# Patient Record
Sex: MALE | Race: WHITE | NOT HISPANIC OR LATINO | Employment: OTHER | URBAN - METROPOLITAN AREA
[De-identification: names, ages, dates, MRNs, and addresses within clinical notes are randomized per-mention and may not be internally consistent; named-entity substitution may affect disease eponyms.]

---

## 2017-09-27 PROBLEM — I49.3 PVC'S (PREMATURE VENTRICULAR CONTRACTIONS): Status: ACTIVE | Noted: 2017-09-27

## 2018-12-14 DIAGNOSIS — I10 ESSENTIAL HYPERTENSION: ICD-10-CM

## 2018-12-14 RX ORDER — HYDROCHLOROTHIAZIDE 12.5 MG/1
12.5 CAPSULE ORAL DAILY
Qty: 30 CAPSULE | Refills: 3 | Status: SHIPPED | OUTPATIENT
Start: 2018-12-14 | End: 2020-02-10

## 2019-01-11 ENCOUNTER — OFFICE VISIT (OUTPATIENT)
Dept: CARDIOLOGY | Facility: CLINIC | Age: 64
End: 2019-01-11
Payer: COMMERCIAL

## 2019-01-11 VITALS
SYSTOLIC BLOOD PRESSURE: 128 MMHG | DIASTOLIC BLOOD PRESSURE: 75 MMHG | WEIGHT: 215 LBS | BODY MASS INDEX: 30.1 KG/M2 | HEART RATE: 59 BPM | HEIGHT: 71 IN

## 2019-01-11 DIAGNOSIS — E01.0 THYROMEGALY: ICD-10-CM

## 2019-01-11 DIAGNOSIS — I49.1 PREMATURE ATRIAL CONTRACTIONS: ICD-10-CM

## 2019-01-11 DIAGNOSIS — I48.92 ATRIAL FLUTTER, UNSPECIFIED TYPE: Primary | ICD-10-CM

## 2019-01-11 DIAGNOSIS — I48.91 ATRIAL FIBRILLATION: ICD-10-CM

## 2019-01-11 DIAGNOSIS — I49.3 PVC'S (PREMATURE VENTRICULAR CONTRACTIONS): ICD-10-CM

## 2019-01-11 DIAGNOSIS — I10 ESSENTIAL HYPERTENSION: ICD-10-CM

## 2019-01-11 PROCEDURE — 3008F PR BODY MASS INDEX (BMI) DOCUMENTED: ICD-10-PCS | Mod: CPTII,S$GLB,, | Performed by: INTERNAL MEDICINE

## 2019-01-11 PROCEDURE — 99214 PR OFFICE/OUTPT VISIT, EST, LEVL IV, 30-39 MIN: ICD-10-PCS | Mod: S$GLB,,, | Performed by: INTERNAL MEDICINE

## 2019-01-11 PROCEDURE — 93000 ELECTROCARDIOGRAM COMPLETE: CPT | Mod: 76,S$GLB,, | Performed by: INTERNAL MEDICINE

## 2019-01-11 PROCEDURE — 3008F BODY MASS INDEX DOCD: CPT | Mod: CPTII,S$GLB,, | Performed by: INTERNAL MEDICINE

## 2019-01-11 PROCEDURE — 93000 EKG 12-LEAD: ICD-10-PCS | Mod: S$GLB,,, | Performed by: INTERNAL MEDICINE

## 2019-01-11 PROCEDURE — 99214 OFFICE O/P EST MOD 30 MIN: CPT | Mod: S$GLB,,, | Performed by: INTERNAL MEDICINE

## 2019-01-11 PROCEDURE — 3078F PR MOST RECENT DIASTOLIC BLOOD PRESSURE < 80 MM HG: ICD-10-PCS | Mod: CPTII,S$GLB,, | Performed by: INTERNAL MEDICINE

## 2019-01-11 PROCEDURE — 93000 ELECTROCARDIOGRAM COMPLETE: CPT | Mod: S$GLB,,, | Performed by: INTERNAL MEDICINE

## 2019-01-11 PROCEDURE — 3078F DIAST BP <80 MM HG: CPT | Mod: CPTII,S$GLB,, | Performed by: INTERNAL MEDICINE

## 2019-01-11 PROCEDURE — 3074F SYST BP LT 130 MM HG: CPT | Mod: CPTII,S$GLB,, | Performed by: INTERNAL MEDICINE

## 2019-01-11 PROCEDURE — 3074F PR MOST RECENT SYSTOLIC BLOOD PRESSURE < 130 MM HG: ICD-10-PCS | Mod: CPTII,S$GLB,, | Performed by: INTERNAL MEDICINE

## 2019-01-11 RX ORDER — PANTOPRAZOLE SODIUM 40 MG/1
40 TABLET, DELAYED RELEASE ORAL DAILY
COMMUNITY
End: 2021-03-09

## 2019-01-11 NOTE — PROGRESS NOTES
Subjective:      Patient ID: Jules Aviles is a 63 y.o. male.    Chief Complaint: Follow-up (Hypertension)    HPI:  Works every day in construction.  Feels well.    Review of Systems   Cardiovascular: Negative for chest pain, claudication, dyspnea on exertion, irregular heartbeat, leg swelling, near-syncope, orthopnea, palpitations and syncope.      Pt recently diagnosed with stomach ulcers.  Pt was begun of protonix within the past week.  There is no hx of bleeding.    Pt does not drink alcohol and does not take ASA or NSAID's.    Pt had EGD and colonoscopy with polypectomy.  Past Medical History:   Diagnosis Date    Essential hypertension 12/7/2016        Past Surgical History:   Procedure Laterality Date    APPENDECTOMY      as a child    GALLBLADDER SURGERY  2004    HERNIA REPAIR  2004 & 2005    TONSILLECTOMY, ADENOIDECTOMY      as a child       Family History   Problem Relation Age of Onset    Aneurysm Mother     Heart disease Father        Social History     Socioeconomic History    Marital status: Single     Spouse name: None    Number of children: None    Years of education: None    Highest education level: None   Social Needs    Financial resource strain: None    Food insecurity - worry: None    Food insecurity - inability: None    Transportation needs - medical: None    Transportation needs - non-medical: None   Occupational History    None   Tobacco Use    Smoking status: Never Smoker    Smokeless tobacco: Never Used   Substance and Sexual Activity    Alcohol use: No    Drug use: No    Sexual activity: None   Other Topics Concern    None   Social History Narrative    None       Current Outpatient Medications on File Prior to Visit   Medication Sig Dispense Refill    Bifidobacterium infantis (ALIGN ORAL) Take by mouth once daily.      hydroCHLOROthiazide (MICROZIDE) 12.5 mg capsule Take 1 capsule (12.5 mg total) by mouth once daily. 30 capsule 3    pantoprazole (PROTONIX) 40 MG  "tablet Take 40 mg by mouth once daily.      [DISCONTINUED] aspirin (ECOTRIN) 81 MG EC tablet Take 81 mg by mouth once daily.      [DISCONTINUED] b complex vitamins capsule Take 1 capsule by mouth once daily.      [DISCONTINUED] fish oil-omega-3 fatty acids 300-1,000 mg capsule Take 2 g by mouth once daily.      [DISCONTINUED] vitamin D 1000 units Tab Take 1,000 Units by mouth once daily.       No current facility-administered medications on file prior to visit.        Review of patient's allergies indicates:  No Known Allergies  Objective:     Vitals:    01/11/19 1347   BP: 128/75   BP Location: Right arm   Patient Position: Sitting   BP Method: Large (Automatic)   Pulse: (!) 59   Weight: 97.5 kg (215 lb)   Height: 5' 11" (1.803 m)        Physical Exam   Constitutional: He is oriented to person, place, and time. He appears well-developed and well-nourished. No distress.   Eyes: No scleral icterus.   Neck: No JVD present. Carotid bruit is not present.   Cardiovascular: Normal heart sounds. An irregularly irregular rhythm present. Exam reveals no gallop and no friction rub.   No murmur heard.  Pulmonary/Chest: Effort normal and breath sounds normal. No respiratory distress.   Musculoskeletal: He exhibits no edema.   Neurological: He is alert and oriented to person, place, and time.   Skin: Skin is warm and dry. He is not diaphoretic.   Psychiatric: He has a normal mood and affect. His behavior is normal. Judgment and thought content normal.   Vitals reviewed.     Wt down 2 lbs    ECG today --atrial flutter with ventricular rate in the 90's, otherwise WNL  Assessment:     1. Atrial flutter, unspecified type    2. Essential hypertension    3. Premature atrial contractions    4. PVC's (premature ventricular contractions)    5. Thyromegaly      Plan:   Jules was seen today for follow-up.    Diagnoses and all orders for this visit:    Atrial flutter, unspecified type  -     TSH; Future  -     T4, free; Future  -     " Transthoracic echo (TTE) complete (Cupid Only); Future    Essential hypertension  -     IN OFFICE EKG 12-LEAD (to Muse)  -     CBC auto differential; Future  -     Comprehensive metabolic panel; Future  -     Lipid panel; Future  -     TSH; Future  -     T4, free; Future  -     Transthoracic echo (TTE) complete (Cupid Only); Future    Premature atrial contractions  -     IN OFFICE EKG 12-LEAD (to Muse)    PVC's (premature ventricular contractions)  -     IN OFFICE EKG 12-LEAD (to Muse)    Thyromegaly     Discussed at length with pt the diagnosis of new onset atrial flutter.Pt is asymptomatic.   Discussed risk of thromboembolic stroke and the benefit of anticoagulation (albeit with a risk of bleeding).  Will begin Eliquis    Consider trial of amiodarone after a month of anticoagulation.  Consider electrical cardioversion if amiodarone is not effective.  Consult Dr Rich Berumen at [facility]    Discussed with pt consultation with electrophysiology service and potential role of RFA.    Endocrinology consult for thyromegaly at [facility]    Lab ordered    Echocardiogram    Follow-up in about 4 weeks (around 2/8/2019).

## 2019-01-15 ENCOUNTER — HOSPITAL ENCOUNTER (OUTPATIENT)
Dept: CARDIOLOGY | Facility: OTHER | Age: 64
Discharge: HOME | End: 2019-01-15
Attending: INTERNAL MEDICINE
Payer: COMMERCIAL

## 2019-01-15 DIAGNOSIS — I10 ESSENTIAL HYPERTENSION: ICD-10-CM

## 2019-01-15 DIAGNOSIS — I48.92 ATRIAL FLUTTER, UNSPECIFIED TYPE: ICD-10-CM

## 2019-01-15 LAB
AORTIC ROOT ANNULUS: 3.01 CM
AORTIC VALVE CUSP SEPERATION: 1.93 CM
ASCENDING AORTA: 2.98 CM
AV INDEX (PROSTH): 0.81
AV MEAN GRADIENT: 3.38 MMHG
AV PEAK GRADIENT: 7.84 MMHG
AV VALVE AREA: 2.7 CM2
CV ECHO LV RWT: 0.49 CM
DOP CALC AO PEAK VEL: 1.4 M/S
DOP CALC AO VTI: 26.04 CM
DOP CALC LVOT AREA: 3.33 CM2
DOP CALC LVOT DIAMETER: 2.06 CM
DOP CALC LVOT STROKE VOLUME: 70.39 CM3
DOP CALCLVOT PEAK VEL VTI: 21.13 CM
E WAVE DECELERATION TIME: 250.41 MSEC
E/A RATIO: 1.05
E/E' RATIO: 6.63
ECHO LV POSTERIOR WALL: 1.2 CM (ref 0.6–1.1)
FRACTIONAL SHORTENING: 24 % (ref 28–44)
INTERVENTRICULAR SEPTUM: 1.3 CM (ref 0.6–1.1)
LA MAJOR: 5.62 CM
LA MINOR: 6.03 CM
LA WIDTH: 4.01 CM
LEFT ATRIUM SIZE: 5 CM
LEFT ATRIUM VOLUME: 99.15 CM3
LEFT INTERNAL DIMENSION IN SYSTOLE: 3.74 CM (ref 2.1–4)
LEFT VENTRICLE DIASTOLIC VOLUME: 113.89 ML
LEFT VENTRICLE SYSTOLIC VOLUME: 59.58 ML
LEFT VENTRICULAR INTERNAL DIMENSION IN DIASTOLE: 4.92 CM (ref 3.5–6)
LEFT VENTRICULAR MASS: 241.4 G
LV LATERAL E/E' RATIO: 5.73
LV SEPTAL E/E' RATIO: 7.88
MV PEAK A VEL: 0.6 M/S
MV PEAK E VEL: 0.63 M/S
PISA TR MAX VEL: 2.52 M/S
PV PEAK VELOCITY: 0.88 CM/S
RA MAJOR: 5.2 CM
RA PRESSURE: 3 MMHG
RA WIDTH: 3.98 CM
RIGHT VENTRICULAR END-DIASTOLIC DIMENSION: 2.8 CM
SINUS: 3.29 CM
STJ: 2.79 CM
TDI LATERAL: 0.11
TDI SEPTAL: 0.08
TDI: 0.1
TR MAX PG: 25.4 MMHG
TV REST PULMONARY ARTERY PRESSURE: 28 MMHG

## 2019-01-15 PROCEDURE — 93306 TTE W/DOPPLER COMPLETE: CPT

## 2019-01-15 PROCEDURE — 93306 TTE W/DOPPLER COMPLETE: CPT | Mod: 26,,, | Performed by: INTERNAL MEDICINE

## 2019-01-15 PROCEDURE — 93306 TRANSTHORACIC ECHO (TTE) COMPLETE (CUPID ONLY): ICD-10-PCS | Mod: 26,,, | Performed by: INTERNAL MEDICINE

## 2019-01-16 ENCOUNTER — TELEPHONE (OUTPATIENT)
Dept: CARDIOLOGY | Facility: CLINIC | Age: 64
End: 2019-01-16

## 2019-01-16 NOTE — TELEPHONE ENCOUNTER
Echocardiogram shows normal LVEF. Pt is back in NSR.   Pt reassured.    Echo shows LVH and dilated LA.    Lab shows low HDL and borderline LDL.  CBC and CMP and TFT's ok--PT reassured.  Elevated T bili prob Jeanmarei's    EGD report from Dr Reed report reviewed.  Pt had ulcers in duodenum and polyp in colon.. Discussed increased risk of bleeding with Eliquis vs risk of stroke off Eliquis>  Pt knows to stop Eliquis and seek medical attention prn bleeding or black stools.    Consults with arrhythmia and endocrinology pending.

## 2019-01-18 DIAGNOSIS — I48.92 ATRIAL FLUTTER, UNSPECIFIED TYPE: Primary | ICD-10-CM

## 2019-01-22 ENCOUNTER — HOSPITAL ENCOUNTER (OUTPATIENT)
Dept: CARDIOLOGY | Facility: CLINIC | Age: 64
Discharge: HOME | End: 2019-01-22
Payer: COMMERCIAL

## 2019-01-22 ENCOUNTER — INITIAL CONSULT (OUTPATIENT)
Dept: ELECTROPHYSIOLOGY | Facility: CLINIC | Age: 64
End: 2019-01-22
Payer: COMMERCIAL

## 2019-01-22 VITALS
BODY MASS INDEX: 29.92 KG/M2 | DIASTOLIC BLOOD PRESSURE: 76 MMHG | WEIGHT: 209 LBS | HEART RATE: 58 BPM | SYSTOLIC BLOOD PRESSURE: 130 MMHG | HEIGHT: 70 IN

## 2019-01-22 DIAGNOSIS — I48.92 ATRIAL FLUTTER, UNSPECIFIED TYPE: ICD-10-CM

## 2019-01-22 DIAGNOSIS — I10 ESSENTIAL HYPERTENSION: ICD-10-CM

## 2019-01-22 DIAGNOSIS — I48.92 ATRIAL FLUTTER, UNSPECIFIED TYPE: Primary | ICD-10-CM

## 2019-01-22 DIAGNOSIS — I49.1 PREMATURE ATRIAL CONTRACTIONS: ICD-10-CM

## 2019-01-22 PROCEDURE — 3075F SYST BP GE 130 - 139MM HG: CPT | Mod: CPTII,S$GLB,, | Performed by: INTERNAL MEDICINE

## 2019-01-22 PROCEDURE — 93005 ELECTROCARDIOGRAM TRACING: CPT | Mod: S$GLB,,, | Performed by: INTERNAL MEDICINE

## 2019-01-22 PROCEDURE — 3078F DIAST BP <80 MM HG: CPT | Mod: CPTII,S$GLB,, | Performed by: INTERNAL MEDICINE

## 2019-01-22 PROCEDURE — 99999 PR PBB SHADOW E&M-EST. PATIENT-LVL III: CPT | Mod: PBBFAC,,, | Performed by: INTERNAL MEDICINE

## 2019-01-22 PROCEDURE — 93010 RHYTHM STRIP: ICD-10-PCS | Mod: S$GLB,,, | Performed by: INTERNAL MEDICINE

## 2019-01-22 PROCEDURE — 93005 RHYTHM STRIP: ICD-10-PCS | Mod: S$GLB,,, | Performed by: INTERNAL MEDICINE

## 2019-01-22 PROCEDURE — 93010 ELECTROCARDIOGRAM REPORT: CPT | Mod: S$GLB,,, | Performed by: INTERNAL MEDICINE

## 2019-01-22 PROCEDURE — 99999 PR PBB SHADOW E&M-EST. PATIENT-LVL III: ICD-10-PCS | Mod: PBBFAC,,, | Performed by: INTERNAL MEDICINE

## 2019-01-22 PROCEDURE — 3078F PR MOST RECENT DIASTOLIC BLOOD PRESSURE < 80 MM HG: ICD-10-PCS | Mod: CPTII,S$GLB,, | Performed by: INTERNAL MEDICINE

## 2019-01-22 PROCEDURE — 99205 PR OFFICE/OUTPT VISIT, NEW, LEVL V, 60-74 MIN: ICD-10-PCS | Mod: S$GLB,,, | Performed by: INTERNAL MEDICINE

## 2019-01-22 PROCEDURE — 99205 OFFICE O/P NEW HI 60 MIN: CPT | Mod: S$GLB,,, | Performed by: INTERNAL MEDICINE

## 2019-01-22 PROCEDURE — 3075F PR MOST RECENT SYSTOLIC BLOOD PRESS GE 130-139MM HG: ICD-10-PCS | Mod: CPTII,S$GLB,, | Performed by: INTERNAL MEDICINE

## 2019-01-22 PROCEDURE — 3008F BODY MASS INDEX DOCD: CPT | Mod: CPTII,S$GLB,, | Performed by: INTERNAL MEDICINE

## 2019-01-22 PROCEDURE — 3008F PR BODY MASS INDEX (BMI) DOCUMENTED: ICD-10-PCS | Mod: CPTII,S$GLB,, | Performed by: INTERNAL MEDICINE

## 2019-01-22 NOTE — PROGRESS NOTES
Subjective:    Patient ID:  Jules Aviles is a 63 y.o. male who presents for evaluation of Atrial Flutter    Referring Cardiologist: Aaron Mathis MD    HPI  I had the pleasure of seeing Mr. Aviles today in our electrophysiology clinic in consultation for his atrial arrhythmia. As you are aware he is a pleasant 63 year-old man with hypertension, duodenal ulcers and newly diagnosed atrial flutter.  He saw Dr. Mathis on 1/11/2019 and was noted to be in atrial flutter with variable AV conduction. Appearance of flutter waves were most consistent with reverse typical atrial flutter however atrial flutter wave cycle length was ~200msec. He was initiated on eliquis. An echocardiogram was then performed on 1/15/2019 noting a normal LVEF and left atrial enlargement. During that study he was noted to be in sinus rhythm.    I reviewed prior ECGs available in media tab which note sinus rhythm with intermittent PACs and PVCs.    My interpretation of today's in clinic ECG is sinus rhythm with first degree AV block.    Review of Systems   Constitution: Negative for fever, weakness and malaise/fatigue.   HENT: Negative for congestion and sore throat.    Eyes: Negative for blurred vision and visual disturbance.   Cardiovascular: Negative for chest pain, dyspnea on exertion, irregular heartbeat, orthopnea and palpitations.   Respiratory: Negative for cough and shortness of breath.    Hematologic/Lymphatic: Negative for bleeding problem. Does not bruise/bleed easily.   Skin: Negative.    Musculoskeletal: Negative.    Gastrointestinal: Positive for heartburn. Negative for hematochezia and melena.   Neurological: Negative for dizziness, focal weakness and light-headedness.        Objective:    Physical Exam   Constitutional: He is oriented to person, place, and time. He appears well-developed and well-nourished. No distress.   HENT:   Head: Normocephalic and atraumatic.   Eyes: Conjunctivae are normal. Right eye exhibits no discharge.  Left eye exhibits no discharge.   Neck: Neck supple. No JVD present.   Cardiovascular: Normal rate and regular rhythm. Exam reveals no gallop and no friction rub.   No murmur heard.  Pulmonary/Chest: Effort normal and breath sounds normal. No respiratory distress. He has no wheezes. He has no rales.   Abdominal: Soft. Bowel sounds are normal. He exhibits no distension. There is no tenderness. There is no rebound.   Musculoskeletal: He exhibits no edema.   Neurological: He is alert and oriented to person, place, and time.   Skin: Skin is warm and dry. He is not diaphoretic.   Psychiatric: He has a normal mood and affect. His behavior is normal. Thought content normal.         Assessment:       1. Atrial flutter, unspecified type    2. Essential hypertension    3. Premature atrial contractions         Plan:       In summary, Mr. Aviles is a pleasant 63 year-old man with hypertension, duodenal ulcers and newly diagnosed paroxysmal symptomatic atrial flutter. Review of his ECG is not consistent with typical counterclockwise CTI dependent flutter. Could be reverse typical CTI flutter. I had a long discussion with the patient about the pathophysiology and risks of atrial flutter and its basic pathophysiology, including its health implications and treatment options. We also discussed atrial fibrillation's relationship to atrial flutter and how 50% of patients are diagnosed with AF within 5 years. We also addressed the need for CVA (stroke) prophylaxis with aspirin versus oral anticoagulation (warfarin vs DOACs, discussed bleeding risks, and need to come to the ER for any head trauma for CT scanning even if asymptomatic). His [id number] score is 1 however he also has duodenal ulcers. I also discussed the goal to reduce symptomatic arrhythmic episodes by pharmacologic and/or procedural methods and utilizing a rhythm versus a rate control strategy. I recommend EPS and RFA of the CTI. Would attempt to induce if he presents in  sinus rhythm. I spent about a half hour discussing the nature of EP study and ablation. We discussed risks and benefits at length. Our discussion included, but was not limited to the risk of death, infection, bleeding, stroke, MI, cardiac perforation, vascular injury, embolism, cardiac tamponade, skin burns, and other organic injury including the possibility for need for surgery or pacemaker implantation.    Plan  EPS/RFA of CTI  Anesthesia for sedation  JONA day of, cancel if in sinus rhythm  SARA for mapping  Hold eliquis AM of procedure    Thank you for allowing me to participate in the care of this patient. Please do not hesitate to call me with any questions or concerns.    Rich Berumen MD, PhD  Cardiac Electrophysiology

## 2019-01-22 NOTE — H&P (VIEW-ONLY)
Subjective:    Patient ID:  Jules Aviles is a 63 y.o. male who presents for evaluation of Atrial Flutter    Referring Cardiologist: Aaron Mathis MD    HPI  I had the pleasure of seeing Mr. Aviles today in our electrophysiology clinic in consultation for his atrial arrhythmia. As you are aware he is a pleasant 63 year-old man with hypertension, duodenal ulcers and newly diagnosed atrial flutter.  He saw Dr. Mathis on 1/11/2019 and was noted to be in atrial flutter with variable AV conduction. Appearance of flutter waves were most consistent with reverse typical atrial flutter however atrial flutter wave cycle length was ~200msec. He was initiated on eliquis. An echocardiogram was then performed on 1/15/2019 noting a normal LVEF and left atrial enlargement. During that study he was noted to be in sinus rhythm.    I reviewed prior ECGs available in media tab which note sinus rhythm with intermittent PACs and PVCs.    My interpretation of today's in clinic ECG is sinus rhythm with first degree AV block.    Review of Systems   Constitution: Negative for fever, weakness and malaise/fatigue.   HENT: Negative for congestion and sore throat.    Eyes: Negative for blurred vision and visual disturbance.   Cardiovascular: Negative for chest pain, dyspnea on exertion, irregular heartbeat, orthopnea and palpitations.   Respiratory: Negative for cough and shortness of breath.    Hematologic/Lymphatic: Negative for bleeding problem. Does not bruise/bleed easily.   Skin: Negative.    Musculoskeletal: Negative.    Gastrointestinal: Positive for heartburn. Negative for hematochezia and melena.   Neurological: Negative for dizziness, focal weakness and light-headedness.        Objective:    Physical Exam   Constitutional: He is oriented to person, place, and time. He appears well-developed and well-nourished. No distress.   HENT:   Head: Normocephalic and atraumatic.   Eyes: Conjunctivae are normal. Right eye exhibits no discharge.  Left eye exhibits no discharge.   Neck: Neck supple. No JVD present.   Cardiovascular: Normal rate and regular rhythm. Exam reveals no gallop and no friction rub.   No murmur heard.  Pulmonary/Chest: Effort normal and breath sounds normal. No respiratory distress. He has no wheezes. He has no rales.   Abdominal: Soft. Bowel sounds are normal. He exhibits no distension. There is no tenderness. There is no rebound.   Musculoskeletal: He exhibits no edema.   Neurological: He is alert and oriented to person, place, and time.   Skin: Skin is warm and dry. He is not diaphoretic.   Psychiatric: He has a normal mood and affect. His behavior is normal. Thought content normal.         Assessment:       1. Atrial flutter, unspecified type    2. Essential hypertension    3. Premature atrial contractions         Plan:       In summary, Mr. Aviles is a pleasant 63 year-old man with hypertension, duodenal ulcers and newly diagnosed paroxysmal symptomatic atrial flutter. Review of his ECG is not consistent with typical counterclockwise CTI dependent flutter. Could be reverse typical CTI flutter. I had a long discussion with the patient about the pathophysiology and risks of atrial flutter and its basic pathophysiology, including its health implications and treatment options. We also discussed atrial fibrillation's relationship to atrial flutter and how 50% of patients are diagnosed with AF within 5 years. We also addressed the need for CVA (stroke) prophylaxis with aspirin versus oral anticoagulation (warfarin vs DOACs, discussed bleeding risks, and need to come to the ER for any head trauma for CT scanning even if asymptomatic). His [id number] score is 1 however he also has duodenal ulcers. I also discussed the goal to reduce symptomatic arrhythmic episodes by pharmacologic and/or procedural methods and utilizing a rhythm versus a rate control strategy. I recommend EPS and RFA of the CTI. Would attempt to induce if he presents in  sinus rhythm. I spent about a half hour discussing the nature of EP study and ablation. We discussed risks and benefits at length. Our discussion included, but was not limited to the risk of death, infection, bleeding, stroke, MI, cardiac perforation, vascular injury, embolism, cardiac tamponade, skin burns, and other organic injury including the possibility for need for surgery or pacemaker implantation.    Plan  EPS/RFA of CTI  Anesthesia for sedation  JONA day of, cancel if in sinus rhythm  SARA for mapping  Hold eliquis AM of procedure    Thank you for allowing me to participate in the care of this patient. Please do not hesitate to call me with any questions or concerns.    Rich Berumen MD, PhD  Cardiac Electrophysiology

## 2019-01-22 NOTE — PATIENT INSTRUCTIONS
Understanding Atrial Flutter    An arrhythmia is any problem with the speed or pattern of the heartbeat. Atrial flutter is a type of arrhythmia. It causes the heart to beat faster than normal. Atrial flutter can increase the risk for certain serious problems, such as stroke. Your healthcare provider will need to monitor and manage it.  What happens during atrial flutter?  The heart has an electrical system that sends signals to control the heartbeat. As signals move through the heart, they tell the hearts upper chambers (atria) and lower chambers (ventricles) when to squeeze (contract) and relax. This lets blood move through the heart and out to the body and lungs.  With atrial flutter, an abnormal electrical loop (circuit) causes the atria to contract too quickly. This results in a fast, steady heartbeat. Because the atria are not meron normally, blood may pool in the atria instead of moving into the ventricles. This can increase the risk for blood clots and stroke. The ventricles also contract too quickly. As a result, they may not pump blood to the body and lungs as well as they should. This can weaken the heart muscle over time and cause heart failure.   What causes of atrial flutter?  Many things can cause atrial flutter. They include:  · Coronary artery disease  · Heart valve disease  · Heart attack  · Heart surgery  · High blood pressure  · Thyroid disease  · Diabetes  · Lung disease  · Sleep apnea  · Heavy alcohol use  What are the symptoms of atrial flutter?  Atrial flutter may or may not cause symptoms. If symptoms do occur, they may include:  · A fast, pounding heartbeat  · Shortness of breath  · Tiredness  · Dizziness or fainting  · Chest pain  How is atrial flutter treated?  Treatment for atrial flutter may include any of the options below.  · Medicines. You may be prescribed:  ¨ Heart rate medicines to help slow down the heartbeat  ¨ Heart rhythm medicines to help the heart beat more  regularly  ¨ Anti-clotting medicines to help reduce the risk for blood clots and stroke  · Electrical cardioversion. Your healthcare provider uses special pads or paddles to send one or more brief electrical shocks to the heart. This can help reset the heartbeat to normal.  · Ablation. A long thin tube called a catheter is thread through a blood vessel to the heart. There, the catheter sends out hot or cold energy to the areas causing the abnormal signals. This energy destroys the problem tissue or cells and cures atrial flutter.  If the heart rate and rhythm cant be controlled, you may need ablation and a pacemaker. Together these help control the heart rate and regularity of the heartbeat.  What are the complications of atrial flutter?  These can include:  · Blood clots  · Stroke  · Heart failure. This problem occurs when the heart muscle weakens so much that it no longer pumps blood well.  When should I call my healthcare provider?  Call your healthcare provider right away if you have any of these:  · Symptoms that dont get better with treatment, or get worse  · New symptoms   Date Last Reviewed: 5/1/2016  © 8613-5786 [employer]. [address] [place] [address]. All rights reserved. This information is not intended as a substitute for professional medical care. Always follow your healthcare professional's instructions.

## 2019-01-22 NOTE — LETTER
January 22, 2019      Bharat Mathis MD  [address]  [address]  [place] [address]           [facility] - Arrhythmia  [address]  [facility] [address]  Phone: [phone]  Fax: [phone]          Patient: Jules Aviles   MR Number: [id number]   YOB: 1955   Date of Visit: 1/22/2019       Dear Dr. Bharat Mathis:    Thank you for referring Jules Aviles to me for evaluation. Attached you will find relevant portions of my assessment and plan of care.    If you have questions, please do not hesitate to call me. I look forward to following Jules Aviles along with you.    Sincerely,    Rich Berumen MD    Enclosure  CC:  No Recipients    If you would like to receive this communication electronically, please contact [email or web address] or [phone] to request more information on [employer] access.    For providers and/or their staff who would like to refer a patient to [facility], please contact us through our one-stop-shop provider referral line, [facility], at [phone].    If you feel you have received this communication in error or would no longer like to receive these types of communications, please e-mail [email or web address]

## 2019-01-25 ENCOUNTER — TELEPHONE (OUTPATIENT)
Dept: ELECTROPHYSIOLOGY | Facility: CLINIC | Age: 64
End: 2019-01-25

## 2019-01-25 NOTE — TELEPHONE ENCOUNTER
Spoke to Mr. Vicente to offer to move procedure up to 1/28 due to a cancellation.  Mr. Vicente reports he is glad we called as he would like to push procedure date back.     Procedure date moved to 2/18/19.  Spoke to Sherly and moved JONA to 2/18/19.    Instructions to be mailed once completed.  Mr. Tonglen verbalizes understanding and appreciates call.

## 2019-02-05 ENCOUNTER — TELEPHONE (OUTPATIENT)
Dept: ELECTROPHYSIOLOGY | Facility: CLINIC | Age: 64
End: 2019-02-05

## 2019-02-05 DIAGNOSIS — I48.92 ATRIAL FLUTTER, UNSPECIFIED TYPE: Primary | ICD-10-CM

## 2019-02-05 NOTE — TELEPHONE ENCOUNTER
----- Message from Sharifa Ram MA sent at 2/5/2019 11:36 AM CST -----  Contact: self  Pt. asking to speak to you about labs to schedule before he has procedure. Wants labs done on 2/13 in [facility] . Please call  pt.

## 2019-02-05 NOTE — TELEPHONE ENCOUNTER
Spoke to Mr. Aviles.  Pre-procedure labs re-scheduled to [facility] location per request.     Mr. Aviles verbalizes understanding and appreciates call.

## 2019-02-10 ENCOUNTER — HOSPITAL ENCOUNTER (EMERGENCY)
Facility: HOSPITAL | Age: 64
Discharge: HOME | End: 2019-02-10
Attending: EMERGENCY MEDICINE
Payer: COMMERCIAL

## 2019-02-10 VITALS
HEIGHT: 70 IN | BODY MASS INDEX: 30.06 KG/M2 | WEIGHT: 210 LBS | DIASTOLIC BLOOD PRESSURE: 72 MMHG | RESPIRATION RATE: 20 BRPM | HEART RATE: 62 BPM | OXYGEN SATURATION: 100 % | SYSTOLIC BLOOD PRESSURE: 152 MMHG | TEMPERATURE: 100 F

## 2019-02-10 DIAGNOSIS — E87.6 HYPOKALEMIA: Primary | ICD-10-CM

## 2019-02-10 DIAGNOSIS — I10 HYPERTENSION: ICD-10-CM

## 2019-02-10 LAB
ALBUMIN SERPL-MCNC: 4.5 G/DL (ref 3.3–5.5)
ALP SERPL-CCNC: 76 U/L (ref 42–141)
BILIRUB SERPL-MCNC: 1.2 MG/DL (ref 0.2–1.6)
BILIRUBIN, POC UA: NEGATIVE
BLOOD, POC UA: NEGATIVE
BUN SERPL-MCNC: 12 MG/DL (ref 7–22)
CALCIUM SERPL-MCNC: 9.9 MG/DL (ref 8–10.3)
CHLORIDE SERPL-SCNC: 104 MMOL/L (ref 98–108)
CLARITY, POC UA: CLEAR
COLOR, POC UA: YELLOW
CREAT SERPL-MCNC: 0.9 MG/DL (ref 0.6–1.2)
GLUCOSE SERPL-MCNC: 112 MG/DL (ref 73–118)
GLUCOSE, POC UA: NEGATIVE
KETONES, POC UA: NEGATIVE
LEUKOCYTE EST, POC UA: NEGATIVE
NITRITE, POC UA: NEGATIVE
PH UR STRIP: 5.5 [PH]
POC ALT (SGPT): 31 U/L (ref 10–47)
POC AST (SGOT): 31 U/L (ref 11–38)
POC CARDIAC TROPONIN I: 0 NG/ML
POC PTINR: 1 (ref 0.9–1.2)
POC PTWBT: 12.3 SEC (ref 9.7–14.3)
POC TCO2: 28 MMOL/L (ref 18–33)
POTASSIUM BLD-SCNC: 3.3 MMOL/L (ref 3.6–5.1)
PROTEIN, POC UA: NEGATIVE
PROTEIN, POC: 7.3 G/DL (ref 6.4–8.1)
SAMPLE: NORMAL
SAMPLE: NORMAL
SODIUM BLD-SCNC: 144 MMOL/L (ref 128–145)
SPECIFIC GRAVITY, POC UA: 1.01
UROBILINOGEN, POC UA: 0.2 E.U./DL

## 2019-02-10 PROCEDURE — 84484 ASSAY OF TROPONIN QUANT: CPT | Mod: ER

## 2019-02-10 PROCEDURE — 93010 EKG 12-LEAD: ICD-10-PCS | Mod: ,,, | Performed by: INTERNAL MEDICINE

## 2019-02-10 PROCEDURE — 85025 COMPLETE CBC W/AUTO DIFF WBC: CPT | Mod: ER

## 2019-02-10 PROCEDURE — 93005 ELECTROCARDIOGRAM TRACING: CPT | Mod: ER

## 2019-02-10 PROCEDURE — 99285 EMERGENCY DEPT VISIT HI MDM: CPT | Mod: ER

## 2019-02-10 PROCEDURE — 93010 ELECTROCARDIOGRAM REPORT: CPT | Mod: ,,, | Performed by: INTERNAL MEDICINE

## 2019-02-10 PROCEDURE — 85610 PROTHROMBIN TIME: CPT | Mod: ER

## 2019-02-10 PROCEDURE — 80053 COMPREHEN METABOLIC PANEL: CPT | Mod: ER

## 2019-02-10 PROCEDURE — 25000003 PHARM REV CODE 250: Mod: ER | Performed by: EMERGENCY MEDICINE

## 2019-02-10 PROCEDURE — 81003 URINALYSIS AUTO W/O SCOPE: CPT | Mod: ER

## 2019-02-10 RX ORDER — POTASSIUM CHLORIDE 20 MEQ/1
40 TABLET, EXTENDED RELEASE ORAL
Status: COMPLETED | OUTPATIENT
Start: 2019-02-10 | End: 2019-02-10

## 2019-02-10 RX ORDER — METHOCARBAMOL 750 MG/1
1500 TABLET, FILM COATED ORAL 3 TIMES DAILY PRN
Qty: 30 TABLET | Refills: 0 | Status: SHIPPED | OUTPATIENT
Start: 2019-02-10 | End: 2019-02-15

## 2019-02-10 RX ORDER — ASPIRIN 325 MG
325 TABLET ORAL
Status: COMPLETED | OUTPATIENT
Start: 2019-02-10 | End: 2019-02-10

## 2019-02-10 RX ORDER — NAPROXEN 375 MG/1
375 TABLET ORAL 2 TIMES DAILY PRN
Qty: 20 TABLET | Refills: 0 | Status: SHIPPED | OUTPATIENT
Start: 2019-02-10 | End: 2019-02-13

## 2019-02-10 RX ADMIN — POTASSIUM CHLORIDE 40 MEQ: 1500 TABLET, EXTENDED RELEASE ORAL at 06:02

## 2019-02-10 RX ADMIN — ASPIRIN 325 MG ORAL TABLET 325 MG: 325 PILL ORAL at 05:02

## 2019-02-10 NOTE — ED PROVIDER NOTES
"Encounter Date: 2/10/2019    SCRIBE #1 NOTE: I, Reyes Chatman, am scribing for, and in the presence of,  Dr. Hathaway. I have scribed the following portions of the note - Other sections scribed: HPI, ROS, PE.       History     Chief Complaint   Patient presents with    Hypertension     pt reports /93, monitors bp at home and noted it was higher than normal today. reports some pain to the back of his head     This is a 63 y.o. male who presents to the ED with a complaint of pain to the occipital head (resolved) that began earlier today. Pt is concerned due to an elevated BP reading of 174/93 at home today. Pt states that he working to get scheduled for an Ablation due to Atrial Fibrillation. Pt also reports lighthdeadedness, but suspects this is due to his "flutter". Pt has an appointment with his cardiologist on 2/13/19. Pt has been taking Eliquis since dx 'd with Afib. Pt's PMHx includes his mother having a brain aneurysm. Pt suspects that he may be stressed due to his high BP reading and that he checked his BP many times today. Pt admits that he is anxious. He admits to a diet low in sodium.       The history is provided by the patient.     Review of patient's allergies indicates:  No Known Allergies  Past Medical History:   Diagnosis Date    Essential hypertension 12/7/2016     Past Surgical History:   Procedure Laterality Date    APPENDECTOMY      as a child    GALLBLADDER SURGERY  2004    HERNIA REPAIR  2004 & 2005    TONSILLECTOMY, ADENOIDECTOMY      as a child     Family History   Problem Relation Age of Onset    Aneurysm Mother     Heart disease Father      Social History     Tobacco Use    Smoking status: Never Smoker    Smokeless tobacco: Never Used   Substance Use Topics    Alcohol use: No    Drug use: No     Review of Systems   Musculoskeletal: Negative for neck pain.   Neurological: Positive for light-headedness and headaches (Resolved).   Psychiatric/Behavioral: The patient is " nervous/anxious.    All other systems reviewed and are negative.      Physical Exam     Initial Vitals [02/10/19 1701]   BP Pulse Resp Temp SpO2   (!) 176/77 69 18 99.5 °F (37.5 °C) 99 %      MAP       --         The patient granted permission for examination.     Physical Exam    Nursing note and vitals reviewed.  Constitutional: He appears well-developed and well-nourished.   HENT:   Head: Normocephalic and atraumatic.   Eyes: Conjunctivae and EOM are normal. Pupils are equal, round, and reactive to light.   Neck: Normal range of motion. Neck supple.   Cardiovascular: Normal rate, regular rhythm, normal heart sounds and intact distal pulses. Exam reveals no gallop and no friction rub.    No murmur heard.  Pulses:       Radial pulses are 2+ on the right side, and 2+ on the left side.   Pulmonary/Chest: Breath sounds normal. No respiratory distress. He has no wheezes. He has no rhonchi. He has no rales. He exhibits no tenderness.   Abdominal: Soft. Bowel sounds are normal. He exhibits no distension and no mass. There is no tenderness. There is no rebound and no guarding.   Musculoskeletal: Normal range of motion.   Neurological: He is alert and oriented to person, place, and time. He has normal strength. No cranial nerve deficit (II-XII intact) or sensory deficit.   Sensation is grossly intact. Strength is 5/5.    Skin: Skin is warm and dry.   Psychiatric: His behavior is normal. His mood appears anxious.         ED Course   Procedures  Labs Reviewed   POCT URINALYSIS W/O SCOPE - Abnormal; Notable for the following components:       Result Value    Glucose, UA Negative (*)     Bilirubin, UA Negative (*)     Ketones, UA Negative (*)     Blood, UA Negative (*)     Protein, UA Negative (*)     Nitrite, UA Negative (*)     Leukocytes, UA Negative (*)     All other components within normal limits   POCT CMP - Abnormal; Notable for the following components:    POC Potassium 3.3 (*)     All other components within normal  limits   TROPONIN ISTAT   POCT CBC   POCT URINALYSIS W/O SCOPE   POCT CMP   POCT PROTIME-INR   POCT TROPONIN   ISTAT PROCEDURE     EKG Readings: (Independently Interpreted)   Initial Reading: No STEMI. Previous EKG Date: When compared to prior EKG, 1/11/19, rate has decreased by 26 BPM today. Rhythm: Normal Sinus Rhythm. Heart Rate: 65. Axis: Left Axis Deviation. Other Impression: Abnormal EKG, septal infarct age undetermined, QTC is 420       Imaging Results          CT Head Without Contrast (Final result)  Result time 02/10/19 17:34:43    Final result by Jn Carballo MD (02/10/19 17:34:43)                 Impression:      1. No acute intracranial abnormalities noting sequela of chronic microvascular ischemic change and senescent change.      Electronically signed by: Jn Carballo MD  Date:    02/10/2019  Time:    17:34             Narrative:    EXAMINATION:  CT HEAD WITHOUT CONTRAST    CLINICAL HISTORY:  Headache, acute, norm neuro exam;    TECHNIQUE:  Low dose axial images were obtained through the head.  Coronal and sagittal reformations were also performed. Contrast was not administered.    COMPARISON:  None.    FINDINGS:  There is generalized cerebral volume loss.  There is hypoattenuation in a periventricular fashion, likely sequela of chronic microvascular ischemic change.  There is no evidence of acute major vascular territory infarct, hemorrhage, or mass.  There is no hydrocephalus.  There are no abnormal extra-axial fluid collections.  The paranasal sinuses and mastoid air cells are clear, and there is no evidence of calvarial fracture.  The visualized soft tissues are are remarkable for radiopaque foreign body within the right parietal soft tissues..                               X-Ray Chest PA And Lateral (Final result)  Result time 02/10/19 18:07:57    Final result by Shannan Salazar MD (02/10/19 18:07:57)                 Impression:      No acute abnormality.  5 mm nodular density  projecting over the 1st rib.  It is unclear whether not this is arising from the actual bone or the parenchyma.  Per Fleischner criteria, follow-up may be performed optional in 1 year.      Electronically signed by: Shannan Salazar  Date:    02/10/2019  Time:    18:07             Narrative:    EXAMINATION:  XR CHEST PA AND LATERAL    CLINICAL HISTORY:  Hypertension;    TECHNIQUE:  PA and lateral views of the chest were performed.    COMPARISON:  None    FINDINGS:  There is a 5 mm nodular density projecting over the left 1st rib, which is nonspecific.  Otherwise, with normal appearance of pulmonary vasculature and no pleural effusion or pneumothorax.    The cardiac silhouette is normal in size. The hilar and mediastinal contours are unremarkable.                                 Medical Decision Making:   Clinical Tests:   Lab Tests: Ordered  Radiological Study: Ordered  Medical Tests: Ordered  Medical decision making   Chief complaint:  Elevated blood pressure and posterior headache.  Treatment in the ED Physical Exam.  Patient reports total resolution of symptoms spontaneously  Discussed labs, and imaging results.    Fill and take prescriptions as directed.  Return to the ED if symptoms worsen or do not resolve.   Answered questions and discussed discharge plan.    Patient feels much better and is ready for discharge.  Follow up with PCP in 1 days.            Scribe Attestation:   Scribe #1: I performed the above scribed service and the documentation accurately describes the services I performed. I attest to the accuracy of the note.     I, Dr. Mariangel Hathaway, personally performed the services described in this documentation. This document was produced by a scribe under my direction and in my presence. All medical record entries made by the scribe were at my direction and in my presence.  I have reviewed the chart and agree that the record reflects my personal performance and is accurate and complete. Mariangel Dhlaiwal  DO [facility].     02/10/2019 6:13 PM             Clinical Impression:     1. Hypokalemia    2. Hypertension                                  Mariangel Hathaway DO  02/14/19 8725

## 2019-02-11 NOTE — PROGRESS NOTES
ABLATION EDUCATION CHECKLIST    PRE-PROCEDURE TESTING:    February 13, 2019 at 11:00 am  Pre-Procedure labs have been ordered for you at:  [facility]   · Be sure to arrive at your scheduled time. YOU DO NOT HAVE TO FAST FOR THIS LAB WORK!    DAY OF PROCEDURE:    February 18, 2019 at 9:00 am   Report to Cardiology Waiting Room on 3rd floor of the Hospital    · Do not eat or drink anything after: 12 mn on the night before your procedure    Medications:   · HOLD (do NOT take) Eliquis on the day of your procedure.  Your last dose will be taken on 2/17/19.  · You may take your other usual morning medications with a sip of water      Directions to the Cardiology Waiting Room  If you park in the Parking Garage:  Take elevators to the 2nd floor  Walk up ramp and turn right by Gold Elevators  Take elevator to the 3rd floor  Upon exiting the elevator, turn away from the clinic areas  Walk long fuller around to front of hospital to area with windows overlooking [facility]  Check in at Reception Desk  OR  If family is dropping you off:  Have them drop you off at the front of the Hospital  (Near the ER, where all the flags are hung).  Take the E elevators to the 3rd floor.  Check in at the Reception Desk in the waiting room.        · You will be spending the night after your procedure.  · You will need someone to drive you home the day after your procedure.  · Your pain during your procedure will be managed by the anesthesia team.     Any need to reschedule or cancel procedures, or any questions regarding your procedures should be addressed directly with the Arrhythmia Department Nurses at the following phone number: [phone]

## 2019-02-11 NOTE — ED NOTES
Patient stated h ehas been checking blood pressure at home and it has been high  Patient stated he was recently diagnosed with a-flutter and he is due for ablation in 2 weeks

## 2019-02-13 ENCOUNTER — OFFICE VISIT (OUTPATIENT)
Dept: CARDIOLOGY | Facility: CLINIC | Age: 64
End: 2019-02-13
Payer: COMMERCIAL

## 2019-02-13 ENCOUNTER — TELEPHONE (OUTPATIENT)
Dept: ELECTROPHYSIOLOGY | Facility: CLINIC | Age: 64
End: 2019-02-13

## 2019-02-13 VITALS
BODY MASS INDEX: 29.78 KG/M2 | HEART RATE: 58 BPM | SYSTOLIC BLOOD PRESSURE: 128 MMHG | DIASTOLIC BLOOD PRESSURE: 75 MMHG | HEIGHT: 70 IN | WEIGHT: 208 LBS

## 2019-02-13 DIAGNOSIS — I48.92 ATRIAL FLUTTER, UNSPECIFIED TYPE: ICD-10-CM

## 2019-02-13 DIAGNOSIS — I49.3 PVC'S (PREMATURE VENTRICULAR CONTRACTIONS): ICD-10-CM

## 2019-02-13 DIAGNOSIS — I10 ESSENTIAL HYPERTENSION: Primary | ICD-10-CM

## 2019-02-13 DIAGNOSIS — E01.0 THYROMEGALY: ICD-10-CM

## 2019-02-13 PROCEDURE — 99214 OFFICE O/P EST MOD 30 MIN: CPT | Mod: S$GLB,,, | Performed by: INTERNAL MEDICINE

## 2019-02-13 PROCEDURE — 3008F PR BODY MASS INDEX (BMI) DOCUMENTED: ICD-10-PCS | Mod: CPTII,S$GLB,, | Performed by: INTERNAL MEDICINE

## 2019-02-13 PROCEDURE — 99214 PR OFFICE/OUTPT VISIT, EST, LEVL IV, 30-39 MIN: ICD-10-PCS | Mod: S$GLB,,, | Performed by: INTERNAL MEDICINE

## 2019-02-13 PROCEDURE — 3008F BODY MASS INDEX DOCD: CPT | Mod: CPTII,S$GLB,, | Performed by: INTERNAL MEDICINE

## 2019-02-13 PROCEDURE — 3074F PR MOST RECENT SYSTOLIC BLOOD PRESSURE < 130 MM HG: ICD-10-PCS | Mod: CPTII,S$GLB,, | Performed by: INTERNAL MEDICINE

## 2019-02-13 PROCEDURE — 3074F SYST BP LT 130 MM HG: CPT | Mod: CPTII,S$GLB,, | Performed by: INTERNAL MEDICINE

## 2019-02-13 PROCEDURE — 3078F DIAST BP <80 MM HG: CPT | Mod: CPTII,S$GLB,, | Performed by: INTERNAL MEDICINE

## 2019-02-13 PROCEDURE — 3078F PR MOST RECENT DIASTOLIC BLOOD PRESSURE < 80 MM HG: ICD-10-PCS | Mod: CPTII,S$GLB,, | Performed by: INTERNAL MEDICINE

## 2019-02-13 RX ORDER — LOSARTAN POTASSIUM 25 MG/1
25 TABLET ORAL DAILY
Qty: 30 TABLET | Refills: 11 | Status: SHIPPED | OUTPATIENT
Start: 2019-02-13 | End: 2020-02-10

## 2019-02-13 NOTE — TELEPHONE ENCOUNTER
Spoke to Mr. Aviles.  Labs rescheduled to [facility] for tomorrow at 10:05 am per his request.     Mr. Aviles verbalizes understanding and appreciates call.

## 2019-02-13 NOTE — TELEPHONE ENCOUNTER
----- Message from Aparna Pickett MA sent at 2/13/2019  1:04 PM CST -----  Contact: Patient      ----- Message -----  From: Monse De La Fuente  Sent: 2/13/2019  11:54 AM  To: Ata MILLAN Staff    The Pt is calling to get the info on his procedure and he also needs to take blood today but doesn't have the orders. Please call him back @ [phone]. Thanks, Monse

## 2019-02-13 NOTE — PROGRESS NOTES
"  Subjective:      Patient ID: Jules Aviles is a 63 y.o. male.    Chief Complaint: Follow-up (ER visit with Hypertension on 20/10/19. Scheduled for admit on Monday with Dr Garzon.)    HPI:  Pt went to [facility] ER 2/10 with concerns over elevated blood pressure of 174/93 and retrosternal pains (which pt attributed to his peptic ulcer disease) and posterior neck stiffness.    Pt had lab and an ECG and a CT of the brain (which showed chronic microvascular disease and "senescent change".    ECG showed NSR.    Pt is scheduled for atrial flutter RFA by Dr Berumen    Pt feels "fizzy pain" in epigastrium which lasts all day and is worse with certain foods and which lasts all day.  Dr Reed dx ulcers and prescribed pantoprazole.  Dr Reed plans to repeat the EGD next month.  Pt was given two potassium supplements in the ER and has now been eating a banana daily.    Review of Systems   Cardiovascular: Positive for chest pain (epigastic pain associated with certain). Negative for claudication, dyspnea on exertion, irregular heartbeat, leg swelling, near-syncope, orthopnea, palpitations and syncope.      There is no hx of chest pain or shortness of breath with exertion.  Past Medical History:   Diagnosis Date    Essential hypertension 12/7/2016        Past Surgical History:   Procedure Laterality Date    APPENDECTOMY      as a child    GALLBLADDER SURGERY  2004    HERNIA REPAIR  2004 & 2005    TONSILLECTOMY, ADENOIDECTOMY      as a child       Family History   Problem Relation Age of Onset    Aneurysm Mother     Heart disease Father        Social History     Socioeconomic History    Marital status: Single     Spouse name: None    Number of children: None    Years of education: None    Highest education level: None   Social Needs    Financial resource strain: None    Food insecurity - worry: None    Food insecurity - inability: None    Transportation needs - medical: None    Transportation needs - " "non-medical: None   Occupational History    None   Tobacco Use    Smoking status: Never Smoker    Smokeless tobacco: Never Used   Substance and Sexual Activity    Alcohol use: No    Drug use: No    Sexual activity: None   Other Topics Concern    None   Social History Narrative    None       Current Outpatient Medications on File Prior to Visit   Medication Sig Dispense Refill    apixaban (ELIQUIS) 5 mg Tab Take 1 tablet (5 mg total) by mouth 2 (two) times daily. 60 tablet 11    Bifidobacterium infantis (ALIGN ORAL) Take by mouth once daily.      hydroCHLOROthiazide (MICROZIDE) 12.5 mg capsule Take 1 capsule (12.5 mg total) by mouth once daily. 30 capsule 3    methocarbamol (ROBAXIN) 750 MG Tab Take 2 tablets (1,500 mg total) by mouth 3 (three) times daily as needed (As needed for pain and muscle spasm). 30 tablet 0    pantoprazole (PROTONIX) 40 MG tablet Take 40 mg by mouth once daily.      [DISCONTINUED] naproxen (EC-NAPROSYN) 375 MG TbEC EC tablet Take 1 tablet (375 mg total) by mouth 2 (two) times daily as needed (pain). Take with food 2 times a day. 20 tablet 0     No current facility-administered medications on file prior to visit.        Review of patient's allergies indicates:  No Known Allergies  Objective:     Vitals:    02/13/19 0952   BP: 128/75   BP Location: Left arm   Patient Position: Sitting   BP Method: Large (Automatic)   Pulse: (!) 58   Weight: 94.3 kg (208 lb)   Height: 5' 10" (1.778 m)        Physical Exam   Constitutional: He is oriented to person, place, and time. He appears well-developed and well-nourished. No distress.   Eyes: No scleral icterus.   Neck: No JVD present. Carotid bruit is not present.   Cardiovascular: Regular rhythm and normal heart sounds. Exam reveals no gallop and no friction rub.   No murmur heard.  Pulmonary/Chest: Effort normal and breath sounds normal. No respiratory distress.   Abdominal: Soft. There is no hepatosplenomegaly. There is no tenderness. "   Musculoskeletal: He exhibits no edema.   Neurological: He is alert and oriented to person, place, and time.   Skin: Skin is warm and dry. He is not diaphoretic.   Psychiatric: He has a normal mood and affect. His behavior is normal. Judgment and thought content normal.   Vitals reviewed.     BP log at home reviewed:  BP varies from 174/93 to116/79    ECG's in ER showed QS pattern V2 and were otherwise normal.    CMP and troponin WNL in ER except for K 3.3  Assessment:     1. Essential hypertension    2. Atrial flutter, unspecified type    3. PVC's (premature ventricular contractions)    4. Thyromegaly      Plan:   Jules was seen today for follow-up.    Diagnoses and all orders for this visit:    Essential hypertension    Atrial flutter, unspecified type    PVC's (premature ventricular contractions)    Thyromegaly    Other orders  -     losartan (COZAAR) 25 MG tablet; Take 1 tablet (25 mg total) by mouth once daily.     Will add losartan 25 mg to the HCTZ for HBP    Agree with RFA for atrial flutter scheduled for Monday.  Will discuss with Dr Berumen how long Eliquis will be required after RFA    RTC one month    Dermatology consult for lesion on right cheek    Follow-up in about 4 weeks (around 3/13/2019).

## 2019-02-14 ENCOUNTER — LAB VISIT (OUTPATIENT)
Dept: LAB | Facility: HOSPITAL | Age: 64
End: 2019-02-14
Attending: INTERNAL MEDICINE
Payer: COMMERCIAL

## 2019-02-14 DIAGNOSIS — I48.92 ATRIAL FLUTTER, UNSPECIFIED TYPE: ICD-10-CM

## 2019-02-14 LAB
ANION GAP SERPL CALC-SCNC: 9 MMOL/L
APTT BLDCRRT: 32.7 SEC
BASOPHILS # BLD AUTO: 0.03 K/UL
BASOPHILS NFR BLD: 0.4 %
BUN SERPL-MCNC: 20 MG/DL
CALCIUM SERPL-MCNC: 9.9 MG/DL
CHLORIDE SERPL-SCNC: 103 MMOL/L
CO2 SERPL-SCNC: 26 MMOL/L
CREAT SERPL-MCNC: 1.1 MG/DL
DIFFERENTIAL METHOD: ABNORMAL
EOSINOPHIL # BLD AUTO: 0.1 K/UL
EOSINOPHIL NFR BLD: 1 %
ERYTHROCYTE [DISTWIDTH] IN BLOOD BY AUTOMATED COUNT: 13 %
EST. GFR  (AFRICAN AMERICAN): >60 ML/MIN/1.73 M^2
EST. GFR  (NON AFRICAN AMERICAN): >60 ML/MIN/1.73 M^2
GLUCOSE SERPL-MCNC: 113 MG/DL
HCT VFR BLD AUTO: 47.2 %
HGB BLD-MCNC: 16.2 G/DL
INR PPP: 1
LYMPHOCYTES # BLD AUTO: 2.2 K/UL
LYMPHOCYTES NFR BLD: 27.2 %
MCH RBC QN AUTO: 31.1 PG
MCHC RBC AUTO-ENTMCNC: 34.3 G/DL
MCV RBC AUTO: 91 FL
MONOCYTES # BLD AUTO: 0.6 K/UL
MONOCYTES NFR BLD: 7.7 %
NEUTROPHILS # BLD AUTO: 5.2 K/UL
NEUTROPHILS NFR BLD: 63.7 %
PLATELET # BLD AUTO: 217 K/UL
PMV BLD AUTO: 11.7 FL
POTASSIUM SERPL-SCNC: 4 MMOL/L
PROTHROMBIN TIME: 10.7 SEC
RBC # BLD AUTO: 5.21 M/UL
SODIUM SERPL-SCNC: 138 MMOL/L
WBC # BLD AUTO: 8.16 K/UL

## 2019-02-14 PROCEDURE — 85610 PROTHROMBIN TIME: CPT

## 2019-02-14 PROCEDURE — 36415 COLL VENOUS BLD VENIPUNCTURE: CPT

## 2019-02-14 PROCEDURE — 80048 BASIC METABOLIC PNL TOTAL CA: CPT

## 2019-02-14 PROCEDURE — 85025 COMPLETE CBC W/AUTO DIFF WBC: CPT

## 2019-02-14 PROCEDURE — 85730 THROMBOPLASTIN TIME PARTIAL: CPT

## 2019-02-15 ENCOUNTER — TELEPHONE (OUTPATIENT)
Dept: ELECTROPHYSIOLOGY | Facility: CLINIC | Age: 64
End: 2019-02-15

## 2019-02-15 NOTE — TELEPHONE ENCOUNTER
Spoke to Sidra Jules    CONFIRMED procedure arrival time of 0900  Reiterated instructions including:  -Directions to check in desk  -NPO after midnight night prior to procedure  -High importance of HOLDING Eliquis  Monday morning      Sidra Vicente verbalizes understanding of above and appreciates call.

## 2019-02-18 ENCOUNTER — ANESTHESIA (OUTPATIENT)
Dept: MEDSURG UNIT | Facility: HOSPITAL | Age: 64
End: 2019-02-18
Payer: COMMERCIAL

## 2019-02-18 ENCOUNTER — ANESTHESIA EVENT (OUTPATIENT)
Dept: MEDSURG UNIT | Facility: HOSPITAL | Age: 64
End: 2019-02-18
Payer: COMMERCIAL

## 2019-02-18 ENCOUNTER — HOSPITAL ENCOUNTER (OUTPATIENT)
Facility: HOSPITAL | Age: 64
Discharge: HOME | End: 2019-02-18
Attending: INTERNAL MEDICINE | Admitting: INTERNAL MEDICINE
Payer: COMMERCIAL

## 2019-02-18 VITALS
BODY MASS INDEX: 29.78 KG/M2 | SYSTOLIC BLOOD PRESSURE: 156 MMHG | DIASTOLIC BLOOD PRESSURE: 74 MMHG | OXYGEN SATURATION: 96 % | HEIGHT: 70 IN | WEIGHT: 208 LBS | HEART RATE: 53 BPM | RESPIRATION RATE: 20 BRPM | TEMPERATURE: 99 F

## 2019-02-18 DIAGNOSIS — I48.92 ATRIAL FLUTTER, UNSPECIFIED TYPE: ICD-10-CM

## 2019-02-18 DIAGNOSIS — I48.92 ATRIAL FLUTTER: ICD-10-CM

## 2019-02-18 DIAGNOSIS — I49.1 PAC (PREMATURE ATRIAL CONTRACTION): ICD-10-CM

## 2019-02-18 PROCEDURE — C1730 CATH, EP, 19 OR FEW ELECT: HCPCS | Performed by: INTERNAL MEDICINE

## 2019-02-18 PROCEDURE — 93010 EKG 12-LEAD: ICD-10-PCS | Mod: ,,, | Performed by: INTERNAL MEDICINE

## 2019-02-18 PROCEDURE — 93010 ELECTROCARDIOGRAM REPORT: CPT | Mod: ,,, | Performed by: INTERNAL MEDICINE

## 2019-02-18 PROCEDURE — 25000003 PHARM REV CODE 250: Performed by: INTERNAL MEDICINE

## 2019-02-18 PROCEDURE — 27201423 OPTIME MED/SURG SUP & DEVICES STERILE SUPPLY: Performed by: INTERNAL MEDICINE

## 2019-02-18 PROCEDURE — 93005 ELECTROCARDIOGRAM TRACING: CPT

## 2019-02-18 PROCEDURE — 93653 COMPRE EP EVAL TX SVT: CPT | Performed by: INTERNAL MEDICINE

## 2019-02-18 PROCEDURE — 93621: ICD-10-PCS | Mod: 26,,, | Performed by: INTERNAL MEDICINE

## 2019-02-18 PROCEDURE — 93613 PR INTRACARD ELECTROPHYS 3-DIMENS MAPPING: ICD-10-PCS | Mod: ,,, | Performed by: INTERNAL MEDICINE

## 2019-02-18 PROCEDURE — D9220A PRA ANESTHESIA: ICD-10-PCS | Mod: ANES,,, | Performed by: ANESTHESIOLOGY

## 2019-02-18 PROCEDURE — 93653 PR ELECTROPHYS EVAL, COMPREHEN, W/SUPRAVENT TACHYCARD TRMT: ICD-10-PCS | Mod: ,,, | Performed by: INTERNAL MEDICINE

## 2019-02-18 PROCEDURE — 93621 COMP EP EVL L PAC&REC C SINS: CPT | Performed by: INTERNAL MEDICINE

## 2019-02-18 PROCEDURE — D9220A PRA ANESTHESIA: Mod: ANES,,, | Performed by: ANESTHESIOLOGY

## 2019-02-18 PROCEDURE — 93613 INTRACARDIAC EPHYS 3D MAPG: CPT | Mod: ,,, | Performed by: INTERNAL MEDICINE

## 2019-02-18 PROCEDURE — 25000003 PHARM REV CODE 250: Performed by: NURSE PRACTITIONER

## 2019-02-18 PROCEDURE — 63600175 PHARM REV CODE 636 W HCPCS: Performed by: NURSE ANESTHETIST, CERTIFIED REGISTERED

## 2019-02-18 PROCEDURE — C1894 INTRO/SHEATH, NON-LASER: HCPCS | Performed by: INTERNAL MEDICINE

## 2019-02-18 PROCEDURE — D9220A PRA ANESTHESIA: ICD-10-PCS | Mod: CRNA,,, | Performed by: NURSE ANESTHETIST, CERTIFIED REGISTERED

## 2019-02-18 PROCEDURE — 93613 INTRACARDIAC EPHYS 3D MAPG: CPT | Performed by: INTERNAL MEDICINE

## 2019-02-18 PROCEDURE — 37000009 HC ANESTHESIA EA ADD 15 MINS: Performed by: INTERNAL MEDICINE

## 2019-02-18 PROCEDURE — 93621 COMP EP EVL L PAC&REC C SINS: CPT | Mod: 26,,, | Performed by: INTERNAL MEDICINE

## 2019-02-18 PROCEDURE — 93653 COMPRE EP EVAL TX SVT: CPT | Mod: ,,, | Performed by: INTERNAL MEDICINE

## 2019-02-18 PROCEDURE — 37000008 HC ANESTHESIA 1ST 15 MINUTES: Performed by: INTERNAL MEDICINE

## 2019-02-18 PROCEDURE — C1732 CATH, EP, DIAG/ABL, 3D/VECT: HCPCS | Performed by: INTERNAL MEDICINE

## 2019-02-18 PROCEDURE — D9220A PRA ANESTHESIA: Mod: CRNA,,, | Performed by: NURSE ANESTHETIST, CERTIFIED REGISTERED

## 2019-02-18 RX ORDER — LIDOCAINE HYDROCHLORIDE 20 MG/ML
INJECTION, SOLUTION INFILTRATION; PERINEURAL
Status: DISCONTINUED | OUTPATIENT
Start: 2019-02-18 | End: 2019-02-18 | Stop reason: HOSPADM

## 2019-02-18 RX ORDER — LIDOCAINE HCL/PF 100 MG/5ML
SYRINGE (ML) INTRAVENOUS
Status: DISCONTINUED | OUTPATIENT
Start: 2019-02-18 | End: 2019-02-18

## 2019-02-18 RX ORDER — PROPOFOL 10 MG/ML
VIAL (ML) INTRAVENOUS CONTINUOUS PRN
Status: DISCONTINUED | OUTPATIENT
Start: 2019-02-18 | End: 2019-02-18

## 2019-02-18 RX ORDER — PROPOFOL 10 MG/ML
VIAL (ML) INTRAVENOUS
Status: DISCONTINUED | OUTPATIENT
Start: 2019-02-18 | End: 2019-02-18

## 2019-02-18 RX ORDER — SODIUM CHLORIDE 0.9 % (FLUSH) 0.9 %
3 SYRINGE (ML) INJECTION
Status: DISCONTINUED | OUTPATIENT
Start: 2019-02-18 | End: 2019-02-18 | Stop reason: HOSPADM

## 2019-02-18 RX ORDER — FENTANYL CITRATE 50 UG/ML
INJECTION, SOLUTION INTRAMUSCULAR; INTRAVENOUS
Status: DISCONTINUED | OUTPATIENT
Start: 2019-02-18 | End: 2019-02-18

## 2019-02-18 RX ORDER — SODIUM CHLORIDE 9 MG/ML
INJECTION, SOLUTION INTRAVENOUS CONTINUOUS
Status: ACTIVE | OUTPATIENT
Start: 2019-02-18

## 2019-02-18 RX ORDER — FENTANYL CITRATE 50 UG/ML
25 INJECTION, SOLUTION INTRAMUSCULAR; INTRAVENOUS EVERY 5 MIN PRN
Status: DISCONTINUED | OUTPATIENT
Start: 2019-02-18 | End: 2019-02-18 | Stop reason: HOSPADM

## 2019-02-18 RX ORDER — PHENYLEPHRINE HYDROCHLORIDE 10 MG/ML
INJECTION INTRAVENOUS
Status: DISCONTINUED | OUTPATIENT
Start: 2019-02-18 | End: 2019-02-18

## 2019-02-18 RX ADMIN — SODIUM CHLORIDE: 0.9 INJECTION, SOLUTION INTRAVENOUS at 01:02

## 2019-02-18 RX ADMIN — LIDOCAINE HYDROCHLORIDE 75 MG: 20 INJECTION, SOLUTION INTRAVENOUS at 01:02

## 2019-02-18 RX ADMIN — FENTANYL CITRATE 25 MCG: 50 INJECTION, SOLUTION INTRAMUSCULAR; INTRAVENOUS at 01:02

## 2019-02-18 RX ADMIN — APIXABAN 5 MG: 5 TABLET, FILM COATED ORAL at 08:02

## 2019-02-18 RX ADMIN — PHENYLEPHRINE HYDROCHLORIDE 100 MCG: 10 INJECTION INTRAVENOUS at 02:02

## 2019-02-18 RX ADMIN — FENTANYL CITRATE 25 MCG: 50 INJECTION, SOLUTION INTRAMUSCULAR; INTRAVENOUS at 02:02

## 2019-02-18 RX ADMIN — PROPOFOL 100 MCG/KG/MIN: 10 INJECTION, EMULSION INTRAVENOUS at 01:02

## 2019-02-18 RX ADMIN — PROPOFOL 40 MG: 10 INJECTION, EMULSION INTRAVENOUS at 01:02

## 2019-02-18 NOTE — TRANSFER OF CARE
"Anesthesia Transfer of Care Note    Patient: Jules Aviles    Procedure(s) Performed: Procedure(s) (LRB):  ABLATION, ATRIAL FLUTTER, TYPICAL (N/A)  ECHOCARDIOGRAM,TRANSESOPHAGEAL (N/A)    Patient location: PACU    Anesthesia Type: general    Transport from OR: Transported from OR on room air with adequate spontaneous ventilation    Post pain: adequate analgesia    Post assessment: no apparent anesthetic complications and tolerated procedure well    Post vital signs: stable    Level of consciousness: awake, alert and oriented    Nausea/Vomiting: no nausea/vomiting    Complications: none    Transfer of care protocol was followed      Last vitals:   Visit Vitals  BP (!) 142/77 (BP Location: Right arm, Patient Position: Lying)   Pulse (!) 56   Temp 36.2 °C (97.2 °F) (Oral)   Resp 20   Ht 5' 10" (1.778 m)   Wt 94.3 kg (208 lb)   SpO2 96%   BMI 29.84 kg/m²     "

## 2019-02-18 NOTE — OP NOTE
EP Post Procedure Note    Successful CTI RFA without any immediate complications    Plan  - Bed rest x 4 hours, Q 30 minute groin checks for 4 hours - Once precautions completed and patient ambulates without issues, he can be discharged. If patient needs to be admitted overnight, please alert general fellow on call for EP service.  - ECG when patient arrives to floor, monitor on telemetry  - Advance diet as tolerated  - Restart Eliquis, patient can take dose this evening. If discharged, can take when he arrives home. If he stays overnight, please ensure he gets his dose tonight (ordered).  - 30 day event monitor upon discharge (will be mailed to him)    Edwin Brody MD, MPH  PGY-VI  Cardiovascular Disease Fellow

## 2019-02-18 NOTE — INTERVAL H&P NOTE
The patient has been examined and the H&P has been reviewed:    I concur with the findings and no changes have occurred since H&P was written.     I discussed the nature of EP study and ablation, including possible transseptal puncture. We discused risks and benefits at length. Our discussion included, but was not limited to the risk of death, infection, bleeding, stroke, MI, cardiac perforation, embolism, cardiac tamponade, skin burns, and other organic injury including the possibility for need for surgery or pacemaker implantation.    EPS +/- CTI RFA, SARA for mapping  MAC  Cx JOAN as patient in NSR  Stella held this AM    Anesthesia/Surgery risks, benefits and alternative options discussed and understood by patient/family.          Active Hospital Problems    Diagnosis  POA    Atrial flutter [I48.92]  Yes      Resolved Hospital Problems   No resolved problems to display.

## 2019-02-18 NOTE — NURSING TRANSFER
Nursing Transfer Note      2/18/2019     Transfer To: 317    Transfer via stretcher    Transfer with cardiac monitoring    Transported by ansim ruiz    Medicines sent: none    Chart send with patient: Yes    Notified: nurse reported to johanny ruiz    Patient reassessed at: see epic (date, time)    Upon arrival to floor: to room no complaints no distress noted

## 2019-02-18 NOTE — Clinical Note
dry, intact, no bleeding and no hematoma. Manual pressure applied until hemostasis achieved. Gauze and tegaderm applied.

## 2019-02-18 NOTE — PROGRESS NOTES
Patient admitted to recovery see [facility] for complete assessment pacu bcg' smaintainted safety measures verified patient instructed on pain scale and patient verbalized understanding. Called for ekg also called patient's girlfriend and updated on patient locaiton with the permission of [place]. Also dr. Berumen here speaking with patient.

## 2019-02-18 NOTE — ANESTHESIA PREPROCEDURE EVALUATION
02/18/2019  Jules Aviles is a 63 y.o., male.  Pre-operative evaluation for Procedure(s) (LRB):  ABLATION, ATRIAL FLUTTER, TYPICAL (N/A)  ECHOCARDIOGRAM,TRANSESOPHAGEAL (N/A)    Jules Aviles is a 63 y.o. male     Patient Active Problem List   Diagnosis    Essential hypertension    Premature atrial contractions    Thyromegaly    PVC's (premature ventricular contractions)    Atrial flutter       Review of patient's allergies indicates:  No Known Allergies    No current facility-administered medications on file prior to encounter.      Current Outpatient Medications on File Prior to Encounter   Medication Sig Dispense Refill    apixaban (ELIQUIS) 5 mg Tab Take 1 tablet (5 mg total) by mouth 2 (two) times daily. 60 tablet 11    Bifidobacterium infantis (ALIGN ORAL) Take by mouth once daily.      hydroCHLOROthiazide (MICROZIDE) 12.5 mg capsule Take 1 capsule (12.5 mg total) by mouth once daily. 30 capsule 3    pantoprazole (PROTONIX) 40 MG tablet Take 40 mg by mouth once daily.         Past Surgical History:   Procedure Laterality Date    APPENDECTOMY      as a child    GALLBLADDER SURGERY  2004    HERNIA REPAIR  2004 & 2005    TONSILLECTOMY, ADENOIDECTOMY      as a child       Social History     Socioeconomic History    Marital status: Single     Spouse name: Not on file    Number of children: Not on file    Years of education: Not on file    Highest education level: Not on file   Social Needs    Financial resource strain: Not on file    Food insecurity - worry: Not on file    Food insecurity - inability: Not on file    Transportation needs - medical: Not on file    Transportation needs - non-medical: Not on file   Occupational History    Not on file   Tobacco Use    Smoking status: Never Smoker    Smokeless tobacco: Never Used   Substance and Sexual Activity    Alcohol use: No    Drug  "use: No    Sexual activity: Not on file   Other Topics Concern    Not on file   Social History Narrative    Not on file         CBC: No results for input(s): WBC, RBC, HGB, HCT, PLT, MCV, MCH, MCHC in the last 72 hours.    CMP: No results for input(s): NA, K, CL, CO2, BUN, CREATININE, GLU, MG, PHOS, CALCIUM, ALBUMIN, PROT, ALKPHOS, ALT, AST, BILITOT in the last 72 hours.    INR  No results for input(s): PT, INR, PROTIME, APTT in the last 72 hours.        Diagnostic Studies:      EKD Echo:  No results found for this or any previous visit.  Last 3 sets of Vitals    Vitals - 1 value per visit 2/10/2019 2019 2019   SYSTOLIC 152 128 142   DIASTOLIC 72 75 77   PULSE 62 58 56   TEMPERATURE 99.5 - 97.2   RESPIRATIONS 20 - 20   SPO2 100 - 96   Weight (lb) 210 208 208   Weight (kg) 95.255 94.348 94.348   HEIGHT 5' 10" 5' 10" 5' 10"   BODY MASS INDEX 30.13 29.84 29.84   VISIT REPORT - - -   Pain Score  - - -         Anesthesia Evaluation    I have reviewed the Patient Summary Reports.     I have reviewed the Medications.     Review of Systems      Physical Exam  General:  Well nourished    Airway/Jaw/Neck:  Airway Findings: Mouth Opening: Normal Tongue: Normal  General Airway Assessment: Adult  Mallampati: II  TM Distance: Normal, at least 6 cm      Dental:  Dental Findings: In tact   Chest/Lungs:  Chest/Lungs Findings: Normal Respiratory Rate     Heart/Vascular:  Heart Findings: Rate: Normal        Mental Status:  Mental Status Findings:  Cooperative, Alert and Oriented         Anesthesia Plan  Type of Anesthesia, risks & benefits discussed:  Anesthesia Type:  general, MAC  Patient's Preference: same   Intra-op Monitoring Plan: standard ASA monitors  Intra-op Monitoring Plan Comments:   Post Op Pain Control Plan: per primary service following discharge from PACU  Post Op Pain Control Plan Comments:   Induction:   IV  Beta Blocker:  Patient is not currently on a Beta-Blocker (No further documentation " required).       Informed Consent: Patient understands risks and agrees with Anesthesia plan.  Questions answered. Anesthesia consent signed with patient.  ASA Score: 3     Day of Surgery Review of History & Physical:    H&P update referred to the surgeon.         Ready For Surgery From Anesthesia Perspective.

## 2019-02-18 NOTE — PLAN OF CARE
Problem: Adult Inpatient Plan of Care  Goal: Plan of Care Review  Outcome: Ongoing (interventions implemented as appropriate)  Received report from ANH Serrato. Patient s/p RFA, AAOx3. VSS, no c/o pain or discomfort at this time, resp even and unlabored. right groin gauze/tegaderm dressing is CDI. Pulses 2+.  No active bleeding. No hematoma noted. Post procedure protocol reviewed with patient and patient's significant other. Understanding verbalized. Significant other at bedside. Nurse call bell within reach. Will continue to monitor per post procedure protocol.

## 2019-02-19 NOTE — PROGRESS NOTES
Patient ambulated in hallway without any complications. Voided. Right groin with dry gauze dressing intact. No bleeding, no hematoma. AVS printed and discharge instructions reviewed with patient and family member. Eliquis dose given. Iv heplock and tele monitor removed. Waiting escort for transport to car.

## 2019-02-19 NOTE — DISCHARGE SUMMARY
Discharge Summary  Electrophysiology      Admit Date: 2/18/2019    Discharge Date:  2/18/19    Attending Physician: Rich Berumen MD  Discharge Physician: Edwin Brody MD    Principal Diagnoses: Atrial flutter  Indication for Admission: CTI RFA    Discharged Condition: Good    Hospital Course:   Patient presented for outpatient CTI RFA which went without complication. Patient with multiple episodes of AF during procedure which would convert spontaneously. Patient was discharged on 2/18/19. 30 day event monitor will be mailed to him for monitoring of AF. Continue Eliquis 5 mg BID in the interim.    Outpatient Plan:  - Follow-up appointment in 4-6 weeks with Dr. Rich Berumen  - There were no medication changes    Diet: Cardiac diet    Activity: Ad allie, wound care instructions provided    Disposition: Home or Self Care    Discharge Medications:      Medication List      CONTINUE taking these medications    ALIGN ORAL     apixaban 5 mg Tab  Commonly known as:  ELIQUIS  Take 1 tablet (5 mg total) by mouth 2 (two) times daily.     hydroCHLOROthiazide 12.5 mg capsule  Commonly known as:  MICROZIDE  Take 1 capsule (12.5 mg total) by mouth once daily.     losartan 25 MG tablet  Commonly known as:  COZAAR  Take 1 tablet (25 mg total) by mouth once daily.     pantoprazole 40 MG tablet  Commonly known as:  PROTONIX

## 2019-02-20 NOTE — ANESTHESIA POSTPROCEDURE EVALUATION
"Anesthesia Post Evaluation    Patient: Jules Aviles    Procedure(s) Performed: Procedure(s) (LRB):  ABLATION, ATRIAL FLUTTER, TYPICAL (N/A)  ECHOCARDIOGRAM,TRANSESOPHAGEAL (N/A)    Final Anesthesia Type: general  Patient location during evaluation: PACU  Patient participation: Yes- Able to Participate  Level of consciousness: awake and alert  Post-procedure vital signs: reviewed and stable  Pain management: adequate  Airway patency: patent  PONV status at discharge: No PONV  Anesthetic complications: no      Cardiovascular status: hemodynamically stable  Respiratory status: unassisted, spontaneous ventilation and room air  Hydration status: euvolemic          Visit Vitals  BP (!) 156/74 (BP Location: Left arm, Patient Position: Lying)   Pulse (!) 53   Temp 37.1 °C (98.8 °F) (Oral)   Resp 20   Ht 5' 10" (1.778 m)   Wt 94.3 kg (208 lb)   SpO2 96%   BMI 29.84 kg/m²       Pain/Andrey Score: No Data Recorded      "

## 2019-02-21 ENCOUNTER — CLINICAL SUPPORT (OUTPATIENT)
Dept: CARDIOLOGY | Facility: HOSPITAL | Age: 64
End: 2019-02-21
Payer: COMMERCIAL

## 2019-02-21 PROCEDURE — 93271 ECG/MONITORING AND ANALYSIS: CPT

## 2019-02-21 PROCEDURE — 93272 ECG/REVIEW INTERPRET ONLY: CPT | Mod: ,,, | Performed by: INTERNAL MEDICINE

## 2019-02-21 PROCEDURE — 93272 CARDIAC EVENT MONITOR (CUPID ONLY): ICD-10-PCS | Mod: ,,, | Performed by: INTERNAL MEDICINE

## 2019-03-13 ENCOUNTER — OFFICE VISIT (OUTPATIENT)
Dept: CARDIOLOGY | Facility: CLINIC | Age: 64
End: 2019-03-13
Payer: COMMERCIAL

## 2019-03-13 VITALS
HEART RATE: 58 BPM | HEIGHT: 70 IN | WEIGHT: 210 LBS | SYSTOLIC BLOOD PRESSURE: 117 MMHG | BODY MASS INDEX: 30.06 KG/M2 | DIASTOLIC BLOOD PRESSURE: 78 MMHG

## 2019-03-13 DIAGNOSIS — I49.1 PREMATURE ATRIAL CONTRACTIONS: ICD-10-CM

## 2019-03-13 DIAGNOSIS — Z98.890 HISTORY OF CARDIAC RADIOFREQUENCY ABLATION (RFA): ICD-10-CM

## 2019-03-13 DIAGNOSIS — R42 DIZZINESS: ICD-10-CM

## 2019-03-13 DIAGNOSIS — I48.92 ATRIAL FLUTTER, UNSPECIFIED TYPE: Primary | ICD-10-CM

## 2019-03-13 DIAGNOSIS — I49.3 PVC'S (PREMATURE VENTRICULAR CONTRACTIONS): ICD-10-CM

## 2019-03-13 DIAGNOSIS — I10 ESSENTIAL HYPERTENSION: ICD-10-CM

## 2019-03-13 PROCEDURE — 93000 EKG 12-LEAD: ICD-10-PCS | Mod: S$GLB,,, | Performed by: INTERNAL MEDICINE

## 2019-03-13 PROCEDURE — 3074F PR MOST RECENT SYSTOLIC BLOOD PRESSURE < 130 MM HG: ICD-10-PCS | Mod: CPTII,S$GLB,, | Performed by: INTERNAL MEDICINE

## 2019-03-13 PROCEDURE — 3008F PR BODY MASS INDEX (BMI) DOCUMENTED: ICD-10-PCS | Mod: CPTII,S$GLB,, | Performed by: INTERNAL MEDICINE

## 2019-03-13 PROCEDURE — 99213 OFFICE O/P EST LOW 20 MIN: CPT | Mod: S$GLB,,, | Performed by: INTERNAL MEDICINE

## 2019-03-13 PROCEDURE — 3078F PR MOST RECENT DIASTOLIC BLOOD PRESSURE < 80 MM HG: ICD-10-PCS | Mod: CPTII,S$GLB,, | Performed by: INTERNAL MEDICINE

## 2019-03-13 PROCEDURE — 93000 ELECTROCARDIOGRAM COMPLETE: CPT | Mod: S$GLB,,, | Performed by: INTERNAL MEDICINE

## 2019-03-13 PROCEDURE — 99213 PR OFFICE/OUTPT VISIT, EST, LEVL III, 20-29 MIN: ICD-10-PCS | Mod: S$GLB,,, | Performed by: INTERNAL MEDICINE

## 2019-03-13 PROCEDURE — 3074F SYST BP LT 130 MM HG: CPT | Mod: CPTII,S$GLB,, | Performed by: INTERNAL MEDICINE

## 2019-03-13 PROCEDURE — 3078F DIAST BP <80 MM HG: CPT | Mod: CPTII,S$GLB,, | Performed by: INTERNAL MEDICINE

## 2019-03-13 PROCEDURE — 3008F BODY MASS INDEX DOCD: CPT | Mod: CPTII,S$GLB,, | Performed by: INTERNAL MEDICINE

## 2019-03-13 NOTE — PROGRESS NOTES
Subjective:      Patient ID: Jules Aviles is a 63 y.o. male.    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        Past Medical History:   Diagnosis Date    Essential hypertension 12/7/2016        Past Surgical History:   Procedure Laterality Date    ABLATION, ATRIAL FLUTTER, TYPICAL N/A 2/18/2019    Performed by Rich Berumen MD at [facility]    APPENDECTOMY      as a child    ECHOCARDIOGRAM,TRANSESOPHAGEAL N/A 2/18/2019    Performed by [facility] at [facility]    GALLBLADDER SURGERY  2004    HERNIA REPAIR  2004 & 2005    TONSILLECTOMY, ADENOIDECTOMY      as a child       Family History   Problem Relation Age of Onset    Aneurysm Mother     Heart disease Father        Social History     Socioeconomic History    Marital status: Single     Spouse name: None    Number of children: None    Years of education: None    Highest education level: None   Social Needs    Financial resource strain: None    Food insecurity - worry: None    Food insecurity - inability: None    Transportation needs - medical: None    Transportation needs - non-medical: None   Occupational History    None   Tobacco Use    Smoking status: Never Smoker    Smokeless tobacco: Never Used   Substance and Sexual Activity    Alcohol use: No    Drug use: No    Sexual activity: None   Other Topics Concern    None   Social History Narrative    None       Current Outpatient Medications on File Prior to Visit  "  Medication Sig Dispense Refill    apixaban (ELIQUIS) 5 mg Tab Take 1 tablet (5 mg total) by mouth 2 (two) times daily. 60 tablet 11    Bifidobacterium infantis (ALIGN ORAL) Take by mouth once daily.      hydroCHLOROthiazide (MICROZIDE) 12.5 mg capsule Take 1 capsule (12.5 mg total) by mouth once daily. 30 capsule 3    losartan (COZAAR) 25 MG tablet Take 1 tablet (25 mg total) by mouth once daily. 30 tablet 11    pantoprazole (PROTONIX) 40 MG tablet Take 40 mg by mouth once daily.       Current Facility-Administered Medications on File Prior to Visit   Medication Dose Route Frequency Provider Last Rate Last Dose    0.9%  NaCl infusion   Intravenous Continuous Lien Crocker NP   Stopped at 02/18/19 8743       Review of patient's allergies indicates:  No Known Allergies  Objective:     Vitals:    03/13/19 1001   BP: 117/78   BP Location: Left arm   Patient Position: Sitting   BP Method: Large (Automatic)   Pulse: (!) 58   Weight: 95.3 kg (210 lb)   Height: 5' 10" (1.778 m)        Physical Exam   Constitutional: He is oriented to person, place, and time. He appears well-developed and well-nourished. No distress.   Eyes: No scleral icterus.   Neck: No JVD present. Carotid bruit is not present. No thyromegaly present.   Cardiovascular: Regular rhythm and normal heart sounds. Exam reveals no gallop and no friction rub.   No murmur heard.  Pulmonary/Chest: Effort normal and breath sounds normal. No respiratory distress.   Musculoskeletal: He exhibits no edema.   Neurological: He is alert and oriented to person, place, and time.   Skin: Skin is warm and dry. He is not diaphoretic.   Psychiatric: He has a normal mood and affect. His behavior is normal. Judgment and thought content normal.      ECG: sinus bradycardia at 58 bpm, otherwise normal.    January 2019 labs reviewed.  Assessment:     1. Atrial flutter, unspecified type    2. PVC's (premature ventricular contractions)    3. Premature atrial contractions  "   4. Essential hypertension    5. History of cardiac radiofrequency ablation (RFA)      Plan:   Jules was seen today for follow-up.    Diagnoses and all orders for this visit:    Atrial flutter, unspecified type  -     IN OFFICE EKG 12-LEAD (to Muse)    PVC's (premature ventricular contractions)    Premature atrial contractions    Essential hypertension  -     IN OFFICE EKG 12-LEAD (to Muse)    History of cardiac radiofrequency ablation (RFA)       OK to increase activity in a stepwise fashion.    Discussed with pt role of stress testing should he develop exertional chest discomfort or unusual shortness of breath with exertion.  Same meds  F/u with Dr Berumen tomorrow  RTC 4 months  If dizzy spells persist may need to cut back on antihypertensives (pt may have minor BPV also given that he can sometimes elicit very transient dizziness wwhen turning his head to the side quickly)  Discussed ENT consult--consult Dr Cole  Follow-up in about 4 months (around 7/13/2019).      Statement Selected

## 2019-03-14 ENCOUNTER — HOSPITAL ENCOUNTER (OUTPATIENT)
Dept: CARDIOLOGY | Facility: CLINIC | Age: 64
Discharge: HOME | End: 2019-03-14
Payer: COMMERCIAL

## 2019-03-14 ENCOUNTER — OFFICE VISIT (OUTPATIENT)
Dept: ELECTROPHYSIOLOGY | Facility: CLINIC | Age: 64
End: 2019-03-14
Payer: COMMERCIAL

## 2019-03-14 VITALS
HEIGHT: 70 IN | BODY MASS INDEX: 29.92 KG/M2 | SYSTOLIC BLOOD PRESSURE: 134 MMHG | WEIGHT: 209 LBS | HEART RATE: 58 BPM | DIASTOLIC BLOOD PRESSURE: 74 MMHG

## 2019-03-14 DIAGNOSIS — I48.92 ATRIAL FLUTTER, UNSPECIFIED TYPE: ICD-10-CM

## 2019-03-14 DIAGNOSIS — I10 ESSENTIAL HYPERTENSION: ICD-10-CM

## 2019-03-14 DIAGNOSIS — I48.3 TYPICAL ATRIAL FLUTTER: Primary | ICD-10-CM

## 2019-03-14 PROCEDURE — 93000 RHYTHM STRIP: ICD-10-PCS | Mod: S$GLB,,, | Performed by: INTERNAL MEDICINE

## 2019-03-14 PROCEDURE — 99213 PR OFFICE/OUTPT VISIT, EST, LEVL III, 20-29 MIN: ICD-10-PCS | Mod: S$GLB,,, | Performed by: INTERNAL MEDICINE

## 2019-03-14 PROCEDURE — 3078F PR MOST RECENT DIASTOLIC BLOOD PRESSURE < 80 MM HG: ICD-10-PCS | Mod: CPTII,S$GLB,, | Performed by: INTERNAL MEDICINE

## 2019-03-14 PROCEDURE — 99213 OFFICE O/P EST LOW 20 MIN: CPT | Mod: S$GLB,,, | Performed by: INTERNAL MEDICINE

## 2019-03-14 PROCEDURE — 3075F SYST BP GE 130 - 139MM HG: CPT | Mod: CPTII,S$GLB,, | Performed by: INTERNAL MEDICINE

## 2019-03-14 PROCEDURE — 3008F PR BODY MASS INDEX (BMI) DOCUMENTED: ICD-10-PCS | Mod: CPTII,S$GLB,, | Performed by: INTERNAL MEDICINE

## 2019-03-14 PROCEDURE — 3008F BODY MASS INDEX DOCD: CPT | Mod: CPTII,S$GLB,, | Performed by: INTERNAL MEDICINE

## 2019-03-14 PROCEDURE — 99999 PR PBB SHADOW E&M-EST. PATIENT-LVL III: CPT | Mod: PBBFAC,,, | Performed by: INTERNAL MEDICINE

## 2019-03-14 PROCEDURE — 99999 PR PBB SHADOW E&M-EST. PATIENT-LVL III: ICD-10-PCS | Mod: PBBFAC,,, | Performed by: INTERNAL MEDICINE

## 2019-03-14 PROCEDURE — 3075F PR MOST RECENT SYSTOLIC BLOOD PRESS GE 130-139MM HG: ICD-10-PCS | Mod: CPTII,S$GLB,, | Performed by: INTERNAL MEDICINE

## 2019-03-14 PROCEDURE — 3078F DIAST BP <80 MM HG: CPT | Mod: CPTII,S$GLB,, | Performed by: INTERNAL MEDICINE

## 2019-03-14 PROCEDURE — 93000 ELECTROCARDIOGRAM COMPLETE: CPT | Mod: S$GLB,,, | Performed by: INTERNAL MEDICINE

## 2019-03-14 NOTE — PROGRESS NOTES
Subjective:    Patient ID:  Jules Aviles is a 63 y.o. male who presents for evaluation of Atrial Fibrillation    Referring Cardiologist: Aaron Mathis MD    HPI  Prior Hx:  I had the pleasure of seeing Mr. Aviles today in our electrophysiology clinic in follow-up for his atrial arrhythmia. As you are aware he is a pleasant 63 year-old man with hypertension, duodenal ulcers and newly diagnosed atrial flutter.  He saw Dr. Mathis on 1/11/2019 and was noted to be in atrial flutter with variable AV conduction. Appearance of flutter waves were most consistent with reverse typical atrial flutter however atrial flutter wave cycle length was ~200msec. He was initiated on eliquis. An echocardiogram was then performed on 1/15/2019 noting a normal LVEF and left atrial enlargement. During that study he was noted to be in sinus rhythm.    I reviewed prior ECGs available in media tab which note sinus rhythm with intermittent PACs and PVCs.    Interim Hx:  Mr. Aviles underwent EPS with easily induced typical CTI dependent atrial flutter on 2/18/2019. He had paroxysms of AF induced with ectopy during CTI ablation. We sent him home with a 30 day monitor to assess for any asymptomatic pAF. He returns for follow-up. No AF noted on the 30 day monitor.    My interpretation of today's in clinic ECG is normal sinus rhythm.    Review of Systems   Constitution: Negative for fever, weakness and malaise/fatigue.   HENT: Negative for congestion and sore throat.    Eyes: Negative for blurred vision and visual disturbance.   Cardiovascular: Negative for chest pain, dyspnea on exertion, irregular heartbeat, orthopnea and palpitations.   Respiratory: Negative for cough and shortness of breath.    Hematologic/Lymphatic: Negative for bleeding problem. Does not bruise/bleed easily.   Skin: Negative.    Musculoskeletal: Negative.    Gastrointestinal: Positive for heartburn. Negative for hematochezia and melena.   Neurological: Negative for dizziness,  focal weakness and light-headedness.        Objective:    Physical Exam   Constitutional: He is oriented to person, place, and time. He appears well-developed and well-nourished. No distress.   HENT:   Head: Normocephalic and atraumatic.   Eyes: Conjunctivae are normal. Right eye exhibits no discharge. Left eye exhibits no discharge.   Neck: Neck supple. No JVD present.   Cardiovascular: Normal rate and regular rhythm. Exam reveals no gallop and no friction rub.   No murmur heard.  Pulmonary/Chest: Effort normal and breath sounds normal. No respiratory distress. He has no wheezes. He has no rales.   Abdominal: Soft. Bowel sounds are normal. He exhibits no distension. There is no tenderness. There is no rebound.   Musculoskeletal: He exhibits no edema.   Neurological: He is alert and oriented to person, place, and time.   Skin: Skin is warm and dry. He is not diaphoretic.   Psychiatric: He has a normal mood and affect. His behavior is normal. Thought content normal.         Assessment:       1. Typical atrial flutter    2. Essential hypertension         Plan:       In summary, Mr. Aviles is a pleasant 63 year-old man with hypertension, duodenal ulcers and newly diagnosed paroxysmal symptomatic atrial flutter. He is s/p RFA of the CTI. He had a few brief paroxysms of atrial fibrillation while ablating that appeared to be induced with ectopy from the ablator. 30 day monitor has not revealed any AF to date. His [id number] score is 1. Can stop eliquis in 1 week. He was instructed to feel his radial pulse daily for 10-20 seconds and notify me if it seems erratic. RTC in 6 months, sooner if needed.    Thank you for allowing me to participate in the care of this patient. Please do not hesitate to call me with any questions or concerns.    Rich Berumen MD, PhD  Cardiac Electrophysiology

## 2019-07-17 ENCOUNTER — OFFICE VISIT (OUTPATIENT)
Dept: CARDIOLOGY | Facility: CLINIC | Age: 64
End: 2019-07-17
Payer: COMMERCIAL

## 2019-07-17 VITALS
WEIGHT: 215.5 LBS | HEART RATE: 70 BPM | SYSTOLIC BLOOD PRESSURE: 134 MMHG | HEIGHT: 70 IN | DIASTOLIC BLOOD PRESSURE: 75 MMHG | BODY MASS INDEX: 30.85 KG/M2

## 2019-07-17 DIAGNOSIS — I48.92 PAROXYSMAL ATRIAL FLUTTER: ICD-10-CM

## 2019-07-17 DIAGNOSIS — I48.3 TYPICAL ATRIAL FLUTTER: ICD-10-CM

## 2019-07-17 DIAGNOSIS — I10 ESSENTIAL HYPERTENSION: Primary | ICD-10-CM

## 2019-07-17 DIAGNOSIS — Z98.890 HISTORY OF CARDIAC RADIOFREQUENCY ABLATION (RFA): ICD-10-CM

## 2019-07-17 DIAGNOSIS — I49.3 PVC'S (PREMATURE VENTRICULAR CONTRACTIONS): ICD-10-CM

## 2019-07-17 PROCEDURE — 3008F PR BODY MASS INDEX (BMI) DOCUMENTED: ICD-10-PCS | Mod: CPTII,S$GLB,, | Performed by: INTERNAL MEDICINE

## 2019-07-17 PROCEDURE — 3075F SYST BP GE 130 - 139MM HG: CPT | Mod: CPTII,S$GLB,, | Performed by: INTERNAL MEDICINE

## 2019-07-17 PROCEDURE — 99213 OFFICE O/P EST LOW 20 MIN: CPT | Mod: S$GLB,,, | Performed by: INTERNAL MEDICINE

## 2019-07-17 PROCEDURE — 3075F PR MOST RECENT SYSTOLIC BLOOD PRESS GE 130-139MM HG: ICD-10-PCS | Mod: CPTII,S$GLB,, | Performed by: INTERNAL MEDICINE

## 2019-07-17 PROCEDURE — 3008F BODY MASS INDEX DOCD: CPT | Mod: CPTII,S$GLB,, | Performed by: INTERNAL MEDICINE

## 2019-07-17 PROCEDURE — 93000 EKG 12-LEAD: ICD-10-PCS | Mod: S$GLB,,, | Performed by: INTERNAL MEDICINE

## 2019-07-17 PROCEDURE — 3078F DIAST BP <80 MM HG: CPT | Mod: CPTII,S$GLB,, | Performed by: INTERNAL MEDICINE

## 2019-07-17 PROCEDURE — 3078F PR MOST RECENT DIASTOLIC BLOOD PRESSURE < 80 MM HG: ICD-10-PCS | Mod: CPTII,S$GLB,, | Performed by: INTERNAL MEDICINE

## 2019-07-17 PROCEDURE — 93000 ELECTROCARDIOGRAM COMPLETE: CPT | Mod: S$GLB,,, | Performed by: INTERNAL MEDICINE

## 2019-07-17 PROCEDURE — 99213 PR OFFICE/OUTPT VISIT, EST, LEVL III, 20-29 MIN: ICD-10-PCS | Mod: S$GLB,,, | Performed by: INTERNAL MEDICINE

## 2019-07-17 RX ORDER — CHOLECALCIFEROL (VITAMIN D3) 25 MCG
8000 TABLET ORAL DAILY
COMMUNITY

## 2019-07-17 RX ORDER — CHOLESTYRAMINE LIGHT 4 G/5.7G
POWDER, FOR SUSPENSION ORAL DAILY
COMMUNITY
Start: 2019-05-31 | End: 2023-08-09

## 2019-07-17 NOTE — PROGRESS NOTES
Subjective:      Patient ID: Jules Aviles is a 64 y.o. male.    Chief Complaint: Follow-up (Aflutter)    HPI:  Works construction.     Had RFA by Dr Berumen for atrial flutter with event monitor afterwards which showed no a fib and no a flutter.     Eliquis was stopped.    Review of Systems   Cardiovascular: Negative for chest pain, claudication, dyspnea on exertion, irregular heartbeat, leg swelling, near-syncope, orthopnea, palpitations and syncope.        Past Medical History:   Diagnosis Date    Essential hypertension 12/7/2016        Past Surgical History:   Procedure Laterality Date    ABLATION, ATRIAL FLUTTER, TYPICAL N/A 2/18/2019    Performed by [facility]umen MD at [facility]    APPENDECTOMY      as a child    ECHOCARDIOGRAM,TRANSESOPHAGEAL N/A 2/18/2019    Performed by [facility] at [facility]    GALLBLADDER SURGERY  2004    HERNIA REPAIR  2004 & 2005    TONSILLECTOMY, ADENOIDECTOMY      as a child       Family History   Problem Relation Age of Onset    Aneurysm Mother     Heart disease Father        Social History     Socioeconomic History    Marital status: Single     Spouse name: Not on file    Number of children: Not on file    Years of education: Not on file    Highest education level: Not on file   Occupational History    Not on file   Social Needs    Financial resource strain: Not on file    Food insecurity:     Worry: Not on file     Inability: Not on file    Transportation needs:     Medical: Not on file     Non-medical: Not on file   Tobacco Use    Smoking status: Never Smoker    Smokeless tobacco: Never Used   Substance and Sexual Activity    Alcohol use: No    Drug use: No    Sexual activity: Not on file   Lifestyle    Physical activity:     Days per week: Not on file     Minutes per session: Not on file    Stress: Not on file   Relationships    Social connections:     Talks on phone: Not on file     Gets together: Not on file     Attends Hindu  "service: Not on file     Active member of club or organization: Not on file     Attends meetings of clubs or organizations: Not on file     Relationship status: Not on file   Other Topics Concern    Not on file   Social History Narrative    Not on file       Current Outpatient Medications on File Prior to Visit   Medication Sig Dispense Refill    Bifidobacterium infantis (ALIGN ORAL) Take by mouth once daily.      hydroCHLOROthiazide (MICROZIDE) 12.5 mg capsule Take 1 capsule (12.5 mg total) by mouth once daily. 30 capsule 3    losartan (COZAAR) 25 MG tablet Take 1 tablet (25 mg total) by mouth once daily. 30 tablet 11    pantoprazole (PROTONIX) 40 MG tablet Take 40 mg by mouth once daily.      vitamin D (VITAMIN D3) 1000 units Tab Take 8,000 Units by mouth once daily.      CHOLESTYRAMINE LIGHT 4 gram Powd       [DISCONTINUED] apixaban (ELIQUIS) 5 mg Tab Take 1 tablet (5 mg total) by mouth 2 (two) times daily. 60 tablet 11     Current Facility-Administered Medications on File Prior to Visit   Medication Dose Route Frequency Provider Last Rate Last Dose    0.9%  NaCl infusion   Intravenous Continuous Lien Crocker NP   Stopped at 02/18/19 1553       Review of patient's allergies indicates:  No Known Allergies  Objective:     Vitals:    07/17/19 1345   BP: 134/75   BP Location: Left arm   Patient Position: Sitting   BP Method: Large (Automatic)   Pulse: 70   Weight: 97.8 kg (215 lb 8 oz)   Height: 5' 10" (1.778 m)        Physical Exam   Constitutional: He is oriented to person, place, and time. He appears well-developed and well-nourished. No distress.   Eyes: No scleral icterus.   Neck: No JVD present. Carotid bruit is not present.   Cardiovascular: Regular rhythm and normal heart sounds. Exam reveals no gallop and no friction rub.   No murmur heard.  Pulmonary/Chest: Effort normal and breath sounds normal. No respiratory distress.   Musculoskeletal: He exhibits no edema.   Neurological: He is alert " and oriented to person, place, and time.   Skin: Skin is warm and dry. He is not diaphoretic.   Psychiatric: He has a normal mood and affect. His behavior is normal. Judgment and thought content normal.   Vitals reviewed.     Wt up 7 lbs since February    ECG: NSR, WNL    Had colonoscopy 6 months ago  Assessment:     1. Essential hypertension    2. Paroxysmal atrial flutter    3. History of cardiac radiofrequency ablation (RFA)    4. Typical atrial flutter    5. PVC's (premature ventricular contractions)      Plan:   Vicente was seen today for follow-up.    Diagnoses and all orders for this visit:    Essential hypertension  -     IN OFFICE EKG 12-LEAD (to Muse)  -     CBC auto differential; Future  -     Comprehensive metabolic panel; Future  -     Lipid panel; Future  -     TSH; Future    Paroxysmal atrial flutter  -     IN OFFICE EKG 12-LEAD (to Muse)    History of cardiac radiofrequency ablation (RFA)    Typical atrial flutter    PVC's (premature ventricular contractions)     Same meds    RTC 6 months    F/u with Dr Reed for stomach ulcers    Follow up in about 6 months (around 1/17/2020).     Labs on return

## 2019-10-09 ENCOUNTER — HOSPITAL ENCOUNTER (EMERGENCY)
Facility: HOSPITAL | Age: 64
Discharge: HOME | End: 2019-10-09
Attending: EMERGENCY MEDICINE
Payer: COMMERCIAL

## 2019-10-09 VITALS
RESPIRATION RATE: 18 BRPM | TEMPERATURE: 99 F | WEIGHT: 213 LBS | SYSTOLIC BLOOD PRESSURE: 155 MMHG | HEART RATE: 77 BPM | DIASTOLIC BLOOD PRESSURE: 71 MMHG | BODY MASS INDEX: 30.56 KG/M2 | OXYGEN SATURATION: 100 %

## 2019-10-09 DIAGNOSIS — T14.8XXA MUSCLE STRAIN: ICD-10-CM

## 2019-10-09 DIAGNOSIS — M43.6 TORTICOLLIS, ACUTE: Primary | ICD-10-CM

## 2019-10-09 PROCEDURE — 99284 EMERGENCY DEPT VISIT MOD MDM: CPT | Mod: ER

## 2019-10-09 RX ORDER — DICLOFENAC SODIUM 10 MG/G
2 GEL TOPICAL 4 TIMES DAILY
Qty: 1 TUBE | Refills: 0 | Status: SHIPPED | OUTPATIENT
Start: 2019-10-09 | End: 2021-03-09

## 2019-10-09 RX ORDER — ORPHENADRINE CITRATE 100 MG/1
100 TABLET, EXTENDED RELEASE ORAL 2 TIMES DAILY PRN
Qty: 20 TABLET | Refills: 0 | Status: SHIPPED | OUTPATIENT
Start: 2019-10-09 | End: 2019-10-19

## 2019-10-09 RX ORDER — ACETAMINOPHEN 500 MG
1000 TABLET ORAL EVERY 6 HOURS PRN
Qty: 30 TABLET | Refills: 0 | Status: SHIPPED | OUTPATIENT
Start: 2019-10-09 | End: 2023-05-16

## 2019-10-09 RX ORDER — CYCLOBENZAPRINE HCL 10 MG
10 TABLET ORAL 3 TIMES DAILY PRN
Qty: 15 TABLET | Refills: 0 | Status: SHIPPED | OUTPATIENT
Start: 2019-10-09 | End: 2019-10-09 | Stop reason: CLARIF

## 2019-10-09 RX ORDER — IBUPROFEN 600 MG/1
600 TABLET ORAL EVERY 6 HOURS PRN
Qty: 20 TABLET | Refills: 0 | Status: SHIPPED | OUTPATIENT
Start: 2019-10-09 | End: 2021-03-09

## 2019-10-09 NOTE — ED NOTES
Presented with a pain in  The lt. Shoulder that radiates up to right above the lt. Ear on  The side of his head.  No chest pain, no sob.  Denied fall's

## 2019-10-09 NOTE — ED PROVIDER NOTES
"Encounter Date: 10/9/2019    SCRIBE #1 NOTE: I, Ana M Ribera, am scribing for, and in the presence of,  Dr. Hathaway. I have scribed the following portions of the note - Other sections scribed: HPI, ROS, PE.       History     Chief Complaint   Patient presents with    Muscle Pain     Pt states, " I have a pain in my left side of my head, it goes down to my neck and shoulders. When I turn my head I hear a crack sound. It has been for one month. I have not taken any medications for this."     Jules Aviles is a 64 y.o. male who presents to the ED complaining of pain to the left side of his neck for 1 month. This is a new pain. Pain radiates to his left shoulder. He states he can hear a "crack" sometimes when he moves. Pain is worse with movement. Does not recall any injury or sleeping in an odd position. He has been applying ice packs and heating pads. He has not been taking Tylenol or Ibuprofen. Denies headache, jaw pain, ear pain, chest pain, palpitations, back pain, shortness of breath, abdominal pain, nausea, vomiting, weakness, dizziness, or vision changes. He sees a cardiologist and GI doctor every year. Hx of ablation (6-8 months ago). He was previous taking blood thinners, but has since stopped.    The history is provided by the patient. No  was used.     Review of patient's allergies indicates:  No Known Allergies  Past Medical History:   Diagnosis Date    Essential hypertension 12/7/2016     Past Surgical History:   Procedure Laterality Date    APPENDECTOMY      as a child    GALLBLADDER SURGERY  2004    HERNIA REPAIR  2004 & 2005    TONSILLECTOMY, ADENOIDECTOMY      as a child     Family History   Problem Relation Age of Onset    Aneurysm Mother     Heart disease Father      Social History     Tobacco Use    Smoking status: Never Smoker    Smokeless tobacco: Never Used   Substance Use Topics    Alcohol use: No    Drug use: No     Review of Systems   Constitutional: Negative " for fever.   HENT: Negative for dental problem, ear pain and sore throat.    Eyes: Negative for visual disturbance.   Respiratory: Negative for shortness of breath.    Cardiovascular: Negative for chest pain and palpitations.   Gastrointestinal: Negative for abdominal pain.   Musculoskeletal: Positive for arthralgias and neck pain. Negative for back pain.   Skin: Negative for color change and wound.   Neurological: Negative for dizziness, weakness, numbness and headaches.   All other systems reviewed and are negative.      Physical Exam     Initial Vitals [10/09/19 1200]   BP Pulse Resp Temp SpO2   (!) 139/93 71 16 98 °F (36.7 °C) 97 %      MAP       --           Patient gave consent to have physical exam performed.    Physical Exam    Nursing note and vitals reviewed.  Constitutional: He appears well-developed and well-nourished.   HENT:   Head: Normocephalic and atraumatic.   Right Ear: External ear normal.   Left Ear: External ear normal.   Nose: Nose normal.   Mouth/Throat: Oropharynx is clear and moist.   Eyes: Conjunctivae and EOM are normal. Pupils are equal, round, and reactive to light.   Neck: Normal range of motion. Neck supple. Muscular tenderness present. No spinous process tenderness present.       Cardiovascular: Normal rate, regular rhythm, normal heart sounds and intact distal pulses. Exam reveals no gallop and no friction rub.    No murmur heard.  Pulmonary/Chest: Breath sounds normal. No stridor. No respiratory distress. He has no wheezes. He has no rhonchi. He has no rales. He exhibits no tenderness.   Abdominal: Soft. Bowel sounds are normal. He exhibits no distension and no mass. There is no tenderness. There is no rigidity, no rebound and no guarding.   Musculoskeletal: Normal range of motion. He exhibits tenderness.   Neurological: He is alert and oriented to person, place, and time. No cranial nerve deficit or sensory deficit. GCS score is 15. GCS eye subscore is 4. GCS verbal subscore is 5.  GCS motor subscore is 6.   Skin: Skin is warm and dry. Capillary refill takes less than 2 seconds. No rash noted.   Psychiatric: He has a normal mood and affect. His behavior is normal.         ED Course   Procedures              Medical Decision Making:   History:   Old Medical Records: I decided to obtain old medical records.    Chief complaint: L sided neck pain. Does not recall an injury. Pain worse with movement.  Patient denies numbness tingling and weakness.  Differential diagnosis: muscle strain, muscle sprain, muscle spasms    12:45 PM Due to patients previous cardiac history I suggested EKG, CXR, and labs, but patient declined. Patient prefers outpatient treatment. Patient drove himself to the ED today.  Decline pain shot today in the ED.     Discharge home with Ibuprofen 600 mg, Tylenol 500 mg, diclofenac, norflex, and flexeril.  Fill and take prescriptions as directed.  Return to the ED if symptoms worsen or do not resolve.   Answered questions and discussed discharge plan.    Patient feels better and is ready for discharge.  Follow up with PCP/specialist in 1 day.            Scribe Attestation:   Scribe #1: I performed the above scribed service and the documentation accurately describes the services I performed. I attest to the accuracy of the note.     I, Dr. Mariangel Hathaway, personally performed the services described in this documentation. This document was produced by a scribe under my direction and in my presence. All medical record entries made by the scribe were at my direction and in my presence.  I have reviewed the chart and agree that the record reflects my personal performance and is accurate and complete. Mariangel Hathaway DO.     10/09/2019 2:11 PM             Clinical Impression:     1. Torticollis, acute    2. Muscle strain                                   Mariangel Hathaway DO  10/09/19 4966

## 2019-10-12 NOTE — ADDENDUM NOTE
Encounter addended by: Jen Strickland on: 10/12/2019 6:44 PM   Actions taken: Charge Capture section accepted, Visit Navigator Flowsheet section accepted

## 2019-11-07 ENCOUNTER — OFFICE VISIT (OUTPATIENT)
Dept: ELECTROPHYSIOLOGY | Facility: CLINIC | Age: 64
End: 2019-11-07
Payer: COMMERCIAL

## 2019-11-07 ENCOUNTER — HOSPITAL ENCOUNTER (OUTPATIENT)
Dept: CARDIOLOGY | Facility: CLINIC | Age: 64
Discharge: HOME | End: 2019-11-07
Payer: COMMERCIAL

## 2019-11-07 VITALS
WEIGHT: 215.81 LBS | SYSTOLIC BLOOD PRESSURE: 126 MMHG | HEIGHT: 70 IN | DIASTOLIC BLOOD PRESSURE: 80 MMHG | BODY MASS INDEX: 30.9 KG/M2 | HEART RATE: 56 BPM

## 2019-11-07 DIAGNOSIS — Z98.890 HISTORY OF CARDIAC RADIOFREQUENCY ABLATION (RFA): ICD-10-CM

## 2019-11-07 DIAGNOSIS — I48.92 ATRIAL FLUTTER, UNSPECIFIED TYPE: ICD-10-CM

## 2019-11-07 DIAGNOSIS — I49.3 PVC'S (PREMATURE VENTRICULAR CONTRACTIONS): ICD-10-CM

## 2019-11-07 DIAGNOSIS — I48.3 TYPICAL ATRIAL FLUTTER: ICD-10-CM

## 2019-11-07 DIAGNOSIS — I10 ESSENTIAL HYPERTENSION: Primary | ICD-10-CM

## 2019-11-07 DIAGNOSIS — I49.1 PREMATURE ATRIAL CONTRACTIONS: ICD-10-CM

## 2019-11-07 PROCEDURE — 3074F PR MOST RECENT SYSTOLIC BLOOD PRESSURE < 130 MM HG: ICD-10-PCS | Mod: CPTII,S$GLB,, | Performed by: INTERNAL MEDICINE

## 2019-11-07 PROCEDURE — 3008F PR BODY MASS INDEX (BMI) DOCUMENTED: ICD-10-PCS | Mod: CPTII,S$GLB,, | Performed by: INTERNAL MEDICINE

## 2019-11-07 PROCEDURE — 99213 PR OFFICE/OUTPT VISIT, EST, LEVL III, 20-29 MIN: ICD-10-PCS | Mod: S$GLB,,, | Performed by: INTERNAL MEDICINE

## 2019-11-07 PROCEDURE — 99999 PR PBB SHADOW E&M-EST. PATIENT-LVL III: ICD-10-PCS | Mod: PBBFAC,,, | Performed by: INTERNAL MEDICINE

## 2019-11-07 PROCEDURE — 3079F PR MOST RECENT DIASTOLIC BLOOD PRESSURE 80-89 MM HG: ICD-10-PCS | Mod: CPTII,S$GLB,, | Performed by: INTERNAL MEDICINE

## 2019-11-07 PROCEDURE — 99999 PR PBB SHADOW E&M-EST. PATIENT-LVL III: CPT | Mod: PBBFAC,,, | Performed by: INTERNAL MEDICINE

## 2019-11-07 PROCEDURE — 93005 RHYTHM STRIP: ICD-10-PCS | Mod: S$GLB,,, | Performed by: INTERNAL MEDICINE

## 2019-11-07 PROCEDURE — 3008F BODY MASS INDEX DOCD: CPT | Mod: CPTII,S$GLB,, | Performed by: INTERNAL MEDICINE

## 2019-11-07 PROCEDURE — 93010 ELECTROCARDIOGRAM REPORT: CPT | Mod: S$GLB,,, | Performed by: INTERNAL MEDICINE

## 2019-11-07 PROCEDURE — 3079F DIAST BP 80-89 MM HG: CPT | Mod: CPTII,S$GLB,, | Performed by: INTERNAL MEDICINE

## 2019-11-07 PROCEDURE — 93005 ELECTROCARDIOGRAM TRACING: CPT | Mod: S$GLB,,, | Performed by: INTERNAL MEDICINE

## 2019-11-07 PROCEDURE — 99213 OFFICE O/P EST LOW 20 MIN: CPT | Mod: S$GLB,,, | Performed by: INTERNAL MEDICINE

## 2019-11-07 PROCEDURE — 3074F SYST BP LT 130 MM HG: CPT | Mod: CPTII,S$GLB,, | Performed by: INTERNAL MEDICINE

## 2019-11-07 PROCEDURE — 93010 RHYTHM STRIP: ICD-10-PCS | Mod: S$GLB,,, | Performed by: INTERNAL MEDICINE

## 2019-11-07 NOTE — PROGRESS NOTES
Subjective:    Patient ID:  Jules Aviles is a 64 y.o. male who presents for evaluation of Atrial Flutter    Referring Cardiologist: Aaron Mathis MD    HPI  Prior Hx:  I had the pleasure of seeing Mr. Aviles today in our electrophysiology clinic in follow-up for his atrial arrhythmia. As you are aware he is a pleasant 63 year-old man with hypertension, duodenal ulcers and newly diagnosed atrial flutter.  He saw Dr. Mathis on 1/11/2019 and was noted to be in atrial flutter with variable AV conduction. Appearance of flutter waves were most consistent with reverse typical atrial flutter however atrial flutter wave cycle length was ~200msec. He was initiated on eliquis. An echocardiogram was then performed on 1/15/2019 noting a normal LVEF and left atrial enlargement. During that study he was noted to be in sinus rhythm.    I reviewed prior ECGs available in media tab which note sinus rhythm with intermittent PACs and PVCs.    Mr. Aviles underwent EPS with easily induced typical CTI dependent atrial flutter on 2/18/2019. He had paroxysms of AF induced with ectopy during CTI ablation. We sent him home with a 30 day monitor to assess for any asymptomatic pAF. He returned for follow-up 3/2019. No AF noted on the 30 day monitor. We stopped his eliquis and he was instructed to perform periodic pulse checks.    Interim Hx:  Mr. Aviles returns for 6 month follow-up. He feels well and has no complaints. Notes occasional brief periods of light-headedness. He denies any symptomatic palpitations.    My interpretation of today's in clinic ECG is normal sinus rhythm at 56 bpm with first degree AV block (SC 215ms).    Review of Systems   Constitution: Negative for fever and malaise/fatigue.   HENT: Negative for congestion and sore throat.    Eyes: Negative for blurred vision and visual disturbance.   Cardiovascular: Negative for chest pain, dyspnea on exertion, irregular heartbeat, orthopnea and palpitations.   Respiratory: Negative  for cough and shortness of breath.    Hematologic/Lymphatic: Negative for bleeding problem. Does not bruise/bleed easily.   Skin: Negative.    Musculoskeletal: Negative.    Gastrointestinal: Positive for heartburn. Negative for hematochezia and melena.   Neurological: Negative for dizziness, focal weakness, light-headedness and weakness.        Objective:    Physical Exam   Constitutional: He is oriented to person, place, and time. He appears well-developed and well-nourished. No distress.   HENT:   Head: Normocephalic and atraumatic.   Eyes: Conjunctivae are normal. Right eye exhibits no discharge. Left eye exhibits no discharge.   Neck: Neck supple. No JVD present.   Cardiovascular: Normal rate and regular rhythm. Exam reveals no gallop and no friction rub.   No murmur heard.  Pulmonary/Chest: Effort normal and breath sounds normal. No respiratory distress. He has no wheezes. He has no rales.   Abdominal: Soft. Bowel sounds are normal. He exhibits no distension. There is no tenderness. There is no rebound.   Musculoskeletal: He exhibits no edema.   Neurological: He is alert and oriented to person, place, and time.   Skin: Skin is warm and dry. He is not diaphoretic.   Psychiatric: He has a normal mood and affect. His behavior is normal. Thought content normal.         Assessment:       1. Essential hypertension    2. Typical atrial flutter    3. History of cardiac radiofrequency ablation (RFA)    4. Premature atrial contractions    5. PVC's (premature ventricular contractions)         Plan:       In summary, Mr. Aviles is a pleasant 64 year-old man with hypertension, duodenal ulcers and newly diagnosed paroxysmal symptomatic atrial flutter. He is s/p RFA of the CTI. He had a few brief paroxysms of atrial fibrillation while ablating that appeared to be induced with ectopy from the ablator. 30 day monitor has not revealed any AF to date. He is off anticoagulation. He has had no known AF. Continue self pulse checks.  RTC in 1 year, sooner if needed.    Thank you for allowing me to participate in the care of this patient. Please do not hesitate to call me with any questions or concerns.    Rich Berumen MD, PhD  Cardiac Electrophysiology

## 2020-02-09 DIAGNOSIS — I10 ESSENTIAL HYPERTENSION: ICD-10-CM

## 2020-02-10 RX ORDER — HYDROCHLOROTHIAZIDE 12.5 MG/1
CAPSULE ORAL
Qty: 90 CAPSULE | Refills: 3 | Status: SHIPPED | OUTPATIENT
Start: 2020-02-10 | End: 2021-02-04

## 2020-02-10 RX ORDER — LOSARTAN POTASSIUM 25 MG/1
TABLET ORAL
Qty: 90 TABLET | Refills: 3 | Status: SHIPPED | OUTPATIENT
Start: 2020-02-10 | End: 2021-02-04

## 2021-01-27 ENCOUNTER — HOSPITAL ENCOUNTER (EMERGENCY)
Facility: HOSPITAL | Age: 66
Discharge: HOME | End: 2021-01-27
Attending: EMERGENCY MEDICINE
Payer: COMMERCIAL

## 2021-01-27 VITALS
OXYGEN SATURATION: 94 % | TEMPERATURE: 99 F | DIASTOLIC BLOOD PRESSURE: 72 MMHG | RESPIRATION RATE: 18 BRPM | SYSTOLIC BLOOD PRESSURE: 157 MMHG | HEART RATE: 79 BPM | BODY MASS INDEX: 30.13 KG/M2 | WEIGHT: 210 LBS

## 2021-01-27 DIAGNOSIS — R07.9 CHEST PAIN: ICD-10-CM

## 2021-01-27 DIAGNOSIS — K21.9 GASTROESOPHAGEAL REFLUX DISEASE, UNSPECIFIED WHETHER ESOPHAGITIS PRESENT: Primary | ICD-10-CM

## 2021-01-27 PROCEDURE — 93005 ELECTROCARDIOGRAM TRACING: CPT | Mod: ER

## 2021-01-27 PROCEDURE — 99284 EMERGENCY DEPT VISIT MOD MDM: CPT | Mod: 25,ER

## 2021-01-27 PROCEDURE — 93010 ELECTROCARDIOGRAM REPORT: CPT | Mod: ,,, | Performed by: INTERNAL MEDICINE

## 2021-01-27 PROCEDURE — 93010 EKG 12-LEAD: ICD-10-PCS | Mod: ,,, | Performed by: INTERNAL MEDICINE

## 2021-01-27 PROCEDURE — 25000003 PHARM REV CODE 250: Mod: ER | Performed by: PHYSICIAN ASSISTANT

## 2021-01-27 RX ORDER — LANSOPRAZOLE 15 MG/1
15 TABLET, ORALLY DISINTEGRATING, DELAYED RELEASE ORAL DAILY
Qty: 30 TABLET | Refills: 11 | Status: SHIPPED | OUTPATIENT
Start: 2021-01-27 | End: 2021-03-09

## 2021-01-27 RX ORDER — METOCLOPRAMIDE 10 MG/1
10 TABLET ORAL EVERY 6 HOURS PRN
Qty: 30 TABLET | Refills: 0 | Status: SHIPPED | OUTPATIENT
Start: 2021-01-27 | End: 2021-03-09

## 2021-01-27 RX ORDER — LANSOPRAZOLE 15 MG/1
15 TABLET, ORALLY DISINTEGRATING, DELAYED RELEASE ORAL DAILY
Qty: 30 TABLET | Refills: 11 | Status: SHIPPED | OUTPATIENT
Start: 2021-01-27 | End: 2021-01-27 | Stop reason: SDUPTHER

## 2021-01-27 RX ADMIN — LIDOCAINE HYDROCHLORIDE: 20 SOLUTION ORAL; TOPICAL at 09:01

## 2021-03-09 ENCOUNTER — OFFICE VISIT (OUTPATIENT)
Dept: CARDIOLOGY | Facility: CLINIC | Age: 66
End: 2021-03-09
Payer: COMMERCIAL

## 2021-03-09 VITALS
HEART RATE: 74 BPM | OXYGEN SATURATION: 96 % | WEIGHT: 230.94 LBS | SYSTOLIC BLOOD PRESSURE: 150 MMHG | HEIGHT: 70 IN | DIASTOLIC BLOOD PRESSURE: 82 MMHG | BODY MASS INDEX: 33.06 KG/M2

## 2021-03-09 DIAGNOSIS — I49.3 PVC'S (PREMATURE VENTRICULAR CONTRACTIONS): ICD-10-CM

## 2021-03-09 DIAGNOSIS — I10 ESSENTIAL HYPERTENSION: Primary | ICD-10-CM

## 2021-03-09 DIAGNOSIS — Z98.890 HISTORY OF CARDIAC RADIOFREQUENCY ABLATION (RFA): ICD-10-CM

## 2021-03-09 DIAGNOSIS — I49.1 PREMATURE ATRIAL CONTRACTIONS: ICD-10-CM

## 2021-03-09 DIAGNOSIS — I48.92 ATRIAL FLUTTER, UNSPECIFIED TYPE: ICD-10-CM

## 2021-03-09 PROCEDURE — 3077F SYST BP >= 140 MM HG: CPT | Mod: CPTII,S$GLB,, | Performed by: INTERNAL MEDICINE

## 2021-03-09 PROCEDURE — 1126F PR PAIN SEVERITY QUANTIFIED, NO PAIN PRESENT: ICD-10-PCS | Mod: S$GLB,,, | Performed by: INTERNAL MEDICINE

## 2021-03-09 PROCEDURE — 3008F BODY MASS INDEX DOCD: CPT | Mod: CPTII,S$GLB,, | Performed by: INTERNAL MEDICINE

## 2021-03-09 PROCEDURE — 93000 ELECTROCARDIOGRAM COMPLETE: CPT | Mod: S$GLB,,, | Performed by: INTERNAL MEDICINE

## 2021-03-09 PROCEDURE — 1126F AMNT PAIN NOTED NONE PRSNT: CPT | Mod: S$GLB,,, | Performed by: INTERNAL MEDICINE

## 2021-03-09 PROCEDURE — 99214 OFFICE O/P EST MOD 30 MIN: CPT | Mod: 25,S$GLB,, | Performed by: INTERNAL MEDICINE

## 2021-03-09 PROCEDURE — 3079F DIAST BP 80-89 MM HG: CPT | Mod: CPTII,S$GLB,, | Performed by: INTERNAL MEDICINE

## 2021-03-09 PROCEDURE — 99214 PR OFFICE/OUTPT VISIT, EST, LEVL IV, 30-39 MIN: ICD-10-PCS | Mod: 25,S$GLB,, | Performed by: INTERNAL MEDICINE

## 2021-03-09 PROCEDURE — 3077F PR MOST RECENT SYSTOLIC BLOOD PRESSURE >= 140 MM HG: ICD-10-PCS | Mod: CPTII,S$GLB,, | Performed by: INTERNAL MEDICINE

## 2021-03-09 PROCEDURE — 99999 PR PBB SHADOW E&M-EST. PATIENT-LVL III: ICD-10-PCS | Mod: PBBFAC,,, | Performed by: INTERNAL MEDICINE

## 2021-03-09 PROCEDURE — 93000 EKG 12-LEAD: ICD-10-PCS | Mod: S$GLB,,, | Performed by: INTERNAL MEDICINE

## 2021-03-09 PROCEDURE — 99999 PR PBB SHADOW E&M-EST. PATIENT-LVL III: CPT | Mod: PBBFAC,,, | Performed by: INTERNAL MEDICINE

## 2021-03-09 PROCEDURE — 3079F PR MOST RECENT DIASTOLIC BLOOD PRESSURE 80-89 MM HG: ICD-10-PCS | Mod: CPTII,S$GLB,, | Performed by: INTERNAL MEDICINE

## 2021-03-09 PROCEDURE — 3008F PR BODY MASS INDEX (BMI) DOCUMENTED: ICD-10-PCS | Mod: CPTII,S$GLB,, | Performed by: INTERNAL MEDICINE

## 2021-03-09 RX ORDER — LOSARTAN POTASSIUM 50 MG/1
50 TABLET ORAL DAILY
Qty: 90 TABLET | Refills: 3 | Status: SHIPPED | OUTPATIENT
Start: 2021-03-09 | End: 2022-03-21

## 2021-04-03 ENCOUNTER — IMMUNIZATION (OUTPATIENT)
Dept: FAMILY MEDICINE | Facility: CLINIC | Age: 66
End: 2021-04-03
Payer: COMMERCIAL

## 2021-04-03 DIAGNOSIS — Z23 NEED FOR VACCINATION: Primary | ICD-10-CM

## 2021-04-03 PROCEDURE — 91300 COVID-19, MRNA, LNP-S, PF, 30 MCG/0.3 ML DOSE VACCINE: CPT | Mod: PBBFAC | Performed by: FAMILY MEDICINE

## 2021-04-16 ENCOUNTER — TELEPHONE (OUTPATIENT)
Dept: CARDIOLOGY | Facility: CLINIC | Age: 66
End: 2021-04-16

## 2021-04-24 ENCOUNTER — IMMUNIZATION (OUTPATIENT)
Dept: FAMILY MEDICINE | Facility: CLINIC | Age: 66
End: 2021-04-24
Payer: COMMERCIAL

## 2021-04-24 DIAGNOSIS — Z23 NEED FOR VACCINATION: Primary | ICD-10-CM

## 2021-04-24 PROCEDURE — 91300 COVID-19, MRNA, LNP-S, PF, 30 MCG/0.3 ML DOSE VACCINE: ICD-10-PCS | Mod: S$GLB,,, | Performed by: FAMILY MEDICINE

## 2021-04-24 PROCEDURE — 0002A COVID-19, MRNA, LNP-S, PF, 30 MCG/0.3 ML DOSE VACCINE: ICD-10-PCS | Mod: CV19,S$GLB,, | Performed by: FAMILY MEDICINE

## 2021-04-24 PROCEDURE — 0002A COVID-19, MRNA, LNP-S, PF, 30 MCG/0.3 ML DOSE VACCINE: CPT | Mod: CV19,S$GLB,, | Performed by: FAMILY MEDICINE

## 2021-04-24 PROCEDURE — 91300 COVID-19, MRNA, LNP-S, PF, 30 MCG/0.3 ML DOSE VACCINE: CPT | Mod: S$GLB,,, | Performed by: FAMILY MEDICINE

## 2021-10-05 ENCOUNTER — OFFICE VISIT (OUTPATIENT)
Dept: CARDIOLOGY | Facility: CLINIC | Age: 66
End: 2021-10-05
Payer: COMMERCIAL

## 2021-10-05 VITALS
HEART RATE: 65 BPM | SYSTOLIC BLOOD PRESSURE: 127 MMHG | DIASTOLIC BLOOD PRESSURE: 80 MMHG | WEIGHT: 226.94 LBS | BODY MASS INDEX: 32.49 KG/M2 | HEIGHT: 70 IN

## 2021-10-05 DIAGNOSIS — I10 ESSENTIAL HYPERTENSION: Primary | ICD-10-CM

## 2021-10-05 DIAGNOSIS — I49.3 PVC'S (PREMATURE VENTRICULAR CONTRACTIONS): ICD-10-CM

## 2021-10-05 DIAGNOSIS — I49.1 PREMATURE ATRIAL CONTRACTIONS: ICD-10-CM

## 2021-10-05 DIAGNOSIS — Z98.890 HISTORY OF CARDIAC RADIOFREQUENCY ABLATION (RFA): ICD-10-CM

## 2021-10-05 DIAGNOSIS — I48.92 ATRIAL FLUTTER, UNSPECIFIED TYPE: ICD-10-CM

## 2021-10-05 PROCEDURE — 4010F ACE/ARB THERAPY RXD/TAKEN: CPT | Mod: CPTII,S$GLB,, | Performed by: INTERNAL MEDICINE

## 2021-10-05 PROCEDURE — 3079F PR MOST RECENT DIASTOLIC BLOOD PRESSURE 80-89 MM HG: ICD-10-PCS | Mod: CPTII,S$GLB,, | Performed by: INTERNAL MEDICINE

## 2021-10-05 PROCEDURE — 3079F DIAST BP 80-89 MM HG: CPT | Mod: CPTII,S$GLB,, | Performed by: INTERNAL MEDICINE

## 2021-10-05 PROCEDURE — 3008F BODY MASS INDEX DOCD: CPT | Mod: CPTII,S$GLB,, | Performed by: INTERNAL MEDICINE

## 2021-10-05 PROCEDURE — 4010F PR ACE/ARB THEARPY RXD/TAKEN: ICD-10-PCS | Mod: CPTII,S$GLB,, | Performed by: INTERNAL MEDICINE

## 2021-10-05 PROCEDURE — 1159F PR MEDICATION LIST DOCUMENTED IN MEDICAL RECORD: ICD-10-PCS | Mod: CPTII,S$GLB,, | Performed by: INTERNAL MEDICINE

## 2021-10-05 PROCEDURE — 99999 PR PBB SHADOW E&M-EST. PATIENT-LVL III: ICD-10-PCS | Mod: PBBFAC,,, | Performed by: INTERNAL MEDICINE

## 2021-10-05 PROCEDURE — 93000 EKG 12-LEAD: ICD-10-PCS | Mod: S$GLB,,, | Performed by: INTERNAL MEDICINE

## 2021-10-05 PROCEDURE — 1159F MED LIST DOCD IN RCRD: CPT | Mod: CPTII,S$GLB,, | Performed by: INTERNAL MEDICINE

## 2021-10-05 PROCEDURE — 99214 PR OFFICE/OUTPT VISIT, EST, LEVL IV, 30-39 MIN: ICD-10-PCS | Mod: 25,S$GLB,, | Performed by: INTERNAL MEDICINE

## 2021-10-05 PROCEDURE — 1160F RVW MEDS BY RX/DR IN RCRD: CPT | Mod: CPTII,S$GLB,, | Performed by: INTERNAL MEDICINE

## 2021-10-05 PROCEDURE — 1160F PR REVIEW ALL MEDS BY PRESCRIBER/CLIN PHARMACIST DOCUMENTED: ICD-10-PCS | Mod: CPTII,S$GLB,, | Performed by: INTERNAL MEDICINE

## 2021-10-05 PROCEDURE — 93000 ELECTROCARDIOGRAM COMPLETE: CPT | Mod: S$GLB,,, | Performed by: INTERNAL MEDICINE

## 2021-10-05 PROCEDURE — 99214 OFFICE O/P EST MOD 30 MIN: CPT | Mod: 25,S$GLB,, | Performed by: INTERNAL MEDICINE

## 2021-10-05 PROCEDURE — 3074F SYST BP LT 130 MM HG: CPT | Mod: CPTII,S$GLB,, | Performed by: INTERNAL MEDICINE

## 2021-10-05 PROCEDURE — 99999 PR PBB SHADOW E&M-EST. PATIENT-LVL III: CPT | Mod: PBBFAC,,, | Performed by: INTERNAL MEDICINE

## 2021-10-05 PROCEDURE — 3074F PR MOST RECENT SYSTOLIC BLOOD PRESSURE < 130 MM HG: ICD-10-PCS | Mod: CPTII,S$GLB,, | Performed by: INTERNAL MEDICINE

## 2021-10-05 PROCEDURE — 3008F PR BODY MASS INDEX (BMI) DOCUMENTED: ICD-10-PCS | Mod: CPTII,S$GLB,, | Performed by: INTERNAL MEDICINE

## 2021-10-05 RX ORDER — PNV NO.95/FERROUS FUM/FOLIC AC 28MG-0.8MG
100 TABLET ORAL DAILY
COMMUNITY

## 2021-10-05 RX ORDER — ASCORBIC ACID 500 MG
500 TABLET ORAL DAILY
COMMUNITY
End: 2023-06-28

## 2022-04-11 ENCOUNTER — TELEPHONE (OUTPATIENT)
Dept: CARDIOLOGY | Facility: CLINIC | Age: 67
End: 2022-04-11
Payer: COMMERCIAL

## 2022-04-11 NOTE — TELEPHONE ENCOUNTER
Spoke to patient.    Rescheduled appointments for his dad and himself.  Patient and dad come for appointments together and his dad has shingles.    ----- Message from Aimee Feliciano sent at 4/11/2022  8:55 AM CDT -----  Regarding: reschedule appointment  Name of Who is Calling: Jules           What is the request in detail: Patient is requesting a call back to reschedule him and his father. James Aviles MRN   [id number]  appointment together because he is still recovering from Shingles.           Can the clinic reply by [facility]: No           What Number to Call Back if not in [facility]: [phone]

## 2022-08-23 ENCOUNTER — HOSPITAL ENCOUNTER (EMERGENCY)
Facility: HOSPITAL | Age: 67
Discharge: HOME | End: 2022-08-23
Attending: EMERGENCY MEDICINE
Payer: MEDICARE

## 2022-08-23 VITALS
DIASTOLIC BLOOD PRESSURE: 65 MMHG | RESPIRATION RATE: 16 BRPM | OXYGEN SATURATION: 96 % | HEART RATE: 65 BPM | BODY MASS INDEX: 31.35 KG/M2 | HEIGHT: 70 IN | TEMPERATURE: 99 F | WEIGHT: 219 LBS | SYSTOLIC BLOOD PRESSURE: 139 MMHG

## 2022-08-23 DIAGNOSIS — R31.9 HEMATURIA, UNSPECIFIED TYPE: Primary | ICD-10-CM

## 2022-08-23 LAB
ALBUMIN SERPL-MCNC: 4.1 G/DL (ref 3.3–5.5)
ALP SERPL-CCNC: 63 U/L (ref 42–141)
BILIRUB SERPL-MCNC: 1.2 MG/DL (ref 0.2–1.6)
BILIRUBIN, POC UA: ABNORMAL
BLOOD, POC UA: ABNORMAL
BUN SERPL-MCNC: 13 MG/DL (ref 7–22)
CALCIUM SERPL-MCNC: 10 MG/DL (ref 8–10.3)
CHLORIDE SERPL-SCNC: 100 MMOL/L (ref 98–108)
CLARITY, POC UA: CLEAR
COLOR, POC UA: YELLOW
CREAT SERPL-MCNC: 0.9 MG/DL (ref 0.6–1.2)
GLUCOSE SERPL-MCNC: 116 MG/DL (ref 73–118)
GLUCOSE, POC UA: NEGATIVE
KETONES, POC UA: ABNORMAL
LEUKOCYTE EST, POC UA: NEGATIVE
NITRITE, POC UA: NEGATIVE
PH UR STRIP: 5.5 [PH]
POC ALT (SGPT): 50 U/L (ref 10–47)
POC AST (SGOT): 46 U/L (ref 11–38)
POC PTINR: 1 (ref 0.9–1.2)
POC PTWBT: 11.8 SEC (ref 9.7–14.3)
POC TCO2: 27 MMOL/L (ref 18–33)
POTASSIUM BLD-SCNC: 3.9 MMOL/L (ref 3.6–5.1)
PROTEIN, POC UA: ABNORMAL
PROTEIN, POC: 7.2 G/DL (ref 6.4–8.1)
SAMPLE: NORMAL
SODIUM BLD-SCNC: 140 MMOL/L (ref 128–145)
SPECIFIC GRAVITY, POC UA: >=1.03
UROBILINOGEN, POC UA: 0.2 E.U./DL

## 2022-08-23 PROCEDURE — 80053 COMPREHEN METABOLIC PANEL: CPT | Mod: ER

## 2022-08-23 PROCEDURE — 81003 URINALYSIS AUTO W/O SCOPE: CPT | Mod: ER

## 2022-08-23 PROCEDURE — 85025 COMPLETE CBC W/AUTO DIFF WBC: CPT | Mod: ER

## 2022-08-23 PROCEDURE — 99283 EMERGENCY DEPT VISIT LOW MDM: CPT | Mod: ER

## 2022-08-23 PROCEDURE — 85610 PROTHROMBIN TIME: CPT | Mod: ER

## 2022-08-23 RX ORDER — CEPHALEXIN 500 MG/1
500 CAPSULE ORAL EVERY 12 HOURS
Qty: 20 CAPSULE | Refills: 0 | Status: SHIPPED | OUTPATIENT
Start: 2022-08-23 | End: 2022-09-02

## 2022-08-23 NOTE — ED PROVIDER NOTES
Encounter Date: 8/23/2022    SCRIBE #1 NOTE: I, Santino Mejia, am scribing for, and in the presence of,  Katlin Matthews MD. I have scribed the following portions of the note - Other sections scribed: HPI,ROS,PE.       History     Chief Complaint   Patient presents with    Hematuria     Pt reports blood in urine yesterday. Pt states that he has had blood in urine previously but never been checked out, states that it has been happening more frequently lately. Pt denies dysuria, fever/chills, flank pain.      Patient is a 67 year old male with PMHx of HTN who presents to ED with complaints of intermittent hematuria since a while ago. Last episode was yesterday. He states that he has some specks of blood at the bottom of the toilet with urination. Denies any history of an enlarged prostate or use of anticoagulants. Patient notes that it has been a while since he saw his PCP and hasn't been seen for this issue before. He takes HTN medication daily. Denies any associated lightheadedness, dizziness, or abdominal pain. No other complaints at this time.     The history is provided by the patient. No  was used.     Review of patient's allergies indicates:  No Known Allergies  Past Medical History:   Diagnosis Date    Essential hypertension 12/7/2016     Past Surgical History:   Procedure Laterality Date    APPENDECTOMY      as a child    GALLBLADDER SURGERY  2004    HERNIA REPAIR  2004 & 2005    TONSILLECTOMY, ADENOIDECTOMY      as a child     Family History   Problem Relation Age of Onset    Aneurysm Mother     Heart disease Father      Social History     Tobacco Use    Smoking status: Never Smoker    Smokeless tobacco: Never Used   Substance Use Topics    Alcohol use: No    Drug use: No     Review of Systems   Constitutional: Negative for chills and fever.   HENT: Negative for congestion, drooling, ear pain and sore throat.    Eyes: Negative for pain, redness and visual disturbance.    Respiratory: Negative for shortness of breath.    Cardiovascular: Negative for chest pain.   Gastrointestinal: Negative for abdominal pain and nausea.   Endocrine: Negative for cold intolerance, polydipsia, polyphagia and polyuria.   Genitourinary: Positive for hematuria. Negative for dysuria and flank pain.   Musculoskeletal: Negative for back pain.   Skin: Negative for rash.   Allergic/Immunologic: Negative for environmental allergies and food allergies.   Neurological: Negative for dizziness, syncope, weakness, light-headedness and headaches.   Hematological: Does not bruise/bleed easily.   Psychiatric/Behavioral: Negative for agitation, behavioral problems and confusion.   All other systems reviewed and are negative.      Physical Exam     Initial Vitals [08/23/22 0959]   BP Pulse Resp Temp SpO2   (!) 147/86 (!) 59 16 99.1 °F (37.3 °C) 97 %      MAP       --         Physical Exam    Nursing note and vitals reviewed.  Constitutional: He appears well-developed and well-nourished. He is not diaphoretic. No distress.   HENT:   Head: Normocephalic and atraumatic.   Eyes: Conjunctivae and EOM are normal. Pupils are equal, round, and reactive to light.   Neck: Neck supple.   Normal range of motion.  Cardiovascular: Normal rate, regular rhythm, normal heart sounds and intact distal pulses. Exam reveals no gallop and no friction rub.    No murmur heard.  Pulmonary/Chest: Breath sounds normal. No stridor. No respiratory distress. He has no wheezes. He has no rhonchi. He has no rales. He exhibits no tenderness.   Abdominal: Abdomen is soft. Bowel sounds are normal. He exhibits no distension and no mass. There is no abdominal tenderness. There is no rebound and no guarding.   Musculoskeletal:         General: No edema. Normal range of motion.      Cervical back: Normal range of motion and neck supple.     Neurological: He is alert and oriented to person, place, and time.   No focal deficits on gross exam   Skin: Skin is  warm and dry.   Psychiatric: He has a normal mood and affect. His behavior is normal. Judgment and thought content normal.         ED Course   Procedures  Labs Reviewed   POCT URINALYSIS W/O SCOPE - Abnormal; Notable for the following components:       Result Value    Bilirubin, UA 1+ (*)     Ketones, UA 1+ (*)     Spec Grav UA >=1.030 (*)     Blood, UA 3+ (*)     Protein, UA Trace (*)     All other components within normal limits   POCT CMP - Abnormal; Notable for the following components:    ALT (SGPT), POC 50 (*)     AST (SGOT), POC 46 (*)     All other components within normal limits   POCT URINALYSIS W/O SCOPE   POCT CBC   POCT CMP   POCT PROTIME-INR   ISTAT PROCEDURE          Imaging Results    None          Medications - No data to display  Medical Decision Making:   History:   Old Medical Records: I decided to obtain old medical records.  Initial Assessment:   Patient is a 67 year old male with PMHx of HTN who presents to ED with complaints of intermittent hematuria since a while ago. Last episode was yesterday.  Differential Diagnosis:   Includes but not limited to UTI versus coagulopathy versus metabolic derangement versus malignancy.  Clinical Tests:   Lab Tests: Ordered and Reviewed  ED Management:  Will get labs and UA.  Will reassess.  Disposition pending results.    11:10 AM 8/23/2022  Sissy Matthews MD      Update note:  H&H unremarkable.  Coags unremarkable.  UA with blood without any leukocytes or nitrates noted.  Mildly elevated LFTs.  Will discharge with Urology follow-up and cover with antibiotics.  Will also give return precautions.  Patient verbalized understanding agrees plan of care.    12:53 PM 8/23/2022  Sissy Matthews MD          DISCLAIMER: This note was prepared with [employer] voice recognition transcription software. Garbled syntax, mangled pronouns, and other bizarre constructions may be attributed to that software system             Scribe Attestation:   Scribe #1: I performed the  above scribed service and the documentation accurately describes the services I performed. I attest to the accuracy of the note.                 I, Sissy Matthews MD, personally performed the services described in this documentation. All medical record entries made by the scribe were at my direction and in my presence. I have reviewed the chart and agree that the record reflects my personal performance and is accurate and complete.  Clinical Impression:   Final diagnoses:  [R31.9] Hematuria, unspecified type (Primary)          ED Disposition Condition    Discharge Stable        ED Prescriptions     Medication Sig Dispense Start Date End Date Auth. Provider    cephALEXin (KEFLEX) 500 MG capsule Take 1 capsule (500 mg total) by mouth every 12 (twelve) hours. for 10 days 20 capsule 8/23/2022 9/2/2022 Sissy Matthews MD        Follow-up Information     Follow up With Specialties Details Why Contact Info Additional Information    [facility] ED Emergency Medicine  As needed, If symptoms worsen [address] [facility] [id number] [phone]     [facility] Urology Schedule an appointment as soon as possible for a visit in 1 day  [address] [facility] [phone] [phone] 2nd Floor           Sissy Matthews MD  08/23/22 7202

## 2022-09-13 ENCOUNTER — OFFICE VISIT (OUTPATIENT)
Dept: CARDIOLOGY | Facility: CLINIC | Age: 67
End: 2022-09-13
Payer: MEDICARE

## 2022-09-13 VITALS
BODY MASS INDEX: 31.37 KG/M2 | SYSTOLIC BLOOD PRESSURE: 123 MMHG | HEART RATE: 58 BPM | DIASTOLIC BLOOD PRESSURE: 80 MMHG | WEIGHT: 219.13 LBS | OXYGEN SATURATION: 95 % | HEIGHT: 70 IN

## 2022-09-13 DIAGNOSIS — I48.92 ATRIAL FLUTTER, UNSPECIFIED TYPE: ICD-10-CM

## 2022-09-13 DIAGNOSIS — I10 ESSENTIAL HYPERTENSION: ICD-10-CM

## 2022-09-13 DIAGNOSIS — Z98.890 HISTORY OF CARDIAC RADIOFREQUENCY ABLATION (RFA): Primary | ICD-10-CM

## 2022-09-13 DIAGNOSIS — I49.1 PREMATURE ATRIAL CONTRACTIONS: ICD-10-CM

## 2022-09-13 PROCEDURE — 99999 PR PBB SHADOW E&M-EST. PATIENT-LVL III: CPT | Mod: PBBFAC,,, | Performed by: INTERNAL MEDICINE

## 2022-09-13 PROCEDURE — 1160F RVW MEDS BY RX/DR IN RCRD: CPT | Mod: CPTII,S$GLB,, | Performed by: INTERNAL MEDICINE

## 2022-09-13 PROCEDURE — 93000 EKG 12-LEAD: ICD-10-PCS | Mod: S$GLB,,, | Performed by: INTERNAL MEDICINE

## 2022-09-13 PROCEDURE — 3074F PR MOST RECENT SYSTOLIC BLOOD PRESSURE < 130 MM HG: ICD-10-PCS | Mod: CPTII,S$GLB,, | Performed by: INTERNAL MEDICINE

## 2022-09-13 PROCEDURE — 93000 ELECTROCARDIOGRAM COMPLETE: CPT | Mod: S$GLB,,, | Performed by: INTERNAL MEDICINE

## 2022-09-13 PROCEDURE — 99213 PR OFFICE/OUTPT VISIT, EST, LEVL III, 20-29 MIN: ICD-10-PCS | Mod: 25,S$GLB,, | Performed by: INTERNAL MEDICINE

## 2022-09-13 PROCEDURE — 3074F SYST BP LT 130 MM HG: CPT | Mod: CPTII,S$GLB,, | Performed by: INTERNAL MEDICINE

## 2022-09-13 PROCEDURE — 99213 OFFICE O/P EST LOW 20 MIN: CPT | Mod: 25,S$GLB,, | Performed by: INTERNAL MEDICINE

## 2022-09-13 PROCEDURE — 4010F ACE/ARB THERAPY RXD/TAKEN: CPT | Mod: CPTII,S$GLB,, | Performed by: INTERNAL MEDICINE

## 2022-09-13 PROCEDURE — 1101F PR PT FALLS ASSESS DOC 0-1 FALLS W/OUT INJ PAST YR: ICD-10-PCS | Mod: CPTII,S$GLB,, | Performed by: INTERNAL MEDICINE

## 2022-09-13 PROCEDURE — 3288F FALL RISK ASSESSMENT DOCD: CPT | Mod: CPTII,S$GLB,, | Performed by: INTERNAL MEDICINE

## 2022-09-13 PROCEDURE — 99999 PR PBB SHADOW E&M-EST. PATIENT-LVL III: ICD-10-PCS | Mod: PBBFAC,,, | Performed by: INTERNAL MEDICINE

## 2022-09-13 PROCEDURE — 1160F PR REVIEW ALL MEDS BY PRESCRIBER/CLIN PHARMACIST DOCUMENTED: ICD-10-PCS | Mod: CPTII,S$GLB,, | Performed by: INTERNAL MEDICINE

## 2022-09-13 PROCEDURE — 1159F MED LIST DOCD IN RCRD: CPT | Mod: CPTII,S$GLB,, | Performed by: INTERNAL MEDICINE

## 2022-09-13 PROCEDURE — 3008F PR BODY MASS INDEX (BMI) DOCUMENTED: ICD-10-PCS | Mod: CPTII,S$GLB,, | Performed by: INTERNAL MEDICINE

## 2022-09-13 PROCEDURE — 3288F PR FALLS RISK ASSESSMENT DOCUMENTED: ICD-10-PCS | Mod: CPTII,S$GLB,, | Performed by: INTERNAL MEDICINE

## 2022-09-13 PROCEDURE — 3079F DIAST BP 80-89 MM HG: CPT | Mod: CPTII,S$GLB,, | Performed by: INTERNAL MEDICINE

## 2022-09-13 PROCEDURE — 1101F PT FALLS ASSESS-DOCD LE1/YR: CPT | Mod: CPTII,S$GLB,, | Performed by: INTERNAL MEDICINE

## 2022-09-13 PROCEDURE — 3079F PR MOST RECENT DIASTOLIC BLOOD PRESSURE 80-89 MM HG: ICD-10-PCS | Mod: CPTII,S$GLB,, | Performed by: INTERNAL MEDICINE

## 2022-09-13 PROCEDURE — 3008F BODY MASS INDEX DOCD: CPT | Mod: CPTII,S$GLB,, | Performed by: INTERNAL MEDICINE

## 2022-09-13 PROCEDURE — 1159F PR MEDICATION LIST DOCUMENTED IN MEDICAL RECORD: ICD-10-PCS | Mod: CPTII,S$GLB,, | Performed by: INTERNAL MEDICINE

## 2022-09-13 PROCEDURE — 4010F PR ACE/ARB THEARPY RXD/TAKEN: ICD-10-PCS | Mod: CPTII,S$GLB,, | Performed by: INTERNAL MEDICINE

## 2022-09-13 RX ORDER — TAMSULOSIN HYDROCHLORIDE 0.4 MG/1
1 CAPSULE ORAL NIGHTLY
COMMUNITY
Start: 2022-08-29

## 2022-09-13 NOTE — PROGRESS NOTES
"  Subjective:      Patient ID: Jules Aviles is a 67 y.o. male.    Chief Complaint: Follow-up and Hypertension    HPI:  Exercising at the gym    Still works construction.    Review of Systems   Cardiovascular:  Negative for chest pain, claudication, dyspnea on exertion, irregular heartbeat, leg swelling, near-syncope, orthopnea, palpitations and syncope.      Cholestyramine "really helps my stomach"        Past Medical History:   Diagnosis Date    Essential hypertension 12/7/2016        Past Surgical History:   Procedure Laterality Date    APPENDECTOMY      as a child    GALLBLADDER SURGERY  2004    HERNIA REPAIR  2004 & 2005    TONSILLECTOMY, ADENOIDECTOMY      as a child       Family History   Problem Relation Age of Onset    Aneurysm Mother     Heart disease Father        Social History     Socioeconomic History    Marital status: Single   Tobacco Use    Smoking status: Never    Smokeless tobacco: Never   Substance and Sexual Activity    Alcohol use: No    Drug use: No       Current Outpatient Medications on File Prior to Visit   Medication Sig Dispense Refill    acetaminophen (TYLENOL) 500 MG tablet Take 2 tablets (1,000 mg total) by mouth every 6 (six) hours as needed for Pain. 30 tablet 0    ascorbic acid, vitamin C, (VITAMIN C) 500 MG tablet Take 500 mg by mouth once daily.      Bifidobacterium infantis (ALIGN ORAL) Take by mouth once daily.      CHOLESTYRAMINE LIGHT 4 gram Powd once daily.       cyanocobalamin (VITAMIN B-12) 100 MCG tablet Take 100 mcg by mouth once daily.      hydroCHLOROthiazide (MICROZIDE) 12.5 mg capsule TAKE 1 CAPSULE BY MOUTH EVERY DAY 30 capsule 11    losartan (COZAAR) 50 MG tablet TAKE 1 TABLET BY MOUTH EVERY DAY 30 tablet 11    vitamin D (VITAMIN D3) 1000 units Tab Take 8,000 Units by mouth once daily.      tamsulosin (FLOMAX) 0.4 mg Cap Take 1 capsule by mouth every evening.       Current Facility-Administered Medications on File Prior to Visit   Medication Dose Route Frequency " "Provider Last Rate Last Admin    0.9%  NaCl infusion   Intravenous Continuous Lien Crocker NP   Stopped at 02/18/19 6719       Review of patient's allergies indicates:  No Known Allergies  Objective:     Vitals:    09/13/22 1035   BP: 123/80   BP Location: Right arm   Patient Position: Sitting   BP Method: Large (Automatic)   Pulse: (!) 58   SpO2: 95%   Weight: 99.4 kg (219 lb 2.2 oz)   Height: 5' 10" (1.778 m)        Physical Exam  Vitals reviewed.   Constitutional:       General: He is not in acute distress.     Appearance: He is well-developed. He is not diaphoretic.   Eyes:      General: No scleral icterus.  Neck:      Vascular: No carotid bruit or JVD.   Cardiovascular:      Rate and Rhythm: Regular rhythm.      Heart sounds: Normal heart sounds. No murmur heard.    No friction rub. No gallop.   Pulmonary:      Effort: Pulmonary effort is normal. No respiratory distress.      Breath sounds: Normal breath sounds.   Musculoskeletal:      Right lower leg: No edema.      Left lower leg: No edema.   Skin:     General: Skin is warm and dry.   Neurological:      Mental Status: He is alert and oriented to person, place, and time.   Psychiatric:         Behavior: Behavior normal.         Thought Content: Thought content normal.         Judgment: Judgment normal.        ECG today reviewed by me: sinus bradycardia at 57 bpm with first degree AV block, otherwise normal, unchanged          Admission on 08/23/2022, Discharged on 08/23/2022   Component Date Value Ref Range Status    Glucose, UA 08/23/2022 Negative   Final    Bilirubin, UA 08/23/2022 1+ (A)   Final    Ketones, UA 08/23/2022 1+ (A)   Final    Spec Grav UA 08/23/2022 >=1.030 (>)   Final    Blood, UA 08/23/2022 3+ (A)   Final    PH, UA 08/23/2022 5.5   Final    Protein, UA 08/23/2022 Trace (A)   Final    Urobilinogen, UA 08/23/2022 0.2  E.U./dL Final    Nitrite, UA 08/23/2022 Negative   Final    Leukocytes, UA 08/23/2022 Negative   Final    Color, UA " 08/23/2022 Yellow   Final    Clarity, UA 08/23/2022 Clear   Final    POC PTWBT 08/23/2022 11.8  9.7 - 14.3 sec Final    POC PTINR 08/23/2022 1.0  0.9 - 1.2 Final    Sample 08/23/2022 unknown   Final    Albumin, POC 08/23/2022 4.1  3.3 - 5.5 g/dL Final    Alkaline Phosphatase, POC 08/23/2022 63  42 - 141 U/L Final    ALT (SGPT), POC 08/23/2022 50 (H)  10 - 47 U/L Final    AST (SGOT), POC 08/23/2022 46 (H)  11 - 38 U/L Final    POC BUN 08/23/2022 13  7 - 22 mg/dL Final    Calcium, POC 08/23/2022 10.0  8.0 - 10.3 mg/dL Final    POC Chloride 08/23/2022 100  98 - 108 mmol/L Final    POC Creatinine 08/23/2022 0.9  0.6 - 1.2 mg/dL Final    POC Glucose 08/23/2022 116  73 - 118 mg/dL Final    POC Potassium 08/23/2022 3.9  3.6 - 5.1 mmol/L Final    POC Sodium 08/23/2022 140  128 - 145 mmol/L Final    Bilirubin, POC 08/23/2022 1.2  0.2 - 1.6 mg/dL Final    POC TCO2 08/23/2022 27  18 - 33 mmol/L Final    Protein, POC 08/23/2022 7.2  6.4 - 8.1 g/dL Final   (    contraction) [I49.1 (ICD-10-CM)]     Procedures    ABLATION, ATRIAL FLUTTER, TYPICAL     Conclusion       Successful ablation of atrial flutter (typical).  Paroxysms of atrial fibrillation observed during ablation of the cavotricuspid isthmus.  Continue anticoagulation. Will order a 30 day event monitor to evaluate for any paroxysmal atrial fibrillation           Procedure Log documented by No documenter listed and verified by Rich Berumen.        Date: 2/18/2019  Time: 3:55 PM     Accession #: [id number]  Transthoracic echo (TTE) complete  Order# [id number]  Reading physician: Bharat Mathis MD Ordering physician: Bharat Mathis MD Study date: 1/15/19     Reason for Exam  Priority: Routine  Dx: Essential hypertension [I10 (ICD-10-CM)]; Atrial flutter, unspecified type [I48.92 (ICD-10-CM)]     Result Image Hyperlink     Show images for Transthoracic echo (TTE) complete (Cupid Only)  Summary    Moderate left atrial enlargement.  Mild concentric left ventricular  hypertrophy.  Normal left ventricular systolic function. The estimated ejection fraction is 60%  Grade I (mild) left ventricular diastolic dysfunction consistent with impaired relaxation.  Normal right ventricular systolic function.  Mild tricuspid regurgitation.  Mild right ventricular enlargement.  Normal left atrial pressure.  Normal central venous pressure (3 mm Hg).  The estimated PA systolic pressure is 28 mm Hg  Rhythm is sinus bradycardia at 54 beats per minute   Accession #: [id number]  Jules Aviles  Cardiac event monitor  Order# [id number]  Reading physician: Rich Berumen MD Ordering physician: Edwin Brody MD Study date: 2/21/19     Reason for Exam  Priority: Routine  Dx: Atrial flutter [I48.92 (ICD-10-CM)]     Conclusion    Negative event monitor with no clinical arrhythmias.     Assessment:     1. History of cardiac radiofrequency ablation (RFA)    2. Essential hypertension    3. Premature atrial contractions    4. Atrial flutter, unspecified type      Plan:   Jules was seen today for follow-up and hypertension.    Diagnoses and all orders for this visit:    History of cardiac radiofrequency ablation (RFA)  -     IN OFFICE EKG 12-LEAD (to Muse)    Essential hypertension  -     IN OFFICE EKG 12-LEAD (to Muse)    Premature atrial contractions  -     IN OFFICE EKG 12-LEAD (to Muse)    Atrial flutter, unspecified type  -     IN OFFICE EKG 12-LEAD (to Muse)     Pt plans to see Dr Avni Grover as PCP    Same meds    RTC 6 months  Follow up in about 6 months (around 3/13/2023).

## 2023-01-15 ENCOUNTER — HOSPITAL ENCOUNTER (EMERGENCY)
Facility: HOSPITAL | Age: 68
Discharge: HOME | End: 2023-01-15
Attending: EMERGENCY MEDICINE
Payer: MEDICARE

## 2023-01-15 VITALS
HEART RATE: 58 BPM | OXYGEN SATURATION: 97 % | TEMPERATURE: 99 F | DIASTOLIC BLOOD PRESSURE: 76 MMHG | BODY MASS INDEX: 31.5 KG/M2 | SYSTOLIC BLOOD PRESSURE: 130 MMHG | WEIGHT: 220 LBS | RESPIRATION RATE: 18 BRPM | HEIGHT: 70 IN

## 2023-01-15 DIAGNOSIS — R00.2 PALPITATIONS: Primary | ICD-10-CM

## 2023-01-15 DIAGNOSIS — E87.6 HYPOKALEMIA: ICD-10-CM

## 2023-01-15 DIAGNOSIS — Z86.79 HISTORY OF ATRIAL FLUTTER: ICD-10-CM

## 2023-01-15 LAB
ALBUMIN SERPL-MCNC: 4.5 G/DL (ref 3.3–5.5)
ALLENS TEST: ABNORMAL
ALP SERPL-CCNC: 74 U/L (ref 42–141)
BILIRUB SERPL-MCNC: 0.9 MG/DL (ref 0.2–1.6)
BUN SERPL-MCNC: 16 MG/DL (ref 7–22)
CALCIUM SERPL-MCNC: 10.6 MG/DL (ref 8–10.3)
CHLORIDE SERPL-SCNC: 104 MMOL/L (ref 98–108)
CREAT SERPL-MCNC: 0.7 MG/DL (ref 0.6–1.2)
GLUCOSE SERPL-MCNC: 111 MG/DL (ref 73–118)
HCO3 UR-SCNC: 29.2 MMOL/L (ref 24–28)
HCT, POC: NORMAL
HGB, POC: NORMAL (ref 14–18)
LDH SERPL L TO P-CCNC: 1.77 MMOL/L (ref 0.5–2.2)
MCH, POC: NORMAL
MCHC, POC: NORMAL
MCV, POC: NORMAL
MPV, POC: NORMAL
PCO2 BLDA: 49.3 MMHG (ref 35–45)
PH SMN: 7.38 [PH] (ref 7.35–7.45)
PO2 BLDA: 44 MMHG (ref 40–60)
POC ALT (SGPT): 29 U/L (ref 10–47)
POC AST (SGOT): 25 U/L (ref 11–38)
POC B-TYPE NATRIURETIC PEPTIDE: 34.1 PG/ML (ref 0–100)
POC BE: 3 MMOL/L
POC CARDIAC TROPONIN I: 0.01 NG/ML (ref 0–0.08)
POC PLATELET COUNT: NORMAL
POC SATURATED O2: 78 % (ref 95–100)
POC TCO2: 29 MMOL/L (ref 18–33)
POC TCO2: 31 MMOL/L (ref 24–29)
POTASSIUM BLD-SCNC: 3.5 MMOL/L (ref 3.6–5.1)
PROTEIN, POC: 7.9 G/DL (ref 6.4–8.1)
RBC, POC: NORMAL
RDW, POC: NORMAL
SAMPLE: ABNORMAL
SAMPLE: NORMAL
SITE: ABNORMAL
SODIUM BLD-SCNC: 145 MMOL/L (ref 128–145)
WBC, POC: NORMAL

## 2023-01-15 PROCEDURE — 93005 ELECTROCARDIOGRAM TRACING: CPT | Mod: ER

## 2023-01-15 PROCEDURE — 82803 BLOOD GASES ANY COMBINATION: CPT | Mod: ER

## 2023-01-15 PROCEDURE — 25000003 PHARM REV CODE 250: Mod: ER | Performed by: EMERGENCY MEDICINE

## 2023-01-15 PROCEDURE — 80053 COMPREHEN METABOLIC PANEL: CPT | Mod: ER

## 2023-01-15 PROCEDURE — 85025 COMPLETE CBC W/AUTO DIFF WBC: CPT | Mod: ER

## 2023-01-15 PROCEDURE — 93010 ELECTROCARDIOGRAM REPORT: CPT | Mod: ,,, | Performed by: INTERNAL MEDICINE

## 2023-01-15 PROCEDURE — 83880 ASSAY OF NATRIURETIC PEPTIDE: CPT | Mod: ER

## 2023-01-15 PROCEDURE — 99284 EMERGENCY DEPT VISIT MOD MDM: CPT | Mod: ER

## 2023-01-15 PROCEDURE — 84484 ASSAY OF TROPONIN QUANT: CPT | Mod: ER

## 2023-01-15 PROCEDURE — 93010 EKG 12-LEAD: ICD-10-PCS | Mod: ,,, | Performed by: INTERNAL MEDICINE

## 2023-01-15 RX ORDER — POTASSIUM CHLORIDE 20 MEQ/1
40 TABLET, EXTENDED RELEASE ORAL
Status: COMPLETED | OUTPATIENT
Start: 2023-01-15 | End: 2023-01-15

## 2023-01-15 RX ADMIN — POTASSIUM CHLORIDE 40 MEQ: 1500 TABLET, EXTENDED RELEASE ORAL at 11:01

## 2023-01-16 NOTE — DISCHARGE INSTRUCTIONS
Drink plenty of electrolyte-rich fluids (at least one gallon or more daily).  Limit/avoid caffeine (coffee, tea, coke, Dr SidraPepper, Mountain Dew, energy drinks, pre-workout supplements, etc.) and alcohol intake while symptoms persist.

## 2023-01-16 NOTE — ED PROVIDER NOTES
"Encounter Date: 1/15/2023    SCRIBE #1 NOTE: I, Lilia Rainey, am scribing for, and in the presence of,  Mike Huerta MD. I have scribed the following portions of the note - Other sections scribed: HPI, ROS, PE.     History     Chief Complaint   Patient presents with    Hypertension     Pt  presents to ed with elevated BP. Pt states 160 90 and a heart rate of 120 at home. States had heart ablation 2-3 yrs ago and was dx with afib. Denies chest pain.states that he feels lightheaded. States took a ASA 81mg pta. Nad at this time.      Jules Aviles 67 y.o. male, with HTN, Atrial flutter, and others, presents to the ED with an acute intermittent feeling of lightheadedness and "wooziness" with palpitations onset 3 weeks ago. Patient states that each episode resolved spontaneously within 1 minute. The most recent episode was about 4 hours ago when he was sitting down watching a football game. Patient took his blood pressure and HR immediately thereafter and reports a heart rate of 95 and a blood pressure of 165/90. Patient reports heart rate of 105 the second time he checked today. Patient states that the symptoms are similar to when he had Atrial flutter. Patient has a history of cardiac ablation in 2019. Patient is no longer taking Eliquis since the ablation.. Patient reports taking Hydrochlorothiazide and Losartan. Patient missed a dose 1 week ago but has been taking regularly since then. Patient denies taking Aspirin regularly, but reports taking an Aspirin 81mg tonight. Patient denies SOB, chest pain, sweats, fatigue, stress, nausea, vomiting, weakness in the arms or legs, frequency, difficulty urinating, or hematochezia. Patient has NKDA. Patient denies tobacco, alcohol, or recreational drug use. Patient reports drinking Coke zero once or twice a day.    The history is provided by the patient. No  was used.   Review of patient's allergies indicates:  No Known Allergies  Past Medical History: "   Diagnosis Date    Essential hypertension 12/7/2016     Past Surgical History:   Procedure Laterality Date    APPENDECTOMY      as a child    GALLBLADDER SURGERY  2004    HERNIA REPAIR  2004 & 2005    TONSILLECTOMY, ADENOIDECTOMY      as a child     Family History   Problem Relation Age of Onset    Aneurysm Mother     Heart disease Father      Social History     Tobacco Use    Smoking status: Never    Smokeless tobacco: Never   Substance Use Topics    Alcohol use: No    Drug use: No     Review of Systems   Constitutional:  Negative for diaphoresis and fatigue.   Respiratory:  Negative for shortness of breath.    Cardiovascular:  Positive for palpitations. Negative for chest pain and leg swelling.   Gastrointestinal:  Negative for blood in stool, nausea and vomiting.   Genitourinary:  Negative for decreased urine volume, difficulty urinating and frequency.   Musculoskeletal:  Negative for gait problem.   Neurological:  Positive for light-headedness. Negative for syncope and weakness.   All other systems reviewed and are negative.    Physical Exam     Initial Vitals   BP Pulse Resp Temp SpO2   01/15/23 1853 01/15/23 1853 01/15/23 1853 01/15/23 1853 01/15/23 2120   125/82 106 20 98.6 °F (37 °C) 96 %      MAP       --                Physical Exam    Nursing note and vitals reviewed.  Constitutional: He appears well-developed and well-nourished. He is not diaphoretic. He is cooperative.  Non-toxic appearance. He does not appear ill. No distress.   HENT:   Head: Normocephalic and atraumatic.   Mouth/Throat: Oropharynx is clear and moist.   Eyes: Conjunctivae are normal.   Neck: Neck supple.   Normal range of motion.  Cardiovascular:  Normal rate and intact distal pulses.           Pulmonary/Chest: Effort normal and breath sounds normal. No accessory muscle usage or stridor. No tachypnea. No respiratory distress. He has no wheezes. He has no rhonchi.   Abdominal: He exhibits no distension. There is no abdominal  tenderness.   Musculoskeletal:         General: No tenderness or edema. Normal range of motion.      Cervical back: Normal range of motion and neck supple.      Right lower leg: No edema.      Left lower leg: No edema.     Neurological: He is alert and oriented to person, place, and time. He has normal strength. Gait normal. GCS eye subscore is 4. GCS verbal subscore is 5. GCS motor subscore is 6.   Skin: Skin is warm and dry. No erythema. No pallor.   Psychiatric: He has a normal mood and affect.       ED Course   Procedures  Labs Reviewed   ISTAT PROCEDURE - Abnormal; Notable for the following components:       Result Value    POC PCO2 49.3 (*)     POC HCO3 29.2 (*)     POC SATURATED O2 78 (*)     POC TCO2 31 (*)     All other components within normal limits   POCT CMP - Abnormal; Notable for the following components:    Calcium, POC 10.6 (*)     POC Potassium 3.5 (*)     All other components within normal limits   TROPONIN ISTAT   POCT CBC   POCT CMP   POCT TROPONIN   POCT B-TYPE NATRIURETIC PEPTIDE (BNP)   POCT B-TYPE NATRIURETIC PEPTIDE (BNP)     EKG Readings: (Independently Interpreted)   Initial Reading: No STEMI. Rhythm: Normal Sinus Rhythm. Heart Rate: 94. Ectopy: No Ectopy. Conduction: Normal. ST Segments: Normal ST Segments. T Waves: Normal. Clinical Impression: Normal Sinus Rhythm     Imaging Results    None          Medications   potassium chloride SA CR tablet 40 mEq (40 mEq Oral Given 1/15/23 2329)     Medical Decision Making:   History:   Old Medical Records: I decided to obtain old medical records.  Clinical Tests:   Lab Tests: Ordered and Reviewed  Medical Tests: Ordered and Reviewed        Labs Reviewed          Admission on 01/15/2023, Discharged on 01/15/2023   Component Date Value Ref Range Status    POC PH 01/15/2023 7.380  7.35 - 7.45 Final    POC PCO2 01/15/2023 49.3 (H)  35 - 45 mmHg Final    POC PO2 01/15/2023 44  40 - 60 mmHg Final    POC HCO3 01/15/2023 29.2 (H)  24 - 28 mmol/L Final     POC BE 01/15/2023 3  -2 to 2 mmol/L Final    POC SATURATED O2 01/15/2023 78 (L)  95 - 100 % Final    POC Lactate 01/15/2023 1.77  0.5 - 2.2 mmol/L Final    POC TCO2 01/15/2023 31 (H)  24 - 29 mmol/L Final    Sample 01/15/2023 VENOUS   Final    Site 01/15/2023 Other   Final    Allens Test 01/15/2023 N/A   Final    Albumin, POC 01/15/2023 4.5  3.3 - 5.5 g/dL Final    Alkaline Phosphatase, POC 01/15/2023 74  42 - 141 U/L Final    ALT (SGPT), POC 01/15/2023 29  10 - 47 U/L Final    AST (SGOT), POC 01/15/2023 25  11 - 38 U/L Final    POC BUN 01/15/2023 16  7 - 22 mg/dL Final    Calcium, POC 01/15/2023 10.6 (H)  8.0 - 10.3 mg/dL Final    POC Chloride 01/15/2023 104  98 - 108 mmol/L Final    POC Creatinine 01/15/2023 0.7  0.6 - 1.2 mg/dL Final    POC Glucose 01/15/2023 111  73 - 118 mg/dL Final    POC Potassium 01/15/2023 3.5 (L)  3.6 - 5.1 mmol/L Final    POC Sodium 01/15/2023 145  128 - 145 mmol/L Final    Bilirubin, POC 01/15/2023 0.9  0.2 - 1.6 mg/dL Final    POC TCO2 01/15/2023 29  18 - 33 mmol/L Final    Protein, POC 01/15/2023 7.9  6.4 - 8.1 g/dL Final    POC Cardiac Troponin I 01/15/2023 0.01  0.00 - 0.08 ng/mL Final    Sample 01/15/2023 unknown   Final    Comment: A single negative troponin is insufficient to rule out myocardial infarction.  The use of a serial sampling protocol is recommended practice. Correlate results with reference intervals established for methodology used. Point of care and core laboratory   troponin results are not interchangeable.      POC B-Type Natriuretic Peptide 01/15/2023 34.1  0.0 - 100.0 pg/mL Final        Imaging Reviewed    Imaging Results    None         Medications given in ED    Medications   potassium chloride SA CR tablet 40 mEq (40 mEq Oral Given 1/15/23 7157)         Note was created using voice recognition software. Note may have occasional typographical errors that may not have been identified and edited despite good lorrie initial review prior  to signing.    I, Mike Huerta MD, personally performed the services described in this documentation. All medical record entries made by the scribe were at my direction and in my presence.  I have reviewed the chart and agree that the record reflects my personal performance and is accurate and complete.       Scribe Attestation:   Scribe #1: I performed the above scribed service and the documentation accurately describes the services I performed. I attest to the accuracy of the note.                   Clinical Impression:   Final diagnoses:  [R00.2] Palpitations (Primary)  [E87.6] Hypokalemia  [Z86.79] History of atrial flutter - sp ablation in 2019        ED Disposition Condition    Discharge Stable          ED Prescriptions    None       Follow-up Information       Follow up With Specialties Details Why Contact Info    The nearest emergency department.  Go to  As needed, If symptoms worsen     Your PCP  Call  As needed, for ongoing care     Rich Berumen MD Electrophysiology, Cardiology Call on 1/17/2023 to schedule an appointment, for re-evaluation of today's complaint, and ongoing care [address] [facility] [address]  [phone]      Bharat Mathis MD Cardiology Call on 1/17/2023 to schedule an appointment, for re-evaluation of today's complaint [address] [facility]  [address]  [facility] [address]  [phone]               Mike Huerta MD  01/16/23 0323

## 2023-01-17 ENCOUNTER — TELEPHONE (OUTPATIENT)
Dept: ELECTROPHYSIOLOGY | Facility: CLINIC | Age: 68
End: 2023-01-17
Payer: MEDICARE

## 2023-01-17 NOTE — TELEPHONE ENCOUNTER
Returned call to pt. He requests event monitor to follow up from ER visit. Message sent to cardiology for order. Will follow up with patient once confirmed with MD.

## 2023-01-18 ENCOUNTER — TELEPHONE (OUTPATIENT)
Dept: CARDIOLOGY | Facility: CLINIC | Age: 68
End: 2023-01-18
Payer: MEDICARE

## 2023-01-18 DIAGNOSIS — R00.2 PALPITATIONS: Primary | ICD-10-CM

## 2023-01-18 DIAGNOSIS — Z86.79 HX OF ATRIAL FLUTTER: ICD-10-CM

## 2023-01-27 ENCOUNTER — CLINICAL SUPPORT (OUTPATIENT)
Dept: CARDIOLOGY | Facility: HOSPITAL | Age: 68
End: 2023-01-27
Attending: INTERNAL MEDICINE
Payer: MEDICARE

## 2023-01-27 DIAGNOSIS — Z86.79 HX OF ATRIAL FLUTTER: ICD-10-CM

## 2023-01-27 DIAGNOSIS — R00.2 PALPITATIONS: ICD-10-CM

## 2023-01-27 PROCEDURE — 93272 CARDIAC EVENT MONITOR (CUPID ONLY): ICD-10-PCS | Mod: ,,, | Performed by: INTERNAL MEDICINE

## 2023-01-27 PROCEDURE — 93272 ECG/REVIEW INTERPRET ONLY: CPT | Mod: ,,, | Performed by: INTERNAL MEDICINE

## 2023-01-27 PROCEDURE — 93270 REMOTE 30 DAY ECG REV/REPORT: CPT

## 2023-03-02 ENCOUNTER — TELEPHONE (OUTPATIENT)
Dept: CARDIOLOGY | Facility: CLINIC | Age: 68
End: 2023-03-02
Payer: MEDICARE

## 2023-03-02 NOTE — TELEPHONE ENCOUNTER
I spoke with pt.  Event monitor looks good.  Isolated PAC's correlate with activations.  Pt reports occasional woozy spells.  Pt has appt in a couple of weeks with me.

## 2023-03-21 ENCOUNTER — HOSPITAL ENCOUNTER (EMERGENCY)
Facility: HOSPITAL | Age: 68
Discharge: HOME | End: 2023-03-21
Attending: EMERGENCY MEDICINE
Payer: MEDICARE

## 2023-03-21 VITALS
SYSTOLIC BLOOD PRESSURE: 140 MMHG | HEART RATE: 85 BPM | OXYGEN SATURATION: 100 % | TEMPERATURE: 98 F | RESPIRATION RATE: 18 BRPM | HEIGHT: 70 IN | WEIGHT: 213 LBS | DIASTOLIC BLOOD PRESSURE: 84 MMHG | BODY MASS INDEX: 30.49 KG/M2

## 2023-03-21 DIAGNOSIS — K21.9 GASTROESOPHAGEAL REFLUX DISEASE, UNSPECIFIED WHETHER ESOPHAGITIS PRESENT: Primary | ICD-10-CM

## 2023-03-21 DIAGNOSIS — R07.9 CHEST PAIN, UNSPECIFIED TYPE: ICD-10-CM

## 2023-03-21 DIAGNOSIS — R10.13 EPIGASTRIC PAIN: ICD-10-CM

## 2023-03-21 PROCEDURE — 93010 EKG 12-LEAD: ICD-10-PCS | Mod: ,,, | Performed by: INTERNAL MEDICINE

## 2023-03-21 PROCEDURE — 93005 ELECTROCARDIOGRAM TRACING: CPT | Mod: ER

## 2023-03-21 PROCEDURE — 99283 EMERGENCY DEPT VISIT LOW MDM: CPT | Mod: ER

## 2023-03-21 PROCEDURE — 25000003 PHARM REV CODE 250: Mod: ER | Performed by: EMERGENCY MEDICINE

## 2023-03-21 PROCEDURE — 93010 ELECTROCARDIOGRAM REPORT: CPT | Mod: ,,, | Performed by: INTERNAL MEDICINE

## 2023-03-21 RX ORDER — LIDOCAINE HYDROCHLORIDE 20 MG/ML
15 SOLUTION OROPHARYNGEAL ONCE
Status: COMPLETED | OUTPATIENT
Start: 2023-03-21 | End: 2023-03-21

## 2023-03-21 RX ORDER — ONDANSETRON 4 MG/1
4 TABLET, ORALLY DISINTEGRATING ORAL
Status: COMPLETED | OUTPATIENT
Start: 2023-03-21 | End: 2023-03-21

## 2023-03-21 RX ORDER — LANSOPRAZOLE 30 MG/1
30 CAPSULE, DELAYED RELEASE ORAL DAILY
Qty: 30 CAPSULE | Refills: 0 | Status: SHIPPED | OUTPATIENT
Start: 2023-03-21 | End: 2023-05-16

## 2023-03-21 RX ORDER — MAG HYDROX/ALUMINUM HYD/SIMETH 200-200-20
30 SUSPENSION, ORAL (FINAL DOSE FORM) ORAL ONCE
Status: COMPLETED | OUTPATIENT
Start: 2023-03-21 | End: 2023-03-21

## 2023-03-21 RX ADMIN — ALUMINUM HYDROXIDE, MAGNESIUM HYDROXIDE, AND SIMETHICONE 30 ML: 200; 200; 20 SUSPENSION ORAL at 06:03

## 2023-03-21 RX ADMIN — ONDANSETRON 4 MG: 4 TABLET, ORALLY DISINTEGRATING ORAL at 06:03

## 2023-03-21 RX ADMIN — LIDOCAINE HYDROCHLORIDE 15 ML: 20 SOLUTION ORAL; TOPICAL at 06:03

## 2023-03-21 NOTE — DISCHARGE INSTRUCTIONS
Plenty of clear liquids. Take medications as directed. Return to ER for any concerns or worsening symptoms.

## 2023-03-21 NOTE — ED PROVIDER NOTES
"Encounter Date: 3/21/2023       History     Chief Complaint   Patient presents with    Abdominal Pain     Reports epigastric burning that started last night. Reports having "reflux" and last time he felt better after a GI cocktail.     This patient ate boudan and aproximally 3:00 apm. yesterday afternoon.  The patient started having midepigastric heart burning sensation.  Patient states similar presentations in the past last time it happened the game a GI cocktail and a went away.  Patient denies any change in his bowel habitus and has no complaints of hematemesis or hematochezia.    The history is provided by the patient.   Review of patient's allergies indicates:  No Known Allergies  Past Medical History:   Diagnosis Date    Essential hypertension 12/07/2016    GERD (gastroesophageal reflux disease)      Past Surgical History:   Procedure Laterality Date    APPENDECTOMY      as a child    GALLBLADDER SURGERY  2004    HERNIA REPAIR  2004 & 2005    TONSILLECTOMY, ADENOIDECTOMY      as a child     Family History   Problem Relation Age of Onset    Aneurysm Mother     Heart disease Father      Social History     Tobacco Use    Smoking status: Never    Smokeless tobacco: Never   Substance Use Topics    Alcohol use: No    Drug use: No     Review of Systems   Constitutional: Negative.    HENT: Negative.     Eyes: Negative.    Respiratory: Negative.     Cardiovascular: Negative.    Gastrointestinal:  Positive for abdominal pain (midepigastric burning).   Endocrine: Negative.    Genitourinary: Negative.    Musculoskeletal: Negative.    Skin: Negative.    Allergic/Immunologic: Negative.    Neurological: Negative.    Hematological: Negative.    Psychiatric/Behavioral: Negative.     All other systems reviewed and are negative.    Physical Exam     Initial Vitals [03/21/23 0558]   BP Pulse Resp Temp SpO2   (!) 159/89 100 20 98.2 °F (36.8 °C) 99 %      MAP       --         Physical Exam    Nursing note and vitals " reviewed.  Constitutional: Vital signs are normal. He appears well-developed. He is active and cooperative.   HENT:   Head: Normocephalic and atraumatic.   Eyes: Conjunctivae, EOM and lids are normal. Pupils are equal, round, and reactive to light.   Neck: Trachea normal. Neck supple. No thyroid mass present.    Full passive range of motion without pain.     Cardiovascular:  Normal rate, regular rhythm, S1 normal, S2 normal, normal heart sounds, intact distal pulses and normal pulses.           Abdominal: Abdomen is soft and flat. Bowel sounds are normal. There is abdominal tenderness.   No right CVA tenderness.  No left CVA tenderness. There is no rebound, no guarding, no tenderness at McBurney's point and negative Arteaga's sign. negative obturator sign, negative psoas sign and negative Rovsing's sign  Musculoskeletal:         General: Normal range of motion.      Cervical back: Full passive range of motion without pain and neck supple.     Lymphadenopathy:     He has no axillary adenopathy.   Neurological: He is alert and oriented to person, place, and time.   Skin: Skin is warm, dry and intact.   Psychiatric: He has a normal mood and affect. His speech is normal and behavior is normal. Judgment and thought content normal. Cognition and memory are normal.       ED Course   Procedures  Labs Reviewed - No data to display       Imaging Results    None          Medications   ondansetron disintegrating tablet 4 mg (4 mg Oral Given 3/21/23 0636)   aluminum-magnesium hydroxide-simethicone 200-200-20 mg/5 mL suspension 30 mL (30 mLs Oral Given 3/21/23 0656)     And   LIDOcaine HCl 2% oral solution 15 mL (15 mLs Oral Given 3/21/23 0656)     Medical Decision Making:   ED Management:  This pt was signed out at end of my shift as pending discharge. He reports left sided chest pain that began after eating boudin yesterday. Pain is still present. Associated nausea and vomiting. Pt was bent over sink in room with nausea, still  having symptoms. No relief with GI cocktail. No labs or EKG done. I explained to pt that it would be best if I checked blood work and EKG for other causes of his symptoms, including heart attack. Pt refuses at this time. Will monitor. I reassessed pt at 0845 - pt states pin is improved and he wants to go home. Will discharge on prevacid with return precautions.  Other:   I have discussed this case with another health care provider.                        Clinical Impression:   Final diagnoses:  [R10.13] Epigastric pain  [K21.9] Gastroesophageal reflux disease, unspecified whether esophagitis present (Primary)  [R07.9] Chest pain, unspecified type        ED Disposition Condition    Discharge Stable          ED Prescriptions       Medication Sig Dispense Start Date End Date Auth. Provider    lansoprazole (PREVACID) 30 MG capsule Take 1 capsule (30 mg total) by mouth once daily. 30 capsule 3/21/2023 4/20/2023 Steven Madden MD          Follow-up Information       Follow up With Specialties Details Why Contact Info    Bharat Mathis MD Cardiology Schedule an appointment as soon as possible for a visit in 3 days  [address]  [facility] [address]  [phone]               Steven Madden MD  03/21/23 0659       Nel Crooks MD  03/21/23 075       Nel Crooks MD  03/21/23 2683

## 2023-03-23 ENCOUNTER — TELEPHONE (OUTPATIENT)
Dept: CARDIOLOGY | Facility: CLINIC | Age: 68
End: 2023-03-23
Payer: MEDICARE

## 2023-03-23 NOTE — TELEPHONE ENCOUNTER
Spoke with patient.  Appointment rescheduled.    ----- Message from Katja Craven sent at 3/23/2023 11:15 AM CDT -----  Type:  Needs Medical Advice    Who Called: pt  Symptoms (please be specific): pt was scheduled for 03/21/23 for a 6 month follow up and he is requesting to have it  rescheduled  the first appointment is in July 2023 pt declined    Would the patient rather a call back or a response via [facility]? call  Best Call Back Number: [phone]  Additional Information:

## 2023-05-16 ENCOUNTER — OFFICE VISIT (OUTPATIENT)
Dept: CARDIOLOGY | Facility: CLINIC | Age: 68
End: 2023-05-16
Payer: MEDICARE

## 2023-05-16 VITALS
HEART RATE: 65 BPM | BODY MASS INDEX: 33.14 KG/M2 | WEIGHT: 231.5 LBS | DIASTOLIC BLOOD PRESSURE: 84 MMHG | SYSTOLIC BLOOD PRESSURE: 137 MMHG | HEIGHT: 70 IN

## 2023-05-16 DIAGNOSIS — I48.92 ATRIAL FLUTTER, UNSPECIFIED TYPE: ICD-10-CM

## 2023-05-16 DIAGNOSIS — I49.1 PREMATURE ATRIAL CONTRACTIONS: ICD-10-CM

## 2023-05-16 DIAGNOSIS — K21.9 GASTROESOPHAGEAL REFLUX DISEASE, UNSPECIFIED WHETHER ESOPHAGITIS PRESENT: ICD-10-CM

## 2023-05-16 DIAGNOSIS — I10 ESSENTIAL HYPERTENSION: Primary | ICD-10-CM

## 2023-05-16 DIAGNOSIS — R07.9 CHEST PAIN OF UNCERTAIN ETIOLOGY: ICD-10-CM

## 2023-05-16 DIAGNOSIS — Z98.890 HISTORY OF CARDIAC RADIOFREQUENCY ABLATION (RFA): ICD-10-CM

## 2023-05-16 DIAGNOSIS — I49.3 PVC'S (PREMATURE VENTRICULAR CONTRACTIONS): ICD-10-CM

## 2023-05-16 PROCEDURE — 3288F FALL RISK ASSESSMENT DOCD: CPT | Mod: CPTII,,, | Performed by: INTERNAL MEDICINE

## 2023-05-16 PROCEDURE — 93000 ELECTROCARDIOGRAM COMPLETE: CPT | Mod: ,,, | Performed by: INTERNAL MEDICINE

## 2023-05-16 PROCEDURE — 1126F PR PAIN SEVERITY QUANTIFIED, NO PAIN PRESENT: ICD-10-PCS | Mod: CPTII,,, | Performed by: INTERNAL MEDICINE

## 2023-05-16 PROCEDURE — 1159F PR MEDICATION LIST DOCUMENTED IN MEDICAL RECORD: ICD-10-PCS | Mod: CPTII,,, | Performed by: INTERNAL MEDICINE

## 2023-05-16 PROCEDURE — 1101F PR PT FALLS ASSESS DOC 0-1 FALLS W/OUT INJ PAST YR: ICD-10-PCS | Mod: CPTII,,, | Performed by: INTERNAL MEDICINE

## 2023-05-16 PROCEDURE — 93000 EKG 12-LEAD: ICD-10-PCS | Mod: ,,, | Performed by: INTERNAL MEDICINE

## 2023-05-16 PROCEDURE — 3008F PR BODY MASS INDEX (BMI) DOCUMENTED: ICD-10-PCS | Mod: CPTII,,, | Performed by: INTERNAL MEDICINE

## 2023-05-16 PROCEDURE — 99214 OFFICE O/P EST MOD 30 MIN: CPT | Mod: 25,,, | Performed by: INTERNAL MEDICINE

## 2023-05-16 PROCEDURE — 1160F PR REVIEW ALL MEDS BY PRESCRIBER/CLIN PHARMACIST DOCUMENTED: ICD-10-PCS | Mod: CPTII,,, | Performed by: INTERNAL MEDICINE

## 2023-05-16 PROCEDURE — 3079F PR MOST RECENT DIASTOLIC BLOOD PRESSURE 80-89 MM HG: ICD-10-PCS | Mod: CPTII,,, | Performed by: INTERNAL MEDICINE

## 2023-05-16 PROCEDURE — 3288F PR FALLS RISK ASSESSMENT DOCUMENTED: ICD-10-PCS | Mod: CPTII,,, | Performed by: INTERNAL MEDICINE

## 2023-05-16 PROCEDURE — 99214 PR OFFICE/OUTPT VISIT, EST, LEVL IV, 30-39 MIN: ICD-10-PCS | Mod: 25,,, | Performed by: INTERNAL MEDICINE

## 2023-05-16 PROCEDURE — 4010F PR ACE/ARB THEARPY RXD/TAKEN: ICD-10-PCS | Mod: CPTII,,, | Performed by: INTERNAL MEDICINE

## 2023-05-16 PROCEDURE — 99999 PR PBB SHADOW E&M-EST. PATIENT-LVL III: ICD-10-PCS | Mod: PBBFAC,,, | Performed by: INTERNAL MEDICINE

## 2023-05-16 PROCEDURE — 1126F AMNT PAIN NOTED NONE PRSNT: CPT | Mod: CPTII,,, | Performed by: INTERNAL MEDICINE

## 2023-05-16 PROCEDURE — 3008F BODY MASS INDEX DOCD: CPT | Mod: CPTII,,, | Performed by: INTERNAL MEDICINE

## 2023-05-16 PROCEDURE — 3079F DIAST BP 80-89 MM HG: CPT | Mod: CPTII,,, | Performed by: INTERNAL MEDICINE

## 2023-05-16 PROCEDURE — 3075F PR MOST RECENT SYSTOLIC BLOOD PRESS GE 130-139MM HG: ICD-10-PCS | Mod: CPTII,,, | Performed by: INTERNAL MEDICINE

## 2023-05-16 PROCEDURE — 3075F SYST BP GE 130 - 139MM HG: CPT | Mod: CPTII,,, | Performed by: INTERNAL MEDICINE

## 2023-05-16 PROCEDURE — 4010F ACE/ARB THERAPY RXD/TAKEN: CPT | Mod: CPTII,,, | Performed by: INTERNAL MEDICINE

## 2023-05-16 PROCEDURE — 1160F RVW MEDS BY RX/DR IN RCRD: CPT | Mod: CPTII,,, | Performed by: INTERNAL MEDICINE

## 2023-05-16 PROCEDURE — 1159F MED LIST DOCD IN RCRD: CPT | Mod: CPTII,,, | Performed by: INTERNAL MEDICINE

## 2023-05-16 PROCEDURE — 99999 PR PBB SHADOW E&M-EST. PATIENT-LVL III: CPT | Mod: PBBFAC,,, | Performed by: INTERNAL MEDICINE

## 2023-05-16 PROCEDURE — 1101F PT FALLS ASSESS-DOCD LE1/YR: CPT | Mod: CPTII,,, | Performed by: INTERNAL MEDICINE

## 2023-05-16 RX ORDER — PANTOPRAZOLE SODIUM 40 MG/1
40 TABLET, DELAYED RELEASE ORAL DAILY
COMMUNITY
Start: 2023-05-14

## 2023-05-16 NOTE — PROGRESS NOTES
Subjective:      Patient ID: Jules Aviles is a 67 y.o. male.    Chief Complaint: Follow-up    HPI:  Works repairing houses.    Feels well.    Pt sees Dr William Cha at [facility], urology    Review of Systems   Cardiovascular:  Positive for chest pain (Pt went to [facility] 2/23 with chest pain dx as GI etiology There is no hx of chest pain with exertion;). Negative for claudication, dyspnea on exertion, irregular heartbeat, leg swelling, near-syncope, orthopnea, palpitations and syncope.        Heartburn is controlled.    Bile gastritis following cholecystectomy is controlled with Questran Light.    Pt is followed by GI MD Dr Reed who started pt on the Questran        Past Medical History:   Diagnosis Date    Essential hypertension 12/07/2016    GERD (gastroesophageal reflux disease)         Past Surgical History:   Procedure Laterality Date    APPENDECTOMY      as a child    GALLBLADDER SURGERY  2004    HERNIA REPAIR  2004 & 2005    TONSILLECTOMY, ADENOIDECTOMY      as a child       Family History   Problem Relation Age of Onset    Aneurysm Mother     Heart disease Father        Social History     Socioeconomic History    Marital status: Single   Tobacco Use    Smoking status: Never    Smokeless tobacco: Never   Substance and Sexual Activity    Alcohol use: No    Drug use: No       Current Outpatient Medications on File Prior to Visit   Medication Sig Dispense Refill    ascorbic acid, vitamin C, (VITAMIN C) 500 MG tablet Take 500 mg by mouth once daily.      Bifidobacterium infantis (ALIGN ORAL) Take by mouth once daily.      CHOLESTYRAMINE LIGHT 4 gram Powd once daily.       cyanocobalamin (VITAMIN B-12) 100 MCG tablet Take 100 mcg by mouth once daily.      hydroCHLOROthiazide (MICROZIDE) 12.5 mg capsule TAKE 1 CAPSULE BY MOUTH EVERY DAY 90 capsule 3    losartan (COZAAR) 50 MG tablet TAKE 1 TABLET BY MOUTH EVERY DAY 90 tablet 3    pantoprazole (PROTONIX) 40 MG tablet Take 40 mg by mouth.      tamsulosin  "(FLOMAX) 0.4 mg Cap Take 1 capsule by mouth every evening.      vitamin D (VITAMIN D3) 1000 units Tab Take 8,000 Units by mouth once daily.      [DISCONTINUED] acetaminophen (TYLENOL) 500 MG tablet Take 2 tablets (1,000 mg total) by mouth every 6 (six) hours as needed for Pain. (Patient not taking: Reported on 5/16/2023) 30 tablet 0    [DISCONTINUED] lansoprazole (PREVACID) 30 MG capsule Take 1 capsule (30 mg total) by mouth once daily. (Patient not taking: Reported on 5/16/2023) 30 capsule 0     Current Facility-Administered Medications on File Prior to Visit   Medication Dose Route Frequency Provider Last Rate Last Admin    0.9%  NaCl infusion   Intravenous Continuous Lien Crocker NP   Stopped at 02/18/19 4930       Review of patient's allergies indicates:  No Known Allergies  Objective:     Vitals:    05/16/23 1006   BP: 137/84   BP Location: Right arm   Patient Position: Sitting   BP Method: Large (Automatic)   Pulse: 65   Weight: 105 kg (231 lb 7.7 oz)   Height: 5' 10" (1.778 m)        Physical Exam  Constitutional:       General: He is not in acute distress.     Appearance: He is well-developed. He is not diaphoretic.   Eyes:      General: No scleral icterus.  Neck:      Vascular: No carotid bruit or JVD.   Cardiovascular:      Rate and Rhythm: Regular rhythm.      Heart sounds: Normal heart sounds. No murmur heard.    No friction rub. No gallop.   Pulmonary:      Effort: Pulmonary effort is normal. No respiratory distress.      Breath sounds: Normal breath sounds.   Musculoskeletal:      Right lower leg: No edema.      Left lower leg: No edema.   Skin:     General: Skin is warm and dry.   Neurological:      Mental Status: He is alert and oriented to person, place, and time.   Psychiatric:         Behavior: Behavior normal.         Thought Content: Thought content normal.         Judgment: Judgment normal.      ECG today: NSR with first degree AV block, leftward axis , otherwise WNL, reviewed by me, no " significant change      Admission on 01/15/2023, Discharged on 01/15/2023   Component Date Value Ref Range Status    POC PH 01/15/2023 7.380  7.35 - 7.45 Final    POC PCO2 01/15/2023 49.3 (H)  35 - 45 mmHg Final    POC PO2 01/15/2023 44  40 - 60 mmHg Final    POC HCO3 01/15/2023 29.2 (H)  24 - 28 mmol/L Final    POC BE 01/15/2023 3  -2 to 2 mmol/L Final    POC SATURATED O2 01/15/2023 78 (L)  95 - 100 % Final    POC Lactate 01/15/2023 1.77  0.5 - 2.2 mmol/L Final    POC TCO2 01/15/2023 31 (H)  24 - 29 mmol/L Final    Sample 01/15/2023 VENOUS   Final    Site 01/15/2023 Other   Final    Allens Test 01/15/2023 N/A   Final    Albumin, POC 01/15/2023 4.5  3.3 - 5.5 g/dL Final    Alkaline Phosphatase, POC 01/15/2023 74  42 - 141 U/L Final    ALT (SGPT), POC 01/15/2023 29  10 - 47 U/L Final    AST (SGOT), POC 01/15/2023 25  11 - 38 U/L Final    POC BUN 01/15/2023 16  7 - 22 mg/dL Final    Calcium, POC 01/15/2023 10.6 (H)  8.0 - 10.3 mg/dL Final    POC Chloride 01/15/2023 104  98 - 108 mmol/L Final    POC Creatinine 01/15/2023 0.7  0.6 - 1.2 mg/dL Final    POC Glucose 01/15/2023 111  73 - 118 mg/dL Final    POC Potassium 01/15/2023 3.5 (L)  3.6 - 5.1 mmol/L Final    POC Sodium 01/15/2023 145  128 - 145 mmol/L Final    Bilirubin, POC 01/15/2023 0.9  0.2 - 1.6 mg/dL Final    POC TCO2 01/15/2023 29  18 - 33 mmol/L Final    Protein, POC 01/15/2023 7.9  6.4 - 8.1 g/dL Final    POC Cardiac Troponin I 01/15/2023 0.01  0.00 - 0.08 ng/mL Final    Sample 01/15/2023 unknown   Final    POC B-Type Natriuretic Peptide 01/15/2023 34.1  0.0 - 100.0 pg/mL Final   (]    Order: [id number]  Status: Final result    Visible to patient: No (inaccessible in Patient Portal)     Next appt: None    0 Result Notes         Component Ref Range & Units 4 mo ago  (1/15/23) 4 yr ago  (2/14/19) 4 yr ago  (1/15/19)   Hematocrit   47.2 R  47.7 R    Hemoglobin   16.2 R  16.5 R    RBC   5.21 R  5.39 R    WBC       MCV   91 R  89 R    MCH, POC   31.1 High  R  30.6  R    MCHC   34.3 R  34.6 R    RDW-CV       Platelet Count, POC       MPV   11.7 R  11.8 R    Resulting Agency  [facility]              Specimen Collected: 01/15/23 22:08 Last Resulted: 01/15/23 22:22                Outside Information       suggestion  Information displayed in this report may not trend or trigger automated decision support.     TSH with Reflex to Free T4  Order: [id number]   Ref Range & Units 3 mo ago   TSH 0.358 - 3.740 mIU/L 1.190    Resulting Agency  [facility]   Specimen Collected: 01/31/23 11:09 Last Resulted: 01/31/23 14:02   Received From: [facility]  Result Received: 03/21/23 05:47    View Encounter      Received Information    View Complete Results       Ordering Encounter Report    Encounter Report  may not trend or trigger automated decision support.      Contains abnormal data Comprehensive Metabolic Panel  Order: [id number]   Ref Range & Units 3 mo ago   Sodium 136 - 145 mmol/L 140    Potassium 3.5 - 5.1 mmol/L 3.9    Chloride 98 - 107 mmol/L 108 High     Carbon Dioxide 21 - 32 mmol/L 27 Cardiac event monitor  Order# [id number]  Reading physician: Rich Berumen MD Ordering physician: Edwin Brody MD Study date: 2/21/19     Reason for Exam  Priority: Routine  Dx: Atrial flutter [I48.92 (ICD-10-CM)]     Conclusion    Negative event monitor with no clinical arrhythmias.     Performing Clinician    Anson Alfonso   Transthoracic echo (TTE) complete  Order# [id number]  Reading physician: Bharat Mathis MD Ordering physician: Bharat Mathis MD Study date: 1/15/19     Reason for Exam  Priority: Routine  Dx: Essential hypertension [I10 (ICD-10-CM)]; Atrial flutter, unspecified type [I48.92 (ICD-10-CM)]     Result Image Hyperlink     Show images for Transthoracic echo (TTE) complete (Cupid Only)  Summary    Moderate left atrial enlargement.  Mild concentric left ventricular hypertrophy.  Normal left ventricular systolic function. The estimated ejection fraction is  60%  Grade I (mild) left ventricular diastolic dysfunction consistent with impaired relaxation.  Normal right ventricular systolic function.  Mild tricuspid regurgitation.  Mild right ventricular enlargement.  Normal left atrial pressure.  Normal central venous pressure (3 mm Hg).  The estimated PA systolic pressure is 28 mm Hg  Rhythm is sinus bradycardia at 54 beats per minute     Vitals    Height      Glucose 65 - 99 mg/dL 106 High     BUN 7.0 - 18.0 mg/dL 17.1    Creatinine 0.70 - 1.30 mg/dL 1.01    BUN/Creatinine Ratio  17    Calcium 8.5 - 10.1 mg/dL 9.5    Total Protein 6.4 - 8.2 g/dL 7.5    Albumin 3.4 - 5.0 g/dL 4.0    Albumin/Globulin Ratio  1.1    AST 15 - 37 U/L 19    ALT 16 - 61 U/L 32    Alkaline Phosphatase 45 - 117 U/L 65    Bilirubin, Total 0.2 - 1.0 mg/dL 0.8    Comment: Use of this assay is not recommended for patients undergoing treatment with eltrombopag due to potential for falsely elevated results.   EGFR >=90 mL/min 83 Low     Comment: Calculation based on the Chronic Kidney Disease Epidemiology Collaboration (CKD-EPI) equation refit without adjustment for race.   Anion Gap 5 - 14        and may not trigger automated decision support.     LIPID PANEL  Order: [id number]   Ref Range & Units 3 mo ago   Cholesterol See Comment mg/dL 187    HDL See Comment mg/dL 49    Triglycerides See Comment mg/dL 142    LDL Cholesterol Calculated See Comment mg/dL 110    Resulting Agency  [facility]   Narrative  Performed by [facility]  Triglycerides     Classification      mg/dL       Impression:    1. Essential hypertension    2. Atrial flutter, unspecified type    3. History of cardiac radiofrequency ablation (RFA)    4. Premature atrial contractions    5. PVC's (premature ventricular contractions)    6. Gastroesophageal reflux disease, unspecified whether esophagitis present    7. Chest pain of uncertain etiology      Plan:   Vicente was seen today for follow-up.    Diagnoses and all orders for  this visit:    Essential hypertension  -     IN OFFICE EKG 12-LEAD (to Muse)  -     Exercise Stress - EKG    Atrial flutter, unspecified type  -     IN OFFICE EKG 12-LEAD (to Muse)  -     Exercise Stress - EKG    History of cardiac radiofrequency ablation (RFA)  -     IN OFFICE EKG 12-LEAD (to Muse)  -     Exercise Stress - EKG    Premature atrial contractions  -     IN OFFICE EKG 12-LEAD (to Muse)  -     Exercise Stress - EKG    PVC's (premature ventricular contractions)  -     IN OFFICE EKG 12-LEAD (to Muse)  -     Exercise Stress - EKG    Gastroesophageal reflux disease, unspecified whether esophagitis present  -     IN OFFICE EKG 12-LEAD (to Muse)  -     Exercise Stress - EKG    Chest pain of uncertain etiology  -     IN OFFICE EKG 12-LEAD (to Muse)  -     Exercise Stress - EKG       Although the chest pain which triggered the ER visit in March was almost certainly GERD or bile gastritis will proceed with treadmill stress test.    HBP  and arrhythmias are well controlled    F/u with Marysol Grover and Dr Reed, GI    Same meds    Follow up in about 6 months (around 11/16/2023).

## 2023-06-02 ENCOUNTER — HOSPITAL ENCOUNTER (INPATIENT)
Facility: HOSPITAL | Age: 68
LOS: 3 days | Discharge: HOME | DRG: 390 | End: 2023-06-05
Attending: EMERGENCY MEDICINE | Admitting: INTERNAL MEDICINE
Payer: MEDICARE

## 2023-06-02 DIAGNOSIS — Z46.59 ENCOUNTER FOR NASOGASTRIC (NG) TUBE PLACEMENT: ICD-10-CM

## 2023-06-02 DIAGNOSIS — R10.13 EPIGASTRIC PAIN: ICD-10-CM

## 2023-06-02 DIAGNOSIS — K56.609 SBO (SMALL BOWEL OBSTRUCTION): Primary | ICD-10-CM

## 2023-06-02 PROBLEM — E83.52 HYPERCALCEMIA: Status: ACTIVE | Noted: 2023-06-02

## 2023-06-02 PROBLEM — E66.9 OBESITY: Status: ACTIVE | Noted: 2023-06-02

## 2023-06-02 LAB
ALBUMIN SERPL-MCNC: 4.6 G/DL (ref 3.3–5.5)
ALBUMIN SERPL-MCNC: 5 G/DL (ref 3.3–5.5)
ALP SERPL-CCNC: 67 U/L (ref 42–141)
ALP SERPL-CCNC: 81 U/L (ref 42–141)
BILIRUB SERPL-MCNC: 1.2 MG/DL (ref 0.2–1.6)
BILIRUB SERPL-MCNC: 1.4 MG/DL (ref 0.2–1.6)
BILIRUBIN, POC UA: NEGATIVE
BLOOD, POC UA: NEGATIVE
BUN SERPL-MCNC: 17 MG/DL (ref 7–22)
CALCIUM SERPL-MCNC: 11.3 MG/DL (ref 8–10.3)
CHLORIDE SERPL-SCNC: 101 MMOL/L (ref 98–108)
CLARITY, POC UA: CLEAR
COLOR, POC UA: ABNORMAL
CREAT SERPL-MCNC: 1.1 MG/DL (ref 0.6–1.2)
GLUCOSE SERPL-MCNC: 175 MG/DL (ref 73–118)
GLUCOSE, POC UA: NEGATIVE
KETONES, POC UA: ABNORMAL
LEUKOCYTE EST, POC UA: NEGATIVE
NITRITE, POC UA: NEGATIVE
PH UR STRIP: 5 [PH]
POC ALT (SGPT): 39 U/L (ref 10–47)
POC ALT (SGPT): 50 U/L (ref 10–47)
POC AMYLASE: 32 U/L (ref 14–97)
POC AST (SGOT): 40 U/L (ref 11–38)
POC AST (SGOT): 44 U/L (ref 11–38)
POC CARDIAC TROPONIN I: 0 NG/ML (ref 0–0.08)
POC GGT: 17 U/L (ref 5–65)
POC TCO2: 27 MMOL/L (ref 18–33)
POTASSIUM BLD-SCNC: 3.8 MMOL/L (ref 3.6–5.1)
PROTEIN, POC UA: ABNORMAL
PROTEIN, POC: 8.5 G/DL (ref 6.4–8.1)
PROTEIN, POC: 8.6 G/DL (ref 6.4–8.1)
SAMPLE: NORMAL
SODIUM BLD-SCNC: 139 MMOL/L (ref 128–145)
SPECIFIC GRAVITY, POC UA: >=1.03
UROBILINOGEN, POC UA: 0.2 E.U./DL

## 2023-06-02 PROCEDURE — 93010 ELECTROCARDIOGRAM REPORT: CPT | Mod: ,,, | Performed by: INTERNAL MEDICINE

## 2023-06-02 PROCEDURE — 63600175 PHARM REV CODE 636 W HCPCS

## 2023-06-02 PROCEDURE — 80053 COMPREHEN METABOLIC PANEL: CPT | Mod: ER

## 2023-06-02 PROCEDURE — 93005 ELECTROCARDIOGRAM TRACING: CPT | Mod: ER

## 2023-06-02 PROCEDURE — 25000003 PHARM REV CODE 250: Performed by: INTERNAL MEDICINE

## 2023-06-02 PROCEDURE — 25000003 PHARM REV CODE 250: Performed by: EMERGENCY MEDICINE

## 2023-06-02 PROCEDURE — 81003 URINALYSIS AUTO W/O SCOPE: CPT | Mod: ER

## 2023-06-02 PROCEDURE — 96374 THER/PROPH/DIAG INJ IV PUSH: CPT | Mod: ER

## 2023-06-02 PROCEDURE — 25000003 PHARM REV CODE 250: Mod: ER | Performed by: EMERGENCY MEDICINE

## 2023-06-02 PROCEDURE — 93010 EKG 12-LEAD: ICD-10-PCS | Mod: ,,, | Performed by: INTERNAL MEDICINE

## 2023-06-02 PROCEDURE — 63600175 PHARM REV CODE 636 W HCPCS: Performed by: INTERNAL MEDICINE

## 2023-06-02 PROCEDURE — 99285 EMERGENCY DEPT VISIT HI MDM: CPT | Mod: 25,ER

## 2023-06-02 PROCEDURE — 63600175 PHARM REV CODE 636 W HCPCS: Mod: ER | Performed by: EMERGENCY MEDICINE

## 2023-06-02 PROCEDURE — 84484 ASSAY OF TROPONIN QUANT: CPT | Mod: ER

## 2023-06-02 PROCEDURE — 85025 COMPLETE CBC W/AUTO DIFF WBC: CPT | Mod: ER

## 2023-06-02 PROCEDURE — 11000001 HC ACUTE MED/SURG PRIVATE ROOM

## 2023-06-02 PROCEDURE — 96375 TX/PRO/DX INJ NEW DRUG ADDON: CPT | Mod: ER

## 2023-06-02 PROCEDURE — 25500020 PHARM REV CODE 255: Mod: ER | Performed by: EMERGENCY MEDICINE

## 2023-06-02 PROCEDURE — 82150 ASSAY OF AMYLASE: CPT | Mod: ER

## 2023-06-02 PROCEDURE — 96361 HYDRATE IV INFUSION ADD-ON: CPT | Mod: ER

## 2023-06-02 RX ORDER — LIDOCAINE HYDROCHLORIDE 20 MG/ML
15 SOLUTION OROPHARYNGEAL
Status: COMPLETED | OUTPATIENT
Start: 2023-06-02 | End: 2023-06-02

## 2023-06-02 RX ORDER — MORPHINE SULFATE 4 MG/ML
4 INJECTION, SOLUTION INTRAMUSCULAR; INTRAVENOUS
Status: COMPLETED | OUTPATIENT
Start: 2023-06-02 | End: 2023-06-02

## 2023-06-02 RX ORDER — PANTOPRAZOLE SODIUM 40 MG/10ML
40 INJECTION, POWDER, LYOPHILIZED, FOR SOLUTION INTRAVENOUS DAILY
Status: DISCONTINUED | OUTPATIENT
Start: 2023-06-03 | End: 2023-06-05 | Stop reason: HOSPADM

## 2023-06-02 RX ORDER — KETOROLAC TROMETHAMINE 30 MG/ML
15 INJECTION, SOLUTION INTRAMUSCULAR; INTRAVENOUS
Status: COMPLETED | OUTPATIENT
Start: 2023-06-02 | End: 2023-06-02

## 2023-06-02 RX ORDER — PROCHLORPERAZINE EDISYLATE 5 MG/ML
2.5 INJECTION INTRAMUSCULAR; INTRAVENOUS EVERY 6 HOURS PRN
Status: DISCONTINUED | OUTPATIENT
Start: 2023-06-02 | End: 2023-06-05 | Stop reason: HOSPADM

## 2023-06-02 RX ORDER — ONDANSETRON 2 MG/ML
8 INJECTION INTRAMUSCULAR; INTRAVENOUS EVERY 6 HOURS PRN
Status: DISCONTINUED | OUTPATIENT
Start: 2023-06-02 | End: 2023-06-05 | Stop reason: HOSPADM

## 2023-06-02 RX ORDER — ENOXAPARIN SODIUM 100 MG/ML
40 INJECTION SUBCUTANEOUS EVERY 24 HOURS
Status: DISCONTINUED | OUTPATIENT
Start: 2023-06-02 | End: 2023-06-05 | Stop reason: HOSPADM

## 2023-06-02 RX ORDER — HYDROMORPHONE HYDROCHLORIDE 1 MG/ML
1 INJECTION, SOLUTION INTRAMUSCULAR; INTRAVENOUS; SUBCUTANEOUS EVERY 6 HOURS PRN
Status: DISCONTINUED | OUTPATIENT
Start: 2023-06-02 | End: 2023-06-05 | Stop reason: HOSPADM

## 2023-06-02 RX ORDER — SODIUM CHLORIDE 9 MG/ML
1000 INJECTION, SOLUTION INTRAVENOUS CONTINUOUS
Status: DISCONTINUED | OUTPATIENT
Start: 2023-06-02 | End: 2023-06-02

## 2023-06-02 RX ORDER — SODIUM CHLORIDE 9 MG/ML
INJECTION, SOLUTION INTRAVENOUS CONTINUOUS
Status: DISCONTINUED | OUTPATIENT
Start: 2023-06-02 | End: 2023-06-04

## 2023-06-02 RX ORDER — ONDANSETRON 2 MG/ML
8 INJECTION INTRAMUSCULAR; INTRAVENOUS EVERY 6 HOURS PRN
Status: DISCONTINUED | OUTPATIENT
Start: 2023-06-02 | End: 2023-06-02

## 2023-06-02 RX ORDER — HYDRALAZINE HYDROCHLORIDE 20 MG/ML
10 INJECTION INTRAMUSCULAR; INTRAVENOUS EVERY 6 HOURS PRN
Status: DISCONTINUED | OUTPATIENT
Start: 2023-06-02 | End: 2023-06-05 | Stop reason: HOSPADM

## 2023-06-02 RX ORDER — FAMOTIDINE 10 MG/ML
40 INJECTION INTRAVENOUS
Status: COMPLETED | OUTPATIENT
Start: 2023-06-02 | End: 2023-06-02

## 2023-06-02 RX ORDER — FAMOTIDINE 20 MG/1
20 TABLET, FILM COATED ORAL
Status: COMPLETED | OUTPATIENT
Start: 2023-06-02 | End: 2023-06-02

## 2023-06-02 RX ORDER — ASPIRIN 325 MG
325 TABLET ORAL
Status: COMPLETED | OUTPATIENT
Start: 2023-06-02 | End: 2023-06-02

## 2023-06-02 RX ORDER — METOCLOPRAMIDE HYDROCHLORIDE 5 MG/ML
10 INJECTION INTRAMUSCULAR; INTRAVENOUS
Status: COMPLETED | OUTPATIENT
Start: 2023-06-02 | End: 2023-06-02

## 2023-06-02 RX ORDER — LIDOCAINE HYDROCHLORIDE 20 MG/ML
15 SOLUTION OROPHARYNGEAL ONCE
Status: COMPLETED | OUTPATIENT
Start: 2023-06-02 | End: 2023-06-02

## 2023-06-02 RX ORDER — MAG HYDROX/ALUMINUM HYD/SIMETH 200-200-20
30 SUSPENSION, ORAL (FINAL DOSE FORM) ORAL ONCE
Status: COMPLETED | OUTPATIENT
Start: 2023-06-02 | End: 2023-06-02

## 2023-06-02 RX ADMIN — ENOXAPARIN SODIUM 40 MG: 40 INJECTION SUBCUTANEOUS at 05:06

## 2023-06-02 RX ADMIN — METOCLOPRAMIDE 10 MG: 5 INJECTION, SOLUTION INTRAMUSCULAR; INTRAVENOUS at 10:06

## 2023-06-02 RX ADMIN — ALUMINUM HYDROXIDE, MAGNESIUM HYDROXIDE, AND DIMETHICONE 30 ML: 200; 20; 200 SUSPENSION ORAL at 08:06

## 2023-06-02 RX ADMIN — ONDANSETRON 8 MG: 2 INJECTION INTRAMUSCULAR; INTRAVENOUS at 04:06

## 2023-06-02 RX ADMIN — SODIUM CHLORIDE 1000 ML: 9 INJECTION, SOLUTION INTRAVENOUS at 03:06

## 2023-06-02 RX ADMIN — HYDRALAZINE HYDROCHLORIDE 10 MG: 20 INJECTION INTRAMUSCULAR; INTRAVENOUS at 03:06

## 2023-06-02 RX ADMIN — MORPHINE SULFATE 2 MG: 4 INJECTION, SOLUTION INTRAMUSCULAR; INTRAVENOUS at 01:06

## 2023-06-02 RX ADMIN — SODIUM CHLORIDE: 9 INJECTION, SOLUTION INTRAVENOUS at 03:06

## 2023-06-02 RX ADMIN — SODIUM CHLORIDE 1000 ML: 9 INJECTION, SOLUTION INTRAVENOUS at 10:06

## 2023-06-02 RX ADMIN — PROCHLORPERAZINE EDISYLATE 2.5 MG: 5 INJECTION INTRAMUSCULAR; INTRAVENOUS at 10:06

## 2023-06-02 RX ADMIN — FAMOTIDINE 20 MG: 20 TABLET, FILM COATED ORAL at 08:06

## 2023-06-02 RX ADMIN — SODIUM CHLORIDE 1000 ML: 9 INJECTION, SOLUTION INTRAVENOUS at 01:06

## 2023-06-02 RX ADMIN — SODIUM CHLORIDE: 9 INJECTION, SOLUTION INTRAVENOUS at 09:06

## 2023-06-02 RX ADMIN — IOHEXOL 100 ML: 350 INJECTION, SOLUTION INTRAVENOUS at 10:06

## 2023-06-02 RX ADMIN — FAMOTIDINE 40 MG: 10 INJECTION INTRAVENOUS at 10:06

## 2023-06-02 RX ADMIN — LIDOCAINE HYDROCHLORIDE 15 ML: 20 SOLUTION ORAL at 01:06

## 2023-06-02 RX ADMIN — KETOROLAC TROMETHAMINE 15 MG: 30 INJECTION, SOLUTION INTRAMUSCULAR; INTRAVENOUS at 10:06

## 2023-06-02 RX ADMIN — ONDANSETRON 8 MG: 2 INJECTION INTRAMUSCULAR; INTRAVENOUS at 11:06

## 2023-06-02 RX ADMIN — LIDOCAINE HYDROCHLORIDE 15 ML: 20 SOLUTION ORAL at 08:06

## 2023-06-02 RX ADMIN — ASPIRIN 325 MG ORAL TABLET 325 MG: 325 PILL ORAL at 08:06

## 2023-06-02 NOTE — ED NOTES
NG attached to low intermittent suction, positive auscultation and return of gastric contents, verified by x-ray- viewed by Dr Hathaway @ bedside

## 2023-06-02 NOTE — ASSESSMENT & PLAN NOTE
Found by CT. Discussed with surgery. Patient with benign abdomen. NG suction. Cause not clear. Hernia repair 10 years ago. NPO/fluids

## 2023-06-02 NOTE — NURSING
[facility]  Nurses Note -- 4 Eyes      6/2/2023       Skin assessed on: Admit      [x] No Pressure Injuries Present    [x]Prevention Measures Documented    [] Yes LDA  for Pressure Injury Previously documented     [] Yes New Pressure Injury Discovered   [] LDA for New Pressure Injury Added      Attending RN:  Yeyo Blevins, RN     Second RN:  Molly

## 2023-06-02 NOTE — ASSESSMENT & PLAN NOTE
Body mass index is 33 kg/m². Morbid obesity complicates all aspects of disease management from diagnostic modalities to treatment. Weight loss encouraged and health benefits explained to patient.

## 2023-06-02 NOTE — ED PROVIDER NOTES
"Encounter Date: 6/2/2023    SCRIBE #1 NOTE: I, Amy Goldberg, am scribing for, and in the presence of,  Mariangel Hathaway DO. I have scribed the following portions of the note - Other sections scribed: HPI; ROS; PE.     History     Chief Complaint   Patient presents with    indigestion     C/o epigastric abdominal discomfort. Pt has reoccurrent indigestion. Pt reports taking Maalox with no relief.      Jules Aviles is a 67 y.o. male with Hx of HTN and GERD who presents to the ED for chief complaint of epigastric pain onset yesterday evening with associated nausea. Patient reports he's had similar symptoms in the past that he's come to the ED for that were alleviated after a GI cocktail. He states his symptoms are still persistent after the GI cocktail today. Patient reports the symptoms are usually brought on after overeating and notes this has been going on since his cholecystectomy in 2004. He states he did not overeat yesterday and is unsure what caused his symptoms. He describes the pain as a pressure. Patient notes "I've been sweating." No further complaints at this time. Patient does not have any medical allergies.       The history is provided by the patient. No  was used.   Review of patient's allergies indicates:  No Known Allergies  Past Medical History:   Diagnosis Date    Essential hypertension 12/07/2016    GERD (gastroesophageal reflux disease)      Past Surgical History:   Procedure Laterality Date    APPENDECTOMY      as a child    GALLBLADDER SURGERY  2004    HERNIA REPAIR  2004 & 2005    TONSILLECTOMY, ADENOIDECTOMY      as a child     Family History   Problem Relation Age of Onset    Aneurysm Mother     Heart disease Father      Social History     Tobacco Use    Smoking status: Never    Smokeless tobacco: Never   Substance Use Topics    Alcohol use: No    Drug use: No     Review of Systems   Constitutional:  Negative for fever.   HENT:  Negative for congestion, sore throat and " trouble swallowing.    Respiratory:  Negative for cough and shortness of breath.    Cardiovascular:  Negative for chest pain.   Gastrointestinal:  Positive for abdominal pain and nausea. Negative for constipation, diarrhea and vomiting.   Genitourinary:  Negative for dysuria, flank pain, frequency and urgency.   Musculoskeletal:  Negative for back pain.   Skin:  Negative for rash.   Neurological:  Negative for headaches.   All other systems reviewed and are negative.    Physical Exam     Initial Vitals [06/02/23 0806]   BP Pulse Resp Temp SpO2   135/89 93 18 97.7 °F (36.5 °C) 100 %      MAP       --         Physical Exam    Nursing note and vitals reviewed.  Constitutional: He appears well-developed and well-nourished.   HENT:   Head: Normocephalic and atraumatic.   Right Ear: External ear normal.   Left Ear: External ear normal.   Mouth/Throat: Oropharynx is clear and moist.   Eyes: Conjunctivae and EOM are normal. Pupils are equal, round, and reactive to light.   Neck: Neck supple.   Normal range of motion.  Cardiovascular:  Normal rate, regular rhythm, normal heart sounds and intact distal pulses.     Exam reveals no gallop and no friction rub.       No murmur heard.  Pulmonary/Chest: Breath sounds normal. No respiratory distress. He has no wheezes. He has no rhonchi. He has no rales.   Abdominal: Abdomen is soft. Bowel sounds are normal. He exhibits no distension. There is abdominal tenderness in the epigastric area.   No rigidity.  There is no rebound and no guarding.   Musculoskeletal:         General: No tenderness or edema. Normal range of motion.      Cervical back: Normal range of motion and neck supple.     Neurological: He is alert and oriented to person, place, and time.   Skin: Skin is warm and dry.   Psychiatric: He has a normal mood and affect. His behavior is normal.       ED Course   Critical Care    Date/Time: 6/2/2023 12:16 PM  Performed by: Mariangel Hathaway DO  Authorized by: Mariangel Hathaway DO    Direct patient critical care time: 8 minutes  Additional history critical care time: 8 minutes  Ordering / reviewing critical care time: 8 minutes  Documentation critical care time: 8 minutes  Consulting other physicians critical care time: 8 minutes  Total critical care time (exclusive of procedural time) : 40 minutes  Critical care was necessary to treat or prevent imminent or life-threatening deterioration of the following conditions: dehydration and sepsis.  Critical care was time spent personally by me on the following activities: evaluation of patient's response to treatment, examination of patient, obtaining history from patient or surrogate, ordering and performing treatments and interventions, ordering and review of laboratory studies, ordering and review of radiographic studies, pulse oximetry and re-evaluation of patient's condition.      Labs Reviewed   POCT URINALYSIS W/O SCOPE - Abnormal; Notable for the following components:       Result Value    Ketones, UA Trace (*)     Spec Grav UA >=1.030 (*)     Protein, UA 2+ (*)     All other components within normal limits   POCT CMP - Abnormal; Notable for the following components:    ALT (SGPT), POC 50 (*)     AST (SGOT), POC 44 (*)     Calcium, POC 11.3 (*)     POC Glucose 175 (*)     Protein, POC 8.6 (*)     All other components within normal limits   POCT LIVER PANEL - Abnormal; Notable for the following components:    AST (SGOT), POC 40 (*)     Protein, POC 8.5 (*)     All other components within normal limits   TROPONIN ISTAT   POCT URINALYSIS W/O SCOPE   POCT CBC   POCT URINALYSIS W/O SCOPE   POCT CMP   POCT LIVER PANEL   POCT TROPONIN          Imaging Results               CT Abdomen Pelvis With Contrast (Final result)  Result time 06/02/23 11:22:00      Final result by Rich Blancas MD (06/02/23 11:22:00)                   Impression:      Findings worrisome for small-bowel obstruction with transition point in the right lower quadrant.  Given  evidence of prior small bowel surgery, adhesions are considered a possible etiology.    Nonobstructing right renal stone.    Diverticulosis.    This report was flagged in Epic as abnormal.      Electronically signed by: Rich Blancas MD  Date:    06/02/2023  Time:    11:22               Narrative:    EXAMINATION:  CT ABDOMEN PELVIS WITH CONTRAST    CLINICAL HISTORY:  Nausea/vomiting;Epigastric pain;    TECHNIQUE:  Low dose axial images, sagittal and coronal reformations were obtained from the lung bases to the pubic symphysis following the IV administration of 100 mL of Omnipaque 350 .  Oral contrast was not given.    COMPARISON:  None.    FINDINGS:  Visualized portions of the lung bases show no significant abnormalities.  Heart is normal in size with coronary artery calcifications.    There are no focal liver lesions.  Mild prominence of the intrahepatic bile ducts.  Patient is status post cholecystectomy.  Portal vein is patent.  Spleen shows no significant abnormalities.  Partial fatty replacement of the pancreas.  Adrenal glands show no significant abnormalities.    Kidneys show no hydronephrosis.  There is a 1 cm right renal stone.  Ureters show no significant abnormalities.  Urinary bladder is nondistended.  There are small prostate calcifications.  Prostate is normal in size.    Stomach and proximal small bowel are distended.  There is small bowel dilatation up to 4.6 cm.  There are postoperative changes in the small bowel in the right lower quadrant.  There is a suspected transition point in the right lower quadrant on axial image 61 series 2 and coronal image 23 series 601.  The colon is relatively decompressed.  There is colonic diverticulosis without acute diverticulitis.  No free air or significant free fluid.    Abdominal aorta is normal in caliber with mild atherosclerosis.  There are fat containing inguinal hernias.  No abnormal lymph node enlargement.  Osseous structures show degenerative changes  primarily in the form of facet arthropathy at the lower lumbar spine.                                       Medications   morphine injection 4 mg (has no administration in time range)   0.9%  NaCl infusion (has no administration in time range)   LIDOcaine HCl 2% oral solution 15 mL (has no administration in time range)   famotidine tablet 20 mg (20 mg Oral Given 6/2/23 0856)   aspirin tablet 325 mg (325 mg Oral Given 6/2/23 0856)   aluminum-magnesium hydroxide-simethicone 200-200-20 mg/5 mL suspension 30 mL (30 mLs Oral Given 6/2/23 0856)     And   LIDOcaine HCl 2% oral solution 15 mL (15 mLs Oral Given 6/2/23 0856)   sodium chloride 0.9% bolus 1,000 mL 1,000 mL (0 mLs Intravenous Stopped 6/2/23 1153)   metoclopramide HCl injection 10 mg (10 mg Intravenous Given 6/2/23 1029)   ketorolac injection 15 mg (15 mg Intravenous Given 6/2/23 1028)   famotidine (PF) injection 40 mg (40 mg Intravenous Given 6/2/23 1029)   iohexoL (OMNIPAQUE 350) injection 100 mL (100 mLs Intravenous Given 6/2/23 1043)     Medical Decision Making:   History:   Old Medical Records: I decided to obtain old medical records.  Independently Interpreted Test(s):   I have ordered and independently interpreted EKG Reading(s) - see summary below       <> Summary of EKG Reading(s): EKG independently interpreted by Dr. Mariangel Hathaway, DO reads: See ED Course.     Clinical Tests:   Lab Tests: Ordered and Reviewed  Radiological Study: Reviewed and Ordered  Medical Tests: Ordered and Reviewed     Medical Decision Making:    This is an evaluation of a 67 y.o. male that presents to the Emergency Department for   Chief Complaint   Patient presents with    indigestion     C/o epigastric abdominal discomfort. Pt has reoccurrent indigestion. Pt reports taking Maalox with no relief.      The patient is a non-toxic and well appearing patient. On physical exam, patient appears well hydrated with moist mucus membranes. Breath sounds are clear and equal bilaterally with  no adventitious breath sounds, tachypnea or respiratory distress. Regular rate and rhythm. No murmurs. Abdomen soft and non tender. Patient is tolerating PO without difficulty.  Vital Signs Are Reassuring.     Based on the patient's symptoms, I am considering and evaluating for the following differential diagnoses: GERD, Gastritis, Gastroenteritis, STEMI, NSTEMI, Cardiac Arrhythmia.     Consider hospitalization for:  Abdominal pain    Patient is agreeable to transfer and admission to [facility].    ED Course:Treatment in the ED included Physical Exam and medications given in ED  Medications   morphine injection 4 mg (has no administration in time range)   0.9%  NaCl infusion (has no administration in time range)   LIDOcaine HCl 2% oral solution 15 mL (has no administration in time range)   famotidine tablet 20 mg (20 mg Oral Given 6/2/23 0856)   aspirin tablet 325 mg (325 mg Oral Given 6/2/23 0856)   aluminum-magnesium hydroxide-simethicone 200-200-20 mg/5 mL suspension 30 mL (30 mLs Oral Given 6/2/23 0856)     And   LIDOcaine HCl 2% oral solution 15 mL (15 mLs Oral Given 6/2/23 0856)   sodium chloride 0.9% bolus 1,000 mL 1,000 mL (0 mLs Intravenous Stopped 6/2/23 1153)   metoclopramide HCl injection 10 mg (10 mg Intravenous Given 6/2/23 1029)   ketorolac injection 15 mg (15 mg Intravenous Given 6/2/23 1028)   famotidine (PF) injection 40 mg (40 mg Intravenous Given 6/2/23 1029)   iohexoL (OMNIPAQUE 350) injection 100 mL (100 mLs Intravenous Given 6/2/23 1043)   .   Patient reports feeling better after treatment in the ER.     External Data/Documents Reviewed:   Labs: ordered and reviewed. Decision-making details documented in ED Course.  Radiology: ordered as indicated and reviewed. Decision-making details documented in ED Course.   ECG/medicine tests: ordered and independent interpretation performed by Dr. Mariangel Hathaway DO. Decision-making details documented in ED Course.   Cardiac monitor placed for  abdominal pain. Monitor shows normal sinus rhythm. Interpreted by Dr. Mariangel Hathaway DO.      In shared decision making with the patient, we discussed treatment, prescriptions, labs, and imaging results.    I spoke with utilization Bertha Marinelli, at 11:54 a.m.  [facility] spoke with Dr. Wallace at 11:58 a.m..  Dr. Wallace is the resident on-call for Dr. Yusuf.  They request NG tube, and admit to Hospital Medicine..   Patient gave verbal consent for NG tube placement.  Requesting consultation with hospitalist for services not available at this facility.   Discussed patient's presentation, past medical history, physical exam, labs, radiology results, vital signs, and ED course.  Consultation with DR Raimundo Holbrook for transfer and admission at 12:16 p.m..  At this time patient will be transferred & admitted.  Patient will be transferred via EMS to accepting facility.    At time of transfer patient is awake alert oriented x4 speaking clearly in full sentences and moving all 4 extremities.     The following labs and imaging were reviewed:    Insert CBC here       Admission on 06/02/2023   Component Date Value Ref Range Status    Glucose, UA 06/02/2023 Negative   Final    Bilirubin, UA 06/02/2023 Negative   Final    Ketones, UA 06/02/2023 Trace (A)   Final    Spec Grav UA 06/02/2023 >=1.030 (>)   Final    Blood, UA 06/02/2023 Negative   Final    PH, UA 06/02/2023 5.0   Final    Protein, UA 06/02/2023 2+ (A)   Final    Urobilinogen, UA 06/02/2023 0.2  E.U./dL Final    Nitrite, UA 06/02/2023 Negative   Final    Leukocytes, UA 06/02/2023 Negative   Final    Color, UA 06/02/2023 Paula   Final    Clarity, UA 06/02/2023 Clear   Final    POC Cardiac Troponin I 06/02/2023 0.00  0.00 - 0.08 ng/mL Final    Sample 06/02/2023 unknown   Final    Comment: A single negative troponin is insufficient to rule out myocardial infarction.  The use of a serial sampling protocol is recommended practice. Correlate results with  reference intervals established for methodology used. Point of care and core laboratory   troponin results are not interchangeable.      Albumin, POC 06/02/2023 4.6  3.3 - 5.5 g/dL Final    Alkaline Phosphatase, POC 06/02/2023 81  42 - 141 U/L Final    ALT (SGPT), POC 06/02/2023 50 (H)  10 - 47 U/L Final    AST (SGOT), POC 06/02/2023 44 (H)  11 - 38 U/L Final    POC BUN 06/02/2023 17  7 - 22 mg/dL Final    Calcium, POC 06/02/2023 11.3 (H)  8.0 - 10.3 mg/dL Final    POC Chloride 06/02/2023 101  98 - 108 mmol/L Final    POC Creatinine 06/02/2023 1.1  0.6 - 1.2 mg/dL Final    POC Glucose 06/02/2023 175 (H)  73 - 118 mg/dL Final    POC Potassium 06/02/2023 3.8  3.6 - 5.1 mmol/L Final    POC Sodium 06/02/2023 139  128 - 145 mmol/L Final    Bilirubin, POC 06/02/2023 1.2  0.2 - 1.6 mg/dL Final    POC TCO2 06/02/2023 27  18 - 33 mmol/L Final    Protein, POC 06/02/2023 8.6 (H)  6.4 - 8.1 g/dL Final    Albumin, POC 06/02/2023 5.0  3.3 - 5.5 g/dL Final    Alkaline Phosphatase, POC 06/02/2023 67  42 - 141 U/L Final    ALT (SGPT), POC 06/02/2023 39  10 - 47 U/L Final    Amylase, POC 06/02/2023 32  14 - 97 U/L Final    AST (SGOT), POC 06/02/2023 40 (H)  11 - 38 U/L Final    POC GGT 06/02/2023 17  5 - 65 U/L Final    Bilirubin, POC 06/02/2023 1.4  0.2 - 1.6 mg/dL Final    Protein, POC 06/02/2023 8.5 (H)  6.4 - 8.1 g/dL Final        Imaging Results               CT Abdomen Pelvis With Contrast (Final result)  Result time 06/02/23 11:22:00      Final result by Rich Blancas MD (06/02/23 11:22:00)                   Impression:      Findings worrisome for small-bowel obstruction with transition point in the right lower quadrant.  Given evidence of prior small bowel surgery, adhesions are considered a possible etiology.    Nonobstructing right renal stone.    Diverticulosis.    This report was flagged in Epic as abnormal.      Electronically signed by: Rich Blancas MD  Date:    06/02/2023  Time:    11:22               Narrative:     EXAMINATION:  CT ABDOMEN PELVIS WITH CONTRAST    CLINICAL HISTORY:  Nausea/vomiting;Epigastric pain;    TECHNIQUE:  Low dose axial images, sagittal and coronal reformations were obtained from the lung bases to the pubic symphysis following the IV administration of 100 mL of Omnipaque 350 .  Oral contrast was not given.    COMPARISON:  None.    FINDINGS:  Visualized portions of the lung bases show no significant abnormalities.  Heart is normal in size with coronary artery calcifications.    There are no focal liver lesions.  Mild prominence of the intrahepatic bile ducts.  Patient is status post cholecystectomy.  Portal vein is patent.  Spleen shows no significant abnormalities.  Partial fatty replacement of the pancreas.  Adrenal glands show no significant abnormalities.    Kidneys show no hydronephrosis.  There is a 1 cm right renal stone.  Ureters show no significant abnormalities.  Urinary bladder is nondistended.  There are small prostate calcifications.  Prostate is normal in size.    Stomach and proximal small bowel are distended.  There is small bowel dilatation up to 4.6 cm.  There are postoperative changes in the small bowel in the right lower quadrant.  There is a suspected transition point in the right lower quadrant on axial image 61 series 2 and coronal image 23 series 601.  The colon is relatively decompressed.  There is colonic diverticulosis without acute diverticulitis.  No free air or significant free fluid.    Abdominal aorta is normal in caliber with mild atherosclerosis.  There are fat containing inguinal hernias.  No abnormal lymph node enlargement.  Osseous structures show degenerative changes primarily in the form of facet arthropathy at the lower lumbar spine.                                         Scribe Attestation:   Scribe #1: I performed the above scribed service and the documentation accurately describes the services I performed. I attest to the accuracy of the note.      ED Course as  of 06/02/23 1246   Fri Jun 02, 2023   0928 EKG interpreted by Dr. Hathaway.  No STEMI.  Sinus rhythm, ventricular rate 64.  Left axis.  Abnormal EKG.  QTC normal at 400.  When compared to prior EKG done on 05/16/2023 rate has decreased by 1 beats per minute today [RF]      ED Course User Index  [RF] Mariangel Hathaway DO           I, Dr. Mariangel Hathaway, personally performed the services described in this documentation. This document was produced by a scribe under my direction and in my presence. All medical record entries made by the scribe were at my direction and in my presence.  I have reviewed the chart and agree that the record reflects my personal performance and is accurate and complete. Mariangel Hathaway DO.     06/02/2023 12:16 PM         Clinical Impression:   Final diagnoses:  [R10.13] Epigastric pain  [K56.609] SBO (small bowel obstruction) (Primary)        ED Disposition Condition    Admit Stable                Mariangel Hathaway DO  06/02/23 3637

## 2023-06-02 NOTE — H&P
[facility]  History & Physical    Patient Name: Jules Aviles  MRN: [id number]  Patient Class: IP- Inpatient  Admission Date: 6/2/2023  Attending Physician: Raimundo Morales MD   Primary Care Provider: Bharat Mathis MD         Patient information was obtained from patient and ER records.     Subjective:     Principal Problem:SBO (small bowel obstruction)    Chief Complaint:   Chief Complaint   Patient presents with    indigestion     C/o epigastric abdominal discomfort. Pt has reoccurrent indigestion. Pt reports taking Maalox with no relief.         HPI: Mr. Aviles is a 66 yo M who presents with a CC of N/V/abdominal pain for one day. Patient presented to [facility] ED and was found by imaging to have a SBO. NG tube placed to suction. Patient denies previous history of this. Hernia repair 10 years ago. Feels better with NG tube. Abdominal exam benign. + gas. No BM today       Past Medical History:   Diagnosis Date    Essential hypertension 12/07/2016    GERD (gastroesophageal reflux disease)        Past Surgical History:   Procedure Laterality Date    APPENDECTOMY      as a child    GALLBLADDER SURGERY  2004    HERNIA REPAIR  2004 & 2005    TONSILLECTOMY, ADENOIDECTOMY      as a child       Review of patient's allergies indicates:  No Known Allergies    Current Facility-Administered Medications on File Prior to Encounter   Medication    0.9%  NaCl infusion     Current Outpatient Medications on File Prior to Encounter   Medication Sig    hydroCHLOROthiazide (MICROZIDE) 12.5 mg capsule TAKE 1 CAPSULE BY MOUTH EVERY DAY    losartan (COZAAR) 50 MG tablet TAKE 1 TABLET BY MOUTH EVERY DAY    ascorbic acid, vitamin C, (VITAMIN C) 500 MG tablet Take 500 mg by mouth once daily.    Bifidobacterium infantis (ALIGN ORAL) Take by mouth once daily.    CHOLESTYRAMINE LIGHT 4 gram Powd once daily.     cyanocobalamin (VITAMIN B-12) 100 MCG tablet Take 100 mcg by mouth once daily.     pantoprazole (PROTONIX) 40 MG tablet Take 40 mg by mouth.    tamsulosin (FLOMAX) 0.4 mg Cap Take 1 capsule by mouth every evening.    vitamin D (VITAMIN D3) 1000 units Tab Take 8,000 Units by mouth once daily.     Family History       Problem Relation (Age of Onset)    Aneurysm Mother    Heart disease Father          Tobacco Use    Smoking status: Never    Smokeless tobacco: Never   Substance and Sexual Activity    Alcohol use: No    Drug use: No    Sexual activity: Yes     Review of Systems   Constitutional:  Positive for appetite change. Negative for activity change.   HENT:  Negative for congestion.    Respiratory:  Negative for chest tightness.    Cardiovascular:  Negative for chest pain.   Genitourinary:  Negative for difficulty urinating.   Musculoskeletal:  Negative for arthralgias.   Neurological:  Negative for dizziness and facial asymmetry.   Psychiatric/Behavioral:  Negative for agitation and behavioral problems.    Objective:     Vital Signs (Most Recent):  Temp: 97.6 °F (36.4 °C) (06/02/23 1446)  Pulse: 68 (06/02/23 1446)  Resp: 18 (06/02/23 1446)  BP: (!) 193/87 (06/02/23 1446)  SpO2: 96 % (06/02/23 1446) Vital Signs (24h Range):  Temp:  [97.6 °F (36.4 °C)-97.7 °F (36.5 °C)] 97.6 °F (36.4 °C)  Pulse:  [] 68  Resp:  [18-19] 18  SpO2:  [95 %-100 %] 96 %  BP: (135-193)/(63-94) 193/87     Weight: 104.3 kg (230 lb)  Body mass index is 33 kg/m².     Physical Exam  Vitals and nursing note reviewed.   Constitutional:       Appearance: He is obese. He is not ill-appearing.   Cardiovascular:      Rate and Rhythm: Normal rate.   Pulmonary:      Effort: Pulmonary effort is normal. No respiratory distress.   Abdominal:      General: There is distension.      Tenderness: There is no abdominal tenderness. There is no guarding.   Neurological:      Mental Status: He is alert.              Significant Labs: All pertinent labs within the past 24 hours have been reviewed.  BMP: No results for input(s): GLU,  NA, K, CL, CO2, BUN, CREATININE, CALCIUM, MG in the last 48 hours.  CBC: No results for input(s): WBC, HGB, HCT, PLT in the last 48 hours.    Significant Imaging: CT abdomen as above       Assessment/Plan:     * SBO (small bowel obstruction)  Found by CT. Discussed with surgery. Patient with benign abdomen. NG suction. Cause not clear. Hernia repair 10 years ago. NPO/fluids       Hypercalcemia  Likely from dehydration. IV fluids. Will check in the am.       Obesity  Body mass index is 33 kg/m². Morbid obesity complicates all aspects of disease management from diagnostic modalities to treatment. Weight loss encouraged and health benefits explained to patient.         GERD (gastroesophageal reflux disease)  PPI IV       Atrial flutter  History of this 10 years ago. Not on blood thinners      Essential hypertension  Prn hydralazine IV           VTE Risk Mitigation (From admission, onward)         Ordered     enoxaparin injection 40 mg  Daily         06/02/23 7902                           Raimundo Holbrook MD  Department of Hospital Medicine  [facility]

## 2023-06-02 NOTE — SUBJECTIVE & OBJECTIVE
Past Medical History:   Diagnosis Date    Essential hypertension 12/07/2016    GERD (gastroesophageal reflux disease)        Past Surgical History:   Procedure Laterality Date    APPENDECTOMY      as a child    GALLBLADDER SURGERY  2004    HERNIA REPAIR  2004 & 2005    TONSILLECTOMY, ADENOIDECTOMY      as a child       Review of patient's allergies indicates:  No Known Allergies    Current Facility-Administered Medications on File Prior to Encounter   Medication    0.9%  NaCl infusion     Current Outpatient Medications on File Prior to Encounter   Medication Sig    hydroCHLOROthiazide (MICROZIDE) 12.5 mg capsule TAKE 1 CAPSULE BY MOUTH EVERY DAY    losartan (COZAAR) 50 MG tablet TAKE 1 TABLET BY MOUTH EVERY DAY    ascorbic acid, vitamin C, (VITAMIN C) 500 MG tablet Take 500 mg by mouth once daily.    Bifidobacterium infantis (ALIGN ORAL) Take by mouth once daily.    CHOLESTYRAMINE LIGHT 4 gram Powd once daily.     cyanocobalamin (VITAMIN B-12) 100 MCG tablet Take 100 mcg by mouth once daily.    pantoprazole (PROTONIX) 40 MG tablet Take 40 mg by mouth.    tamsulosin (FLOMAX) 0.4 mg Cap Take 1 capsule by mouth every evening.    vitamin D (VITAMIN D3) 1000 units Tab Take 8,000 Units by mouth once daily.     Family History       Problem Relation (Age of Onset)    Aneurysm Mother    Heart disease Father          Tobacco Use    Smoking status: Never    Smokeless tobacco: Never   Substance and Sexual Activity    Alcohol use: No    Drug use: No    Sexual activity: Yes     Review of Systems   Constitutional:  Positive for appetite change. Negative for activity change.   HENT:  Negative for congestion.    Respiratory:  Negative for chest tightness.    Cardiovascular:  Negative for chest pain.   Genitourinary:  Negative for difficulty urinating.   Musculoskeletal:  Negative for arthralgias.   Neurological:  Negative for dizziness and facial asymmetry.   Psychiatric/Behavioral:  Negative for agitation and behavioral problems.     Objective:     Vital Signs (Most Recent):  Temp: 97.6 °F (36.4 °C) (06/02/23 1446)  Pulse: 68 (06/02/23 1446)  Resp: 18 (06/02/23 1446)  BP: (!) 193/87 (06/02/23 1446)  SpO2: 96 % (06/02/23 1446) Vital Signs (24h Range):  Temp:  [97.6 °F (36.4 °C)-97.7 °F (36.5 °C)] 97.6 °F (36.4 °C)  Pulse:  [] 68  Resp:  [18-19] 18  SpO2:  [95 %-100 %] 96 %  BP: (135-193)/(63-94) 193/87     Weight: 104.3 kg (230 lb)  Body mass index is 33 kg/m².     Physical Exam  Vitals and nursing note reviewed.   Constitutional:       Appearance: He is obese. He is not ill-appearing.   Cardiovascular:      Rate and Rhythm: Normal rate.   Pulmonary:      Effort: Pulmonary effort is normal. No respiratory distress.   Abdominal:      General: There is distension.      Tenderness: There is no abdominal tenderness. There is no guarding.   Neurological:      Mental Status: He is alert.              Significant Labs: All pertinent labs within the past 24 hours have been reviewed.  BMP: No results for input(s): GLU, NA, K, CL, CO2, BUN, CREATININE, CALCIUM, MG in the last 48 hours.  CBC: No results for input(s): WBC, HGB, HCT, PLT in the last 48 hours.    Significant Imaging: CT abdomen as above

## 2023-06-02 NOTE — HPI
Mr. Aviles is a 66 yo M who presents with a CC of N/V/abdominal pain for one day. Patient presented to [facility] ED and was found by imaging to have a SBO. NG tube placed to suction. Patient denies previous history of this. Hernia repair 10 years ago. Feels better with NG tube. Abdominal exam benign. + gas. No BM today

## 2023-06-02 NOTE — PROGRESS NOTES
Pt arrived to unit via stretcher from [facility]. Pt AAOx3. No distress noted.

## 2023-06-03 LAB
ANION GAP SERPL CALC-SCNC: 11 MMOL/L (ref 8–16)
BUN SERPL-MCNC: 17 MG/DL (ref 8–23)
CA-I BLDV-SCNC: 1.29 MMOL/L (ref 1.06–1.42)
CALCIUM SERPL-MCNC: 10.1 MG/DL (ref 8.7–10.5)
CHLORIDE SERPL-SCNC: 105 MMOL/L (ref 95–110)
CO2 SERPL-SCNC: 26 MMOL/L (ref 23–29)
CREAT SERPL-MCNC: 1 MG/DL (ref 0.5–1.4)
EST. GFR  (NO RACE VARIABLE): >60 ML/MIN/1.73 M^2
GLUCOSE SERPL-MCNC: 130 MG/DL (ref 70–110)
POTASSIUM SERPL-SCNC: 4.3 MMOL/L (ref 3.5–5.1)
SODIUM SERPL-SCNC: 142 MMOL/L (ref 136–145)

## 2023-06-03 PROCEDURE — 80048 BASIC METABOLIC PNL TOTAL CA: CPT | Performed by: INTERNAL MEDICINE

## 2023-06-03 PROCEDURE — 36415 COLL VENOUS BLD VENIPUNCTURE: CPT | Performed by: INTERNAL MEDICINE

## 2023-06-03 PROCEDURE — 25000003 PHARM REV CODE 250: Performed by: INTERNAL MEDICINE

## 2023-06-03 PROCEDURE — C9113 INJ PANTOPRAZOLE SODIUM, VIA: HCPCS | Performed by: INTERNAL MEDICINE

## 2023-06-03 PROCEDURE — 11000001 HC ACUTE MED/SURG PRIVATE ROOM

## 2023-06-03 PROCEDURE — 63600175 PHARM REV CODE 636 W HCPCS: Performed by: INTERNAL MEDICINE

## 2023-06-03 PROCEDURE — 63600175 PHARM REV CODE 636 W HCPCS

## 2023-06-03 PROCEDURE — 82330 ASSAY OF CALCIUM: CPT | Performed by: INTERNAL MEDICINE

## 2023-06-03 PROCEDURE — 94761 N-INVAS EAR/PLS OXIMETRY MLT: CPT

## 2023-06-03 PROCEDURE — 94760 N-INVAS EAR/PLS OXIMETRY 1: CPT

## 2023-06-03 RX ADMIN — PROCHLORPERAZINE EDISYLATE 2.5 MG: 5 INJECTION INTRAMUSCULAR; INTRAVENOUS at 04:06

## 2023-06-03 RX ADMIN — ONDANSETRON 8 MG: 2 INJECTION INTRAMUSCULAR; INTRAVENOUS at 06:06

## 2023-06-03 RX ADMIN — ENOXAPARIN SODIUM 40 MG: 40 INJECTION SUBCUTANEOUS at 04:06

## 2023-06-03 RX ADMIN — HYDROMORPHONE HYDROCHLORIDE 1 MG: 1 INJECTION, SOLUTION INTRAMUSCULAR; INTRAVENOUS; SUBCUTANEOUS at 09:06

## 2023-06-03 RX ADMIN — SODIUM CHLORIDE: 9 INJECTION, SOLUTION INTRAVENOUS at 06:06

## 2023-06-03 RX ADMIN — ONDANSETRON 8 MG: 2 INJECTION INTRAMUSCULAR; INTRAVENOUS at 08:06

## 2023-06-03 RX ADMIN — PANTOPRAZOLE SODIUM 40 MG: 40 INJECTION, POWDER, FOR SOLUTION INTRAVENOUS at 08:06

## 2023-06-03 NOTE — HOSPITAL COURSE
Patient admitted for SBO. NG tube placed. Patient has had multiple BM's. Has tolerated his diet. On 6/5/23 he was requesting to go home. Activity as tolerated. Diet- soft for a few days. Follow up with PCP in one week

## 2023-06-03 NOTE — SUBJECTIVE & OBJECTIVE
Interval History: Feels better     Review of Systems   Constitutional:  Positive for appetite change. Negative for activity change.   HENT:  Negative for congestion.    Respiratory:  Negative for chest tightness.    Cardiovascular:  Negative for chest pain.   Genitourinary:  Negative for difficulty urinating.   Musculoskeletal:  Negative for arthralgias.   Neurological:  Negative for dizziness and facial asymmetry.   Psychiatric/Behavioral:  Negative for agitation and behavioral problems.    Objective:     Vital Signs (Most Recent):  Temp: 98.5 °F (36.9 °C) (06/03/23 0714)  Pulse: 67 (06/03/23 0738)  Resp: 17 (06/03/23 0738)  BP: (!) 148/70 (06/03/23 0714)  SpO2: (!) 94 % (06/03/23 0738) Vital Signs (24h Range):  Temp:  [97.6 °F (36.4 °C)-99.2 °F (37.3 °C)] 98.5 °F (36.9 °C)  Pulse:  [] 67  Resp:  [16-19] 17  SpO2:  [92 %-96 %] 94 %  BP: (139-193)/(63-94) 148/70     Weight: 104.3 kg (230 lb)  Body mass index is 33 kg/m².    Intake/Output Summary (Last 24 hours) at 6/3/2023 0837  Last data filed at 6/3/2023 0638  Gross per 24 hour   Intake 2328.2 ml   Output 2050 ml   Net 278.2 ml         Physical Exam  Vitals and nursing note reviewed.   Constitutional:       Appearance: He is obese. He is not ill-appearing.   Cardiovascular:      Rate and Rhythm: Normal rate.   Pulmonary:      Effort: Pulmonary effort is normal. No respiratory distress.   Abdominal:      General: There is distension.      Tenderness: There is no abdominal tenderness. There is no guarding.   Neurological:      Mental Status: He is alert.           Significant Labs: All pertinent labs within the past 24 hours have been reviewed.  BMP:   Recent Labs   Lab 06/03/23  0547   *      K 4.3      CO2 26   BUN 17   CREATININE 1.0   CALCIUM 10.1     CBC: No results for input(s): WBC, HGB, HCT, PLT in the last 48 hours.    Significant Imaging:

## 2023-06-03 NOTE — PLAN OF CARE
Patient alert and oriented x4. Respirations even and unlabored. No distress noted. Ng tube to right nostril connected to liws. Greenish drainage noted. Skin warm and dry. Iv fluids infusing with no complications noted. Patient complains of nausea. Prn medication given as needed. Patient complains of mild pain. Patient denies need for pain medication. Patient has no complaints at this time. Instructed to call for any needs. Bed in lowest position. Call bell within reach.    Problem: Adult Inpatient Plan of Care  Goal: Plan of Care Review  Outcome: Ongoing, Progressing  Flowsheets (Taken 6/3/2023 0633)  Plan of Care Reviewed With: patient  Goal: Patient-Specific Goal (Individualized)  Outcome: Ongoing, Progressing  Goal: Absence of Hospital-Acquired Illness or Injury  Intervention: Identify and Manage Fall Risk  Flowsheets (Taken 6/3/2023 0633)  Safety Promotion/Fall Prevention:   assistive device/personal item within reach   high risk medications identified   medications reviewed   nonskid shoes/socks when out of bed

## 2023-06-03 NOTE — PROGRESS NOTES
[facility]  Progress Note    Patient Name: Jules Aviles  MRN: [id number]  Patient Class: IP- Inpatient   Admission Date: 6/2/2023  Length of Stay: 1 days  Attending Physician: Raimundo Morales MD  Primary Care Provider: Bharat Mathis MD        Subjective:     Principal Problem:SBO (small bowel obstruction)        HPI:  Mr. Aviles is a 66 yo M who presents with a CC of N/V/abdominal pain for one day. Patient presented to [place] ED and was found by imaging to have a SBO. NG tube placed to suction. Patient denies previous history of this. Hernia repair 10 years ago. Feels better with NG tube. Abdominal exam benign. + gas. No BM today       Overview/Hospital Course:  Patient admitted for SBO. NG tube placed         Interval History: Feels better     Review of Systems   Constitutional:  Positive for appetite change. Negative for activity change.   HENT:  Negative for congestion.    Respiratory:  Negative for chest tightness.    Cardiovascular:  Negative for chest pain.   Genitourinary:  Negative for difficulty urinating.   Musculoskeletal:  Negative for arthralgias.   Neurological:  Negative for dizziness and facial asymmetry.   Psychiatric/Behavioral:  Negative for agitation and behavioral problems.    Objective:     Vital Signs (Most Recent):  Temp: 98.5 °F (36.9 °C) (06/03/23 0714)  Pulse: 67 (06/03/23 0738)  Resp: 17 (06/03/23 0738)  BP: (!) 148/70 (06/03/23 0714)  SpO2: (!) 94 % (06/03/23 0738) Vital Signs (24h Range):  Temp:  [97.6 °F (36.4 °C)-99.2 °F (37.3 °C)] 98.5 °F (36.9 °C)  Pulse:  [] 67  Resp:  [16-19] 17  SpO2:  [92 %-96 %] 94 %  BP: (139-193)/(63-94) 148/70     Weight: 104.3 kg (230 lb)  Body mass index is 33 kg/m².    Intake/Output Summary (Last 24 hours) at 6/3/2023 0837  Last data filed at 6/3/2023 0638  Gross per 24 hour   Intake 2328.2 ml   Output 2050 ml   Net 278.2 ml         Physical Exam  Vitals and nursing note reviewed.   Constitutional:        Appearance: He is obese. He is not ill-appearing.   Cardiovascular:      Rate and Rhythm: Normal rate.   Pulmonary:      Effort: Pulmonary effort is normal. No respiratory distress.   Abdominal:      General: There is distension.      Tenderness: There is no abdominal tenderness. There is no guarding.   Neurological:      Mental Status: He is alert.           Significant Labs: All pertinent labs within the past 24 hours have been reviewed.  BMP:   Recent Labs   Lab 06/03/23  0547   *      K 4.3      CO2 26   BUN 17   CREATININE 1.0   CALCIUM 10.1     CBC: No results for input(s): WBC, HGB, HCT, PLT in the last 48 hours.    Significant Imaging:       Assessment/Plan:      * SBO (small bowel obstruction)  Found by CT. Discussed with surgery. Patient with benign abdomen. NG suction. Cause not clear. Hernia repair 10 years ago. NPO/fluids       Hypercalcemia  Likely from dehydration. IV fluids. Will check in the am.       Obesity  Body mass index is 33 kg/m². Morbid obesity complicates all aspects of disease management from diagnostic modalities to treatment. Weight loss encouraged and health benefits explained to patient.         GERD (gastroesophageal reflux disease)  PPI IV       Atrial flutter  History of this 10 years ago. Not on blood thinners      Essential hypertension  Prn hydralazine IV           VTE Risk Mitigation (From admission, onward)         Ordered     enoxaparin injection 40 mg  Daily         06/02/23 1524                Discharge Planning   KAYLEIGH:      Code Status: Not on file   Is the patient medically ready for discharge?:     Reason for patient still in hospital (select all that apply): Patient unstable                     Raimundo Holbrook MD  Department of Hospital Medicine   [facility]

## 2023-06-03 NOTE — NURSING
[facility] [place]  Nurses Note -- 4 Eyes      6/3/2023       Skin assessed on: Q Shift      [x] No Pressure Injuries Present    []Prevention Measures Documented    [] Yes LDA  for Pressure Injury Previously documented     [] Yes New Pressure Injury Discovered   [] LDA for New Pressure Injury Added      Attending RN:  Claudia Osman LPN     Second RN:  Juan Alberto Singh RN

## 2023-06-03 NOTE — PLAN OF CARE
Case Management Assessment     PCP: LILA Mathis  Pharmacy: [facility]    Patient Arrived From: home  Existing Help at Home: Aleisha DAVIS    Barriers to Discharge: none    Discharge Plan:    A. Home with SO   B. Home with SO         06/03/23 0955   Discharge Assessment   Assessment Type Discharge Planning Assessment   Confirmed/corrected address, phone number and insurance Yes   Confirmed Demographics Correct on Facesheet   Communicated KAYLEIGH with patient/caregiver Date not available/Unable to determine   People in Home significant other   Do you expect to return to your current living situation? Yes   Do you have help at home or someone to help you manage your care at home? Yes   Prior to hospitilization cognitive status: Alert/Oriented   Current cognitive status: Alert/Oriented   Equipment Currently Used at Home none   Readmission within 30 days? No   Patient currently being followed by outpatient case management? No   Do you currently have service(s) that help you manage your care at home? No   Do you take prescription medications? Yes   Do you have prescription coverage? Yes   Coverage BCBS   Do you have any problems affording any of your prescribed medications? No   Is the patient taking medications as prescribed? yes   Who is going to help you get home at discharge? Aleisha DAVIS   How do you get to doctors appointments? car, drives self   Are you on dialysis? No   Do you take coumadin? No   Discharge Plan A Home   Discharge Plan B Home   DME Needed Upon Discharge  none   Discharge Plan discussed with: Patient   Transition of Care Barriers None

## 2023-06-03 NOTE — NURSING
[facility]  Nurses Note -- 4 Eyes      6/2/23         Skin assessed on: Q Shift      [x] No Pressure Injuries Present    [x]Prevention Measures Documented    [] Yes LDA  for Pressure Injury Previously documented     [] Yes New Pressure Injury Discovered   [] LDA for New Pressure Injury Added      Attending RN:  Juan Alberto Singh RN     Second RN:  Alma Rosa Solorio Rn

## 2023-06-04 PROCEDURE — 99223 PR INITIAL HOSPITAL CARE,LEVL III: ICD-10-PCS | Mod: ,,, | Performed by: SURGERY

## 2023-06-04 PROCEDURE — 63600175 PHARM REV CODE 636 W HCPCS: Performed by: INTERNAL MEDICINE

## 2023-06-04 PROCEDURE — C9113 INJ PANTOPRAZOLE SODIUM, VIA: HCPCS | Performed by: INTERNAL MEDICINE

## 2023-06-04 PROCEDURE — 11000001 HC ACUTE MED/SURG PRIVATE ROOM

## 2023-06-04 PROCEDURE — 25000003 PHARM REV CODE 250: Performed by: INTERNAL MEDICINE

## 2023-06-04 PROCEDURE — 99223 1ST HOSP IP/OBS HIGH 75: CPT | Mod: ,,, | Performed by: SURGERY

## 2023-06-04 RX ORDER — DEXTROSE MONOHYDRATE AND SODIUM CHLORIDE 5; .9 G/100ML; G/100ML
INJECTION, SOLUTION INTRAVENOUS CONTINUOUS
Status: DISCONTINUED | OUTPATIENT
Start: 2023-06-04 | End: 2023-06-05 | Stop reason: HOSPADM

## 2023-06-04 RX ADMIN — DEXTROSE AND SODIUM CHLORIDE: 5; 900 INJECTION, SOLUTION INTRAVENOUS at 09:06

## 2023-06-04 RX ADMIN — PANTOPRAZOLE SODIUM 40 MG: 40 INJECTION, POWDER, FOR SOLUTION INTRAVENOUS at 08:06

## 2023-06-04 RX ADMIN — HYDRALAZINE HYDROCHLORIDE 10 MG: 20 INJECTION INTRAMUSCULAR; INTRAVENOUS at 07:06

## 2023-06-04 RX ADMIN — SODIUM CHLORIDE: 9 INJECTION, SOLUTION INTRAVENOUS at 01:06

## 2023-06-04 RX ADMIN — DEXTROSE AND SODIUM CHLORIDE: 5; 900 INJECTION, SOLUTION INTRAVENOUS at 07:06

## 2023-06-04 RX ADMIN — ENOXAPARIN SODIUM 40 MG: 40 INJECTION SUBCUTANEOUS at 04:06

## 2023-06-04 NOTE — PROGRESS NOTES
[facility]  Progress Note    Patient Name: Jules Aviles  MRN: [id number]  Patient Class: IP- Inpatient   Admission Date: 6/2/2023  Length of Stay: 2 days  Attending Physician: Raimundo Morales MD  Primary Care Provider: Bharat Mathis MD        Subjective:     Principal Problem:SBO (small bowel obstruction)        HPI:  Mr. Aviles is a 66 yo M who presents with a CC of N/V/abdominal pain for one day. Patient presented to [place] ED and was found by imaging to have a SBO. NG tube placed to suction. Patient denies previous history of this. Hernia repair 10 years ago. Feels better with NG tube. Abdominal exam benign. + gas. No BM today       Overview/Hospital Course:  Patient admitted for SBO. NG tube placed. Not much improvement in 3 rand. Surgery consulted.        Interval History: No new issues     Review of Systems   Constitutional:  Positive for appetite change. Negative for activity change.   HENT:  Negative for congestion.    Respiratory:  Negative for chest tightness.    Cardiovascular:  Negative for chest pain.   Genitourinary:  Negative for difficulty urinating.   Musculoskeletal:  Negative for arthralgias.   Neurological:  Negative for dizziness and facial asymmetry.   Psychiatric/Behavioral:  Negative for agitation and behavioral problems.    Objective:     Vital Signs (Most Recent):  Temp: 99.3 °F (37.4 °C) (06/04/23 0732)  Pulse: 96 (06/04/23 0732)  Resp: 18 (06/04/23 0732)  BP: 132/79 (06/04/23 0732)  SpO2: (!) 92 % (06/04/23 0732) Vital Signs (24h Range):  Temp:  [98.4 °F (36.9 °C)-99.3 °F (37.4 °C)] 99.3 °F (37.4 °C)  Pulse:  [74-96] 96  Resp:  [16-20] 18  SpO2:  [92 %-95 %] 92 %  BP: (132-173)/(76-85) 132/79     Weight: 104.3 kg (230 lb)  Body mass index is 33 kg/m².    Intake/Output Summary (Last 24 hours) at 6/4/2023 0905  Last data filed at 6/4/2023 0747  Gross per 24 hour   Intake 2347.31 ml   Output 1000 ml   Net 1347.31 ml         Physical Exam  Vitals and nursing  note reviewed.   Constitutional:       Appearance: He is obese. He is not ill-appearing.   Cardiovascular:      Rate and Rhythm: Normal rate.   Pulmonary:      Effort: Pulmonary effort is normal. No respiratory distress.   Abdominal:      General: There is distension.      Tenderness: There is no abdominal tenderness. There is no guarding.   Neurological:      Mental Status: He is alert.           Significant Labs: All pertinent labs within the past 24 hours have been reviewed.  BMP:   Recent Labs   Lab 06/03/23  0547   *      K 4.3      CO2 26   BUN 17   CREATININE 1.0   CALCIUM 10.1     CBC: No results for input(s): WBC, HGB, HCT, PLT in the last 48 hours.    Significant Imaging:       Assessment/Plan:      * SBO (small bowel obstruction)  Found by CT. Discussed with surgery. Patient with benign abdomen. NG suction. Cause not clear. Hernia repair 10 years ago. NPO/fluids     No sig. BM. Surgery consulted. Continue NG suction    Hypercalcemia  Likely from dehydration. IV fluids. Will check in the am.       Obesity  Body mass index is 33 kg/m². Morbid obesity complicates all aspects of disease management from diagnostic modalities to treatment. Weight loss encouraged and health benefits explained to patient.         GERD (gastroesophageal reflux disease)  PPI IV       Atrial flutter  History of this 10 years ago. Not on blood thinners      Essential hypertension  Prn hydralazine IV           VTE Risk Mitigation (From admission, onward)         Ordered     enoxaparin injection 40 mg  Daily         06/02/23 1525                Discharge Planning   KAYLEIGH:      Code Status: Not on file   Is the patient medically ready for discharge?:     Reason for patient still in hospital (select all that apply): Patient unstable  Discharge Plan A: Home                  Raimundo Holbrook MD  Department of Hospital Medicine   [facility]

## 2023-06-04 NOTE — CONSULTS
.History and Physical Exam    Patient ID: Jules Aviles is a 67 y.o. male.    Chief Complaint: indigestion (C/o epigastric abdominal discomfort. Pt has reoccurrent indigestion. Pt reports taking Maalox with no relief. )      HPI:  68 y/o with history of SBO, not improving with conservative measures.      Review of Systems   Constitutional:  Negative for chills and unexpected weight change.   HENT:  Negative for congestion, ear pain and sore throat.    Eyes:  Negative for pain and redness.   Respiratory:  Negative for cough and shortness of breath.    Cardiovascular:  Negative for chest pain and palpitations.   Gastrointestinal:  Negative for abdominal distention, abdominal pain and constipation.   Endocrine: Negative for cold intolerance and heat intolerance.   Genitourinary:  Negative for dysuria and frequency.   Musculoskeletal:  Negative for back pain and neck pain.   Skin:  Negative for pallor and rash.   Neurological:  Negative for syncope, light-headedness and headaches.   Hematological:  Negative for adenopathy. Does not bruise/bleed easily.   Psychiatric/Behavioral:  Negative for confusion and hallucinations.      Current Facility-Administered Medications   Medication Dose Route Frequency Provider Last Rate Last Admin    dextrose 5 % and 0.9 % NaCl infusion   Intravenous Continuous Raimundo Morales  mL/hr at 06/04/23 0917 New Bag at 06/04/23 0917    enoxaparin injection 40 mg  40 mg Subcutaneous Daily Raimundo Morales MD   40 mg at 06/03/23 1638    hydrALAZINE injection 10 mg  10 mg Intravenous Q6H PRN Raimundo Morales MD   10 mg at 06/04/23 0708    HYDROmorphone injection 1 mg  1 mg Intravenous Q6H PRN Raimundo Morales MD   1 mg at 06/03/23 2149    ondansetron injection 8 mg  8 mg Intravenous Q6H PRN Raimundo Morales MD   8 mg at 06/03/23 2022    pantoprazole injection 40 mg  40 mg Intravenous Daily Raimundo Morales MD   40 mg at 06/04/23 0812    prochlorperazine injection Soln  2.5 mg  2.5 mg Intravenous Q6H PRN Imtiaz Gamboa NP   2.5 mg at 06/03/23 0439     Facility-Administered Medications Ordered in Other Encounters   Medication Dose Route Frequency Provider Last Rate Last Admin    0.9%  NaCl infusion   Intravenous Continuous Lien Crocker NP   Stopped at 02/18/19 1027       Review of patient's allergies indicates:  No Known Allergies    Past Medical History:   Diagnosis Date    Essential hypertension 12/07/2016    GERD (gastroesophageal reflux disease)        Past Surgical History:   Procedure Laterality Date    APPENDECTOMY      as a child    GALLBLADDER SURGERY  2004    HERNIA REPAIR  2004 & 2005    TONSILLECTOMY, ADENOIDECTOMY      as a child       Family History   Problem Relation Age of Onset    Aneurysm Mother     Heart disease Father        Social History     Socioeconomic History    Marital status: Single   Tobacco Use    Smoking status: Never    Smokeless tobacco: Never   Substance and Sexual Activity    Alcohol use: No    Drug use: No    Sexual activity: Yes       Vitals:    06/04/23 1125   BP: (!) 159/90   Pulse: 96   Resp: 18   Temp: 99 °F (37.2 °C)       Physical Exam  Constitutional:       Appearance: He is well-developed.   HENT:      Head: Normocephalic and atraumatic.   Eyes:      Pupils: Pupils are equal, round, and reactive to light.   Cardiovascular:      Rate and Rhythm: Normal rate and regular rhythm.      Heart sounds: No murmur heard.  Pulmonary:      Effort: Pulmonary effort is normal.      Breath sounds: Normal breath sounds. No wheezing.   Abdominal:      General: There is distension.      Palpations: Abdomen is soft. There is no mass.      Tenderness: There is no abdominal tenderness.   Musculoskeletal:         General: Normal range of motion.      Cervical back: Normal range of motion and neck supple.   Skin:     General: Skin is warm and dry.      Capillary Refill: Capillary refill takes less than 2 seconds.      Findings: No rash.    Neurological:      Mental Status: He is alert and oriented to person, place, and time.   Psychiatric:         Judgment: Judgment normal.       Assessment & Plan:   SBO not improving with conservative measures.  Discussed possible operative intervention with patient who desires to continue on current course for now.  In absence of leukocytosis abnormal vitals, will continue with NGT suction and ivf  and serial abd exams.

## 2023-06-04 NOTE — NURSING
Pt requesting NG tube removed. Ng with 100 cc output this shift. Called Dr. Yusuf who is says we can pull NG tube and revisit in the morning. NG tube removed per orders.

## 2023-06-04 NOTE — NURSING
[facility]  Nurses Note -- 4 Eyes      6/4/2023       Skin assessed on: Q Shift      [x] No Pressure Injuries Present    []Prevention Measures Documented    [] Yes LDA  for Pressure Injury Previously documented     [] Yes New Pressure Injury Discovered   [] LDA for New Pressure Injury Added      Attending RN:  Claudia Osman LPN     Second RN:  Juan Alberto Singh RN

## 2023-06-04 NOTE — ASSESSMENT & PLAN NOTE
Found by CT. Discussed with surgery. Patient with benign abdomen. NG suction. Cause not clear. Hernia repair 10 years ago. NPO/fluids     No sig. BM. Surgery consulted. Continue NG suction

## 2023-06-04 NOTE — SUBJECTIVE & OBJECTIVE
Interval History: No new issues     Review of Systems   Constitutional:  Positive for appetite change. Negative for activity change.   HENT:  Negative for congestion.    Respiratory:  Negative for chest tightness.    Cardiovascular:  Negative for chest pain.   Genitourinary:  Negative for difficulty urinating.   Musculoskeletal:  Negative for arthralgias.   Neurological:  Negative for dizziness and facial asymmetry.   Psychiatric/Behavioral:  Negative for agitation and behavioral problems.    Objective:     Vital Signs (Most Recent):  Temp: 99.3 °F (37.4 °C) (06/04/23 0732)  Pulse: 96 (06/04/23 0732)  Resp: 18 (06/04/23 0732)  BP: 132/79 (06/04/23 0732)  SpO2: (!) 92 % (06/04/23 0732) Vital Signs (24h Range):  Temp:  [98.4 °F (36.9 °C)-99.3 °F (37.4 °C)] 99.3 °F (37.4 °C)  Pulse:  [74-96] 96  Resp:  [16-20] 18  SpO2:  [92 %-95 %] 92 %  BP: (132-173)/(76-85) 132/79     Weight: 104.3 kg (230 lb)  Body mass index is 33 kg/m².    Intake/Output Summary (Last 24 hours) at 6/4/2023 0905  Last data filed at 6/4/2023 0747  Gross per 24 hour   Intake 2347.31 ml   Output 1000 ml   Net 1347.31 ml         Physical Exam  Vitals and nursing note reviewed.   Constitutional:       Appearance: He is obese. He is not ill-appearing.   Cardiovascular:      Rate and Rhythm: Normal rate.   Pulmonary:      Effort: Pulmonary effort is normal. No respiratory distress.   Abdominal:      General: There is distension.      Tenderness: There is no abdominal tenderness. There is no guarding.   Neurological:      Mental Status: He is alert.           Significant Labs: All pertinent labs within the past 24 hours have been reviewed.  BMP:   Recent Labs   Lab 06/03/23  0547   *      K 4.3      CO2 26   BUN 17   CREATININE 1.0   CALCIUM 10.1     CBC: No results for input(s): WBC, HGB, HCT, PLT in the last 48 hours.    Significant Imaging:

## 2023-06-04 NOTE — PLAN OF CARE
Patient alert and oriented x4. Respirations even and unlabored. No distress noted. Ng tube to right nostril connected to liws. Greenish drainage noted. Skin warm and dry. Iv fluids infusing with no complications noted. Patient complains of nausea. Prn medication given as needed. Patient complains of mild pain. Pain medication given as needed. Patient has no complaints at this time. Instructed to call for any needs. Bed in lowest position. Call bell within reach.       Problem: Adult Inpatient Plan of Care  Goal: Plan of Care Review  Outcome: Ongoing, Progressing  Goal: Patient-Specific Goal (Individualized)  Outcome: Ongoing, Progressing  Goal: Absence of Hospital-Acquired Illness or Injury  Outcome: Ongoing, Progressing     Problem: Fall Injury Risk  Goal: Absence of Fall and Fall-Related Injury  Outcome: Ongoing, Progressing  Intervention: Identify and Manage Contributors  Flowsheets (Taken 6/4/2023 9211)  Self-Care Promotion:   independence encouraged   BADL personal objects within reach  Medication Review/Management:   medications reviewed   high-risk medications identified

## 2023-06-04 NOTE — NURSING
[facility]  Nurses Note -- 4 Eyes      6/3/2023       Skin assessed on: Q Shift      [x] No Pressure Injuries Present    []Prevention Measures Documented    [] Yes LDA  for Pressure Injury Previously documented     [] Yes New Pressure Injury Discovered   [] LDA for New Pressure Injury Added      Attending RN:  Juan Alberto Singh RN     Second RN:  Claudia Osman Lpn

## 2023-06-05 VITALS
OXYGEN SATURATION: 97 % | HEIGHT: 70 IN | SYSTOLIC BLOOD PRESSURE: 136 MMHG | HEART RATE: 56 BPM | BODY MASS INDEX: 32.93 KG/M2 | RESPIRATION RATE: 18 BRPM | TEMPERATURE: 98 F | DIASTOLIC BLOOD PRESSURE: 83 MMHG | WEIGHT: 230 LBS

## 2023-06-05 LAB
ANION GAP SERPL CALC-SCNC: 7 MMOL/L (ref 8–16)
BUN SERPL-MCNC: 15 MG/DL (ref 8–23)
CALCIUM SERPL-MCNC: 9.1 MG/DL (ref 8.7–10.5)
CHLORIDE SERPL-SCNC: 111 MMOL/L (ref 95–110)
CO2 SERPL-SCNC: 29 MMOL/L (ref 23–29)
CREAT SERPL-MCNC: 0.8 MG/DL (ref 0.5–1.4)
EST. GFR  (NO RACE VARIABLE): >60 ML/MIN/1.73 M^2
GLUCOSE SERPL-MCNC: 120 MG/DL (ref 70–110)
POTASSIUM SERPL-SCNC: 3.8 MMOL/L (ref 3.5–5.1)
SODIUM SERPL-SCNC: 147 MMOL/L (ref 136–145)

## 2023-06-05 PROCEDURE — 63600175 PHARM REV CODE 636 W HCPCS: Performed by: INTERNAL MEDICINE

## 2023-06-05 PROCEDURE — 99233 SBSQ HOSP IP/OBS HIGH 50: CPT | Mod: GC,,, | Performed by: SURGERY

## 2023-06-05 PROCEDURE — 36415 COLL VENOUS BLD VENIPUNCTURE: CPT | Performed by: INTERNAL MEDICINE

## 2023-06-05 PROCEDURE — 80048 BASIC METABOLIC PNL TOTAL CA: CPT | Performed by: INTERNAL MEDICINE

## 2023-06-05 PROCEDURE — C9113 INJ PANTOPRAZOLE SODIUM, VIA: HCPCS | Performed by: INTERNAL MEDICINE

## 2023-06-05 PROCEDURE — 99233 PR SUBSEQUENT HOSPITAL CARE,LEVL III: ICD-10-PCS | Mod: GC,,, | Performed by: SURGERY

## 2023-06-05 RX ADMIN — DEXTROSE AND SODIUM CHLORIDE: 5; 900 INJECTION, SOLUTION INTRAVENOUS at 05:06

## 2023-06-05 RX ADMIN — PANTOPRAZOLE SODIUM 40 MG: 40 INJECTION, POWDER, FOR SOLUTION INTRAVENOUS at 08:06

## 2023-06-05 NOTE — ASSESSMENT & PLAN NOTE
Patient is a 67y M who is currently admitted with a SBO. Now with return of bowel function. OK to advance diet as tolerated.   If tolerates a diet he is OK for discharge from a surgery standpoint.

## 2023-06-05 NOTE — SUBJECTIVE & OBJECTIVE
Interval History: No new issues     Review of Systems   Constitutional:  Positive for appetite change. Negative for activity change.   HENT:  Negative for congestion.    Respiratory:  Negative for chest tightness.    Cardiovascular:  Negative for chest pain.   Genitourinary:  Negative for difficulty urinating.   Musculoskeletal:  Negative for arthralgias.   Neurological:  Negative for dizziness and facial asymmetry.   Psychiatric/Behavioral:  Negative for agitation and behavioral problems.    Objective:     Vital Signs (Most Recent):  Temp: 97.7 °F (36.5 °C) (06/05/23 0355)  Pulse: (!) 55 (06/05/23 0355)  Resp: 16 (06/05/23 0355)  BP: (!) 155/82 (06/05/23 0355)  SpO2: (!) 94 % (06/05/23 0355) Vital Signs (24h Range):  Temp:  [97.7 °F (36.5 °C)-99.5 °F (37.5 °C)] 97.7 °F (36.5 °C)  Pulse:  [55-96] 55  Resp:  [16-18] 16  SpO2:  [92 %-95 %] 94 %  BP: (130-173)/(72-90) 155/82     Weight: 104.3 kg (230 lb)  Body mass index is 33 kg/m².    Intake/Output Summary (Last 24 hours) at 6/5/2023 0612  Last data filed at 6/4/2023 1838  Gross per 24 hour   Intake 1464.12 ml   Output 950 ml   Net 514.12 ml         Physical Exam  Vitals and nursing note reviewed.   Constitutional:       Appearance: He is obese. He is not ill-appearing.   Cardiovascular:      Rate and Rhythm: Normal rate.   Pulmonary:      Effort: Pulmonary effort is normal. No respiratory distress.   Abdominal:      General: There is distension.      Tenderness: There is no abdominal tenderness. There is no guarding.   Neurological:      Mental Status: He is alert.           Significant Labs: All pertinent labs within the past 24 hours have been reviewed.  BMP:   Recent Labs   Lab 06/05/23  0432   *   *   K 3.8   *   CO2 29   BUN 15   CREATININE 0.8   CALCIUM 9.1     CBC: No results for input(s): WBC, HGB, HCT, PLT in the last 48 hours.    Significant Imaging:

## 2023-06-05 NOTE — PLAN OF CARE
TN sent med surg nurse Claudia a secure chat that this patient is cleared for discharge from case management's viewpoint. Pcp appt made.   06/05/23 1102   Final Note   Assessment Type Final Discharge Note   Anticipated Discharge Disposition Home   What phone number can be called within the next 1-3 days to see how you are doing after discharge?   (see chart)   Hospital Resources/Appts/Education Provided Provided patient/caregiver with written discharge plan information;Appointments scheduled and added to AVS;Appointments scheduled by Navigator/Coordinator   Post-Acute Status   Coverage BCBS [employer] MEDICARE - BCBS OUT OF STATE MEDICARE ADVANTAGE   Discharge Delays None known at this time

## 2023-06-05 NOTE — NURSING
Pt discharged per MD order. IV removed. Catheter tip intact. No distress noted. Discharge instructions reviewed with pt and family. Given the opportunity for questions. All questions answered. AVS given to pt and placed in blue folder. Patient verbalized understanding of all instructions. Vitals per chart. afebrile. No complaints of pain, N/V, diarrhea, or SOB. Pt refused wheelchair transport. Ambulated off unit with steady gait noted. Tolerated lunch without nausea and vomiting

## 2023-06-05 NOTE — PROGRESS NOTES
Reviewed discharge instructions with patient. Instructions includes follow up appointments,EMS and when to seeks additional medical help. No acute distress noted

## 2023-06-05 NOTE — ASSESSMENT & PLAN NOTE
Found by CT. Discussed with surgery. Patient with benign abdomen. NG suction. Cause not clear. Hernia repair 10 years ago. NPO/fluids     No sig. BM. Surgery consulted. Continue NG suction  Patient wishes to defer surgery at this time.

## 2023-06-05 NOTE — SUBJECTIVE & OBJECTIVE
Interval History:   FLORES PATTONS  Patient reporting 3 large bowel movements overnight, feeling much better. No abdominal pain.     Medications:  Continuous Infusions:   dextrose 5 % and 0.9 % NaCl 100 mL/hr at 06/05/23 0519     Scheduled Meds:   enoxaparin  40 mg Subcutaneous Daily    pantoprazole  40 mg Intravenous Daily     PRN Meds:hydrALAZINE, HYDROmorphone, ondansetron, prochlorperazine     Review of patient's allergies indicates:  No Known Allergies  Objective:     Vital Signs (Most Recent):  Temp: 97.7 °F (36.5 °C) (06/05/23 0355)  Pulse: (!) 55 (06/05/23 0355)  Resp: 16 (06/05/23 0355)  BP: (!) 155/82 (06/05/23 0355)  SpO2: (!) 94 % (06/05/23 0355) Vital Signs (24h Range):  Temp:  [97.7 °F (36.5 °C)-99.5 °F (37.5 °C)] 97.7 °F (36.5 °C)  Pulse:  [55-96] 55  Resp:  [16-18] 16  SpO2:  [93 %-95 %] 94 %  BP: (130-159)/(72-90) 155/82     Weight: 104.3 kg (230 lb)  Body mass index is 33 kg/m².    Intake/Output - Last 3 Shifts         06/03 0700  06/04 0659 06/04 0700  06/05 0659 06/05 0700  06/06 0659    P.O. 1 0     I.V. (mL/kg) 1945.6 (18.7) 2532.5 (24.3)     IV Piggyback       Total Intake(mL/kg) 1946.6 (18.7) 2532.5 (24.3)     Urine (mL/kg/hr) 750 (0.3) 800 (0.3)     Drains 100 150     Other       Total Output 850 950     Net +1096.6 +1582.5                     Physical Exam  Constitutional:       General: He is not in acute distress.     Appearance: Normal appearance.   HENT:      Head: Normocephalic and atraumatic.      Mouth/Throat:      Mouth: Mucous membranes are moist.   Eyes:      Pupils: Pupils are equal, round, and reactive to light.   Cardiovascular:      Rate and Rhythm: Normal rate.   Pulmonary:      Effort: Pulmonary effort is normal. No respiratory distress.   Abdominal:      General: Abdomen is flat. There is no distension.      Palpations: Abdomen is soft.   Musculoskeletal:         General: Normal range of motion.      Cervical back: Normal range of motion.   Skin:     General: Skin is warm and  dry.   Neurological:      General: No focal deficit present.      Mental Status: He is alert and oriented to person, place, and time.   Psychiatric:         Mood and Affect: Mood normal.         Behavior: Behavior normal.        Significant Labs:  I have reviewed all pertinent lab results within the past 24 hours.    Significant Diagnostics:  I have reviewed all pertinent imaging results/findings within the past 24 hours.

## 2023-06-05 NOTE — PROGRESS NOTES
[facility] Medicine  Progress Note    Patient Name: Jules Aviles  MRN: [id number]  Patient Class: IP- Inpatient   Admission Date: 6/2/2023  Length of Stay: 3 days  Attending Physician: Raimundo Morales MD  Primary Care Provider: Bharat Mathis MD        Subjective:     Principal Problem:SBO (small bowel obstruction)        HPI:  Mr. Aviles is a 68 yo M who presents with a CC of N/V/abdominal pain for one day. Patient presented to [place] ED and was found by imaging to have a SBO. NG tube placed to suction. Patient denies previous history of this. Hernia repair 10 years ago. Feels better with NG tube. Abdominal exam benign. + gas. No BM today       Overview/Hospital Course:  Patient admitted for SBO. NG tube placed. Not much improvement in 3 rand. Surgery consulted.        Interval History: No new issues     Review of Systems   Constitutional:  Positive for appetite change. Negative for activity change.   HENT:  Negative for congestion.    Respiratory:  Negative for chest tightness.    Cardiovascular:  Negative for chest pain.   Genitourinary:  Negative for difficulty urinating.   Musculoskeletal:  Negative for arthralgias.   Neurological:  Negative for dizziness and facial asymmetry.   Psychiatric/Behavioral:  Negative for agitation and behavioral problems.    Objective:     Vital Signs (Most Recent):  Temp: 97.7 °F (36.5 °C) (06/05/23 0355)  Pulse: (!) 55 (06/05/23 0355)  Resp: 16 (06/05/23 0355)  BP: (!) 155/82 (06/05/23 0355)  SpO2: (!) 94 % (06/05/23 0355) Vital Signs (24h Range):  Temp:  [97.7 °F (36.5 °C)-99.5 °F (37.5 °C)] 97.7 °F (36.5 °C)  Pulse:  [55-96] 55  Resp:  [16-18] 16  SpO2:  [92 %-95 %] 94 %  BP: (130-173)/(72-90) 155/82     Weight: 104.3 kg (230 lb)  Body mass index is 33 kg/m².    Intake/Output Summary (Last 24 hours) at 6/5/2023 0612  Last data filed at 6/4/2023 1838  Gross per 24 hour   Intake 1464.12 ml   Output 950 ml   Net 514.12 ml         Physical Exam  Vitals and  nursing note reviewed.   Constitutional:       Appearance: He is obese. He is not ill-appearing.   Cardiovascular:      Rate and Rhythm: Normal rate.   Pulmonary:      Effort: Pulmonary effort is normal. No respiratory distress.   Abdominal:      General: There is distension.      Tenderness: There is no abdominal tenderness. There is no guarding.   Neurological:      Mental Status: He is alert.           Significant Labs: All pertinent labs within the past 24 hours have been reviewed.  BMP:   Recent Labs   Lab 06/05/23  0432   *   *   K 3.8   *   CO2 29   BUN 15   CREATININE 0.8   CALCIUM 9.1     CBC: No results for input(s): WBC, HGB, HCT, PLT in the last 48 hours.    Significant Imaging:       Assessment/Plan:      * SBO (small bowel obstruction)  Found by CT. Discussed with surgery. Patient with benign abdomen. NG suction. Cause not clear. Hernia repair 10 years ago. NPO/fluids     No sig. BM. Surgery consulted. Continue NG suction  Patient wishes to defer surgery at this time.    Hypercalcemia  Likely from dehydration. IV fluids. Will check in the am.     resolved      Obesity  Body mass index is 33 kg/m². Morbid obesity complicates all aspects of disease management from diagnostic modalities to treatment. Weight loss encouraged and health benefits explained to patient.         GERD (gastroesophageal reflux disease)  PPI IV       Atrial flutter  History of this 10 years ago. Not on blood thinners      Essential hypertension  Prn hydralazine IV           VTE Risk Mitigation (From admission, onward)         Ordered     enoxaparin injection 40 mg  Daily         06/02/23 1524                Discharge Planning   KAYLEIGH:      Code Status: Not on file   Is the patient medically ready for discharge?:     Reason for patient still in hospital (select all that apply): Patient unstable  Discharge Plan A: Home                  Raimundo Holbrook MD  Department of Hospital Medicine   [facility] S[facility]

## 2023-06-05 NOTE — PROGRESS NOTES
[facility] S[facility]  General Surgery  Progress Note    Subjective:     History of Present Illness:  No notes on file    Post-Op Info:  * No surgery found *         Interval History:   NAEON  AFVSS  Patient reporting 3 large bowel movements overnight, feeling much better. No abdominal pain.     Medications:  Continuous Infusions:   dextrose 5 % and 0.9 % NaCl 100 mL/hr at 06/05/23 0519     Scheduled Meds:   enoxaparin  40 mg Subcutaneous Daily    pantoprazole  40 mg Intravenous Daily     PRN Meds:hydrALAZINE, HYDROmorphone, ondansetron, prochlorperazine     Review of patient's allergies indicates:  No Known Allergies  Objective:     Vital Signs (Most Recent):  Temp: 97.7 °F (36.5 °C) (06/05/23 0355)  Pulse: (!) 55 (06/05/23 0355)  Resp: 16 (06/05/23 0355)  BP: (!) 155/82 (06/05/23 0355)  SpO2: (!) 94 % (06/05/23 0355) Vital Signs (24h Range):  Temp:  [97.7 °F (36.5 °C)-99.5 °F (37.5 °C)] 97.7 °F (36.5 °C)  Pulse:  [55-96] 55  Resp:  [16-18] 16  SpO2:  [93 %-95 %] 94 %  BP: (130-159)/(72-90) 155/82     Weight: 104.3 kg (230 lb)  Body mass index is 33 kg/m².    Intake/Output - Last 3 Shifts         06/03 0700  06/04 0659 06/04 0700 06/05 0659 06/05 0700  06/06 0659    P.O. 1 0     I.V. (mL/kg) 1945.6 (18.7) 2532.5 (24.3)     IV Piggyback       Total Intake(mL/kg) 1946.6 (18.7) 2532.5 (24.3)     Urine (mL/kg/hr) 750 (0.3) 800 (0.3)     Drains 100 150     Other       Total Output 850 950     Net +1096.6 +1582.5                     Physical Exam  Constitutional:       General: He is not in acute distress.     Appearance: Normal appearance.   HENT:      Head: Normocephalic and atraumatic.      Mouth/Throat:      Mouth: Mucous membranes are moist.   Eyes:      Pupils: Pupils are equal, round, and reactive to light.   Cardiovascular:      Rate and Rhythm: Normal rate.   Pulmonary:      Effort: Pulmonary effort is normal. No respiratory distress.   Abdominal:      General: Abdomen is flat. There is no distension.       Palpations: Abdomen is soft.   Musculoskeletal:         General: Normal range of motion.      Cervical back: Normal range of motion.   Skin:     General: Skin is warm and dry.   Neurological:      General: No focal deficit present.      Mental Status: He is alert and oriented to person, place, and time.   Psychiatric:         Mood and Affect: Mood normal.         Behavior: Behavior normal.        Significant Labs:  I have reviewed all pertinent lab results within the past 24 hours.    Significant Diagnostics:  I have reviewed all pertinent imaging results/findings within the past 24 hours.    Assessment/Plan:     * SBO (small bowel obstruction)  Patient is a 67y M who is currently admitted with a SBO. Now with return of bowel function. OK to advance diet as tolerated.   If tolerates a diet he is OK for discharge from a surgery standpoint.           Sabrina Martinez MD  General Surgery  [facility]

## 2023-06-05 NOTE — DISCHARGE SUMMARY
[facility] Medicine  Discharge Summary      Patient Name: Jules Aviles  MRN: [id number]  DONELL: [id number]  Patient Class: IP- Inpatient  Admission Date: 6/2/2023  Hospital Length of Stay: 3 days  Discharge Date and Time:  06/05/2023 10:02 AM  Attending Physician: Alex Morales MD   Discharging Provider: Alex Morales MD  Primary Care Provider: Bharat Mathis MD    Primary Care Team: Networked reference to record PCT     HPI:   Mr. Aviles is a 68 yo M who presents with a CC of N/V/abdominal pain for one day. Patient presented to [place] ED and was found by imaging to have a SBO. NG tube placed to suction. Patient denies previous history of this. Hernia repair 10 years ago. Feels better with NG tube. Abdominal exam benign. + gas. No BM today       * No surgery found *      Hospital Course:   Patient admitted for SBO. NG tube placed. Patient has had multiple BM's. Has tolerated his diet. On 6/5/23 he was requesting to go home. Activity as tolerated. Diet- soft for a few days. Follow up with PCP in one week          Goals of Care Treatment Preferences:         Consults:   Consults (From admission, onward)        Status Ordering Provider     Inpatient consult to General Surgery  Once        Provider:  Jn Yusuf MD    Completed ALEX MORALES new Assessment & Plan notes have been filed under this hospital service since the last note was generated.  Service: Hospital Medicine    Final Active Diagnoses:    Diagnosis Date Noted POA    PRINCIPAL PROBLEM:  SBO (small bowel obstruction) [K56.609] 06/02/2023 Yes    Obesity [E66.9] 06/02/2023 Yes    Hypercalcemia [E83.52] 06/02/2023 Yes    GERD (gastroesophageal reflux disease) [K21.9]  Yes    Atrial flutter [I48.92] 01/11/2019 Yes    Essential hypertension [I10] 12/07/2016 Yes      Problems Resolved During this Admission:       Discharged Condition: good    Disposition: Home-Health Care c    Follow Up:    Follow-up Information     Bharat Mathis MD Follow up in 1 week(s).    Specialty: Cardiology  Contact information:  [address] [facility]  [address]  [place] [address]  [phone]                       Patient Instructions:   No discharge procedures on file.    Significant Diagnostic Studies: N/A    Pending Diagnostic Studies:     None         Medications:  Reconciled Home Medications:      Medication List      CONTINUE taking these medications    ALIGN ORAL  Take by mouth once daily.     ascorbic acid (vitamin C) 500 MG tablet  Commonly known as: VITAMIN C  Take 500 mg by mouth once daily.     CHOLESTYRAMINE LIGHT 4 gram Powd  Generic drug: cholestyramine-aspartame  once daily.     cyanocobalamin 100 MCG tablet  Commonly known as: VITAMIN B-12  Take 100 mcg by mouth once daily.     hydroCHLOROthiazide 12.5 mg capsule  Commonly known as: MICROZIDE  TAKE 1 CAPSULE BY MOUTH EVERY DAY     losartan 50 MG tablet  Commonly known as: COZAAR  TAKE 1 TABLET BY MOUTH EVERY DAY     pantoprazole 40 MG tablet  Commonly known as: PROTONIX  Take 40 mg by mouth.     tamsulosin 0.4 mg Cap  Commonly known as: FLOMAX  Take 1 capsule by mouth every evening.     vitamin D 1000 units Tab  Commonly known as: VITAMIN D3  Take 8,000 Units by mouth once daily.            Indwelling Lines/Drains at time of discharge:   Lines/Drains/Airways     None                 Time spent on the discharge of patient: > 35 minutes         Raimundo Holbrook MD  Department of Hospital Medicine  [facility] Surg

## 2023-06-13 NOTE — PHYSICIAN QUERY
PT Name: Jules Aviles  MR #: [id number]     DOCUMENTATION CLARIFICATION   CDS: Ene ALONZO,RN        Contact information:[email or web address]   This form is a permanent document in the medical record.     Query Date: June 13, 2023    By submitting this query, we are merely seeking further clarification of documentation to reflect the severity of illness of your patient. Please utilize your independent clinical judgment when addressing the question(s) below.    The medical record reflects the following:     Indicators   Supporting Clinical Findings Location in Medical Record   x Bowel obstruction, intestinal obstruction, LBO or SBO documented SBO (small bowel obstruction)  Found by CT. Discussed with surgery. Patient with benign abdomen. NG suction. Cause not clear. Hernia repair 10 years ago. NPO/fluids     SBO (small bowel obstruction)  Found by CT. Discussed with surgery. Patient with benign abdomen. NG suction. Cause not clear. Hernia repair 10 years ago. NPO/fluids   No sig. BM. Surgery consulted. Continue NG suction      SBO (small bowel obstruction)  Patient is a 67y M who is currently admitted with a SBO. Now with return of bowel function. OK to advance diet as tolerated.   If tolerates a diet he is OK for discharge from a surgery standpoint.          6/2  HM H&P             6/4  HM PN               6/5 General Surgery  PN   x Radiology findings Impression:   Findings worrisome for small-bowel obstruction with transition point in the right lower quadrant.  Given evidence of prior small bowel surgery, adhesions are considered a possible etiology.   Non-obstructing right renal stone.   Diverticulosis.   This report was flagged in Epic as abnormal.          6/4  HM PN   x Treatment/Medication NG tube placed.  6/4  HM PN    Procedure/Surgery     x Other Past Surgical History:   Procedure  GALLBLADDER SURGERY   2004  HERNIA REPAIR   2004 & 2005    6/2  HM H&P     Provider, please further specify  the  type and etiology of small bowel obstruction diagnosis:  [   ] Partial or incomplete intestinal obstruction, due to adhesions   [  x ] Partial or incomplete intestinal obstruction, due to other (please specify): __Not sure__________   [   ] Partial or incomplete intestinal obstruction, unknown or unspecified etiology   [   ] Complete intestinal obstruction, due to adhesions   [   ] Complete intestinal obstruction, due to other (please specify): ____________   [   ] Complete intestinal obstruction, unknown or unspecified etiology   [   ] Other intestinal condition (please specify): _____________________       Please document in your progress notes daily for the duration of treatment until resolved, and include in your discharge summary.

## 2023-06-28 ENCOUNTER — HOSPITAL ENCOUNTER (INPATIENT)
Facility: HOSPITAL | Age: 68
LOS: 1 days | Discharge: HOME | DRG: 392 | End: 2023-06-28
Attending: EMERGENCY MEDICINE | Admitting: SURGERY
Payer: MEDICARE

## 2023-06-28 ENCOUNTER — PATIENT OUTREACH (OUTPATIENT)
Dept: ADMINISTRATIVE | Facility: OTHER | Age: 68
End: 2023-06-28
Payer: MEDICARE

## 2023-06-28 VITALS
RESPIRATION RATE: 17 BRPM | BODY MASS INDEX: 30.42 KG/M2 | HEART RATE: 66 BPM | OXYGEN SATURATION: 97 % | DIASTOLIC BLOOD PRESSURE: 84 MMHG | WEIGHT: 212 LBS | TEMPERATURE: 98 F | SYSTOLIC BLOOD PRESSURE: 136 MMHG

## 2023-06-28 DIAGNOSIS — K56.609 SMALL BOWEL OBSTRUCTION: ICD-10-CM

## 2023-06-28 DIAGNOSIS — R10.10 PAIN OF UPPER ABDOMEN: Primary | ICD-10-CM

## 2023-06-28 PROBLEM — R10.9 ABDOMINAL PAIN: Status: ACTIVE | Noted: 2023-06-28

## 2023-06-28 LAB
ALBUMIN SERPL BCP-MCNC: 4.4 G/DL (ref 3.5–5.2)
ALP SERPL-CCNC: 66 U/L (ref 55–135)
ALT SERPL W/O P-5'-P-CCNC: 32 U/L (ref 10–44)
ANION GAP SERPL CALC-SCNC: 14 MMOL/L (ref 8–16)
AST SERPL-CCNC: 25 U/L (ref 10–40)
BASOPHILS # BLD AUTO: 0.04 K/UL (ref 0–0.2)
BASOPHILS NFR BLD: 0.4 % (ref 0–1.9)
BILIRUB SERPL-MCNC: 1.4 MG/DL (ref 0.1–1)
BUN SERPL-MCNC: 14 MG/DL (ref 8–23)
CALCIUM SERPL-MCNC: 10.2 MG/DL (ref 8.7–10.5)
CHLORIDE SERPL-SCNC: 102 MMOL/L (ref 95–110)
CO2 SERPL-SCNC: 23 MMOL/L (ref 23–29)
CREAT SERPL-MCNC: 1 MG/DL (ref 0.5–1.4)
DIFFERENTIAL METHOD: ABNORMAL
EOSINOPHIL # BLD AUTO: 0.1 K/UL (ref 0–0.5)
EOSINOPHIL NFR BLD: 1 % (ref 0–8)
ERYTHROCYTE [DISTWIDTH] IN BLOOD BY AUTOMATED COUNT: 12.6 % (ref 11.5–14.5)
EST. GFR  (NO RACE VARIABLE): >60 ML/MIN/1.73 M^2
GLUCOSE SERPL-MCNC: 123 MG/DL (ref 70–110)
HCT VFR BLD AUTO: 47.7 % (ref 40–54)
HGB BLD-MCNC: 16.4 G/DL (ref 14–18)
IMM GRANULOCYTES # BLD AUTO: 0.02 K/UL (ref 0–0.04)
IMM GRANULOCYTES NFR BLD AUTO: 0.2 % (ref 0–0.5)
LIPASE SERPL-CCNC: 23 U/L (ref 4–60)
LYMPHOCYTES # BLD AUTO: 2 K/UL (ref 1–4.8)
LYMPHOCYTES NFR BLD: 19.3 % (ref 18–48)
MCH RBC QN AUTO: 30.4 PG (ref 27–31)
MCHC RBC AUTO-ENTMCNC: 34.4 G/DL (ref 32–36)
MCV RBC AUTO: 88 FL (ref 82–98)
MONOCYTES # BLD AUTO: 0.6 K/UL (ref 0.3–1)
MONOCYTES NFR BLD: 5.4 % (ref 4–15)
NEUTROPHILS # BLD AUTO: 7.4 K/UL (ref 1.8–7.7)
NEUTROPHILS NFR BLD: 73.7 % (ref 38–73)
NRBC BLD-RTO: 0 /100 WBC
PLATELET # BLD AUTO: 206 K/UL (ref 150–450)
PMV BLD AUTO: 11.5 FL (ref 9.2–12.9)
POTASSIUM SERPL-SCNC: 3.6 MMOL/L (ref 3.5–5.1)
PROT SERPL-MCNC: 7.5 G/DL (ref 6–8.4)
RBC # BLD AUTO: 5.4 M/UL (ref 4.6–6.2)
SODIUM SERPL-SCNC: 139 MMOL/L (ref 136–145)
WBC # BLD AUTO: 10.1 K/UL (ref 3.9–12.7)

## 2023-06-28 PROCEDURE — 80053 COMPREHEN METABOLIC PANEL: CPT | Performed by: EMERGENCY MEDICINE

## 2023-06-28 PROCEDURE — 83690 ASSAY OF LIPASE: CPT | Performed by: EMERGENCY MEDICINE

## 2023-06-28 PROCEDURE — 99223 PR INITIAL HOSPITAL CARE,LEVL III: ICD-10-PCS | Mod: GC,,, | Performed by: SURGERY

## 2023-06-28 PROCEDURE — 25500020 PHARM REV CODE 255: Performed by: SURGERY

## 2023-06-28 PROCEDURE — 99223 1ST HOSP IP/OBS HIGH 75: CPT | Mod: GC,,, | Performed by: SURGERY

## 2023-06-28 PROCEDURE — 85025 COMPLETE CBC W/AUTO DIFF WBC: CPT | Performed by: EMERGENCY MEDICINE

## 2023-06-28 PROCEDURE — 12000002 HC ACUTE/MED SURGE SEMI-PRIVATE ROOM

## 2023-06-28 PROCEDURE — 63600175 PHARM REV CODE 636 W HCPCS: Performed by: EMERGENCY MEDICINE

## 2023-06-28 PROCEDURE — 99285 EMERGENCY DEPT VISIT HI MDM: CPT | Mod: 25

## 2023-06-28 RX ORDER — SERTRALINE HYDROCHLORIDE 25 MG/1
25 TABLET, FILM COATED ORAL DAILY
COMMUNITY
Start: 2023-06-09 | End: 2023-08-04

## 2023-06-28 RX ORDER — DEXTROSE MONOHYDRATE AND SODIUM CHLORIDE 5; .9 G/100ML; G/100ML
1000 INJECTION, SOLUTION INTRAVENOUS
Status: COMPLETED | OUTPATIENT
Start: 2023-06-28 | End: 2023-06-28

## 2023-06-28 RX ADMIN — DIATRIZOATE MEGLUMINE AND DIATRIZOATE SODIUM 120 ML: 660; 100 LIQUID ORAL; RECTAL at 03:06

## 2023-06-28 RX ADMIN — DEXTROSE AND SODIUM CHLORIDE 1000 ML: 5; 900 INJECTION, SOLUTION INTRAVENOUS at 02:06

## 2023-06-28 NOTE — PROGRESS NOTES
CHW - Initial Contact    This Community Health Worker completed the Social Determinant of Health questionnaire with patient  at bedside  today.    Pt identified barriers of most importance are: patient has no needs at this time.   Referrals to community agencies completed with patient/caregiver consent outside of [facility] include: none at this time.  Referrals were put through [facility] - no: none at this time.  Support and Services: has no support at this time.  Other information discussed the patient needs / wants help with: Completed SDOH with patient at bedside today, patient has no needs at this time, will follow up in one week for possible case closure.  Follow up required: yes.  Follow-up Outreach - Due: 7/4/2023

## 2023-06-28 NOTE — H&P
"[facility] Emergency Dept  General Surgery  History & Physical    Patient Name: Jules Aviles  MRN: [id number]  Admission Date: 6/28/2023  Attending Physician: Christos Watkins MD   Primary Care Provider: Basil Grover MD    Patient information was obtained from patient, past medical records, and ER records.     Subjective:     Chief Complaint/Reason for Admission: Abdominal pain    History of Present Illness:  Patient is a 68 y.o. male presents a history of HTN, GERD, appendectomy, cholecystectomy, and hernia repair who presents with vague abdominal pain.    Patient states that his abdominal pain began today after eating.  Some nausea associated with it but no emesis.  Last bowel movement was today and was diarrhea and passing gas in the ED.  No fevers or chills.  Workup shows no leukocytosis but a left shift on labs.  Renal and hepatic panel unremarkable.  CT scan obtained showing some mild small bowel dilation.  Has a stapled anastomosis from a presumed small bowel resection which is noted on the CT read in axial series 2 image 92 which is widely patent.  No obvious transition point seen on personal review.  Patient clinically without any evidence of a bowel obstruction.  GGC performed and passed quickly with continued diarrhea.  General surgery consulted for "SBO".    Current Facility-Administered Medications on File Prior to Encounter   Medication    0.9%  NaCl infusion     Current Outpatient Medications on File Prior to Encounter   Medication Sig    CHOLESTYRAMINE LIGHT 4 gram Powd once daily.     cyanocobalamin (VITAMIN B-12) 100 MCG tablet Take 100 mcg by mouth once daily.    hydroCHLOROthiazide (MICROZIDE) 12.5 mg capsule TAKE 1 CAPSULE BY MOUTH EVERY DAY    Lactobacillus acidophilus (PROBIOTIC ORAL) Take 1 capsule by mouth once daily.    losartan (COZAAR) 50 MG tablet TAKE 1 TABLET BY MOUTH EVERY DAY    pantoprazole (PROTONIX) 40 MG tablet Take 40 mg by mouth once daily.    sertraline (ZOLOFT) 25 MG tablet " Take 25 mg by mouth once daily.    tamsulosin (FLOMAX) 0.4 mg Cap Take 1 capsule by mouth every evening.    vitamin D (VITAMIN D3) 1000 units Tab Take 8,000 Units by mouth once daily.    [DISCONTINUED] ascorbic acid, vitamin C, (VITAMIN C) 500 MG tablet Take 500 mg by mouth once daily.    [DISCONTINUED] Bifidobacterium infantis (ALIGN ORAL) Take by mouth once daily.       Review of patient's allergies indicates:  No Known Allergies    Past Medical History:   Diagnosis Date    Essential hypertension 12/07/2016    GERD (gastroesophageal reflux disease)      Past Surgical History:   Procedure Laterality Date    APPENDECTOMY      as a child    GALLBLADDER SURGERY  2004    HERNIA REPAIR  2004 & 2005    TONSILLECTOMY, ADENOIDECTOMY      as a child     Family History       Problem Relation (Age of Onset)    Aneurysm Mother    Heart disease Father          Tobacco Use    Smoking status: Never    Smokeless tobacco: Never   Substance and Sexual Activity    Alcohol use: No    Drug use: No    Sexual activity: Yes     Review of Systems   Constitutional:  Negative for chills and fever.   HENT: Negative.     Respiratory: Negative.     Cardiovascular: Negative.    Gastrointestinal:  Positive for abdominal pain, diarrhea and nausea. Negative for constipation and vomiting.   Genitourinary:  Negative for dysuria and hematuria.   Musculoskeletal: Negative.    Skin: Negative.    Neurological: Negative.    Psychiatric/Behavioral: Negative.     Objective:     Vital Signs (Most Recent):  Temp: 98.1 °F (36.7 °C) (06/28/23 0606)  Pulse: 63 (06/28/23 1418)  Resp: 14 (06/28/23 1418)  BP: 139/81 (06/28/23 1418)  SpO2: 98 % (06/28/23 1418) Vital Signs (24h Range):  Temp:  [98.1 °F (36.7 °C)] 98.1 °F (36.7 °C)  Pulse:  [60-94] 63  Resp:  [14-21] 14  SpO2:  [95 %-99 %] 98 %  BP: (126-139)/(75-93) 139/81     Weight: 96.2 kg (212 lb)  Body mass index is 30.42 kg/m².    Physical Exam  Constitutional:       General: He is not in acute distress.      Appearance: Normal appearance. He is not ill-appearing.   HENT:      Head: Normocephalic and atraumatic.   Eyes:      Extraocular Movements: Extraocular movements intact.   Cardiovascular:      Rate and Rhythm: Normal rate and regular rhythm.   Pulmonary:      Effort: Pulmonary effort is normal. No respiratory distress.   Abdominal:      General: There is no distension.      Palpations: Abdomen is soft.      Tenderness: There is no abdominal tenderness. There is no guarding.   Musculoskeletal:         General: Normal range of motion.   Skin:     General: Skin is warm and dry.   Neurological:      General: No focal deficit present.      Mental Status: He is alert and oriented to person, place, and time.   Psychiatric:         Mood and Affect: Mood normal.         Behavior: Behavior normal.       Significant Labs:  I have reviewed all pertinent lab results within the past 24 hours.  CBC:   Recent Labs   Lab 06/28/23 0618   WBC 10.10   RBC 5.40   HGB 16.4   HCT 47.7      MCV 88   MCH 30.4   MCHC 34.4     CMP:   Recent Labs   Lab 06/28/23 0618   *   CALCIUM 10.2   ALBUMIN 4.4   PROT 7.5      K 3.6   CO2 23      BUN 14   CREATININE 1.0   ALKPHOS 66   ALT 32   AST 25   BILITOT 1.4*       Significant Diagnostics:  I have reviewed all pertinent imaging results/findings within the past 24 hours.  Narrative & Impression  EXAMINATION:  CT ABDOMEN PELVIS WITHOUT CONTRAST     CLINICAL HISTORY:  Bowel obstruction suspected;     TECHNIQUE:  Low dose axial images, sagittal and coronal reformations were obtained from the lung bases to the pubic symphysis.     COMPARISON:  CT of the abdomen pelvis performed 06/02/2023.     FINDINGS:  This examination is limited due to lack of intravenous contrast.     Lower chest: Unremarkable.     Liver: Normal contour.     Gallbladder and bile ducts: Status post cholecystectomy.  No new biliary dilatation seen.     Pancreas: Normal contour.     Spleen: Normal contour.      Adrenals: Normal contour.     Kidneys: Nonspecific bilateral perinephric stranding.  No short-term change in appearance of nonobstructing right-sided renal calculi measuring up to 8 mm within a midpole calyx, relative to 06/02/2023 CT.     Lymph nodes: Unchanged mildly prominent in number mesenteric lymph nodes with mildly increased attenuation in the adjoining mesenteric fat planes at the mesenteric root.     Bowel and mesentery: Small hiatal hernia.  Colonic diverticulosis.     Dilatation of fluid filled small bowel loops measuring up to 5.1 cm noted, as measured in the right anterior hemiabdomen (axial series 2, image 92).  Transition point suggested in this region with proximity to suture material (axial series 2, image 115).  Notably, the degree of small-bowel dilatation appears somewhat reduced when compared to prior CT 06/02/2023.     No evidence of pneumatosis, or pneumoperitoneum.     Abdominal aorta: Nonaneurysmal.  Moderate atherosclerosis.     Inferior vena cava: Unremarkable.     Free fluid or free air: None.     Pelvis: Prostate appears enlarged     Urinary bladder: Unremarkable.     Body wall: Remote operative sequela suggested anterior abdominal wall.  Bilateral fat containing inguinal hernias.     Bones: No acute change.  Degenerative findings again noted involving the spine.     Impression:     1. Findings in keeping with persistent small bowel obstruction, with transition point suggested in the right hemiabdomen, as described.  Overall, suggested slightly decreased degree of small-bowel dilatation compared to prior CT 06/02/2023.  2. Additional details of chronic and incidental findings, as provided in the body of report    Assessment/Plan:     Active Diagnoses:    Diagnosis Date Noted POA    PRINCIPAL PROBLEM:  Abdominal pain [R10.9] 06/28/2023 Yes      Problems Resolved During this Admission:     VTE Risk Mitigation (From admission, onward)      None          Mr. Aviles is a 67yo male who  "presents with vague abdominal pain.  General surgery consulted for "SBO".  Patient has no clinical signs of a small bowel obstruction with active bowel function in the ED.  CT imaging with widely patent side to side stapled anastomosis of small bowel with expected transition to normal caliber.      Plan:  - Ok for regular diet  - Will plan for discharge  - Possibly secondary to gastroenteritis given his symptoms of nausea, abdominal pain, diarrhea and a left shift on labs  - Can follow up with gastroenterology for further workup of his abdominal discomfort however is not significant at time of exam    Scotty Lopez MD  General Surgery  [facility] - Emergency Dept  "

## 2023-06-28 NOTE — DISCHARGE SUMMARY
"[facility] Emergency Dept  General Surgery  Discharge Summary      Patient Name: Jules Aviles  MRN: [id number]  Admission Date: 6/28/2023  Hospital Length of Stay: 0 days  Discharge Date and Time:  06/28/2023   Attending Physician: Christos Watkins MD   Discharging Provider: Scotty Lopez MD  Primary Care Provider: Basil Grover MD     HPI: Patient is a 68 y.o. male presents a history of HTN, GERD, appendectomy, cholecystectomy, and hernia repair who presents with vague abdominal pain.     Patient states that his abdominal pain began today after eating.  Some nausea associated with it but no emesis.  Last bowel movement was today and was diarrhea and passing gas in the ED.  No fevers or chills.  Workup shows no leukocytosis but a left shift on labs.  Renal and hepatic panel unremarkable.  CT scan obtained showing some mild small bowel dilation.  Has a stapled anastomosis from a presumed small bowel resection which is noted on the CT read in axial series 2 image 92 which is widely patent.  No obvious transition point seen on personal review.  Patient clinically without any evidence of a bowel obstruction.  GGC performed and passed quickly with continued diarrhea.  General surgery consulted for "SBO".    * No surgery found *     Hospital Course: Patient does not have clinical signs of a bowel obstruction.  Passed [facility] and having active bowel function before and during ED.  Patient endorses 7 episodes of diarrhea after gastrograffin challenge and imaging with contrast in rectum.  Possible gastroenteritis given symptoms of nausea, abdominal pain, and diarrhea with left shift on labs.  No surgical intervention seen on imaging or exam at this time.  Discussed with patient to follow up with gastroenterology for further workup of abdominal pain if it persists.  Can possibly consider diagnostic laparoscopy with revision of small bowel anastomosis if workup doesn't reveal any other source of pathology.  However this workup " can be performed on an outpatient basis and patient can follow up in surgical clinic as needed if pursuing this route.      Consults:     Significant Diagnostic Studies: Labs: BMP:   Recent Labs   Lab 06/28/23  0618   *      K 3.6      CO2 23   BUN 14   CREATININE 1.0   CALCIUM 10.2    and CBC   Recent Labs   Lab 06/28/23  0618   WBC 10.10   HGB 16.4   HCT 47.7        Radiology: CT scan: Narrative & Impression  EXAMINATION:  CT ABDOMEN PELVIS WITHOUT CONTRAST     CLINICAL HISTORY:  Bowel obstruction suspected;     TECHNIQUE:  Low dose axial images, sagittal and coronal reformations were obtained from the lung bases to the pubic symphysis.     COMPARISON:  CT of the abdomen pelvis performed 06/02/2023.     FINDINGS:  This examination is limited due to lack of intravenous contrast.     Lower chest: Unremarkable.     Liver: Normal contour.     Gallbladder and bile ducts: Status post cholecystectomy.  No new biliary dilatation seen.     Pancreas: Normal contour.     Spleen: Normal contour.     Adrenals: Normal contour.     Kidneys: Nonspecific bilateral perinephric stranding.  No short-term change in appearance of nonobstructing right-sided renal calculi measuring up to 8 mm within a midpole calyx, relative to 06/02/2023 CT.     Lymph nodes: Unchanged mildly prominent in number mesenteric lymph nodes with mildly increased attenuation in the adjoining mesenteric fat planes at the mesenteric root.     Bowel and mesentery: Small hiatal hernia.  Colonic diverticulosis.     Dilatation of fluid filled small bowel loops measuring up to 5.1 cm noted, as measured in the right anterior hemiabdomen (axial series 2, image 92).  Transition point suggested in this region with proximity to suture material (axial series 2, image 115).  Notably, the degree of small-bowel dilatation appears somewhat reduced when compared to prior CT 06/02/2023.     No evidence of pneumatosis, or pneumoperitoneum.     Abdominal  aorta: Nonaneurysmal.  Moderate atherosclerosis.     Inferior vena cava: Unremarkable.     Free fluid or free air: None.     Pelvis: Prostate appears enlarged     Urinary bladder: Unremarkable.     Body wall: Remote operative sequela suggested anterior abdominal wall.  Bilateral fat containing inguinal hernias.     Bones: No acute change.  Degenerative findings again noted involving the spine.     Impression:     1. Findings in keeping with persistent small bowel obstruction, with transition point suggested in the right hemiabdomen, as described.  Overall, suggested slightly decreased degree of small-bowel dilatation compared to prior CT 06/02/2023.  2. Additional details of chronic and incidental findings, as provided in the body of report    Pending Diagnostic Studies:       Procedure Component Value Units Date/Time    XR Gastrograffin Challenge [[id number]] Resulted: 06/28/23 0915    Order Status: Sent Lab Status: In process Updated: 06/28/23 0915          Final Active Diagnoses:    Diagnosis Date Noted POA    PRINCIPAL PROBLEM:  Abdominal pain [R10.9] 06/28/2023 Yes      Problems Resolved During this Admission:      Discharged Condition: good    Disposition: Home or Self Care    Follow Up:   Follow-up Information       Christos Watkins MD Follow up.    Specialties: General Surgery, Oncology  Why: As needed  Contact information:  [address] [place] [address]  [phone]               [facility] GASTROENTEROLOGY Follow up.    Specialty: Gastroenterology  Why: As needed  Contact information:  [facility] [address]  [phone]                         Patient Instructions:      Diet Adult Regular     No driving until:   Order Comments: No driving while using narcotics.     Notify your health care provider if you experience any of the following:  temperature >100.4     Notify your health care provider if you experience any of the following:  persistent nausea and vomiting or  diarrhea     Notify your health care provider if you experience any of the following:  increased confusion or weakness     Notify your health care provider if you experience any of the following:  severe uncontrolled pain     No dressing needed     Activity as tolerated     Shower on day dressing removed (No bath)     Medications:  Reconciled Home Medications:      Medication List        CONTINUE taking these medications      CHOLESTYRAMINE LIGHT 4 gram Powd  Generic drug: cholestyramine-aspartame  once daily.     cyanocobalamin 100 MCG tablet  Commonly known as: VITAMIN B-12  Take 100 mcg by mouth once daily.     hydroCHLOROthiazide 12.5 mg capsule  Commonly known as: MICROZIDE  TAKE 1 CAPSULE BY MOUTH EVERY DAY     losartan 50 MG tablet  Commonly known as: COZAAR  TAKE 1 TABLET BY MOUTH EVERY DAY     pantoprazole 40 MG tablet  Commonly known as: PROTONIX  Take 40 mg by mouth once daily.     PROBIOTIC ORAL  Take 1 capsule by mouth once daily.     sertraline 25 MG tablet  Commonly known as: ZOLOFT  Take 25 mg by mouth once daily.     tamsulosin 0.4 mg Cap  Commonly known as: FLOMAX  Take 1 capsule by mouth every evening.     vitamin D 1000 units Tab  Commonly known as: VITAMIN D3  Take 8,000 Units by mouth once daily.              Scotty Lopez MD  General Surgery  [facility] - Emergency Dept

## 2023-06-28 NOTE — ED PROVIDER NOTES
Encounter Date: 6/28/2023    SCRIBE #1 NOTE: I, La Terry, am scribing for, and in the presence of,  Anibal Muhammad MD. Other sections scribed: HPI, ROS.     History     Chief Complaint   Patient presents with    Abdominal Pain     Patient reports recent bowel obstruction 1 mo ago and yesterday he began having similar symptoms of fullness and indigestion. Reports some diarrhea this morning and was passing a small amount of gas. Denies emesis.     CC: Abdominal pain    HPI: History is provided by independent historian. This is a 68 y.o. M who has HTN, and GERD who presents to the ED for emergent evaluation of acute and intermittent epigastric abdominal pain that began yesterday. Pt describes the abdominal pain as a pressure-like sensation. Pt has associated diarrhea. He reports 1 episode of diarrhea this morning. He endorses flatulence. The pt reports a Hx of similar problem secondary to a small bowel obstruction, which resolved after hospitalization for 4 days. Pt has a surgical history of gallbladder surgery, and hernia repair. Pt denies fever, chills, ear pain, sore throat, eye problem, cough, SOB, CP, nausea, vomiting, dysuria, arm or leg problem, back pain, rash, or headache.    The history is provided by the patient. No  was used.   Review of patient's allergies indicates:  No Known Allergies  Past Medical History:   Diagnosis Date    Essential hypertension 12/07/2016    GERD (gastroesophageal reflux disease)      Past Surgical History:   Procedure Laterality Date    APPENDECTOMY      as a child    GALLBLADDER SURGERY  2004    HERNIA REPAIR  2004 & 2005    TONSILLECTOMY, ADENOIDECTOMY      as a child     Family History   Problem Relation Age of Onset    Aneurysm Mother     Heart disease Father      Social History     Tobacco Use    Smoking status: Never    Smokeless tobacco: Never   Substance Use Topics    Alcohol use: No    Drug use: No     Review of Systems   Constitutional:  Negative  for chills and fever.   HENT:  Negative for ear pain and sore throat.    Eyes:  Negative for visual disturbance.   Respiratory:  Negative for cough and shortness of breath.    Cardiovascular:  Negative for chest pain.   Gastrointestinal:  Positive for abdominal pain and diarrhea. Negative for nausea and vomiting.   Genitourinary:  Negative for dysuria.   Musculoskeletal:  Negative for back pain.   Skin:  Negative for rash.   Neurological:  Negative for weakness and headaches.   Hematological:  Does not bruise/bleed easily.     Physical Exam     Initial Vitals [06/28/23 0606]   BP Pulse Resp Temp SpO2   137/84 94 16 98.1 °F (36.7 °C) 99 %      MAP       --         Physical Exam  The patient was examined specifically for the following:   General:No significant distress, Good color, Warm and dry. Head and neck:Scalp atraumatic, Neck supple. Neurological:Appropriate conversation, Gross motor deficits. Eyes:Conjugate gaze, Clear corneas. ENT: No epistaxis. Cardiac: Regular rate and rhythm, Grossly normal heart tones. Pulmonary: Wheezing, Rales. Gastrointestinal: Abdominal tenderness, Abdominal distention. Musculoskeletal: Extremity deformity, Apparent pain with range of motion of the joints. Skin: Rash.   The findings on examination were normal.  Lungs are clear.  The heart tones are normal.  The abdomen is nontender.  Extremities are nontender there is no pain with range of motion any joints.  The patient has no rash.  ED Course   Procedures  Labs Reviewed   CBC W/ AUTO DIFFERENTIAL - Abnormal; Notable for the following components:       Result Value    Gran % 73.7 (*)     All other components within normal limits   COMPREHENSIVE METABOLIC PANEL - Abnormal; Notable for the following components:    Glucose 123 (*)     Total Bilirubin 1.4 (*)     All other components within normal limits   LIPASE   LIPASE          Imaging Results              XR Gastrograffin Challenge (In process)    Procedure changed from XR Small Bowel  Follow Through                    CT Abdomen Pelvis  Without Contrast (Final result)  Result time 06/28/23 07:41:14      Final result by Fred Kimball MD (06/28/23 07:41:14)                   Impression:      1. Findings in keeping with persistent small bowel obstruction, with transition point suggested in the right hemiabdomen, as described.  Overall, suggested slightly decreased degree of small-bowel dilatation compared to prior CT 06/02/2023.  2. Additional details of chronic and incidental findings, as provided in the body of report      Electronically signed by: Fred Kimball  Date:    06/28/2023  Time:    07:41               Narrative:    EXAMINATION:  CT ABDOMEN PELVIS WITHOUT CONTRAST    CLINICAL HISTORY:  Bowel obstruction suspected;    TECHNIQUE:  Low dose axial images, sagittal and coronal reformations were obtained from the lung bases to the pubic symphysis.    COMPARISON:  CT of the abdomen pelvis performed 06/02/2023.    FINDINGS:  This examination is limited due to lack of intravenous contrast.    Lower chest: Unremarkable.    Liver: Normal contour.    Gallbladder and bile ducts: Status post cholecystectomy.  No new biliary dilatation seen.    Pancreas: Normal contour.    Spleen: Normal contour.    Adrenals: Normal contour.    Kidneys: Nonspecific bilateral perinephric stranding.  No short-term change in appearance of nonobstructing right-sided renal calculi measuring up to 8 mm within a midpole calyx, relative to 06/02/2023 CT.    Lymph nodes: Unchanged mildly prominent in number mesenteric lymph nodes with mildly increased attenuation in the adjoining mesenteric fat planes at the mesenteric root.    Bowel and mesentery: Small hiatal hernia.  Colonic diverticulosis.    Dilatation of fluid filled small bowel loops measuring up to 5.1 cm noted, as measured in the right anterior hemiabdomen (axial series 2, image 92).  Transition point suggested in this region with proximity to suture material  (axial series 2, image 115).  Notably, the degree of small-bowel dilatation appears somewhat reduced when compared to prior CT 06/02/2023.    No evidence of pneumatosis, or pneumoperitoneum.    Abdominal aorta: Nonaneurysmal.  Moderate atherosclerosis.    Inferior vena cava: Unremarkable.    Free fluid or free air: None.    Pelvis: Prostate appears enlarged    Urinary bladder: Unremarkable.    Body wall: Remote operative sequela suggested anterior abdominal wall.  Bilateral fat containing inguinal hernias.    Bones: No acute change.  Degenerative findings again noted involving the spine.                                       Medications - No data to display  Medical Decision Making:   History:   Old Medical Records: I decided to obtain old medical records.  Old Records Summarized: other records.       <> Summary of Records: External documents reviewed by me.    Clinical Tests:   Lab Tests: Reviewed and Ordered  Radiological Study: Reviewed and Ordered  ED Management:  Shared decision making patient.     Medical decision making: Given the above this patient presents emergency room with the abdominal pain that reminds him of previous small-bowel obstructions.  The patient is status post hernia repair and cholecystectomy.  His CT scan of the abdomen reveals a small bowel obstruction.  I consulted Dr. Lopez, General surgery, who recommended a Gastrografin swallow.  That study takes 8 hours.  Dr. Lopez recommends admission to the hospital.  I will write orders on his behalf.  There is no clinical evidence of peritonitis.          Scribe Attestation:   Scribe #1: I performed the above scribed service and the documentation accurately describes the services I performed. I attest to the accuracy of the note.                 I personally performed the services described in this documentation.  All medical record  entries made by the scribe are at my direction and in my presence.  Signed, Dr. Muhammad    Clinical Impression:    Final diagnoses:  [K56.609] Small bowel obstruction (Primary)        ED Disposition Condition    Admit Stable                Anibal Muhammad MD  06/28/23 4164

## 2023-06-29 NOTE — PLAN OF CARE
Case Management Assessment     PCP: Basil Grover  Pharmacy: [facility] on [address]    Patient Arrived From: home  Existing Help at Home: none    Barriers to Discharge: none    Discharge Plan:    A. Home with family   B. Home with family      SW completed initial assessment and discussed discharge planning with patient at his bedside. Patient stated that he lives with his dad and his significant other they are his support system. Patient's son Yovany will provide transportation for him to get home when discharge from the hospital.      06/28/23 0245   Discharge Assessment   Assessment Type Discharge Planning Assessment   Confirmed/corrected address, phone number and insurance Yes   Confirmed Demographics Correct on Facesheet   Source of Information patient   When was your last doctors appointment? 07/10/23   Communicated KAYLEIGH with patient/caregiver Date not available/Unable to determine   Reason For Admission Small Bowel Obstruction   People in Home significant other;parent(s)   Do you expect to return to your current living situation? Yes   Do you have help at home or someone to help you manage your care at home? Yes   Who are your caregiver(s) and their phone number(s)? Aleisha Serna (Significant Other) [phone]   Prior to hospitilization cognitive status: Alert/Oriented   Current cognitive status: Alert/Oriented   Equipment Currently Used at Home none   Readmission within 30 days? No   Patient currently being followed by outpatient case management? No   Do you currently have service(s) that help you manage your care at home? No   Do you take prescription medications? Yes   Do you have prescription coverage? Yes   Coverage [employer]   Do you have any problems affording any of your prescribed medications? No   Is the patient taking medications as prescribed? yes   Who is going to help you get home at discharge? Aleisha Serna (Significant Other) [phone]   How do you get to doctors  appointments? car, drives self   Are you on dialysis? No   Do you take coumadin? No   Discharge Plan A Home with family   Discharge Plan B Home with family   DME Needed Upon Discharge  none   Discharge Plan discussed with: Patient   Transition of Care Barriers None   OTHER   Name(s) of People in Home Aleisha Serna (Significant Other) [phone]

## 2023-07-18 ENCOUNTER — PATIENT OUTREACH (OUTPATIENT)
Dept: ADMINISTRATIVE | Facility: OTHER | Age: 68
End: 2023-07-18
Payer: MEDICARE

## 2023-07-18 NOTE — PROGRESS NOTES
CHW - Outreach Attempt    Community Health Worker to attempt to contact patient on: 7/18/23 1st follow up visit attempt call.

## 2023-07-18 NOTE — PROGRESS NOTES
CHW - Case Closure    This Community Health Worker spoke to patient via telephone today.   Pt reported: Patient does not have any needs nor request assistance.  Pt denied any additional needs at this time and agrees with episode closure at this time.  Provided patient with Community Health Worker's contact information and encouraged him to contact this Community Health Worker if additional needs arise.

## 2023-08-04 ENCOUNTER — HOSPITAL ENCOUNTER (INPATIENT)
Facility: HOSPITAL | Age: 68
LOS: 3 days | Discharge: HOME | DRG: 390 | End: 2023-08-07
Attending: STUDENT IN AN ORGANIZED HEALTH CARE EDUCATION/TRAINING PROGRAM | Admitting: SURGERY
Payer: MEDICARE

## 2023-08-04 DIAGNOSIS — K56.609 SBO (SMALL BOWEL OBSTRUCTION): Primary | ICD-10-CM

## 2023-08-04 DIAGNOSIS — Z01.89 ENCOUNTER FOR IMAGING STUDY TO CONFIRM NASOGASTRIC (NG) TUBE PLACEMENT: ICD-10-CM

## 2023-08-04 DIAGNOSIS — K56.609 SMALL BOWEL OBSTRUCTION: ICD-10-CM

## 2023-08-04 LAB
ALBUMIN SERPL BCP-MCNC: 5 G/DL (ref 3.5–5.2)
ALP SERPL-CCNC: 92 U/L (ref 55–135)
ALT SERPL W/O P-5'-P-CCNC: 36 U/L (ref 10–44)
ANION GAP SERPL CALC-SCNC: 15 MMOL/L (ref 8–16)
ANION GAP SERPL CALC-SCNC: 20 MMOL/L (ref 8–16)
AST SERPL-CCNC: 23 U/L (ref 10–40)
BACTERIA #/AREA URNS HPF: ABNORMAL /HPF
BASOPHILS # BLD AUTO: 0.02 K/UL (ref 0–0.2)
BASOPHILS NFR BLD: 0.2 % (ref 0–1.9)
BILIRUB SERPL-MCNC: 1.3 MG/DL (ref 0.1–1)
BILIRUB UR QL STRIP: NEGATIVE
BUN SERPL-MCNC: 23 MG/DL (ref 8–23)
BUN SERPL-MCNC: 24 MG/DL (ref 6–30)
CALCIUM SERPL-MCNC: 11.4 MG/DL (ref 8.7–10.5)
CHLORIDE SERPL-SCNC: 101 MMOL/L (ref 95–110)
CHLORIDE SERPL-SCNC: 99 MMOL/L (ref 95–110)
CLARITY UR: ABNORMAL
CO2 SERPL-SCNC: 26 MMOL/L (ref 23–29)
COLOR UR: YELLOW
CREAT SERPL-MCNC: 1 MG/DL (ref 0.5–1.4)
CREAT SERPL-MCNC: 1.2 MG/DL (ref 0.5–1.4)
DIFFERENTIAL METHOD: ABNORMAL
EOSINOPHIL # BLD AUTO: 0 K/UL (ref 0–0.5)
EOSINOPHIL NFR BLD: 0 % (ref 0–8)
ERYTHROCYTE [DISTWIDTH] IN BLOOD BY AUTOMATED COUNT: 13.2 % (ref 11.5–14.5)
EST. GFR  (NO RACE VARIABLE): >60 ML/MIN/1.73 M^2
GLUCOSE SERPL-MCNC: 160 MG/DL (ref 70–110)
GLUCOSE SERPL-MCNC: 162 MG/DL (ref 70–110)
GLUCOSE UR QL STRIP: NEGATIVE
HCT VFR BLD AUTO: 51.1 % (ref 40–54)
HCT VFR BLD CALC: 51 %PCV (ref 36–54)
HGB BLD-MCNC: 17.2 G/DL (ref 14–18)
HGB UR QL STRIP: NEGATIVE
HYALINE CASTS #/AREA URNS LPF: 16 /LPF
IMM GRANULOCYTES # BLD AUTO: 0.05 K/UL (ref 0–0.04)
IMM GRANULOCYTES NFR BLD AUTO: 0.4 % (ref 0–0.5)
KETONES UR QL STRIP: ABNORMAL
LACTATE SERPL-SCNC: 1 MMOL/L (ref 0.5–2.2)
LEUKOCYTE ESTERASE UR QL STRIP: ABNORMAL
LIPASE SERPL-CCNC: 9 U/L (ref 4–60)
LYMPHOCYTES # BLD AUTO: 0.8 K/UL (ref 1–4.8)
LYMPHOCYTES NFR BLD: 6.1 % (ref 18–48)
MCH RBC QN AUTO: 30.1 PG (ref 27–31)
MCHC RBC AUTO-ENTMCNC: 33.7 G/DL (ref 32–36)
MCV RBC AUTO: 89 FL (ref 82–98)
MICROSCOPIC COMMENT: ABNORMAL
MONOCYTES # BLD AUTO: 0.4 K/UL (ref 0.3–1)
MONOCYTES NFR BLD: 2.8 % (ref 4–15)
NEUTROPHILS # BLD AUTO: 12 K/UL (ref 1.8–7.7)
NEUTROPHILS NFR BLD: 90.5 % (ref 38–73)
NITRITE UR QL STRIP: NEGATIVE
NRBC BLD-RTO: 0 /100 WBC
PH UR STRIP: 6 [PH] (ref 5–8)
PLATELET # BLD AUTO: 236 K/UL (ref 150–450)
PMV BLD AUTO: 11.6 FL (ref 9.2–12.9)
POC IONIZED CALCIUM: 1.29 MMOL/L (ref 1.06–1.42)
POC TCO2 (MEASURED): 27 MMOL/L (ref 23–29)
POTASSIUM BLD-SCNC: 3.8 MMOL/L (ref 3.5–5.1)
POTASSIUM SERPL-SCNC: 3.9 MMOL/L (ref 3.5–5.1)
PROT SERPL-MCNC: 8.5 G/DL (ref 6–8.4)
PROT UR QL STRIP: ABNORMAL
RBC # BLD AUTO: 5.72 M/UL (ref 4.6–6.2)
RBC #/AREA URNS HPF: 3 /HPF (ref 0–4)
SAMPLE: ABNORMAL
SODIUM BLD-SCNC: 141 MMOL/L (ref 136–145)
SODIUM SERPL-SCNC: 142 MMOL/L (ref 136–145)
SP GR UR STRIP: >1.03 (ref 1–1.03)
URN SPEC COLLECT METH UR: ABNORMAL
UROBILINOGEN UR STRIP-ACNC: NEGATIVE EU/DL
WBC # BLD AUTO: 13.26 K/UL (ref 3.9–12.7)
WBC #/AREA URNS HPF: 7 /HPF (ref 0–5)

## 2023-08-04 PROCEDURE — 85025 COMPLETE CBC W/AUTO DIFF WBC: CPT | Performed by: STUDENT IN AN ORGANIZED HEALTH CARE EDUCATION/TRAINING PROGRAM

## 2023-08-04 PROCEDURE — 99900035 HC TECH TIME PER 15 MIN (STAT)

## 2023-08-04 PROCEDURE — 25000003 PHARM REV CODE 250: Performed by: EMERGENCY MEDICINE

## 2023-08-04 PROCEDURE — 82330 ASSAY OF CALCIUM: CPT

## 2023-08-04 PROCEDURE — 96374 THER/PROPH/DIAG INJ IV PUSH: CPT

## 2023-08-04 PROCEDURE — 82565 ASSAY OF CREATININE: CPT

## 2023-08-04 PROCEDURE — 83605 ASSAY OF LACTIC ACID: CPT | Performed by: EMERGENCY MEDICINE

## 2023-08-04 PROCEDURE — 63600175 PHARM REV CODE 636 W HCPCS: Performed by: EMERGENCY MEDICINE

## 2023-08-04 PROCEDURE — 99223 1ST HOSP IP/OBS HIGH 75: CPT | Mod: GC,,, | Performed by: SURGERY

## 2023-08-04 PROCEDURE — 81000 URINALYSIS NONAUTO W/SCOPE: CPT | Performed by: STUDENT IN AN ORGANIZED HEALTH CARE EDUCATION/TRAINING PROGRAM

## 2023-08-04 PROCEDURE — 80053 COMPREHEN METABOLIC PANEL: CPT | Performed by: STUDENT IN AN ORGANIZED HEALTH CARE EDUCATION/TRAINING PROGRAM

## 2023-08-04 PROCEDURE — 85014 HEMATOCRIT: CPT

## 2023-08-04 PROCEDURE — 99223 PR INITIAL HOSPITAL CARE,LEVL III: ICD-10-PCS | Mod: GC,,, | Performed by: SURGERY

## 2023-08-04 PROCEDURE — 83690 ASSAY OF LIPASE: CPT | Performed by: STUDENT IN AN ORGANIZED HEALTH CARE EDUCATION/TRAINING PROGRAM

## 2023-08-04 PROCEDURE — 25000003 PHARM REV CODE 250

## 2023-08-04 PROCEDURE — 63600175 PHARM REV CODE 636 W HCPCS: Performed by: STUDENT IN AN ORGANIZED HEALTH CARE EDUCATION/TRAINING PROGRAM

## 2023-08-04 PROCEDURE — 99285 EMERGENCY DEPT VISIT HI MDM: CPT | Mod: 25

## 2023-08-04 PROCEDURE — 96375 TX/PRO/DX INJ NEW DRUG ADDON: CPT

## 2023-08-04 PROCEDURE — 63600175 PHARM REV CODE 636 W HCPCS

## 2023-08-04 PROCEDURE — 21400001 HC TELEMETRY ROOM

## 2023-08-04 PROCEDURE — 25500020 PHARM REV CODE 255: Performed by: STUDENT IN AN ORGANIZED HEALTH CARE EDUCATION/TRAINING PROGRAM

## 2023-08-04 PROCEDURE — 25000003 PHARM REV CODE 250: Performed by: STUDENT IN AN ORGANIZED HEALTH CARE EDUCATION/TRAINING PROGRAM

## 2023-08-04 PROCEDURE — 84132 ASSAY OF SERUM POTASSIUM: CPT

## 2023-08-04 PROCEDURE — 84295 ASSAY OF SERUM SODIUM: CPT

## 2023-08-04 RX ORDER — ENOXAPARIN SODIUM 100 MG/ML
40 INJECTION SUBCUTANEOUS EVERY 24 HOURS
Status: DISCONTINUED | OUTPATIENT
Start: 2023-08-04 | End: 2023-08-07 | Stop reason: HOSPADM

## 2023-08-04 RX ORDER — OLANZAPINE 5 MG/1
5 TABLET, ORALLY DISINTEGRATING ORAL NIGHTLY PRN
Status: DISCONTINUED | OUTPATIENT
Start: 2023-08-04 | End: 2023-08-07 | Stop reason: HOSPADM

## 2023-08-04 RX ORDER — ONDANSETRON 2 MG/ML
4 INJECTION INTRAMUSCULAR; INTRAVENOUS EVERY 6 HOURS PRN
Status: DISCONTINUED | OUTPATIENT
Start: 2023-08-04 | End: 2023-08-07 | Stop reason: HOSPADM

## 2023-08-04 RX ORDER — ONDANSETRON 2 MG/ML
4 INJECTION INTRAMUSCULAR; INTRAVENOUS
Status: COMPLETED | OUTPATIENT
Start: 2023-08-04 | End: 2023-08-04

## 2023-08-04 RX ORDER — SODIUM CHLORIDE 0.9 % (FLUSH) 0.9 %
10 SYRINGE (ML) INJECTION
Status: DISCONTINUED | OUTPATIENT
Start: 2023-08-04 | End: 2023-08-07 | Stop reason: HOSPADM

## 2023-08-04 RX ORDER — MORPHINE SULFATE 4 MG/ML
2 INJECTION, SOLUTION INTRAMUSCULAR; INTRAVENOUS EVERY 4 HOURS PRN
Status: DISCONTINUED | OUTPATIENT
Start: 2023-08-04 | End: 2023-08-07 | Stop reason: HOSPADM

## 2023-08-04 RX ORDER — MORPHINE SULFATE 4 MG/ML
4 INJECTION, SOLUTION INTRAMUSCULAR; INTRAVENOUS
Status: COMPLETED | OUTPATIENT
Start: 2023-08-04 | End: 2023-08-04

## 2023-08-04 RX ORDER — SUCRALFATE 1 G
TABLET ORAL
COMMUNITY
Start: 2023-06-29 | End: 2023-08-04

## 2023-08-04 RX ORDER — LIDOCAINE HYDROCHLORIDE 10 MG/ML
1 INJECTION, SOLUTION EPIDURAL; INFILTRATION; INTRACAUDAL; PERINEURAL ONCE AS NEEDED
Status: DISCONTINUED | OUTPATIENT
Start: 2023-08-04 | End: 2023-08-07 | Stop reason: HOSPADM

## 2023-08-04 RX ADMIN — MORPHINE SULFATE 4 MG: 4 INJECTION, SOLUTION INTRAMUSCULAR; INTRAVENOUS at 04:08

## 2023-08-04 RX ADMIN — PROMETHAZINE HYDROCHLORIDE 12.5 MG: 25 INJECTION INTRAMUSCULAR; INTRAVENOUS at 08:08

## 2023-08-04 RX ADMIN — SODIUM CHLORIDE 1000 ML: 9 INJECTION, SOLUTION INTRAVENOUS at 04:08

## 2023-08-04 RX ADMIN — ONDANSETRON 4 MG: 2 INJECTION INTRAMUSCULAR; INTRAVENOUS at 04:08

## 2023-08-04 RX ADMIN — IOHEXOL 85 ML: 350 INJECTION, SOLUTION INTRAVENOUS at 05:08

## 2023-08-04 RX ADMIN — ENOXAPARIN SODIUM 40 MG: 40 INJECTION SUBCUTANEOUS at 05:08

## 2023-08-04 RX ADMIN — OLANZAPINE 5 MG: 5 TABLET, ORALLY DISINTEGRATING ORAL at 09:08

## 2023-08-04 NOTE — ED PROVIDER NOTES
Encounter Date: 8/4/2023       History     Chief Complaint   Patient presents with    Abdominal Pain     Abd pain since yesterday. + vomiting, + abd fullness and distention. LBM yesterday. No relief with OTC pepto     HPI    68-year-old male past medical history of GERD, hypertension, presents to the emergency department for evaluation of epigastric abdominal pain and vomiting.  Started yesterday.  Pressure-like pain.  Notes abdominal fullness and distention.  Last bowel movement yesterday.  Denies any relief with over-the-counter Pepto-Bismol.  Denies diarrhea, constipation, melena, hematochezia, hematemesis, coffee-ground emesis, chest pain, shortness of breath, dysuria, hematuria, fever, chills, headache, head injury.  No other mitigating or exacerbating factors.  Review of patient's allergies indicates:  No Known Allergies  Past Medical History:   Diagnosis Date    Essential hypertension 12/07/2016    GERD (gastroesophageal reflux disease)      Past Surgical History:   Procedure Laterality Date    APPENDECTOMY      as a child    GALLBLADDER SURGERY  2004    HERNIA REPAIR  2004 & 2005    TONSILLECTOMY, ADENOIDECTOMY      as a child     Family History   Problem Relation Age of Onset    Aneurysm Mother     Heart disease Father      Social History     Tobacco Use    Smoking status: Never    Smokeless tobacco: Never   Substance Use Topics    Alcohol use: No    Drug use: No     Review of Systems   All other systems reviewed and are negative.    See HPI  Physical Exam     Initial Vitals [08/04/23 0336]   BP Pulse Resp Temp SpO2   (!) 134/91 89 20 97.6 °F (36.4 °C) 100 %      MAP       --         Physical Exam    Nursing note and vitals reviewed.  Constitutional: He appears well-developed and well-nourished. He is not diaphoretic. No distress.   HENT:   Head: Normocephalic and atraumatic.   Right Ear: External ear normal.   Left Ear: External ear normal.   Nose: Nose normal.   Eyes: Conjunctivae are normal. No scleral  icterus.   Neck: Neck supple. No tracheal deviation present.   Cardiovascular:  Normal rate, regular rhythm, normal heart sounds and intact distal pulses.     Exam reveals no gallop and no friction rub.       No murmur heard.  Pulmonary/Chest: Breath sounds normal. No respiratory distress.   Abdominal: Abdomen is soft. Bowel sounds are normal. There is abdominal tenderness.   Tenderness to the epigastric region.  No rebound or guarding.  Negative Arteaga sign.   Musculoskeletal:      Cervical back: Neck supple.     Neurological: He is alert and oriented to person, place, and time. GCS score is 15. GCS eye subscore is 4. GCS verbal subscore is 5. GCS motor subscore is 6.   Skin: Skin is warm and dry.   Psychiatric: He has a normal mood and affect. His behavior is normal. Thought content normal.         ED Course   Procedures  Labs Reviewed   CBC W/ AUTO DIFFERENTIAL - Abnormal; Notable for the following components:       Result Value    WBC 13.26 (*)     Gran # (ANC) 12.0 (*)     Immature Grans (Abs) 0.05 (*)     Lymph # 0.8 (*)     Gran % 90.5 (*)     Lymph % 6.1 (*)     Mono % 2.8 (*)     All other components within normal limits   COMPREHENSIVE METABOLIC PANEL - Abnormal; Notable for the following components:    Glucose 160 (*)     Calcium 11.4 (*)     Total Protein 8.5 (*)     Total Bilirubin 1.3 (*)     All other components within normal limits   URINALYSIS, REFLEX TO URINE CULTURE - Abnormal; Notable for the following components:    Appearance, UA Hazy (*)     Specific Gravity, UA >1.030 (*)     Protein, UA 2+ (*)     Ketones, UA 1+ (*)     Leukocytes, UA Trace (*)     All other components within normal limits    Narrative:     Specimen Source->Urine   URINALYSIS MICROSCOPIC - Abnormal; Notable for the following components:    WBC, UA 7 (*)     Hyaline Casts, UA 16 (*)     All other components within normal limits    Narrative:     Specimen Source->Urine   ISTAT PROCEDURE - Abnormal; Notable for the following  components:    POC Glucose 162 (*)     POC Anion Gap 20 (*)     All other components within normal limits   LIPASE   LACTIC ACID, PLASMA   ISTAT CHEM8          Imaging Results               CT Abdomen Pelvis With Contrast (Final result)  Result time 08/04/23 07:07:14      Final result by Trung Martinez MD (08/04/23 07:07:14)                   Impression:      Small-bowel obstruction with transition point in the mid lower abdomen at the midline.  Possible adjacent sort segment small bowel intussusception versus adhesions.  Of note, small bowel anastomosis is now to the left of midline and was previously in the right hemiabdomen on prior CTs dated 06/28/2023 and 06/02/2023.  Thinning of small-bowel walls proximal to the transition point but no overt small bowel wall hypoenhancement or portal venous gas.  Suggest surgical evaluation and correlation with serum lactate.    Nonobstructing right-sided renal stones.    Colonic diverticulosis.    Additional findings discussed in the body of the report.    This report was flagged in Epic as abnormal.      Electronically signed by: Trung Martinez MD  Date:    08/04/2023  Time:    07:07               Narrative:    EXAMINATION:  CT ABDOMEN PELVIS WITH CONTRAST    CLINICAL HISTORY:  Nausea/vomiting;Epigastric pain;    TECHNIQUE:  Low dose axial images, sagittal and coronal reformations were obtained from the lung bases to the pubic symphysis following the IV administration of 85 mL of Omnipaque 350 .  Oral contrast was not given.    COMPARISON:  CT, 06/28/2023.    FINDINGS:  Lower Chest:    Mild bibasilar subsegmental atelectasis.  Heart size is normal.  No pleural or pericardial effusion.    Abdomen:    Liver is stable in size and contour.  No focal liver mass identified.  Gallbladder is surgically absent.  No intrahepatic biliary ductal dilatation.    Spleen, adrenals, and pancreas are unremarkable.    The kidneys are symmetric.  No hydronephrosis. 1 cm nonobstructing  stone in the right mid kidney as seen previously.  Additional punctate nonobstructing stone in the lower pole of the right kidney.    Stomach is moderately distended with fluid and air.  Minimal wall prominence of the distal stomach.  Significant fluid distension of small bowel measuring up to 4.5 cm in maximum diameter.  Focal transition point in the mid lower abdomen (axial image 125 and coronal image 54).  Possible adjacent small bowel intussusception (coronal image 43).  Patulous small bowel anastomosis in the anterior mid abdomen to the left of midline.  Small bowel anastomosis was previously to the right of midline on prior CTs, unclear if this is related to adhesions or other process such as internal hernia, though this would be somewhat atypical at this location.  Mild mesenteric edema.  No significant free fluid.  No overt small bowel wall hypoenhancement, though there is nonspecific small-bowel wall thinning involving the segment of small bowel immediately proximal to the transition point when compared with small bowel segments in the left hemiabdomen.  No pneumatosis or portal venous gas.  Liquid stool in the cecum.  Scattered colonic diverticula without evidence of diverticulitis.  The distal colon is relatively decompressed.    No pneumoperitoneum or organized fluid collection.    No bulky retroperitoneal lymphadenopathy.    Abdominal aorta is normal in caliber with mild calcific atherosclerosis.    Portal, splenic, and superior mesenteric veins are patent.    Pelvis:    Urinary bladder and rectum are unremarkable.  Prostate is enlarged, measuring approximately 5.3 cm in transverse width.  No free fluid in the pelvis.  No pelvic lymphadenopathy.    Bones and soft tissues:    No aggressive osseous lesions.  Similar degenerative changes in the spine.  Postoperative changes in the anterior abdominal wall.  Small fat containing inguinal hernias.                                       Medications    promethazine (PHENERGAN) 12.5 mg in dextrose 5 % (D5W) 50 mL IVPB (has no administration in time range)   ondansetron injection 4 mg (4 mg Intravenous Given 8/4/23 0424)   sodium chloride 0.9% bolus 1,000 mL 1,000 mL (0 mLs Intravenous Stopped 8/4/23 0529)   ondansetron injection 4 mg (4 mg Intravenous Given 8/4/23 0429)   morphine injection 4 mg (4 mg Intravenous Given 8/4/23 0429)   iohexoL (OMNIPAQUE 350) injection 85 mL (85 mLs Intravenous Given 8/4/23 0532)                 ED Course as of 08/04/23 0755   Fri Aug 04, 2023   0704 68-year-old presenting with epigastric pain.  Severe pain on initial evaluation.  Pain reduction with morphine.  On re-evaluation patient denies significant pain is resting comfortably.  Vitals are stable.  Patient is satting 90% on room air.  Mild hyperglycemia in a nonfasting state.  Mild hyperbilirubinemia.  LFTs are within normal limits.  Leukocytosis noted.  UA not indicative of UTI.  Patient denies dysuria.  Given significant pain on initial evaluation concern for possible pancreatitis although lipase was normal obtain CT scan.  CT pending at this time. [CC]   4357 Hand off to Dr. Singer, pending CT imaging, ultimate disposition. [CC]      ED Course User Index  [CC] Kameron Padilla MD            Assumed care patient at turnover, CT scan concerning for small bowel obstruction with transition point noted around prior anastomosis.  Patient reports no bowel function since yesterday, has had nausea vomiting, little bit of some ongoing nausea, abdominal pain has subsided, pending lactate.  General surgery consult will admit patient.  Vital signs stable, symptoms improved, NG tube placed.  Patient transferred to the floor in stable condition.           Clinical Impression:   Final diagnoses:  [K56.609] Small bowel obstruction        ED Disposition Condition    Admit                 Jossue Singer MD  08/04/23 0756

## 2023-08-04 NOTE — ED TRIAGE NOTES
"Patient presents to the ED c/o epigastric pain x1 day.Pt reports 10/10 abd pain and discomfort accompanied w/ continuous nausea. Pt states he had several episodes of self-induced vomiting "to alleviate my pain."  Hx of GERD but has ran out his Rx medication 2 weeks ago. Denies diarrhea, difficulty/changes in urination, or back pain.   "

## 2023-08-04 NOTE — HPI
This is a 68-year-old male with a past surgical history of cholecystectomy, appendectomy, open hernia repair and small-bowel resection with anastomosis with mesh s/p explant of mesh who presents to the ED with complaints of vague abdominal pain and nausea.  He has a history of small-bowel obstruction and has been admitted twice in the past most recently on 06/28/2023 and was managed conservatively with decompression and Gastrografin challenge.  He states that he began to have this vague abdominal pain and nausea yesterday which ultimately is why he presented to the ED this morning.  His last bowel movement was yesterday morning.  Denies any episodes of emesis.    While in the ED a CT scan of his abdomen and pelvis was performed which showed evidence of a small-bowel obstruction with a transition point near his anastomosis, he has a leukocytosis of 13.  A lactic acid is pending.  General surgery was consulted for management small-bowel obstruction.

## 2023-08-04 NOTE — CONSULTS
[facility] Emergency Dept  General Surgery  Consult Note    Patient Name: Jules Aviles  MRN: [id number]  Code Status: No Order  Admission Date: 2023  Hospital Length of Stay: 0 days  Attending Physician: Christos Watkins MD  Primary Care Provider: Basil Grover MD    Patient information was obtained from patient, past medical records and ER records.     Inpatient consult to General Surgery  Consult performed by: Ilia Robledo MD  Consult ordered by: Jossue Singer MD        Subjective:     Principal Problem: SBO (small bowel obstruction)    History of Present Illness: This is a 68-year-old male with a past surgical history of cholecystectomy, appendectomy, open hernia repair and small-bowel resection with anastomosis with mesh s/p explant of mesh who presents to the ED with complaints of vague abdominal pain and nausea.  He has a history of small-bowel obstruction and has been admitted twice in the past most recently on 2023 and was managed conservatively with decompression and Gastrografin challenge.  He states that he began to have this vague abdominal pain and nausea yesterday which ultimately is why he presented to the ED this morning.  His last bowel movement was yesterday morning.  Denies any episodes of emesis.    While in the ED a CT scan of his abdomen and pelvis was performed which showed evidence of a small-bowel obstruction with a transition point near his anastomosis, he has a leukocytosis of 13.  A lactic acid is pending.  General surgery was consulted for management small-bowel obstruction.      Current Facility-Administered Medications on File Prior to Encounter   Medication    0.9%  NaCl infusion     Current Outpatient Medications on File Prior to Encounter   Medication Sig    CARAFATE 1 gram tablet SMARTSI Gram(s) By Mouth    losartan (COZAAR) 50 MG tablet TAKE 1 TABLET BY MOUTH EVERY DAY    sertraline (ZOLOFT) 25 MG tablet Take 25 mg by mouth once daily.     CHOLESTYRAMINE LIGHT 4 gram Powd once daily.     cyanocobalamin (VITAMIN B-12) 100 MCG tablet Take 100 mcg by mouth once daily.    hydroCHLOROthiazide (MICROZIDE) 12.5 mg capsule TAKE 1 CAPSULE BY MOUTH EVERY DAY    Lactobacillus acidophilus (PROBIOTIC ORAL) Take 1 capsule by mouth once daily.    pantoprazole (PROTONIX) 40 MG tablet Take 40 mg by mouth once daily.    tamsulosin (FLOMAX) 0.4 mg Cap Take 1 capsule by mouth every evening.    vitamin D (VITAMIN D3) 1000 units Tab Take 8,000 Units by mouth once daily.       Review of patient's allergies indicates:  No Known Allergies    Past Medical History:   Diagnosis Date    Essential hypertension 12/07/2016    GERD (gastroesophageal reflux disease)      Past Surgical History:   Procedure Laterality Date    APPENDECTOMY      as a child    GALLBLADDER SURGERY  2004    HERNIA REPAIR  2004 & 2005    TONSILLECTOMY, ADENOIDECTOMY      as a child     Family History       Problem Relation (Age of Onset)    Aneurysm Mother    Heart disease Father          Tobacco Use    Smoking status: Never    Smokeless tobacco: Never   Substance and Sexual Activity    Alcohol use: No    Drug use: No    Sexual activity: Yes     Review of Systems   Constitutional:  Negative for chills and fever.   Respiratory: Negative.     Cardiovascular:  Negative for chest pain.   Gastrointestinal:  Positive for abdominal distention, abdominal pain, constipation and nausea. Negative for diarrhea and vomiting.   Skin: Negative.    Hematological: Negative.      Objective:     Vital Signs (Most Recent):  Temp: 97.6 °F (36.4 °C) (08/04/23 0336)  Pulse: 71 (08/04/23 0746)  Resp: 16 (08/04/23 0601)  BP: 132/75 (08/04/23 0732)  SpO2: (!) 94 % (08/04/23 0746) Vital Signs (24h Range):  Temp:  [97.6 °F (36.4 °C)] 97.6 °F (36.4 °C)  Pulse:  [64-91] 71  Resp:  [14-20] 16  SpO2:  [85 %-100 %] 94 %  BP: (129-139)/(65-91) 132/75     Weight: 98.4 kg (217 lb)  Body mass index is 31.14 kg/m².     Physical  Exam  Vitals and nursing note reviewed.   Constitutional:       General: He is not in acute distress.     Appearance: Normal appearance. He is not toxic-appearing.   HENT:      Head: Normocephalic and atraumatic.   Cardiovascular:      Rate and Rhythm: Normal rate and regular rhythm.   Pulmonary:      Effort: Pulmonary effort is normal. No respiratory distress.   Abdominal:      General: There is distension.      Palpations: Abdomen is soft.      Tenderness: There is no abdominal tenderness. There is no guarding.   Musculoskeletal:         General: Normal range of motion.   Skin:     General: Skin is warm and dry.   Neurological:      General: No focal deficit present.      Mental Status: He is alert and oriented to person, place, and time.            I have reviewed all pertinent lab results within the past 24 hours.  CBC:   Recent Labs   Lab 08/04/23 0412 08/04/23 0415   WBC 13.26*  --    RBC 5.72  --    HGB 17.2  --    HCT 51.1 51     --    MCV 89  --    MCH 30.1  --    MCHC 33.7  --      CMP:   Recent Labs   Lab 08/04/23 0412   *   CALCIUM 11.4*   ALBUMIN 5.0   PROT 8.5*      K 3.9   CO2 26      BUN 23   CREATININE 1.2   ALKPHOS 92   ALT 36   AST 23   BILITOT 1.3*       Significant Diagnostics:  I have reviewed all pertinent imaging results/findings within the past 24 hours.      Assessment/Plan:     * SBO (small bowel obstruction)  This is our 68-year-old male patient with the above past surgical history who is presenting today with a one-day history of vague abdominal pain and nausea with evidence of small-bowel obstruction on CT scan.    -admit to General surgery service   -NG tube placement, decompression  -NPO  -discussed with him that given that he is now had multiple episodes of small-bowel obstruction over the last year he may require operative intervention this admission if he does not progress  -IV fluids  -follow-up lactic acid      VTE Risk Mitigation (From admission,  onward)    None          Thank you for your consult. I will follow-up with patient. Please contact us if you have any additional questions.    Ilia Robledo MD  General Surgery  [facility] [place] Emergency Dept

## 2023-08-04 NOTE — NURSING
Pt arrived to the floor from the ED. Report taken from ANH Barcenas. Pt resting in bed, VSS. NGT present to TRISTIAN sanchez. NGT currently clamped. Message sent to resident on call for orders on NG. Awaiting response. Bed locked in lowest position, call light in reach. Pt oriented to room and call light use. No complaints, no evident distress.

## 2023-08-04 NOTE — H&P
General Surgery H&P    Please see consult note dated 8/4/23 for full H&P.      Ilia Robledo MD  [facility]

## 2023-08-04 NOTE — PHARMACY MED REC
"    Admission Medication History     The home medication history was taken by Noy Villela CPhT.      You may go to "Admission" then "Reconcile Home Medications" tabs to review and/or act upon these items.     The home medication list has been updated by the Pharmacy department.   Please read ALL comments highlighted in yellow.   Please address this information as you see fit.    Feel free to contact us if you have any questions or require assistance.      The medications listed below were removed from the home medication list. Please reorder if appropriate:  Patient reports no longer taking the following medication(s):  Zoloft 25 mg tab  Sucralfate 1 gram tab      Medications listed below were obtained from: Patient/family and Analytic software- [employer]  (Not in a hospital admission)          Noy Villela CPhT.  [phone]                .          "

## 2023-08-04 NOTE — SUBJECTIVE & OBJECTIVE
Current Facility-Administered Medications on File Prior to Encounter   Medication    0.9%  NaCl infusion     Current Outpatient Medications on File Prior to Encounter   Medication Sig    CARAFATE 1 gram tablet SMARTSI Gram(s) By Mouth    losartan (COZAAR) 50 MG tablet TAKE 1 TABLET BY MOUTH EVERY DAY    sertraline (ZOLOFT) 25 MG tablet Take 25 mg by mouth once daily.    CHOLESTYRAMINE LIGHT 4 gram Powd once daily.     cyanocobalamin (VITAMIN B-12) 100 MCG tablet Take 100 mcg by mouth once daily.    hydroCHLOROthiazide (MICROZIDE) 12.5 mg capsule TAKE 1 CAPSULE BY MOUTH EVERY DAY    Lactobacillus acidophilus (PROBIOTIC ORAL) Take 1 capsule by mouth once daily.    pantoprazole (PROTONIX) 40 MG tablet Take 40 mg by mouth once daily.    tamsulosin (FLOMAX) 0.4 mg Cap Take 1 capsule by mouth every evening.    vitamin D (VITAMIN D3) 1000 units Tab Take 8,000 Units by mouth once daily.       Review of patient's allergies indicates:  No Known Allergies    Past Medical History:   Diagnosis Date    Essential hypertension 2016    GERD (gastroesophageal reflux disease)      Past Surgical History:   Procedure Laterality Date    APPENDECTOMY      as a child    GALLBLADDER SURGERY  2004    HERNIA REPAIR   & 2005    TONSILLECTOMY, ADENOIDECTOMY      as a child     Family History       Problem Relation (Age of Onset)    Aneurysm Mother    Heart disease Father          Tobacco Use    Smoking status: Never    Smokeless tobacco: Never   Substance and Sexual Activity    Alcohol use: No    Drug use: No    Sexual activity: Yes     Review of Systems   Constitutional:  Negative for chills and fever.   Respiratory: Negative.     Cardiovascular:  Negative for chest pain.   Gastrointestinal:  Positive for abdominal distention, abdominal pain, constipation and nausea. Negative for diarrhea and vomiting.   Skin: Negative.    Hematological: Negative.      Objective:     Vital Signs (Most Recent):  Temp: 97.6 °F (36.4 °C) (23  0336)  Pulse: 71 (08/04/23 0746)  Resp: 16 (08/04/23 0601)  BP: 132/75 (08/04/23 0732)  SpO2: (!) 94 % (08/04/23 0746) Vital Signs (24h Range):  Temp:  [97.6 °F (36.4 °C)] 97.6 °F (36.4 °C)  Pulse:  [64-91] 71  Resp:  [14-20] 16  SpO2:  [85 %-100 %] 94 %  BP: (129-139)/(65-91) 132/75     Weight: 98.4 kg (217 lb)  Body mass index is 31.14 kg/m².     Physical Exam  Vitals and nursing note reviewed.   Constitutional:       General: He is not in acute distress.     Appearance: Normal appearance. He is not toxic-appearing.   HENT:      Head: Normocephalic and atraumatic.   Cardiovascular:      Rate and Rhythm: Normal rate and regular rhythm.   Pulmonary:      Effort: Pulmonary effort is normal. No respiratory distress.   Abdominal:      General: There is distension.      Palpations: Abdomen is soft.      Tenderness: There is no abdominal tenderness. There is no guarding.   Musculoskeletal:         General: Normal range of motion.   Skin:     General: Skin is warm and dry.   Neurological:      General: No focal deficit present.      Mental Status: He is alert and oriented to person, place, and time.            I have reviewed all pertinent lab results within the past 24 hours.  CBC:   Recent Labs   Lab 08/04/23 0412 08/04/23 0415   WBC 13.26*  --    RBC 5.72  --    HGB 17.2  --    HCT 51.1 51     --    MCV 89  --    MCH 30.1  --    MCHC 33.7  --      CMP:   Recent Labs   Lab 08/04/23 0412   *   CALCIUM 11.4*   ALBUMIN 5.0   PROT 8.5*      K 3.9   CO2 26      BUN 23   CREATININE 1.2   ALKPHOS 92   ALT 36   AST 23   BILITOT 1.3*       Significant Diagnostics:  I have reviewed all pertinent imaging results/findings within the past 24 hours.

## 2023-08-04 NOTE — PLAN OF CARE
Problem: Adult Inpatient Plan of Care  Goal: Optimal Comfort and Wellbeing  Outcome: Ongoing, Progressing  Intervention: Provide Person-Centered Care  Flowsheets (Taken 8/4/2023 1707)  Trust Relationship/Rapport: care explained     Problem: Oral Intake Inadequate  Goal: Improved Oral Intake  Outcome: Ongoing, Not Progressing

## 2023-08-05 LAB
ANION GAP SERPL CALC-SCNC: 10 MMOL/L (ref 8–16)
BASOPHILS # BLD AUTO: 0.02 K/UL (ref 0–0.2)
BASOPHILS NFR BLD: 0.2 % (ref 0–1.9)
BUN SERPL-MCNC: 21 MG/DL (ref 8–23)
CALCIUM SERPL-MCNC: 9.7 MG/DL (ref 8.7–10.5)
CHLORIDE SERPL-SCNC: 105 MMOL/L (ref 95–110)
CO2 SERPL-SCNC: 28 MMOL/L (ref 23–29)
CREAT SERPL-MCNC: 1 MG/DL (ref 0.5–1.4)
DIFFERENTIAL METHOD: NORMAL
EOSINOPHIL # BLD AUTO: 0.2 K/UL (ref 0–0.5)
EOSINOPHIL NFR BLD: 1.7 % (ref 0–8)
ERYTHROCYTE [DISTWIDTH] IN BLOOD BY AUTOMATED COUNT: 13.3 % (ref 11.5–14.5)
EST. GFR  (NO RACE VARIABLE): >60 ML/MIN/1.73 M^2
GLUCOSE SERPL-MCNC: 87 MG/DL (ref 70–110)
HCT VFR BLD AUTO: 47.8 % (ref 40–54)
HGB BLD-MCNC: 15.4 G/DL (ref 14–18)
IMM GRANULOCYTES # BLD AUTO: 0.03 K/UL (ref 0–0.04)
IMM GRANULOCYTES NFR BLD AUTO: 0.3 % (ref 0–0.5)
LYMPHOCYTES # BLD AUTO: 2.1 K/UL (ref 1–4.8)
LYMPHOCYTES NFR BLD: 21.2 % (ref 18–48)
MAGNESIUM SERPL-MCNC: 2 MG/DL (ref 1.6–2.6)
MCH RBC QN AUTO: 30.1 PG (ref 27–31)
MCHC RBC AUTO-ENTMCNC: 32.2 G/DL (ref 32–36)
MCV RBC AUTO: 93 FL (ref 82–98)
MONOCYTES # BLD AUTO: 0.9 K/UL (ref 0.3–1)
MONOCYTES NFR BLD: 8.9 % (ref 4–15)
NEUTROPHILS # BLD AUTO: 6.6 K/UL (ref 1.8–7.7)
NEUTROPHILS NFR BLD: 67.7 % (ref 38–73)
NRBC BLD-RTO: 0 /100 WBC
PHOSPHATE SERPL-MCNC: 3.1 MG/DL (ref 2.7–4.5)
PLATELET # BLD AUTO: 213 K/UL (ref 150–450)
PMV BLD AUTO: 11.6 FL (ref 9.2–12.9)
POTASSIUM SERPL-SCNC: 3.8 MMOL/L (ref 3.5–5.1)
RBC # BLD AUTO: 5.12 M/UL (ref 4.6–6.2)
SODIUM SERPL-SCNC: 143 MMOL/L (ref 136–145)
WBC # BLD AUTO: 9.8 K/UL (ref 3.9–12.7)

## 2023-08-05 PROCEDURE — 99233 SBSQ HOSP IP/OBS HIGH 50: CPT | Mod: GC,,, | Performed by: SURGERY

## 2023-08-05 PROCEDURE — 83735 ASSAY OF MAGNESIUM: CPT

## 2023-08-05 PROCEDURE — 36415 COLL VENOUS BLD VENIPUNCTURE: CPT

## 2023-08-05 PROCEDURE — 85025 COMPLETE CBC W/AUTO DIFF WBC: CPT

## 2023-08-05 PROCEDURE — 84100 ASSAY OF PHOSPHORUS: CPT

## 2023-08-05 PROCEDURE — 99233 PR SUBSEQUENT HOSPITAL CARE,LEVL III: ICD-10-PCS | Mod: GC,,, | Performed by: SURGERY

## 2023-08-05 PROCEDURE — 63600175 PHARM REV CODE 636 W HCPCS

## 2023-08-05 PROCEDURE — 80048 BASIC METABOLIC PNL TOTAL CA: CPT

## 2023-08-05 PROCEDURE — 21400001 HC TELEMETRY ROOM

## 2023-08-05 RX ORDER — SODIUM CHLORIDE, SODIUM LACTATE, POTASSIUM CHLORIDE, CALCIUM CHLORIDE 600; 310; 30; 20 MG/100ML; MG/100ML; MG/100ML; MG/100ML
INJECTION, SOLUTION INTRAVENOUS CONTINUOUS
Status: DISCONTINUED | OUTPATIENT
Start: 2023-08-05 | End: 2023-08-07 | Stop reason: HOSPADM

## 2023-08-05 RX ADMIN — SODIUM CHLORIDE, POTASSIUM CHLORIDE, SODIUM LACTATE AND CALCIUM CHLORIDE: 600; 310; 30; 20 INJECTION, SOLUTION INTRAVENOUS at 05:08

## 2023-08-05 RX ADMIN — SODIUM CHLORIDE, POTASSIUM CHLORIDE, SODIUM LACTATE AND CALCIUM CHLORIDE: 600; 310; 30; 20 INJECTION, SOLUTION INTRAVENOUS at 09:08

## 2023-08-05 RX ADMIN — ENOXAPARIN SODIUM 40 MG: 40 INJECTION SUBCUTANEOUS at 04:08

## 2023-08-05 NOTE — NURSING
[facility] - [place]  Nurses Note -- 4 Eyes      8/4/2023       Skin assessed on: Q Shift      [x] No Pressure Injuries Present    []Prevention Measures Documented    [] Yes LDA  for Pressure Injury Previously documented     [] Yes New Pressure Injury Discovered   [] LDA for New Pressure Injury Added      Attending RN:  Sherley Tirado RN     Second RN:  Sharyn Galvan RN

## 2023-08-05 NOTE — ASSESSMENT & PLAN NOTE
This is our 68-year-old male patient with the above past surgical history who is presenting today with a one-day history of vague abdominal pain and nausea with evidence of small-bowel obstruction on CT scan.    -NG tube placement, decompression, patient put out 2 L of bilious output in the last 24 hours, his abdominal exam is much less distended today and he is having absolutely no pain.  Still not having any bowel function though.  Will plan on continuing to decompress today and if his output has decreased by tomorrow may perform Gastrografin challenge on him tomorrow.  -NPO  -discussed with him that given that he is now had multiple episodes of small-bowel obstruction over the last year he may require operative intervention this admission if he does not progress  -IV fluids

## 2023-08-05 NOTE — PROGRESS NOTES
[facility]  General Surgery  Progress Note    Subjective:     History of Present Illness:  This is a 68-year-old male with a past surgical history of cholecystectomy, appendectomy, open hernia repair and small-bowel resection with anastomosis with mesh s/p explant of mesh who presents to the ED with complaints of vague abdominal pain and nausea.  He has a history of small-bowel obstruction and has been admitted twice in the past most recently on 06/28/2023 and was managed conservatively with decompression and Gastrografin challenge.  He states that he began to have this vague abdominal pain and nausea yesterday which ultimately is why he presented to the ED this morning.  His last bowel movement was yesterday morning.  Denies any episodes of emesis.    While in the ED a CT scan of his abdomen and pelvis was performed which showed evidence of a small-bowel obstruction with a transition point near his anastomosis, he has a leukocytosis of 13.  A lactic acid is pending.  General surgery was consulted for management small-bowel obstruction.      Post-Op Info:  * No surgery found *         Interval History:   No acute events overnight  2 L out of bilious output from his NG tube in the last 24 hours  Still not passing any gas or having any bowel function    Medications:  Continuous Infusions:   lactated ringers       Scheduled Meds:   enoxparin  40 mg Subcutaneous Daily     PRN Meds:LIDOcaine (PF) 10 mg/ml (1%), morphine, OLANZapine zydis, ondansetron, sodium chloride 0.9%     Review of patient's allergies indicates:  No Known Allergies  Objective:     Vital Signs (Most Recent):  Temp: 97.9 °F (36.6 °C) (08/05/23 0739)  Pulse: (!) 57 (08/05/23 0739)  Resp: 18 (08/05/23 0739)  BP: 130/79 (08/05/23 0739)  SpO2: (!) 91 % (08/05/23 0739) Vital Signs (24h Range):  Temp:  [97.7 °F (36.5 °C)-99 °F (37.2 °C)] 97.9 °F (36.6 °C)  Pulse:  [] 57  Resp:  [14-18] 18  SpO2:  [91 %-96 %] 91 %  BP: (122-145)/(70-88)  130/79     Weight: 93.9 kg (207 lb 0.2 oz)  Body mass index is 29.7 kg/m².    Intake/Output - Last 3 Shifts         08/03 0700 08/04 0659 08/04 0700 08/05 0659 08/05 0700 08/06 0659    IV Piggyback 999      Total Intake(mL/kg) 999 (10.2)      Emesis/NG output  100     Drains  1950     Total Output  2050     Net +999 -2050                     Physical Exam  Vitals and nursing note reviewed.   Constitutional:       General: He is not in acute distress.     Appearance: Normal appearance. He is not toxic-appearing.   HENT:      Head: Normocephalic and atraumatic.   Cardiovascular:      Rate and Rhythm: Normal rate and regular rhythm.   Pulmonary:      Effort: Pulmonary effort is normal. No respiratory distress.   Abdominal:      General: There is no distension.      Palpations: Abdomen is soft.      Tenderness: There is no abdominal tenderness. There is no guarding.   Musculoskeletal:         General: Normal range of motion.   Skin:     General: Skin is warm and dry.   Neurological:      General: No focal deficit present.      Mental Status: He is alert and oriented to person, place, and time.          Significant Labs:  I have reviewed all pertinent lab results within the past 24 hours.  CBC:   Recent Labs   Lab 08/05/23  0502   WBC 9.80   RBC 5.12   HGB 15.4   HCT 47.8      MCV 93   MCH 30.1   MCHC 32.2     CMP:   Recent Labs   Lab 08/04/23  0412 08/05/23  0502   * 87   CALCIUM 11.4* 9.7   ALBUMIN 5.0  --    PROT 8.5*  --     143   K 3.9 3.8   CO2 26 28    105   BUN 23 21   CREATININE 1.2 1.0   ALKPHOS 92  --    ALT 36  --    AST 23  --    BILITOT 1.3*  --        Significant Diagnostics:  I have reviewed all pertinent imaging results/findings within the past 24 hours.    Assessment/Plan:     * SBO (small bowel obstruction)  This is our 68-year-old male patient with the above past surgical history who is presenting today with a one-day history of vague abdominal pain and nausea with  evidence of small-bowel obstruction on CT scan.    -NG tube placement, decompression, patient put out 2 L of bilious output in the last 24 hours, his abdominal exam is much less distended today and he is having absolutely no pain.  Still not having any bowel function though.  Will plan on continuing to decompress today and if his output has decreased by tomorrow may perform Gastrografin challenge on him tomorrow.  -NPO  -discussed with him that given that he is now had multiple episodes of small-bowel obstruction over the last year he may require operative intervention this admission if he does not progress  -IV fluids        Ilia Robledo MD  General Surgery  [facility] - [place] - Telemetry

## 2023-08-05 NOTE — PLAN OF CARE
Problem: Adult Inpatient Plan of Care  Goal: Plan of Care Review  Outcome: Ongoing, Progressing  Goal: Patient-Specific Goal (Individualized)  Outcome: Ongoing, Progressing  Goal: Absence of Hospital-Acquired Illness or Injury  Outcome: Ongoing, Progressing  Goal: Optimal Comfort and Wellbeing  Outcome: Ongoing, Progressing  Goal: Readiness for Transition of Care  Outcome: Ongoing, Progressing     Problem: Oral Intake Inadequate  Goal: Improved Oral Intake  Outcome: Ongoing, Progressing

## 2023-08-05 NOTE — SUBJECTIVE & OBJECTIVE
Interval History:   No acute events overnight  2 L out of bilious output from his NG tube in the last 24 hours  Still not passing any gas or having any bowel function    Medications:  Continuous Infusions:   lactated ringers       Scheduled Meds:   enoxparin  40 mg Subcutaneous Daily     PRN Meds:LIDOcaine (PF) 10 mg/ml (1%), morphine, OLANZapine zydis, ondansetron, sodium chloride 0.9%     Review of patient's allergies indicates:  No Known Allergies  Objective:     Vital Signs (Most Recent):  Temp: 97.9 °F (36.6 °C) (08/05/23 0739)  Pulse: (!) 57 (08/05/23 0739)  Resp: 18 (08/05/23 0739)  BP: 130/79 (08/05/23 0739)  SpO2: (!) 91 % (08/05/23 0739) Vital Signs (24h Range):  Temp:  [97.7 °F (36.5 °C)-99 °F (37.2 °C)] 97.9 °F (36.6 °C)  Pulse:  [] 57  Resp:  [14-18] 18  SpO2:  [91 %-96 %] 91 %  BP: (122-145)/(70-88) 130/79     Weight: 93.9 kg (207 lb 0.2 oz)  Body mass index is 29.7 kg/m².    Intake/Output - Last 3 Shifts         08/03 0700  08/04 0659 08/04 0700  08/05 0659 08/05 0700  08/06 0659    IV Piggyback 999      Total Intake(mL/kg) 999 (10.2)      Emesis/NG output  100     Drains  1950     Total Output  2050     Net +999 -2050                     Physical Exam  Vitals and nursing note reviewed.   Constitutional:       General: He is not in acute distress.     Appearance: Normal appearance. He is not toxic-appearing.   HENT:      Head: Normocephalic and atraumatic.   Cardiovascular:      Rate and Rhythm: Normal rate and regular rhythm.   Pulmonary:      Effort: Pulmonary effort is normal. No respiratory distress.   Abdominal:      General: There is no distension.      Palpations: Abdomen is soft.      Tenderness: There is no abdominal tenderness. There is no guarding.   Musculoskeletal:         General: Normal range of motion.   Skin:     General: Skin is warm and dry.   Neurological:      General: No focal deficit present.      Mental Status: He is alert and oriented to person, place, and time.           Significant Labs:  I have reviewed all pertinent lab results within the past 24 hours.  CBC:   Recent Labs   Lab 08/05/23  0502   WBC 9.80   RBC 5.12   HGB 15.4   HCT 47.8      MCV 93   MCH 30.1   MCHC 32.2     CMP:   Recent Labs   Lab 08/04/23  0412 08/05/23  0502   * 87   CALCIUM 11.4* 9.7   ALBUMIN 5.0  --    PROT 8.5*  --     143   K 3.9 3.8   CO2 26 28    105   BUN 23 21   CREATININE 1.2 1.0   ALKPHOS 92  --    ALT 36  --    AST 23  --    BILITOT 1.3*  --        Significant Diagnostics:  I have reviewed all pertinent imaging results/findings within the past 24 hours.

## 2023-08-05 NOTE — NURSING
[facility]  Nurses Note -- 4 Eyes      8/5/2023       Skin assessed on: Q Shift      [x] No Pressure Injuries Present    [x]Prevention Measures Documented    [] Yes LDA  for Pressure Injury Previously documented     [] Yes New Pressure Injury Discovered   [] LDA for New Pressure Injury Added      Attending RN:  Allyssa Solorio RN     Second RN:  Sherley TERRAZAS RN

## 2023-08-06 LAB
ANION GAP SERPL CALC-SCNC: 17 MMOL/L (ref 8–16)
BASOPHILS # BLD AUTO: 0.02 K/UL (ref 0–0.2)
BASOPHILS NFR BLD: 0.2 % (ref 0–1.9)
BUN SERPL-MCNC: 16 MG/DL (ref 8–23)
CALCIUM SERPL-MCNC: 8.3 MG/DL (ref 8.7–10.5)
CHLORIDE SERPL-SCNC: 105 MMOL/L (ref 95–110)
CO2 SERPL-SCNC: 18 MMOL/L (ref 23–29)
CREAT SERPL-MCNC: 0.6 MG/DL (ref 0.5–1.4)
DIFFERENTIAL METHOD: ABNORMAL
EOSINOPHIL # BLD AUTO: 0.1 K/UL (ref 0–0.5)
EOSINOPHIL NFR BLD: 0.6 % (ref 0–8)
ERYTHROCYTE [DISTWIDTH] IN BLOOD BY AUTOMATED COUNT: 13.1 % (ref 11.5–14.5)
EST. GFR  (NO RACE VARIABLE): >60 ML/MIN/1.73 M^2
GLUCOSE SERPL-MCNC: 59 MG/DL (ref 70–110)
HCT VFR BLD AUTO: 44.4 % (ref 40–54)
HGB BLD-MCNC: 14.3 G/DL (ref 14–18)
IMM GRANULOCYTES # BLD AUTO: 0.03 K/UL (ref 0–0.04)
IMM GRANULOCYTES NFR BLD AUTO: 0.3 % (ref 0–0.5)
LYMPHOCYTES # BLD AUTO: 1.8 K/UL (ref 1–4.8)
LYMPHOCYTES NFR BLD: 17 % (ref 18–48)
MAGNESIUM SERPL-MCNC: 1.2 MG/DL (ref 1.6–2.6)
MCH RBC QN AUTO: 30.2 PG (ref 27–31)
MCHC RBC AUTO-ENTMCNC: 32.2 G/DL (ref 32–36)
MCV RBC AUTO: 94 FL (ref 82–98)
MONOCYTES # BLD AUTO: 0.9 K/UL (ref 0.3–1)
MONOCYTES NFR BLD: 7.9 % (ref 4–15)
NEUTROPHILS # BLD AUTO: 8 K/UL (ref 1.8–7.7)
NEUTROPHILS NFR BLD: 74 % (ref 38–73)
NRBC BLD-RTO: 0 /100 WBC
PHOSPHATE SERPL-MCNC: 1.9 MG/DL (ref 2.7–4.5)
PLATELET # BLD AUTO: 197 K/UL (ref 150–450)
PMV BLD AUTO: 11.3 FL (ref 9.2–12.9)
POTASSIUM SERPL-SCNC: 3.9 MMOL/L (ref 3.5–5.1)
RBC # BLD AUTO: 4.73 M/UL (ref 4.6–6.2)
SODIUM SERPL-SCNC: 140 MMOL/L (ref 136–145)
WBC # BLD AUTO: 10.78 K/UL (ref 3.9–12.7)

## 2023-08-06 PROCEDURE — 99233 PR SUBSEQUENT HOSPITAL CARE,LEVL III: ICD-10-PCS | Mod: GC,,, | Performed by: SURGERY

## 2023-08-06 PROCEDURE — 80048 BASIC METABOLIC PNL TOTAL CA: CPT

## 2023-08-06 PROCEDURE — 83735 ASSAY OF MAGNESIUM: CPT

## 2023-08-06 PROCEDURE — 84100 ASSAY OF PHOSPHORUS: CPT

## 2023-08-06 PROCEDURE — 85025 COMPLETE CBC W/AUTO DIFF WBC: CPT

## 2023-08-06 PROCEDURE — 21400001 HC TELEMETRY ROOM

## 2023-08-06 PROCEDURE — 25000003 PHARM REV CODE 250

## 2023-08-06 PROCEDURE — 36415 COLL VENOUS BLD VENIPUNCTURE: CPT

## 2023-08-06 PROCEDURE — 63600175 PHARM REV CODE 636 W HCPCS

## 2023-08-06 PROCEDURE — 25500020 PHARM REV CODE 255

## 2023-08-06 PROCEDURE — 63600175 PHARM REV CODE 636 W HCPCS: Performed by: SURGERY

## 2023-08-06 PROCEDURE — 99233 SBSQ HOSP IP/OBS HIGH 50: CPT | Mod: GC,,, | Performed by: SURGERY

## 2023-08-06 RX ORDER — MAGNESIUM SULFATE HEPTAHYDRATE 40 MG/ML
2 INJECTION, SOLUTION INTRAVENOUS
Status: DISCONTINUED | OUTPATIENT
Start: 2023-08-06 | End: 2023-08-06

## 2023-08-06 RX ORDER — MAGNESIUM SULFATE HEPTAHYDRATE 40 MG/ML
2 INJECTION, SOLUTION INTRAVENOUS
Status: COMPLETED | OUTPATIENT
Start: 2023-08-06 | End: 2023-08-06

## 2023-08-06 RX ADMIN — MAGNESIUM SULFATE HEPTAHYDRATE 2 G: 40 INJECTION, SOLUTION INTRAVENOUS at 12:08

## 2023-08-06 RX ADMIN — SODIUM CHLORIDE, POTASSIUM CHLORIDE, SODIUM LACTATE AND CALCIUM CHLORIDE: 600; 310; 30; 20 INJECTION, SOLUTION INTRAVENOUS at 06:08

## 2023-08-06 RX ADMIN — DIATRIZOATE MEGLUMINE AND DIATRIZOATE SODIUM 230 ML: 660; 100 LIQUID ORAL; RECTAL at 10:08

## 2023-08-06 RX ADMIN — SODIUM CHLORIDE, POTASSIUM CHLORIDE, SODIUM LACTATE AND CALCIUM CHLORIDE: 600; 310; 30; 20 INJECTION, SOLUTION INTRAVENOUS at 01:08

## 2023-08-06 RX ADMIN — MAGNESIUM SULFATE HEPTAHYDRATE 2 G: 40 INJECTION, SOLUTION INTRAVENOUS at 03:08

## 2023-08-06 RX ADMIN — POTASSIUM PHOSPHATE, MONOBASIC AND POTASSIUM PHOSPHATE, DIBASIC 20 MMOL: 224; 236 INJECTION, SOLUTION, CONCENTRATE INTRAVENOUS at 08:08

## 2023-08-06 NOTE — PROGRESS NOTES
[facility]  General Surgery  Progress Note    Subjective:     History of Present Illness:  This is a 68-year-old male with a past surgical history of cholecystectomy, appendectomy, open hernia repair and small-bowel resection with anastomosis with mesh s/p explant of mesh who presents to the ED with complaints of vague abdominal pain and nausea.  He has a history of small-bowel obstruction and has been admitted twice in the past most recently on 06/28/2023 and was managed conservatively with decompression and Gastrografin challenge.  He states that he began to have this vague abdominal pain and nausea yesterday which ultimately is why he presented to the ED this morning.  His last bowel movement was yesterday morning.  Denies any episodes of emesis.    While in the ED a CT scan of his abdomen and pelvis was performed which showed evidence of a small-bowel obstruction with a transition point near his anastomosis, he has a leukocytosis of 13.  A lactic acid is pending.  General surgery was consulted for management small-bowel obstruction.      Post-Op Info:  * No surgery found *         Interval History:   NAEO. States that he feels that he is going to have a bowel movement. Passing flatus. NG with 800cc out in the last 24 hous    Medications:  Continuous Infusions:   lactated ringers 125 mL/hr at 08/06/23 0126     Scheduled Meds:   diatrizoate meglumineand-diatrizoate sodium  100 mL Per NG tube Once    enoxparin  40 mg Subcutaneous Daily    magnesium sulfate IVPB  2 g Intravenous Q2H    potassium phosphate IVPB  20 mmol Intravenous Once     PRN Meds:LIDOcaine (PF) 10 mg/ml (1%), morphine, OLANZapine zydis, ondansetron, sodium chloride 0.9%     Review of patient's allergies indicates:  No Known Allergies  Objective:     Vital Signs (Most Recent):  Temp: 97.8 °F (36.6 °C) (08/06/23 0744)  Pulse: 87 (08/06/23 0744)  Resp: 18 (08/06/23 0744)  BP: 138/77 (08/06/23 0744)  SpO2: 95 % (08/06/23 0744) Vital  Signs (24h Range):  Temp:  [97.8 °F (36.6 °C)-99.4 °F (37.4 °C)] 97.8 °F (36.6 °C)  Pulse:  [57-87] 87  Resp:  [18] 18  SpO2:  [92 %-96 %] 95 %  BP: (132-155)/(76-87) 138/77     Weight: 93.9 kg (207 lb 0.2 oz)  Body mass index is 29.7 kg/m².    Intake/Output - Last 3 Shifts         08/04 0700 08/05 0659 08/05 0700 08/06 0659 08/06 0700 08/07 0659    IV Piggyback       Total Intake(mL/kg)       Urine (mL/kg/hr)  600 (0.3) 200 (0.8)    Emesis/NG output 100      Drains 1950 800     Total Output 2050 1400 200    Net -2050 -1400 -200                    Physical Exam  Vitals and nursing note reviewed.   Constitutional:       General: He is not in acute distress.     Appearance: Normal appearance. He is not toxic-appearing.   HENT:      Head: Normocephalic and atraumatic.      Nose:      Comments: NG in place with light green output  Cardiovascular:      Rate and Rhythm: Normal rate and regular rhythm.   Pulmonary:      Effort: Pulmonary effort is normal. No respiratory distress.   Abdominal:      General: There is no distension.      Palpations: Abdomen is soft.      Tenderness: There is no abdominal tenderness. There is no guarding.   Musculoskeletal:         General: Normal range of motion.   Skin:     General: Skin is warm and dry.   Neurological:      General: No focal deficit present.      Mental Status: He is alert and oriented to person, place, and time.          Significant Labs:  I have reviewed all pertinent lab results within the past 24 hours.  CBC:   Recent Labs   Lab 08/06/23  0515   WBC 10.78   RBC 4.73   HGB 14.3   HCT 44.4      MCV 94   MCH 30.2   MCHC 32.2     CMP:   Recent Labs   Lab 08/04/23  0412 08/05/23  0502 08/06/23  0515   *   < > 59*   CALCIUM 11.4*   < > 8.3*   ALBUMIN 5.0  --   --    PROT 8.5*  --   --       < > 140   K 3.9   < > 3.9   CO2 26   < > 18*      < > 105   BUN 23   < > 16   CREATININE 1.2   < > 0.6   ALKPHOS 92  --   --    ALT 36  --   --    AST 23  --    --    BILITOT 1.3*  --   --     < > = values in this interval not displayed.       Significant Diagnostics:  I have reviewed all pertinent imaging results/findings within the past 24 hours.    Assessment/Plan:     * SBO (small bowel obstruction)  This is our 68-year-old male patient with the above past surgical history who is presenting today with a one-day history of vague abdominal pain and nausea with evidence of small-bowel obstruction on CT scan.    -NG tube placement, decompression, patient put out 800cc of light bilious output in the last 24 hours, his abdominal exam continues to be less distended today and he is having absolutely no pain. Passing flatus. Plan to Gastrograffin challenge patient today - will follow up for bowel function  -NPO  -discussed with him that given that he is now had multiple episodes of small-bowel obstruction over the last year he may require operative intervention this admission if he does not progress  -IV fluids        Ilia Robledo MD  General Surgery  [facility] - [place] - Telemetry

## 2023-08-06 NOTE — NURSING
[facility] - [place]  Nurses Note -- 4 Eyes      8/6/2023       Skin assessed on: Q Shift      [x] No Pressure Injuries Present    [x]Prevention Measures Documented    [] Yes LDA  for Pressure Injury Previously documented     [] Yes New Pressure Injury Discovered   [] LDA for New Pressure Injury Added      Attending RN:  Allyssa Solorio RN     Second RN:  Sherley TERRAZAS RN

## 2023-08-06 NOTE — ASSESSMENT & PLAN NOTE
This is our 68-year-old male patient with the above past surgical history who is presenting today with a one-day history of vague abdominal pain and nausea with evidence of small-bowel obstruction on CT scan.    -NG tube placement, decompression, patient put out 800cc of light bilious output in the last 24 hours, his abdominal exam continues to be less distended today and he is having absolutely no pain. Passing flatus. Plan to Gastrograffin challenge patient today - will follow up for bowel function  -NPO  -discussed with him that given that he is now had multiple episodes of small-bowel obstruction over the last year he may require operative intervention this admission if he does not progress  -IV fluids

## 2023-08-06 NOTE — SUBJECTIVE & OBJECTIVE
Interval History:   NAEO. States that he feels that he is going to have a bowel movement. Passing flatus. NG with 800cc out in the last 24 hous    Medications:  Continuous Infusions:   lactated ringers 125 mL/hr at 08/06/23 0126     Scheduled Meds:   diatrizoate meglumineand-diatrizoate sodium  100 mL Per NG tube Once    enoxparin  40 mg Subcutaneous Daily    magnesium sulfate IVPB  2 g Intravenous Q2H    potassium phosphate IVPB  20 mmol Intravenous Once     PRN Meds:LIDOcaine (PF) 10 mg/ml (1%), morphine, OLANZapine zydis, ondansetron, sodium chloride 0.9%     Review of patient's allergies indicates:  No Known Allergies  Objective:     Vital Signs (Most Recent):  Temp: 97.8 °F (36.6 °C) (08/06/23 0744)  Pulse: 87 (08/06/23 0744)  Resp: 18 (08/06/23 0744)  BP: 138/77 (08/06/23 0744)  SpO2: 95 % (08/06/23 0744) Vital Signs (24h Range):  Temp:  [97.8 °F (36.6 °C)-99.4 °F (37.4 °C)] 97.8 °F (36.6 °C)  Pulse:  [57-87] 87  Resp:  [18] 18  SpO2:  [92 %-96 %] 95 %  BP: (132-155)/(76-87) 138/77     Weight: 93.9 kg (207 lb 0.2 oz)  Body mass index is 29.7 kg/m².    Intake/Output - Last 3 Shifts         08/04 0700 08/05 0659 08/05 0700 08/06 0659 08/06 0700 08/07 0659    IV Piggyback       Total Intake(mL/kg)       Urine (mL/kg/hr)  600 (0.3) 200 (0.8)    Emesis/NG output 100      Drains 1950 800     Total Output 2050 1400 200    Net -2050 -1400 -200                    Physical Exam  Vitals and nursing note reviewed.   Constitutional:       General: He is not in acute distress.     Appearance: Normal appearance. He is not toxic-appearing.   HENT:      Head: Normocephalic and atraumatic.      Nose:      Comments: NG in place with light green output  Cardiovascular:      Rate and Rhythm: Normal rate and regular rhythm.   Pulmonary:      Effort: Pulmonary effort is normal. No respiratory distress.   Abdominal:      General: There is no distension.      Palpations: Abdomen is soft.      Tenderness: There is no abdominal  tenderness. There is no guarding.   Musculoskeletal:         General: Normal range of motion.   Skin:     General: Skin is warm and dry.   Neurological:      General: No focal deficit present.      Mental Status: He is alert and oriented to person, place, and time.          Significant Labs:  I have reviewed all pertinent lab results within the past 24 hours.  CBC:   Recent Labs   Lab 08/06/23  0515   WBC 10.78   RBC 4.73   HGB 14.3   HCT 44.4      MCV 94   MCH 30.2   MCHC 32.2     CMP:   Recent Labs   Lab 08/04/23  0412 08/05/23  0502 08/06/23  0515   *   < > 59*   CALCIUM 11.4*   < > 8.3*   ALBUMIN 5.0  --   --    PROT 8.5*  --   --       < > 140   K 3.9   < > 3.9   CO2 26   < > 18*      < > 105   BUN 23   < > 16   CREATININE 1.2   < > 0.6   ALKPHOS 92  --   --    ALT 36  --   --    AST 23  --   --    BILITOT 1.3*  --   --     < > = values in this interval not displayed.       Significant Diagnostics:  I have reviewed all pertinent imaging results/findings within the past 24 hours.

## 2023-08-06 NOTE — NURSING
Pt's NG tube is out of place as verified by xray, NG tube advanced and new orders placed, per MD, for confirmation of placement.

## 2023-08-06 NOTE — NURSING
[facility]  Nurses Note -- 4 Eyes      8/5/2023       Skin assessed on: Q Shift      [x] No Pressure Injuries Present    [x]Prevention Measures Documented    [] Yes LDA  for Pressure Injury Previously documented     [] Yes New Pressure Injury Discovered   [] LDA for New Pressure Injury Added      Attending RN:  Sherley Tirado RN     Second RN:  Allyssa Solorio RN

## 2023-08-07 VITALS
WEIGHT: 207 LBS | HEART RATE: 55 BPM | BODY MASS INDEX: 29.63 KG/M2 | TEMPERATURE: 98 F | SYSTOLIC BLOOD PRESSURE: 144 MMHG | DIASTOLIC BLOOD PRESSURE: 72 MMHG | HEIGHT: 70 IN | OXYGEN SATURATION: 97 % | RESPIRATION RATE: 12 BRPM

## 2023-08-07 PROCEDURE — 63600175 PHARM REV CODE 636 W HCPCS

## 2023-08-07 PROCEDURE — 99238 HOSP IP/OBS DSCHRG MGMT 30/<: CPT | Mod: ,,, | Performed by: SURGERY

## 2023-08-07 PROCEDURE — 99238 PR HOSPITAL DISCHARGE DAY,<30 MIN: ICD-10-PCS | Mod: ,,, | Performed by: SURGERY

## 2023-08-07 RX ADMIN — SODIUM CHLORIDE, POTASSIUM CHLORIDE, SODIUM LACTATE AND CALCIUM CHLORIDE: 600; 310; 30; 20 INJECTION, SOLUTION INTRAVENOUS at 03:08

## 2023-08-07 NOTE — DISCHARGE SUMMARY
[facility]  General Surgery  Discharge Summary      Patient Name: Jules Aviles  MRN: [id number]  Admission Date: 8/4/2023  Hospital Length of Stay: 3 days  Discharge Date and Time:  08/07/2023 7:57 AM  Attending Physician: Christos Watkins MD   Discharging Provider: Carlos Aguilera MD  Primary Care Provider: Basil Grover MD    HPI:   This is a 68-year-old male with a past surgical history of cholecystectomy, appendectomy, open hernia repair and small-bowel resection with anastomosis with mesh s/p explant of mesh who presents to the ED with complaints of vague abdominal pain and nausea.  He has a history of small-bowel obstruction and has been admitted twice in the past most recently on 06/28/2023 and was managed conservatively with decompression and Gastrografin challenge.  He states that he began to have this vague abdominal pain and nausea yesterday which ultimately is why he presented to the ED this morning.  His last bowel movement was yesterday morning.  Denies any episodes of emesis.    While in the ED a CT scan of his abdomen and pelvis was performed which showed evidence of a small-bowel obstruction with a transition point near his anastomosis, he has a leukocytosis of 13.  A lactic acid is pending.  General surgery was consulted for management small-bowel obstruction.      * No surgery found *      Indwelling Lines/Drains at time of discharge:   Lines/Drains/Airways     None               Hospital Course: Jules Aviles is a 68-year-old male who presented with his 3rd small-bowel obstruction in the past 2 months.  His CT scan demonstrates a likely adhesive band in the same position as his prior to admission.  His other 2 admissions had been managed conservatively.  We elected to conservatively manage Mr. Aviles with a NG tube for decompression and bowel rest.  He passed a Gastrografin challenge.  His diet was advanced, which he tolerated without difficulty.  He is being discharged with a  plan for short interval follow-up to discuss elective surgery.      Goals of Care Treatment Preferences:  Code Status: Full Code      Physical Exam  Vitals and nursing note reviewed.   Constitutional:       General: He is not in acute distress.     Appearance: Normal appearance.   HENT:      Head: Normocephalic and atraumatic.      Nose: Nose normal.      Mouth/Throat:      Mouth: Mucous membranes are moist.   Cardiovascular:      Rate and Rhythm: Normal rate and regular rhythm.   Pulmonary:      Effort: Pulmonary effort is normal. No respiratory distress.   Abdominal:      General: Abdomen is flat. There is no distension.      Palpations: Abdomen is soft.      Tenderness: There is no abdominal tenderness.   Musculoskeletal:         General: Normal range of motion.   Skin:     General: Skin is warm and dry.   Neurological:      Mental Status: He is alert.         Consults:   Consults (From admission, onward)        Status Ordering Provider     Inpatient consult to General Surgery  Once        Provider:  Christos Watkins MD    Completed TRINH MILTON          Significant Diagnostic Studies: Labs: All labs within the past 24 hours have been reviewed    Pending Diagnostic Studies:     None        Final Active Diagnoses:    Diagnosis Date Noted POA    PRINCIPAL PROBLEM:  SBO (small bowel obstruction) [K56.609] 06/02/2023 Yes      Problems Resolved During this Admission:      Discharged Condition: good    Disposition: Home or Self Care    Follow Up:   Follow-up Information     Christos Watkins MD Follow up in 1 week(s).    Specialties: General Surgery, Oncology  Why: recurrent small bowel obstructions. Likely needs elective procedure short term  Contact information:  [address]  [address] [place] [id number]  [phone]                       Patient Instructions:      Diet Adult Regular     Notify your health care provider if you experience any of the following:  temperature >100.4     Notify your health  care provider if you experience any of the following:  persistent nausea and vomiting or diarrhea     Notify your health care provider if you experience any of the following:  severe uncontrolled pain     Activity as tolerated     Medications:  Reconciled Home Medications:      Medication List      CONTINUE taking these medications    CHOLESTYRAMINE LIGHT 4 gram Powd  Generic drug: cholestyramine-aspartame  once daily.     cyanocobalamin 100 MCG tablet  Commonly known as: VITAMIN B-12  Take 100 mcg by mouth once daily.     hydroCHLOROthiazide 12.5 mg capsule  Commonly known as: MICROZIDE  TAKE 1 CAPSULE BY MOUTH EVERY DAY     losartan 50 MG tablet  Commonly known as: COZAAR  TAKE 1 TABLET BY MOUTH EVERY DAY     pantoprazole 40 MG tablet  Commonly known as: PROTONIX  Take 40 mg by mouth once daily.     PROBIOTIC ORAL  Take 1 capsule by mouth once daily.     tamsulosin 0.4 mg Cap  Commonly known as: FLOMAX  Take 1 capsule by mouth every evening.     vitamin D 1000 units Tab  Commonly known as: VITAMIN D3  Take 8,000 Units by mouth once daily.          Time spent on the discharge of patient: 20 minutes    Carlos Aguilera MD  General Surgery  [facility]

## 2023-08-07 NOTE — PLAN OF CARE
Problem: Adult Inpatient Plan of Care  Goal: Plan of Care Review  8/7/2023 0630 by Sherley Tirado, RN  Outcome: Ongoing, Progressing  8/7/2023 0442 by Sherley Tirado, ANH  Outcome: Ongoing, Progressing     Problem: Adult Inpatient Plan of Care  Goal: Absence of Hospital-Acquired Illness or Injury  8/7/2023 0630 by Sherley Tirado, RN  Outcome: Ongoing, Progressing  8/7/2023 0442 by Sherley Tirado RN  Outcome: Ongoing, Progressing  Intervention: Identify and Manage Fall Risk  Flowsheets (Taken 8/7/2023 0442 by Emperatriz Al, Patient Care Assistant)  Safety Promotion/Fall Prevention:   assistive device/personal item within reach   nonskid shoes/socks when out of bed   lighting adjusted   room near unit station   high risk medications identified   bed alarm set   side rails raised x 2   instructed to call staff for mobility

## 2023-08-07 NOTE — NURSING
AVS virtually reviewed with patient in its entirety with emphasis on medications, follow-up appointments and reasons to return to the ED or contact the [facility] on call Nurse Care Line.  Education complete and patient voiced understanding.  All questions answered.  Discharge complete.

## 2023-08-07 NOTE — NURSING
[facility]  Nurses Note -- 4 Eyes      8/6/2023       Skin assessed on: Q Shift      [x] No Pressure Injuries Present    []Prevention Measures Documented    [] Yes LDA  for Pressure Injury Previously documented     [] Yes New Pressure Injury Discovered   [] LDA for New Pressure Injury Added      Attending RN:  Sherley Tirado RN     Second RN:  Allyssa Solorio RN

## 2023-08-07 NOTE — NURSING
[facility], [facility]  Nurses Note -- 4 Eyes      8/7/2023       Skin assessed on: Q Shift      [x] No Pressure Injuries Present    []Prevention Measures Documented    [] Yes LDA  for Pressure Injury Previously documented     [] Yes New Pressure Injury Discovered   [] LDA for New Pressure Injury Added      Attending RN:  Sharyn Galvan RN     Second RN:  ANH Lepe

## 2023-08-07 NOTE — PLAN OF CARE
Case Management Assessment     PCP: Basil Mccormick  Pharmacy: [facility] ([place])     Patient Arrived From: Home  Existing Help at Home: SO and Son    Barriers to Discharge: none    Discharge Plan:    A. Home with Family   B. Home with Family     Patient stated he lives with SO who will be his help at home. He stated he also has his son Yovany that would be able to help also. Patient stated he prefers lunch time appointments. Patient he plans to return home and his son will provide transportation at discharge.        08/07/23 1010   Discharge Assessment   Assessment Type Discharge Planning Assessment   Confirmed/corrected address, phone number and insurance Yes   Confirmed Demographics Correct on Facesheet   If unable to respond/provide information was family/caregiver contacted? No Contact Information Available   Communicated KAYLEIGH with patient/caregiver Yes   Reason For Admission Small bowel obstruction   People in Home significant other   Facility Arrived From: Home   Do you expect to return to your current living situation? Yes   Do you have help at home or someone to help you manage your care at home? Yes   Who are your caregiver(s) and their phone number(s)? Aleisha Serna () [phone]   Prior to hospitilization cognitive status: Alert/Oriented   Current cognitive status: Alert/Oriented   Equipment Currently Used at Home none   Readmission within 30 days? No   Patient currently being followed by outpatient case management? No   Do you currently have service(s) that help you manage your care at home? No   Do you take prescription medications? Yes   Do you have prescription coverage? Yes   Coverage BCBS Medicare   Do you have any problems affording any of your prescribed medications? No   Is the patient taking medications as prescribed? yes   Who is going to help you get home at discharge? Aleisha Serna () [phone]   How do you get to doctors appointments? family or friend will provide;car, drives  self   Are you on dialysis? No   Do you take coumadin? No   Discharge Plan A Home with family   Discharge Plan B Home with family   DME Needed Upon Discharge  none   Discharge Plan discussed with: Patient   Name(s) and Number(s) Aleisha Serna (SO) [phone]   Transition of Care Barriers None   OTHER   Name(s) of People in Home Aleisha Serna (SO) [phone]

## 2023-08-07 NOTE — PLAN OF CARE
Problem: Adult Inpatient Plan of Care  Goal: Plan of Care Review  Outcome: Met  Goal: Patient-Specific Goal (Individualized)  Outcome: Met  Goal: Absence of Hospital-Acquired Illness or Injury  Outcome: Met  Goal: Optimal Comfort and Wellbeing  Outcome: Met  Goal: Readiness for Transition of Care  Outcome: Met     Problem: Oral Intake Inadequate  Goal: Improved Oral Intake  Outcome: Met

## 2023-08-07 NOTE — PLAN OF CARE
08/07/23 1049   Final Note   Assessment Type Final Discharge Note   Anticipated Discharge Disposition Home   What phone number can be called within the next 1-3 days to see how you are doing after discharge? [phone]   Hospital Resources/Appts/Education Provided Appointments scheduled and added to AVS;Appointments scheduled by Navigator/Coordinator   Post-Acute Status   Coverage BCBS Medicare   Discharge Delays None known at this time       SW secure chat nurse April: patient cleared from case management standpoint.

## 2023-08-07 NOTE — NURSING
Pt refused to have labs drawn. Educated on the importance of having labs results and pt stated that he is going to be discharged today and did not need them.

## 2023-08-07 NOTE — HOSPITAL COURSE
Jules Aviles is a 68-year-old male who presented with his 3rd small-bowel obstruction in the past 2 months.  His CT scan demonstrates a likely adhesive band in the same position as his prior to admission.  His other 2 admissions had been managed conservatively.  We elected to conservatively manage Mr. Aviles with a NG tube for decompression and bowel rest.  He passed a Gastrografin challenge.  His diet was advanced, which he tolerated without difficulty.  He is being discharged with a plan for short interval follow-up to discuss elective surgery.

## 2023-08-07 NOTE — PLAN OF CARE
08/07/23 1009   Medicare Message   Important Message from Medicare regarding Discharge Appeal Rights Given to patient/caregiver;Explained to patient/caregiver;Signed/date by patient/caregiver   Date IMM was signed 08/07/23   Time IMM was signed 1008

## 2023-08-07 NOTE — PLAN OF CARE
Problem: Adult Inpatient Plan of Care  Goal: Plan of Care Review  Outcome: Ongoing, Progressing     Problem: Adult Inpatient Plan of Care  Goal: Absence of Hospital-Acquired Illness or Injury  Intervention: Identify and Manage Fall Risk  Flowsheets (Taken 8/7/2023 0442)  Safety Promotion/Fall Prevention:   assistive device/personal item within reach   nonskid shoes/socks when out of bed   lighting adjusted   room near unit station   high risk medications identified

## 2023-08-08 ENCOUNTER — NURSE TRIAGE (OUTPATIENT)
Dept: ADMINISTRATIVE | Facility: CLINIC | Age: 68
End: 2023-08-08
Payer: MEDICARE

## 2023-08-08 ENCOUNTER — HOSPITAL ENCOUNTER (INPATIENT)
Facility: HOSPITAL | Age: 68
LOS: 7 days | Discharge: HOME HEALTH | DRG: 330 | End: 2023-08-16
Attending: EMERGENCY MEDICINE | Admitting: SURGERY
Payer: MEDICARE

## 2023-08-08 DIAGNOSIS — R10.84 GENERALIZED ABDOMINAL PAIN: ICD-10-CM

## 2023-08-08 DIAGNOSIS — K56.609 SBO (SMALL BOWEL OBSTRUCTION): Primary | ICD-10-CM

## 2023-08-08 DIAGNOSIS — Z46.59 ENCOUNTER FOR NASOGASTRIC (NG) TUBE PLACEMENT: ICD-10-CM

## 2023-08-08 DIAGNOSIS — I10 ESSENTIAL HYPERTENSION: ICD-10-CM

## 2023-08-08 NOTE — Clinical Note
Diagnosis: SBO (small bowel obstruction) [966375]  Future Attending Provider: JOSE LORENZO [[id number]]  Admitting Provider:: JOSE LORENZO [[id number]]

## 2023-08-09 ENCOUNTER — ANESTHESIA (OUTPATIENT)
Dept: SURGERY | Facility: HOSPITAL | Age: 68
DRG: 330 | End: 2023-08-09
Payer: MEDICARE

## 2023-08-09 ENCOUNTER — ANESTHESIA EVENT (OUTPATIENT)
Dept: SURGERY | Facility: HOSPITAL | Age: 68
DRG: 330 | End: 2023-08-09
Payer: MEDICARE

## 2023-08-09 LAB
ALBUMIN SERPL BCP-MCNC: 4.1 G/DL (ref 3.5–5.2)
ALP SERPL-CCNC: 71 U/L (ref 55–135)
ALT SERPL W/O P-5'-P-CCNC: 20 U/L (ref 10–44)
ANION GAP SERPL CALC-SCNC: 11 MMOL/L (ref 8–16)
ANION GAP SERPL CALC-SCNC: 12 MMOL/L (ref 8–16)
AST SERPL-CCNC: 22 U/L (ref 10–40)
BASOPHILS # BLD AUTO: 0.03 K/UL (ref 0–0.2)
BASOPHILS # BLD AUTO: 0.04 K/UL (ref 0–0.2)
BASOPHILS NFR BLD: 0.2 % (ref 0–1.9)
BASOPHILS NFR BLD: 0.4 % (ref 0–1.9)
BILIRUB SERPL-MCNC: 1.2 MG/DL (ref 0.1–1)
BILIRUB UR QL STRIP: NEGATIVE
BUN SERPL-MCNC: 10 MG/DL (ref 8–23)
BUN SERPL-MCNC: 9 MG/DL (ref 8–23)
CALCIUM SERPL-MCNC: 9.1 MG/DL (ref 8.7–10.5)
CALCIUM SERPL-MCNC: 9.2 MG/DL (ref 8.7–10.5)
CHLORIDE SERPL-SCNC: 101 MMOL/L (ref 95–110)
CHLORIDE SERPL-SCNC: 102 MMOL/L (ref 95–110)
CLARITY UR: CLEAR
CO2 SERPL-SCNC: 24 MMOL/L (ref 23–29)
CO2 SERPL-SCNC: 27 MMOL/L (ref 23–29)
COLOR UR: YELLOW
CREAT SERPL-MCNC: 0.9 MG/DL (ref 0.5–1.4)
CREAT SERPL-MCNC: 0.9 MG/DL (ref 0.5–1.4)
DIFFERENTIAL METHOD: ABNORMAL
DIFFERENTIAL METHOD: ABNORMAL
EOSINOPHIL # BLD AUTO: 0 K/UL (ref 0–0.5)
EOSINOPHIL # BLD AUTO: 0.1 K/UL (ref 0–0.5)
EOSINOPHIL NFR BLD: 0.1 % (ref 0–8)
EOSINOPHIL NFR BLD: 0.7 % (ref 0–8)
ERYTHROCYTE [DISTWIDTH] IN BLOOD BY AUTOMATED COUNT: 12.5 % (ref 11.5–14.5)
ERYTHROCYTE [DISTWIDTH] IN BLOOD BY AUTOMATED COUNT: 12.6 % (ref 11.5–14.5)
EST. GFR  (NO RACE VARIABLE): >60 ML/MIN/1.73 M^2
EST. GFR  (NO RACE VARIABLE): >60 ML/MIN/1.73 M^2
GLUCOSE SERPL-MCNC: 104 MG/DL (ref 70–110)
GLUCOSE SERPL-MCNC: 106 MG/DL (ref 70–110)
GLUCOSE UR QL STRIP: NEGATIVE
HCT VFR BLD AUTO: 43.8 % (ref 40–54)
HCT VFR BLD AUTO: 45.5 % (ref 40–54)
HGB BLD-MCNC: 15.1 G/DL (ref 14–18)
HGB BLD-MCNC: 15.2 G/DL (ref 14–18)
HGB UR QL STRIP: NEGATIVE
IMM GRANULOCYTES # BLD AUTO: 0.02 K/UL (ref 0–0.04)
IMM GRANULOCYTES # BLD AUTO: 0.05 K/UL (ref 0–0.04)
IMM GRANULOCYTES NFR BLD AUTO: 0.2 % (ref 0–0.5)
IMM GRANULOCYTES NFR BLD AUTO: 0.3 % (ref 0–0.5)
KETONES UR QL STRIP: ABNORMAL
LACTATE SERPL-SCNC: 0.8 MMOL/L (ref 0.5–2.2)
LEUKOCYTE ESTERASE UR QL STRIP: NEGATIVE
LIPASE SERPL-CCNC: 16 U/L (ref 4–60)
LYMPHOCYTES # BLD AUTO: 1.3 K/UL (ref 1–4.8)
LYMPHOCYTES # BLD AUTO: 1.7 K/UL (ref 1–4.8)
LYMPHOCYTES NFR BLD: 17.6 % (ref 18–48)
LYMPHOCYTES NFR BLD: 8.7 % (ref 18–48)
MCH RBC QN AUTO: 30 PG (ref 27–31)
MCH RBC QN AUTO: 30.4 PG (ref 27–31)
MCHC RBC AUTO-ENTMCNC: 33.4 G/DL (ref 32–36)
MCHC RBC AUTO-ENTMCNC: 34.5 G/DL (ref 32–36)
MCV RBC AUTO: 88 FL (ref 82–98)
MCV RBC AUTO: 90 FL (ref 82–98)
MONOCYTES # BLD AUTO: 0.8 K/UL (ref 0.3–1)
MONOCYTES # BLD AUTO: 0.8 K/UL (ref 0.3–1)
MONOCYTES NFR BLD: 5.2 % (ref 4–15)
MONOCYTES NFR BLD: 7.9 % (ref 4–15)
NEUTROPHILS # BLD AUTO: 12.7 K/UL (ref 1.8–7.7)
NEUTROPHILS # BLD AUTO: 6.9 K/UL (ref 1.8–7.7)
NEUTROPHILS NFR BLD: 73.2 % (ref 38–73)
NEUTROPHILS NFR BLD: 85.5 % (ref 38–73)
NITRITE UR QL STRIP: NEGATIVE
NRBC BLD-RTO: 0 /100 WBC
NRBC BLD-RTO: 0 /100 WBC
PH UR STRIP: 7 [PH] (ref 5–8)
PLATELET # BLD AUTO: 202 K/UL (ref 150–450)
PLATELET # BLD AUTO: 202 K/UL (ref 150–450)
PMV BLD AUTO: 11.1 FL (ref 9.2–12.9)
PMV BLD AUTO: 11.1 FL (ref 9.2–12.9)
POTASSIUM SERPL-SCNC: 3.6 MMOL/L (ref 3.5–5.1)
POTASSIUM SERPL-SCNC: 3.7 MMOL/L (ref 3.5–5.1)
PROT SERPL-MCNC: 7.2 G/DL (ref 6–8.4)
PROT UR QL STRIP: NEGATIVE
RBC # BLD AUTO: 4.96 M/UL (ref 4.6–6.2)
RBC # BLD AUTO: 5.07 M/UL (ref 4.6–6.2)
SODIUM SERPL-SCNC: 138 MMOL/L (ref 136–145)
SODIUM SERPL-SCNC: 139 MMOL/L (ref 136–145)
SP GR UR STRIP: 1.01 (ref 1–1.03)
URN SPEC COLLECT METH UR: ABNORMAL
UROBILINOGEN UR STRIP-ACNC: NEGATIVE EU/DL
WBC # BLD AUTO: 14.86 K/UL (ref 3.9–12.7)
WBC # BLD AUTO: 9.45 K/UL (ref 3.9–12.7)

## 2023-08-09 PROCEDURE — 85025 COMPLETE CBC W/AUTO DIFF WBC: CPT | Mod: 91 | Performed by: STUDENT IN AN ORGANIZED HEALTH CARE EDUCATION/TRAINING PROGRAM

## 2023-08-09 PROCEDURE — 85025 COMPLETE CBC W/AUTO DIFF WBC: CPT | Performed by: EMERGENCY MEDICINE

## 2023-08-09 PROCEDURE — 36000709 HC OR TIME LEV III EA ADD 15 MIN: Performed by: SURGERY

## 2023-08-09 PROCEDURE — 96361 HYDRATE IV INFUSION ADD-ON: CPT

## 2023-08-09 PROCEDURE — 99285 EMERGENCY DEPT VISIT HI MDM: CPT | Mod: 25

## 2023-08-09 PROCEDURE — 80053 COMPREHEN METABOLIC PANEL: CPT | Performed by: EMERGENCY MEDICINE

## 2023-08-09 PROCEDURE — 25000003 PHARM REV CODE 250: Performed by: STUDENT IN AN ORGANIZED HEALTH CARE EDUCATION/TRAINING PROGRAM

## 2023-08-09 PROCEDURE — D9220A PRA ANESTHESIA: ICD-10-PCS | Mod: CRNA,,, | Performed by: STUDENT IN AN ORGANIZED HEALTH CARE EDUCATION/TRAINING PROGRAM

## 2023-08-09 PROCEDURE — 63600175 PHARM REV CODE 636 W HCPCS: Performed by: STUDENT IN AN ORGANIZED HEALTH CARE EDUCATION/TRAINING PROGRAM

## 2023-08-09 PROCEDURE — 99223 PR INITIAL HOSPITAL CARE,LEVL III: ICD-10-PCS | Mod: 57,,, | Performed by: SURGERY

## 2023-08-09 PROCEDURE — 25000003 PHARM REV CODE 250: Performed by: NURSE ANESTHETIST, CERTIFIED REGISTERED

## 2023-08-09 PROCEDURE — 83605 ASSAY OF LACTIC ACID: CPT | Performed by: EMERGENCY MEDICINE

## 2023-08-09 PROCEDURE — 88307 TISSUE EXAM BY PATHOLOGIST: CPT | Mod: 26,,, | Performed by: PATHOLOGY

## 2023-08-09 PROCEDURE — 88307 TISSUE EXAM BY PATHOLOGIST: CPT | Performed by: PATHOLOGY

## 2023-08-09 PROCEDURE — 63600175 PHARM REV CODE 636 W HCPCS: Performed by: EMERGENCY MEDICINE

## 2023-08-09 PROCEDURE — 25500020 PHARM REV CODE 255: Performed by: EMERGENCY MEDICINE

## 2023-08-09 PROCEDURE — 27201423 OPTIME MED/SURG SUP & DEVICES STERILE SUPPLY: Performed by: SURGERY

## 2023-08-09 PROCEDURE — 88307 PR  SURG PATH,LEVEL V: ICD-10-PCS | Mod: 26,,, | Performed by: PATHOLOGY

## 2023-08-09 PROCEDURE — 11000001 HC ACUTE MED/SURG PRIVATE ROOM

## 2023-08-09 PROCEDURE — 63600175 PHARM REV CODE 636 W HCPCS: Performed by: ANESTHESIOLOGY

## 2023-08-09 PROCEDURE — 96375 TX/PRO/DX INJ NEW DRUG ADDON: CPT

## 2023-08-09 PROCEDURE — 44120 REMOVAL OF SMALL INTESTINE: CPT | Mod: 52,,, | Performed by: SURGERY

## 2023-08-09 PROCEDURE — 25000003 PHARM REV CODE 250: Performed by: EMERGENCY MEDICINE

## 2023-08-09 PROCEDURE — 25000003 PHARM REV CODE 250: Performed by: SURGERY

## 2023-08-09 PROCEDURE — D9220A PRA ANESTHESIA: Mod: CRNA,,, | Performed by: STUDENT IN AN ORGANIZED HEALTH CARE EDUCATION/TRAINING PROGRAM

## 2023-08-09 PROCEDURE — 71000039 HC RECOVERY, EACH ADD'L HOUR: Performed by: SURGERY

## 2023-08-09 PROCEDURE — 81003 URINALYSIS AUTO W/O SCOPE: CPT | Performed by: EMERGENCY MEDICINE

## 2023-08-09 PROCEDURE — 44120 PR RESECT SMALL INTEST,SINGL RESEC/ANAS: ICD-10-PCS | Mod: 52,,, | Performed by: SURGERY

## 2023-08-09 PROCEDURE — 37000009 HC ANESTHESIA EA ADD 15 MINS: Performed by: SURGERY

## 2023-08-09 PROCEDURE — 63600175 PHARM REV CODE 636 W HCPCS: Performed by: SURGERY

## 2023-08-09 PROCEDURE — D9220A PRA ANESTHESIA: ICD-10-PCS | Mod: ANES,,, | Performed by: ANESTHESIOLOGY

## 2023-08-09 PROCEDURE — 37000008 HC ANESTHESIA 1ST 15 MINUTES: Performed by: SURGERY

## 2023-08-09 PROCEDURE — D9220A PRA ANESTHESIA: Mod: ANES,,, | Performed by: ANESTHESIOLOGY

## 2023-08-09 PROCEDURE — 71000033 HC RECOVERY, INTIAL HOUR: Performed by: SURGERY

## 2023-08-09 PROCEDURE — 99223 1ST HOSP IP/OBS HIGH 75: CPT | Mod: 57,,, | Performed by: SURGERY

## 2023-08-09 PROCEDURE — 83690 ASSAY OF LIPASE: CPT | Performed by: EMERGENCY MEDICINE

## 2023-08-09 PROCEDURE — 80048 BASIC METABOLIC PNL TOTAL CA: CPT | Mod: XB | Performed by: STUDENT IN AN ORGANIZED HEALTH CARE EDUCATION/TRAINING PROGRAM

## 2023-08-09 PROCEDURE — 96374 THER/PROPH/DIAG INJ IV PUSH: CPT

## 2023-08-09 PROCEDURE — 36000708 HC OR TIME LEV III 1ST 15 MIN: Performed by: SURGERY

## 2023-08-09 RX ORDER — CEFAZOLIN SODIUM 2 G/50ML
2 SOLUTION INTRAVENOUS ONCE
Status: COMPLETED | OUTPATIENT
Start: 2023-08-09 | End: 2023-08-09

## 2023-08-09 RX ORDER — SODIUM CHLORIDE 9 MG/ML
1000 INJECTION, SOLUTION INTRAVENOUS
Status: COMPLETED | OUTPATIENT
Start: 2023-08-09 | End: 2023-08-09

## 2023-08-09 RX ORDER — LIDOCAINE HYDROCHLORIDE 20 MG/ML
5 SOLUTION OROPHARYNGEAL
Status: DISCONTINUED | OUTPATIENT
Start: 2023-08-09 | End: 2023-08-09

## 2023-08-09 RX ORDER — DIPHENHYDRAMINE HYDROCHLORIDE 50 MG/ML
INJECTION INTRAMUSCULAR; INTRAVENOUS
Status: DISCONTINUED | OUTPATIENT
Start: 2023-08-09 | End: 2023-08-09

## 2023-08-09 RX ORDER — HYDRALAZINE HYDROCHLORIDE 20 MG/ML
10 INJECTION INTRAMUSCULAR; INTRAVENOUS EVERY 6 HOURS PRN
Status: DISCONTINUED | OUTPATIENT
Start: 2023-08-09 | End: 2023-08-16 | Stop reason: HOSPADM

## 2023-08-09 RX ORDER — SODIUM CHLORIDE 9 MG/ML
INJECTION, SOLUTION INTRAVENOUS CONTINUOUS
Status: DISCONTINUED | OUTPATIENT
Start: 2023-08-09 | End: 2023-08-09

## 2023-08-09 RX ORDER — KETOROLAC TROMETHAMINE 30 MG/ML
15 INJECTION, SOLUTION INTRAMUSCULAR; INTRAVENOUS EVERY 6 HOURS PRN
Status: DISPENSED | OUTPATIENT
Start: 2023-08-09 | End: 2023-08-12

## 2023-08-09 RX ORDER — ONDANSETRON 2 MG/ML
INJECTION INTRAMUSCULAR; INTRAVENOUS
Status: DISCONTINUED | OUTPATIENT
Start: 2023-08-09 | End: 2023-08-09

## 2023-08-09 RX ORDER — ONDANSETRON 2 MG/ML
4 INJECTION INTRAMUSCULAR; INTRAVENOUS EVERY 12 HOURS PRN
Status: DISCONTINUED | OUTPATIENT
Start: 2023-08-09 | End: 2023-08-16 | Stop reason: HOSPADM

## 2023-08-09 RX ORDER — HYDROMORPHONE HYDROCHLORIDE 2 MG/ML
0.5 INJECTION, SOLUTION INTRAMUSCULAR; INTRAVENOUS; SUBCUTANEOUS EVERY 4 HOURS PRN
Status: DISCONTINUED | OUTPATIENT
Start: 2023-08-09 | End: 2023-08-13

## 2023-08-09 RX ORDER — SODIUM CHLORIDE, SODIUM LACTATE, POTASSIUM CHLORIDE, CALCIUM CHLORIDE 600; 310; 30; 20 MG/100ML; MG/100ML; MG/100ML; MG/100ML
INJECTION, SOLUTION INTRAVENOUS CONTINUOUS
Status: DISCONTINUED | OUTPATIENT
Start: 2023-08-09 | End: 2023-08-15

## 2023-08-09 RX ORDER — FENTANYL CITRATE 50 UG/ML
INJECTION, SOLUTION INTRAMUSCULAR; INTRAVENOUS
Status: DISCONTINUED | OUTPATIENT
Start: 2023-08-09 | End: 2023-08-09

## 2023-08-09 RX ORDER — ONDANSETRON 2 MG/ML
4 INJECTION INTRAMUSCULAR; INTRAVENOUS DAILY PRN
Status: DISCONTINUED | OUTPATIENT
Start: 2023-08-09 | End: 2023-08-09

## 2023-08-09 RX ORDER — HYDROMORPHONE HYDROCHLORIDE 2 MG/ML
0.2 INJECTION, SOLUTION INTRAMUSCULAR; INTRAVENOUS; SUBCUTANEOUS EVERY 5 MIN PRN
Status: DISCONTINUED | OUTPATIENT
Start: 2023-08-09 | End: 2023-08-09

## 2023-08-09 RX ORDER — METOCLOPRAMIDE HYDROCHLORIDE 5 MG/ML
10 INJECTION INTRAMUSCULAR; INTRAVENOUS
Status: COMPLETED | OUTPATIENT
Start: 2023-08-09 | End: 2023-08-09

## 2023-08-09 RX ORDER — SODIUM CHLORIDE, SODIUM LACTATE, POTASSIUM CHLORIDE, CALCIUM CHLORIDE 600; 310; 30; 20 MG/100ML; MG/100ML; MG/100ML; MG/100ML
INJECTION, SOLUTION INTRAVENOUS CONTINUOUS
Status: DISCONTINUED | OUTPATIENT
Start: 2023-08-09 | End: 2023-08-09

## 2023-08-09 RX ORDER — SODIUM CHLORIDE 0.9 % (FLUSH) 0.9 %
10 SYRINGE (ML) INJECTION
Status: DISCONTINUED | OUTPATIENT
Start: 2023-08-09 | End: 2023-08-09

## 2023-08-09 RX ORDER — PROPOFOL 10 MG/ML
VIAL (ML) INTRAVENOUS
Status: DISCONTINUED | OUTPATIENT
Start: 2023-08-09 | End: 2023-08-09

## 2023-08-09 RX ORDER — ONDANSETRON 4 MG/1
8 TABLET, ORALLY DISINTEGRATING ORAL EVERY 8 HOURS PRN
Status: DISCONTINUED | OUTPATIENT
Start: 2023-08-09 | End: 2023-08-09

## 2023-08-09 RX ORDER — SUCCINYLCHOLINE CHLORIDE 20 MG/ML
INJECTION INTRAMUSCULAR; INTRAVENOUS
Status: DISCONTINUED | OUTPATIENT
Start: 2023-08-09 | End: 2023-08-09

## 2023-08-09 RX ORDER — LIDOCAINE HYDROCHLORIDE 20 MG/ML
INJECTION INTRAVENOUS
Status: DISCONTINUED | OUTPATIENT
Start: 2023-08-09 | End: 2023-08-09

## 2023-08-09 RX ORDER — ROCURONIUM BROMIDE 10 MG/ML
INJECTION, SOLUTION INTRAVENOUS
Status: DISCONTINUED | OUTPATIENT
Start: 2023-08-09 | End: 2023-08-09

## 2023-08-09 RX ORDER — HYDROMORPHONE HYDROCHLORIDE 2 MG/ML
0.2 INJECTION, SOLUTION INTRAMUSCULAR; INTRAVENOUS; SUBCUTANEOUS EVERY 5 MIN PRN
Status: DISCONTINUED | OUTPATIENT
Start: 2023-08-09 | End: 2023-08-09 | Stop reason: HOSPADM

## 2023-08-09 RX ORDER — ACETAMINOPHEN 10 MG/ML
1000 INJECTION, SOLUTION INTRAVENOUS ONCE
Status: COMPLETED | OUTPATIENT
Start: 2023-08-09 | End: 2023-08-09

## 2023-08-09 RX ORDER — KETOROLAC TROMETHAMINE 30 MG/ML
15 INJECTION, SOLUTION INTRAMUSCULAR; INTRAVENOUS
Status: COMPLETED | OUTPATIENT
Start: 2023-08-09 | End: 2023-08-09

## 2023-08-09 RX ORDER — MUPIROCIN 20 MG/G
OINTMENT TOPICAL 2 TIMES DAILY
Status: DISPENSED | OUTPATIENT
Start: 2023-08-09 | End: 2023-08-14

## 2023-08-09 RX ORDER — ENOXAPARIN SODIUM 100 MG/ML
40 INJECTION SUBCUTANEOUS EVERY 24 HOURS
Status: DISCONTINUED | OUTPATIENT
Start: 2023-08-09 | End: 2023-08-09

## 2023-08-09 RX ORDER — ENOXAPARIN SODIUM 100 MG/ML
40 INJECTION SUBCUTANEOUS EVERY 24 HOURS
Status: DISCONTINUED | OUTPATIENT
Start: 2023-08-09 | End: 2023-08-16 | Stop reason: HOSPADM

## 2023-08-09 RX ORDER — PANTOPRAZOLE SODIUM 40 MG/10ML
40 INJECTION, POWDER, LYOPHILIZED, FOR SOLUTION INTRAVENOUS DAILY
Status: DISCONTINUED | OUTPATIENT
Start: 2023-08-10 | End: 2023-08-15

## 2023-08-09 RX ADMIN — DIPHENHYDRAMINE HYDROCHLORIDE 12.5 MG: 50 INJECTION INTRAMUSCULAR; INTRAVENOUS at 02:08

## 2023-08-09 RX ADMIN — IOHEXOL 15 ML: 300 INJECTION, SOLUTION INTRAVENOUS at 01:08

## 2023-08-09 RX ADMIN — ROCURONIUM BROMIDE 50 MG: 10 INJECTION, SOLUTION INTRAVENOUS at 01:08

## 2023-08-09 RX ADMIN — SUCCINYLCHOLINE CHLORIDE 140 MG: 20 INJECTION, SOLUTION INTRAMUSCULAR; INTRAVENOUS at 01:08

## 2023-08-09 RX ADMIN — ACETAMINOPHEN 1000 MG: 10 INJECTION INTRAVENOUS at 03:08

## 2023-08-09 RX ADMIN — HYDROMORPHONE HYDROCHLORIDE 0.2 MG: 2 INJECTION INTRAMUSCULAR; INTRAVENOUS; SUBCUTANEOUS at 03:08

## 2023-08-09 RX ADMIN — ENOXAPARIN SODIUM 40 MG: 40 INJECTION SUBCUTANEOUS at 05:08

## 2023-08-09 RX ADMIN — ONDANSETRON 4 MG: 2 INJECTION, SOLUTION INTRAMUSCULAR; INTRAVENOUS at 02:08

## 2023-08-09 RX ADMIN — FENTANYL CITRATE 100 MCG: 50 INJECTION, SOLUTION INTRAMUSCULAR; INTRAVENOUS at 03:08

## 2023-08-09 RX ADMIN — SODIUM CHLORIDE 1000 ML: 9 INJECTION, SOLUTION INTRAVENOUS at 03:08

## 2023-08-09 RX ADMIN — SODIUM CHLORIDE, POTASSIUM CHLORIDE, SODIUM LACTATE AND CALCIUM CHLORIDE: 600; 310; 30; 20 INJECTION, SOLUTION INTRAVENOUS at 09:08

## 2023-08-09 RX ADMIN — ONDANSETRON 4 MG: 2 INJECTION, SOLUTION INTRAMUSCULAR; INTRAVENOUS at 01:08

## 2023-08-09 RX ADMIN — HYDROMORPHONE HYDROCHLORIDE 0.2 MG: 2 INJECTION INTRAMUSCULAR; INTRAVENOUS; SUBCUTANEOUS at 04:08

## 2023-08-09 RX ADMIN — SODIUM CHLORIDE, POTASSIUM CHLORIDE, SODIUM LACTATE AND CALCIUM CHLORIDE: 600; 310; 30; 20 INJECTION, SOLUTION INTRAVENOUS at 05:08

## 2023-08-09 RX ADMIN — SODIUM CHLORIDE: 0.9 INJECTION, SOLUTION INTRAVENOUS at 02:08

## 2023-08-09 RX ADMIN — SODIUM CHLORIDE, SODIUM LACTATE, POTASSIUM CHLORIDE, AND CALCIUM CHLORIDE: .6; .31; .03; .02 INJECTION, SOLUTION INTRAVENOUS at 01:08

## 2023-08-09 RX ADMIN — LIDOCAINE HYDROCHLORIDE 80 MG: 20 INJECTION, SOLUTION INTRAVENOUS at 01:08

## 2023-08-09 RX ADMIN — ONDANSETRON 8 MG: 8 TABLET, ORALLY DISINTEGRATING ORAL at 08:08

## 2023-08-09 RX ADMIN — BENZOCAINE: 200 SPRAY DENTAL; ORAL; PERIODONTAL at 11:08

## 2023-08-09 RX ADMIN — PROPOFOL 160 MG: 10 INJECTION, EMULSION INTRAVENOUS at 01:08

## 2023-08-09 RX ADMIN — MUPIROCIN 1 G: 20 OINTMENT TOPICAL at 09:08

## 2023-08-09 RX ADMIN — ROCURONIUM BROMIDE 25 MG: 10 INJECTION, SOLUTION INTRAVENOUS at 02:08

## 2023-08-09 RX ADMIN — KETOROLAC TROMETHAMINE 15 MG: 30 INJECTION, SOLUTION INTRAMUSCULAR; INTRAVENOUS at 03:08

## 2023-08-09 RX ADMIN — IOHEXOL 100 ML: 350 INJECTION, SOLUTION INTRAVENOUS at 01:08

## 2023-08-09 RX ADMIN — FENTANYL CITRATE 100 MCG: 50 INJECTION, SOLUTION INTRAMUSCULAR; INTRAVENOUS at 02:08

## 2023-08-09 RX ADMIN — FENTANYL CITRATE 100 MCG: 50 INJECTION, SOLUTION INTRAMUSCULAR; INTRAVENOUS at 01:08

## 2023-08-09 RX ADMIN — METOCLOPRAMIDE 10 MG: 5 INJECTION, SOLUTION INTRAMUSCULAR; INTRAVENOUS at 03:08

## 2023-08-09 RX ADMIN — SUGAMMADEX 200 MG: 100 INJECTION, SOLUTION INTRAVENOUS at 03:08

## 2023-08-09 RX ADMIN — CEFAZOLIN SODIUM 2 G: 2 SOLUTION INTRAVENOUS at 01:08

## 2023-08-09 NOTE — ANESTHESIA PREPROCEDURE EVALUATION
08/09/2023    Pre-operative evaluation for Procedure(s) (LRB):  LAPAROTOMY, EXPLORATORY (N/A)    Jules Aviles is a 68 y.o. male     Patient Active Problem List   Diagnosis    Essential hypertension    Premature atrial contractions    PVC's (premature ventricular contractions)    Atrial flutter    History of cardiac radiofrequency ablation (RFA)    GERD (gastroesophageal reflux disease)    Obesity    SBO (small bowel obstruction)    Hypercalcemia    Abdominal pain       Review of patient's allergies indicates:  No Known Allergies    Current Facility-Administered Medications on File Prior to Encounter   Medication Dose Route Frequency Provider Last Rate Last Admin    0.9%  NaCl infusion   Intravenous Continuous Lien Crocker, NP   Stopped at 02/18/19 1555     Current Outpatient Medications on File Prior to Encounter   Medication Sig Dispense Refill    cyanocobalamin (VITAMIN B-12) 100 MCG tablet Take 100 mcg by mouth once daily.      hydroCHLOROthiazide (MICROZIDE) 12.5 mg capsule TAKE 1 CAPSULE BY MOUTH EVERY DAY 90 capsule 3    Lactobacillus acidophilus (PROBIOTIC ORAL) Take 1 capsule by mouth once daily.      losartan (COZAAR) 50 MG tablet TAKE 1 TABLET BY MOUTH EVERY DAY 90 tablet 3    pantoprazole (PROTONIX) 40 MG tablet Take 40 mg by mouth once daily.      tamsulosin (FLOMAX) 0.4 mg Cap Take 1 capsule by mouth every evening.      vitamin D (VITAMIN D3) 1000 units Tab Take 8,000 Units by mouth once daily.         VITALS  Vitals:    08/09/23 0603   BP: 119/63   Pulse: 66   Resp:    Temp:      LABS  CBC:   Recent Labs     08/09/23  0003 08/09/23  0623   WBC 9.45 14.86*   RBC 4.96 5.07   HGB 15.1 15.2   HCT 43.8 45.5    202   MCV 88 90   MCH 30.4 30.0   MCHC 34.5 33.4       CMP:   Recent Labs     08/09/23  0003 08/09/23  0623    139   K 3.6 3.7    101   CO2 24 27   BUN 10 9   CREATININE 0.9 0.9    104   CALCIUM 9.2 9.1   ALBUMIN 4.1  --    PROT 7.2  --    ALKPHOS 71   --    ALT 20  --    AST 22  --    BILITOT 1.2*  --      Pre-op Assessment    I have reviewed the Patient Summary Reports.     I have reviewed the Nursing Notes.       Review of Systems  Anesthesia Hx:  No problems with previous Anesthesia  Denies Family Hx of Anesthesia complications.   Denies Personal Hx of Anesthesia complications.   Social:  Non-Smoker, No Alcohol Use    Cardiovascular:   Exercise tolerance: good Hypertension Denies MI.  Dysrhythmias (h/o atrial flutter)   Denies Angina. H/o aflutter s/p ablation   Pulmonary:  Pulmonary Normal    Hepatic/GI:   GERD SBO   Neurological:  Neurology Normal    Endocrine:  Obesity / BMI > 30      Physical Exam  General: Well nourished, Cooperative, Alert and Oriented    Airway:  Mallampati: III   Mouth Opening: Small, but > 3cm  TM Distance: 4 - 6 cm  Tongue: Normal  Neck ROM: Normal ROM    Dental:  Poor dentition; multiple missing  Chest/Lungs:  Clear to auscultation, Normal Respiratory Rate    Heart:  Rate: Normal  Rhythm: Regular Rhythm      Wt Readings from Last 3 Encounters:   08/08/23 95.3 kg (210 lb)   08/05/23 93.9 kg (207 lb 0.2 oz)   06/28/23 96.2 kg (212 lb)     Temp Readings from Last 3 Encounters:   08/09/23 (!) 38.1 °C (100.6 °F) (Oral)   08/07/23 36.6 °C (97.9 °F) (Oral)   06/28/23 36.7 °C (98.1 °F) (Oral)     BP Readings from Last 3 Encounters:   08/09/23 134/82   08/07/23 (!) 144/72   06/28/23 136/84     Pulse Readings from Last 3 Encounters:   08/09/23 92   08/07/23 (!) 55   06/28/23 66     Lab Results   Component Value Date    WBC 14.86 (H) 08/09/2023    HGB 15.2 08/09/2023    HCT 45.5 08/09/2023    MCV 90 08/09/2023     08/09/2023       CMP  Sodium   Date Value Ref Range Status   08/09/2023 139 136 - 145 mmol/L Final     Potassium   Date Value Ref Range Status   08/09/2023 3.7 3.5 - 5.1 mmol/L Final     Chloride   Date Value Ref Range Status   08/09/2023 101 95 - 110 mmol/L Final     CO2   Date Value Ref Range Status   08/09/2023 27 23 - 29  mmol/L Final     Glucose   Date Value Ref Range Status   08/09/2023 104 70 - 110 mg/dL Final     BUN   Date Value Ref Range Status   08/09/2023 9 8 - 23 mg/dL Final     Creatinine   Date Value Ref Range Status   08/09/2023 0.9 0.5 - 1.4 mg/dL Final     Calcium   Date Value Ref Range Status   08/09/2023 9.1 8.7 - 10.5 mg/dL Final     Total Protein   Date Value Ref Range Status   08/09/2023 7.2 6.0 - 8.4 g/dL Final     Albumin   Date Value Ref Range Status   08/09/2023 4.1 3.5 - 5.2 g/dL Final     Total Bilirubin   Date Value Ref Range Status   08/09/2023 1.2 (H) 0.1 - 1.0 mg/dL Final     Comment:     For infants and newborns, interpretation of results should be based  on gestational age, weight and in agreement with clinical  observations.    Premature Infant recommended reference ranges:  Up to 24 hours.............<8.0 mg/dL  Up to 48 hours............<12.0 mg/dL  3-5 days..................<15.0 mg/dL  6-29 days.................<15.0 mg/dL       Alkaline Phosphatase   Date Value Ref Range Status   08/09/2023 71 55 - 135 U/L Final     AST   Date Value Ref Range Status   08/09/2023 22 10 - 40 U/L Final     ALT   Date Value Ref Range Status   08/09/2023 20 10 - 44 U/L Final     Anion Gap   Date Value Ref Range Status   08/09/2023 11 8 - 16 mmol/L Final     eGFR   Date Value Ref Range Status   08/09/2023 >60 >60 mL/min/1.73 m^2 Final         Anesthesia Plan  Type of Anesthesia, risks & benefits discussed:    Anesthesia Type: Gen ETT  Intra-op Monitoring Plan: Standard ASA Monitors  Post Op Pain Control Plan: multimodal analgesia and IV/PO Opioids PRN  Induction:  IV  Informed Consent: Informed consent signed with the Patient and all parties understand the risks and agree with anesthesia plan.  All questions answered. Patient consented to blood products? Yes  ASA Score: 2  Day of Surgery Review of History & Physical: H&P Update referred to the surgeon/provider.    Ready For Surgery From Anesthesia Perspective.      .

## 2023-08-09 NOTE — TELEPHONE ENCOUNTER
"Pt reports small bowel obstruction last Friday was in the hospital, but since coming home he has had some lower abdominal pain that has been constant all day, only thing that helps is a hot shower or a heating pad to the area. Pt advised to see a MD within 4 hours per protocol, Pt encouraged to call back with any worsening symptoms or questions and verbalized understanding.    Reason for Disposition   [1] MILD-MODERATE pain AND [2] constant AND [3] present > 2 hours    Additional Information   Negative: Shock suspected (e.g., cold/pale/clammy skin, too weak to stand, low BP, rapid pulse)   Negative: Difficult to awaken or acting confused (e.g., disoriented, slurred speech)   Negative: Passed out (i.e., lost consciousness, collapsed and was not responding)   Negative: Sounds like a life-threatening emergency to the triager   Negative: [1] SEVERE pain (e.g., excruciating) AND [2] present > 1 hour   Negative: [1] SEVERE pain AND [2] age > 60 years   Negative: [1] Vomiting AND [2] contains red blood or black ("coffee ground") material  (Exception: Few red streaks in vomit that only happened once.)   Negative: Blood in bowel movements  (Exception: Blood on surface of BM with constipation.)   Negative: Black or tarry bowel movements  (Exception: Chronic-unchanged black-grey BMs AND is taking iron pills or Pepto-Bismol.)   Negative: [1] Unable to urinate (or only a few drops) > 4 hours AND [2] bladder feels very full (e.g., palpable bladder or strong urge to urinate)   Negative: [1] Vomiting AND [2] contains bile (green color)   Negative: [1] Pain in the scrotum or testicle AND [2] present > 1 hour   Negative: Patient sounds very sick or weak to the triager    Protocols used: Abdominal Pain - Male-A-AH    "

## 2023-08-09 NOTE — HPI
Jules Aviles is a 68yoM who is well known to the general surgery service. He was recently admitted to the service with a small bowel obstruction, which was managed conservatively and resolved. He was discharged on Monday. He represents with similar abdominal distension, abdominal discomfort. CT redemonstrates a small bowel obstruction, with the obstruction in the same area. He denies any bowel function since discharge. General surgery consulted for evaluation.

## 2023-08-09 NOTE — ED NOTES
[facility]  Nurses Note -- 4 Eyes      8/9/2023       Skin assessed on: Admit      [x] No Pressure Injuries Present    []Prevention Measures Documented    [] Yes LDA  for Pressure Injury Previously documented     [] Yes New Pressure Injury Discovered   [] LDA for New Pressure Injury Added      Attending RN:  Josephine Reinoso RN     Second RN:  ANH Viveros

## 2023-08-09 NOTE — PLAN OF CARE
[facility]  Initial Discharge Assessment       Primary Care Provider: Basil Grover MD  Case Management Assessment     PCP: Same as above  Pharmacy: [facility]    Patient Arrived From: Home with significant other  Existing Help at Home: Significant other;  Aleisha Daphne    Barriers to Discharge: None    Discharge Plan:    A. Home   B. Home      Discharge planning assessment completed with patient's assistance.  Patient recently discharged (8/7/2023).  He returned due to pain/SBO.  When medically cleared, patient will discharge to home.  He will need PCP and surgery followup appointments.       Admission Diagnosis: SBO (small bowel obstruction) [K56.609]  Generalized abdominal pain [R10.84]    Admission Date: 8/8/2023  Expected Discharge Date:     Transition of Care Barriers: None    Payor: BCBS [place] MEDICARE / Plan: [employer] [place] / Product Type: Medicare Advantage /     Extended Emergency Contact Information  Primary Emergency Contact: daphnealeisha  Mobile Phone: [phone]  Relation: Significant other  Secondary Emergency Contact: Yovany Webb  Mobile Phone: [phone]  Relation: Son  Preferred language: English   needed? No    Discharge Plan A: Home  Discharge Plan B: Home      [id number] [place] - [address]  [address] [place] [address]  Phone: [phone] Fax: [phone]      Initial Assessment (most recent)       Adult Discharge Assessment - 08/09/23 0925          Discharge Assessment    Assessment Type Discharge Planning Assessment     Confirmed/corrected address, phone number and insurance Yes     Confirmed Demographics Correct on Facesheet     Source of Information patient;health record     When was your last doctors appointment? --   Patient recently discharged on 8/7/2023.  Post discharge surgery followup scheduled for 8/15/2023.    Reason For Admission Small Bowel Obstruction     People in Home  significant other     Facility Arrived From: Home     Do you expect to return to your current living situation? Yes     Do you have help at home or someone to help you manage your care at home? Yes     Who are your caregiver(s) and their phone number(s)? Significant other;  Aleisha Serna;  [phone]     Prior to hospitilization cognitive status: Alert/Oriented     Current cognitive status: Alert/Oriented     Equipment Currently Used at Home none     Readmission within 30 days? Yes   Discharged 8/7/2023    Do you currently have service(s) that help you manage your care at home? No     Do you take prescription medications? Yes     Do you have prescription coverage? Yes     Coverage BCBS Medicare     Do you have any problems affording any of your prescribed medications? No     Who is going to help you get home at discharge? Aleisha Serna;  [phone]     How do you get to doctors appointments? family or friend will provide     Are you on dialysis? No     Do you take coumadin? No     Discharge Plan A Home     Discharge Plan B Home     DME Needed Upon Discharge  none     Discharge Plan discussed with: Patient     Transition of Care Barriers None

## 2023-08-09 NOTE — OP NOTE
[facility]  Operative Note    DATE OF PROCEDURE: 8/9/2023    PREOPERATIVE DIAGNOSIS: SBO (small bowel obstruction) [K56.609]    POSTOPERATIVE DIAGNOSIS: SBO (small bowel obstruction) [K56.609]    PROCEDURES PERFORMED:  1. LAPAROTOMY, EXPLORATORY (N/A)  2. Small bowel Resection    ATTENDING SURGEON: Jn Yusuf MD    RESIDENT: Carlos Aguilera MD    ANESTHESIA: General    KEY FINDINGS: Exploratory laparotomy with dense adhesive band to the left lower quadrant. This adhesive band communicated with the bowel lumen, thus necessitating a 20cm small bowel resection with primary anastomosis.     ESTIMATED BLOOD LOSS: 50 ml    DRAINS: none    COMPLICATIONS: none    INDICATIONS FOR PROCEDURE: The patient is a 68 y.o. male who had presented to the hospital four times this calendar year with a small bowel obstruction. He was recently discharged on 8/7/23 following an obstruction that was managed conservatively. He represented in the early morning of 8/9/23 with complaints of abdominal pain, nausea, constipation. CT scan consistent with persistent small bowel obstruction. We elected to pursue surgical intervention.     PROCEDURE IN DETAIL: After informed consent was obtained, the patient was brought to the operating room and placed in the supine position. After adequate anesthesia the patient was prepped and draped in the usual sterile fashion. A timeout procedure was performed and a vertical midline incision was carried across his previous incision. Using electrocautery, we dissected down to the level of the fascia. The abdomen was entered sharply using Metzenbaum scissors. We immediately encountered distended small bowel. The small intestine was attempted to be externalized, but this was initially unsuccessful given a dense adhesive band to the left lower anterior abdominal wall. The character of the band was unlike a typical adhesive band, as it was dense, hypervascular and dusky in color. We could  not confirm with certainty that this band did not communicate with the bowel lumen. The decision was made to sharply transect the band. A Laney clamp was placed on the band near the anterior abdominal wall and the band was transected with Metzenbaum scissors. With further inspection, there was a significant fibrotic component, but closer to the intestine, it communicated with the bowel lumen. This enterotomy was near his previous small bowel anastomosis, which was significantly distended, thus the decision was made to resect this previous anastomosis. An enterotomy was made in the distal small intestine and the previously made enterotomy was used to create the common channel using a blue load on a RADHA stapler. The small intestine was then stapled off, again using a blue load on the RADHA, and the resected small intestine measured 20cm. The mesentery was transected using the ligasure device and the mesenteric defect was closed using a running 3-0 Vicryl stitch. We then turned our attention to the small intestine. This was run from the ligament of Treitz beyond the area of concern to the terminal ileum. There were no other abnormalities noted. We then further assessed this adhesive band against the anterior abdominal wall. The Laney clamp was removed and there was no obvious communication with the intestinal lumen. It blindly ended at the anterior abdominal wall. Hemostasis was ensured. The fascia was closed with two #1 looped PDS sutures. The skin was stapled closed. A sterile dressing was applied. The patient tolerated the procedure well and was transported to the recovery room in stable condition.

## 2023-08-09 NOTE — ED TRIAGE NOTES
Patient presents to the ED c/o 5/10 abdominal pain. Pt reports he was discharged from this hospital yesterday after being admitted on 8/4 for SBO. Pt states pain was initially localized to his epigastric area but is now localized to lower abdominal area and is less severe than before. Denies n/v/d, difficulty/changes in BM or urination. Last BM was today, denies presence of blood.

## 2023-08-09 NOTE — NURSING
Arrived to the floor, significant other at the bedside.  No complaints of pain.  NGT to LIWS, greenish brown drainage noted from the tube.  Midline abdominal dressings noted - scant serosanguinous drainage noted to upper part of the dressing.  Skin intact.  Educated on NPO status.  Sandoval cath noted with hayley urine

## 2023-08-09 NOTE — NURSING
[facility]  Nurses Note -- 4 Eyes      8/9/2023       Skin assessed on: Admit      [x] No Pressure Injuries Present    [x]Prevention Measures Documented    [] Yes LDA  for Pressure Injury Previously documented     [] Yes New Pressure Injury Discovered   [] LDA for New Pressure Injury Added      Attending RN:  Yeyo Blevins, RN     Second RN:  eli

## 2023-08-09 NOTE — BRIEF OP NOTE
[facility] - Surgery  Brief Operative Note    SUMMARY     Surgery Date: 8/9/2023     Surgeon(s) and Role:     * Jn Yusuf MD - Primary    Assisting Surgeon: MD Willy; MD Venkat    Pre-op Diagnosis:  SBO (small bowel obstruction) [K56.609]    Post-op Diagnosis:  Post-Op Diagnosis Codes:     * SBO (small bowel obstruction) [K56.609]    Procedure(s) (LRB):  LAPAROTOMY, EXPLORATORY (N/A)    Anesthesia: General    Operative Findings: Exploratory laparotomy with dense adhesive band to the left lower quadrant. This adhesive band communicated with the bowel lumen, thus necessitating a 20cm small bowel resection with primary anastomosis.     Estimated Blood Loss: 50 mL    Estimated Blood Loss has been documented.         Specimens:   Specimen (24h ago, onward)       Start     Ordered    08/09/23 1430  Specimen to Pathology, Surgery General Surgery  Once        Comments: Pre-op Diagnosis: SBO (small bowel obstruction) [K56.609]Procedure(s):LAPAROTOMY, EXPLORATORY Number of specimens: 1Name of specimens: Small bowel     References:    Click here for ordering Quick Tip   Question Answer Comment   Procedure Type: General Surgery    Which provider would you like to cc? JN YUSUF    Release to patient Immediate        08/09/23 1430                    [id number]

## 2023-08-09 NOTE — SUBJECTIVE & OBJECTIVE
Current Facility-Administered Medications on File Prior to Encounter   Medication    0.9%  NaCl infusion     Current Outpatient Medications on File Prior to Encounter   Medication Sig    cyanocobalamin (VITAMIN B-12) 100 MCG tablet Take 100 mcg by mouth once daily.    hydroCHLOROthiazide (MICROZIDE) 12.5 mg capsule TAKE 1 CAPSULE BY MOUTH EVERY DAY    Lactobacillus acidophilus (PROBIOTIC ORAL) Take 1 capsule by mouth once daily.    losartan (COZAAR) 50 MG tablet TAKE 1 TABLET BY MOUTH EVERY DAY    pantoprazole (PROTONIX) 40 MG tablet Take 40 mg by mouth once daily.    tamsulosin (FLOMAX) 0.4 mg Cap Take 1 capsule by mouth every evening.    vitamin D (VITAMIN D3) 1000 units Tab Take 8,000 Units by mouth once daily.    [DISCONTINUED] CHOLESTYRAMINE LIGHT 4 gram Powd once daily.        Review of patient's allergies indicates:  No Known Allergies    Past Medical History:   Diagnosis Date    Essential hypertension 12/07/2016    GERD (gastroesophageal reflux disease)      Past Surgical History:   Procedure Laterality Date    APPENDECTOMY      as a child    GALLBLADDER SURGERY  2004    HERNIA REPAIR  2004 & 2005    TONSILLECTOMY, ADENOIDECTOMY      as a child     Family History       Problem Relation (Age of Onset)    Aneurysm Mother    Heart disease Father          Tobacco Use    Smoking status: Never    Smokeless tobacco: Never   Substance and Sexual Activity    Alcohol use: No    Drug use: No    Sexual activity: Yes     Review of Systems   Constitutional:  Positive for appetite change. Negative for activity change, chills, fatigue and fever.   HENT: Negative.     Eyes: Negative.    Respiratory:  Negative for cough and shortness of breath.    Cardiovascular:  Negative for chest pain.   Gastrointestinal:  Positive for abdominal distention, abdominal pain, constipation and nausea. Negative for diarrhea and vomiting.   Genitourinary: Negative.    Musculoskeletal: Negative.    Skin: Negative.      Objective:     Vital Signs  (Most Recent):  Temp: 98.7 °F (37.1 °C) (08/08/23 2337)  Pulse: 66 (08/09/23 0603)  Resp: 16 (08/09/23 0302)  BP: 119/63 (08/09/23 0603)  SpO2: 97 % (08/09/23 0603) Vital Signs (24h Range):  Temp:  [98.7 °F (37.1 °C)] 98.7 °F (37.1 °C)  Pulse:  [57-93] 66  Resp:  [16] 16  SpO2:  [95 %-99 %] 97 %  BP: (119-169)/(63-94) 119/63     Weight: 95.3 kg (210 lb)  Body mass index is 30.13 kg/m².     Physical Exam  Vitals and nursing note reviewed.   Constitutional:       General: He is not in acute distress.     Appearance: Normal appearance.   HENT:      Nose: Nose normal.      Mouth/Throat:      Mouth: Mucous membranes are moist.   Cardiovascular:      Rate and Rhythm: Normal rate and regular rhythm.   Pulmonary:      Effort: Pulmonary effort is normal. No respiratory distress.   Abdominal:      Comments: Abdomen soft, moderately distended  Tenderness to palpation at the mid-abdomen  Well healed midline surgical incision   Musculoskeletal:         General: Normal range of motion.   Skin:     General: Skin is warm and dry.   Neurological:      General: No focal deficit present.      Mental Status: He is alert.          I have reviewed all pertinent lab results within the past 24 hours.  CBC:   Recent Labs   Lab 08/09/23 0623   WBC 14.86*   RBC 5.07   HGB 15.2   HCT 45.5      MCV 90   MCH 30.0   MCHC 33.4     CMP:   Recent Labs   Lab 08/09/23  0003 08/09/23 0623    104   CALCIUM 9.2 9.1   ALBUMIN 4.1  --    PROT 7.2  --     139   K 3.6 3.7   CO2 24 27    101   BUN 10 9   CREATININE 0.9 0.9   ALKPHOS 71  --    ALT 20  --    AST 22  --    BILITOT 1.2*  --        Significant Diagnostics:  I have reviewed all pertinent imaging results/findings within the past 24 hours.

## 2023-08-09 NOTE — ED PROVIDER NOTES
"Encounter Date: 8/8/2023    SCRIBE #1 NOTE: I, Scar Theodore, am scribing for, and in the presence of,  Anibal Portillo MD. I have scribed the following portions of the note - Other sections scribed: HPI, ROS, PE.       History     Chief Complaint   Patient presents with    Abdominal Pain     Patient presents to ED secondary to abdominal pain beginning this morning. States admitted this past Friday and was discharged yesterday after an admission for a SBO. States symptoms feel similar. Denies n/v/d. Also reports has not eaten in 5 days.      Jules Aviles is a 68 y.o. male with a PMHx of HTN, GERD, small bowel obstruction, who presents to the ED for evaluation of lower abdominal pain beginning today. Patient reports he was recently discharge yesterday after being admitted for a small bowel obstruction. He reports he has not had any solids in 5 days. He further endorses he began having lower abdominal pain described as "pressure," as well as reporting "his stomach is gurgling." No medications taken PTA. No alleviating or exacerbating factors noted. Denies rectal pain, nausea, vomiting, or other associated symptoms. NKDA.     The history is provided by the patient. No  was used.     Review of patient's allergies indicates:  No Known Allergies  Past Medical History:   Diagnosis Date    Essential hypertension 12/07/2016    GERD (gastroesophageal reflux disease)      Past Surgical History:   Procedure Laterality Date    APPENDECTOMY      as a child    GALLBLADDER SURGERY  2004    HERNIA REPAIR  2004 & 2005    TONSILLECTOMY, ADENOIDECTOMY      as a child     Family History   Problem Relation Age of Onset    Aneurysm Mother     Heart disease Father      Social History     Tobacco Use    Smoking status: Never    Smokeless tobacco: Never   Substance Use Topics    Alcohol use: No    Drug use: No     Review of Systems   Constitutional:  Negative for chills and fever.   HENT:  Negative for congestion.  "   Respiratory:  Negative for cough and shortness of breath.    Gastrointestinal:  Positive for abdominal pain. Negative for diarrhea, nausea, rectal pain and vomiting.   Genitourinary:  Negative for dysuria.   Musculoskeletal:  Negative for back pain.   Skin:  Negative for rash.   Neurological:  Negative for headaches.       Physical Exam     Initial Vitals [08/08/23 2337]   BP Pulse Resp Temp SpO2   (!) 169/83 70 16 98.7 °F (37.1 °C) 97 %      MAP       --         Physical Exam    Nursing note and vitals reviewed.  Constitutional: He appears well-developed. He is not diaphoretic. No distress.   HENT:   Head: Normocephalic.   Eyes: EOM are normal.   Cardiovascular:  Normal rate and regular rhythm.           No murmur heard.  Pulmonary/Chest: Effort normal and breath sounds normal. He has no wheezes.   Abdominal: Abdomen is soft. He exhibits no distension.   Lower abdominal tenderness on exam. There is no rebound and no guarding.   Musculoskeletal:         General: Normal range of motion.     Neurological: He is alert.   Skin: Skin is warm.         ED Course   Procedures  Labs Reviewed   CBC W/ AUTO DIFFERENTIAL - Abnormal; Notable for the following components:       Result Value    Gran % 73.2 (*)     Lymph % 17.6 (*)     All other components within normal limits   COMPREHENSIVE METABOLIC PANEL - Abnormal; Notable for the following components:    Total Bilirubin 1.2 (*)     All other components within normal limits   URINALYSIS, REFLEX TO URINE CULTURE - Abnormal; Notable for the following components:    Ketones, UA 2+ (*)     All other components within normal limits    Narrative:     Specimen Source->Urine   CBC W/ AUTO DIFFERENTIAL - Abnormal; Notable for the following components:    WBC 14.86 (*)     Gran # (ANC) 12.7 (*)     Immature Grans (Abs) 0.05 (*)     Gran % 85.5 (*)     Lymph % 8.7 (*)     All other components within normal limits   LIPASE   LACTIC ACID, PLASMA   BASIC METABOLIC PANEL          Imaging  Results              X-Ray Abdomen AP 1 View (In process)                      CT Abdomen Pelvis With Contrast (Final result)  Result time 08/09/23 02:01:46      Final result by Natan Ritter MD (08/09/23 02:01:46)                   Impression:      1.  Dilated loops of small bowel with apparent transition point in the right paramidline lower abdomen as above.  Findings are concerning for evolving small bowel obstruction, possibly secondary to adhesions or an internal hernia.  Overall extent of small-bowel dilatation appears mildly improved from prior examination.  No evidence of free intraperitoneal air or portal venous gas.  Clinical correlation advised.    2.  Colonic diverticulosis without evidence to suggest acute diverticulitis.    3.  Decreased attenuation hepatic parenchyma which may relate to hepatic steatosis.    4.  Additional findings as above.      Electronically signed by: Natan Ritter MD  Date:    08/09/2023  Time:    02:01               Narrative:    EXAMINATION:  CT ABDOMEN PELVIS WITH CONTRAST    CLINICAL HISTORY:  Possible SBO;    TECHNIQUE:  Low dose axial images, sagittal and coronal reformations were obtained from the lung bases to the pubic symphysis following the IV administration of 100 mL of Omnipaque 350 and the oral administration of 15 ml of Omnipaque 350.    COMPARISON:  CT 08/04/2023    FINDINGS:  The lung bases are unremarkable.  There is no pleural fluid present.  There are scattered coronary artery calcifications present.    The liver is not significantly enlarged.  There is decreased attenuation of the hepatic parenchyma which may relate to hepatic steatosis.  The gallbladder is surgically absent.  There is no intra-or extrahepatic biliary ductal dilatation.    Stomach is mildly distended.  The spleen, pancreas, and adrenal glands appear within normal limits    Kidneys are normal in size and location and enhance symmetrically.  There is nonobstructing right-sided  nephrolithiasis.  There is no evidence of hydronephrosis. Urinary bladder is mildly distended and otherwise unremarkable.  Prostate is mildly prominent.  There are bilateral fat containing inguinal hernias.    The abdominal aorta is normal in course and caliber with mild atherosclerotic calcification along its course.  There is no retroperitoneal hematoma.    There are postsurgical changes of the small bowel.  There are dilated loops of small bowel measuring up to 4.4 cm in diameter.  There is an apparent focal transition point within the right paramidline lower abdomen (for example axial series 3, image 113).  Findings are concerning for evolving small bowel obstruction, possibly secondary to adhesions or an internal hernia.  The overall extent of small-bowel dilatation appears mildly improved from prior examination.  There is no evidence of free intraperitoneal air, portal venous gas, or pneumatosis.  Colon is relatively nondistended.  There is colonic diverticulosis without definite evidence of acute diverticulitis.  There are remote postsurgical change of the anterior abdominal wall.    Osseous structures demonstrate degenerative changes of the thoracolumbar spine.  The extraperitoneal soft tissues are unremarkable.                                       Medications   sodium chloride 0.9% flush 10 mL (has no administration in time range)   ondansetron disintegrating tablet 8 mg (has no administration in time range)   lactated ringers infusion (has no administration in time range)   enoxaparin injection 40 mg (has no administration in time range)   iohexoL (OMNIPAQUE 300) injection 15 mL (15 mLs Oral Given 8/9/23 0132)   iohexoL (OMNIPAQUE 350) injection 100 mL (100 mLs Intravenous Given 8/9/23 0132)   0.9%  NaCl infusion (0 mLs Intravenous Stopped 8/9/23 0641)   metoclopramide HCl injection 10 mg (10 mg Intravenous Given 8/9/23 0339)   ketorolac injection 15 mg (15 mg Intravenous Given 8/9/23 0339)     Medical  Decision Making:   History:   Old Medical Records: I decided to obtain old medical records.  Old Records Summarized: other records.       <> Summary of Records: External documents reviewed.  Initial Assessment:     68-year-old male with previous cholecystectomy, appendectomy, partial small bowel obstruction presenting with generalized abdominal pain and nausea.  Patient was discharged 2 days prior for similar small-bowel obstruction.  During recent discharge patient was able to tolerate p.o. however after about 1 day of discharge patient reports his symptoms have reoccurred.  Patient does have generalized tenderness on palpation.  No guarding rebound or rigidity to suggest acute abdomen.  CT abdomen and pelvis shows transition point in the mid abdomen with mildly dilated small bowel loops.  Suspect small bowel obstruction.  Case was discussed with General surgery for further management.  Patient is not vomiting at this time.  No gastric tube placed.  Differential Diagnosis:   Small-bowel obstruction, pancreatitis  Clinical Tests:   Lab Tests: Ordered and Reviewed  Radiological Study: Ordered and Reviewed          Scribe Attestation:   Scribe #1: I performed the above scribed service and the documentation accurately describes the services I performed. I attest to the accuracy of the note.        ED Course as of 08/09/23 0651   Wed Aug 09, 2023   0004 History cholecystectomy, appendectomy, partial small bowel resection.  Patient has been having bowel movements throughout the day. [JM]   0314 Discussed with Sidra Gandara, general surgery resident.     SBO present. NPO and IVF  [JM]      ED Course User Index  [JM] Anibal Portillo MD                   Clinical Impression:   Final diagnoses:  [K56.609] SBO (small bowel obstruction)  [R10.84] Generalized abdominal pain (Primary)        ED Disposition Condition    Transfer to Another Facility Stable               I, Anibal Portillo, personally performed the services  described in this documentation. All medical record entries made by the scribe were at my direction and in my presence. I have reviewed the chart and agree that the record reflects my personal performance and is accurate and complete.      Anibal Portillo MD  08/09/23 0651

## 2023-08-09 NOTE — ASSESSMENT & PLAN NOTE
Jules Aviles is a 68yoM who represents to the hospital with a small bowel obstruction. This is his fourth presentation in the last three months.     - patient seen and examined  - admit to general surgery  - NPO, mIVFs  - insert NG tube for decompression  - plan to proceed to OR for exploratory laparotomy  - surgical consent to be obtained  - please call with questions

## 2023-08-09 NOTE — CONSULTS
[facility] Emergency Dept  General Surgery  Consult Note    Patient Name: Jules Aviles  MRN: [id number]  Code Status: Full Code  Admission Date: 8/8/2023  Hospital Length of Stay: 0 days  Attending Physician: Christos Watkins MD  Primary Care Provider: Basil Grover MD    Patient information was obtained from patient and ER records.     Consults  Subjective:     Principal Problem: SBO (small bowel obstruction)    History of Present Illness: Jules Aviles is a 68yoM who is well known to the general surgery service. He was recently admitted to the service with a small bowel obstruction, which was managed conservatively and resolved. He was discharged on Monday. He represents with similar abdominal distension, abdominal discomfort. CT redemonstrates a small bowel obstruction, with the obstruction in the same area. He denies any bowel function since discharge. General surgery consulted for evaluation.       Current Facility-Administered Medications on File Prior to Encounter   Medication    0.9%  NaCl infusion     Current Outpatient Medications on File Prior to Encounter   Medication Sig    cyanocobalamin (VITAMIN B-12) 100 MCG tablet Take 100 mcg by mouth once daily.    hydroCHLOROthiazide (MICROZIDE) 12.5 mg capsule TAKE 1 CAPSULE BY MOUTH EVERY DAY    Lactobacillus acidophilus (PROBIOTIC ORAL) Take 1 capsule by mouth once daily.    losartan (COZAAR) 50 MG tablet TAKE 1 TABLET BY MOUTH EVERY DAY    pantoprazole (PROTONIX) 40 MG tablet Take 40 mg by mouth once daily.    tamsulosin (FLOMAX) 0.4 mg Cap Take 1 capsule by mouth every evening.    vitamin D (VITAMIN D3) 1000 units Tab Take 8,000 Units by mouth once daily.    [DISCONTINUED] CHOLESTYRAMINE LIGHT 4 gram Powd once daily.        Review of patient's allergies indicates:  No Known Allergies    Past Medical History:   Diagnosis Date    Essential hypertension 12/07/2016    GERD (gastroesophageal reflux disease)      Past Surgical History:   Procedure  Laterality Date    APPENDECTOMY      as a child    GALLBLADDER SURGERY  2004    HERNIA REPAIR  2004 & 2005    TONSILLECTOMY, ADENOIDECTOMY      as a child     Family History       Problem Relation (Age of Onset)    Aneurysm Mother    Heart disease Father          Tobacco Use    Smoking status: Never    Smokeless tobacco: Never   Substance and Sexual Activity    Alcohol use: No    Drug use: No    Sexual activity: Yes     Review of Systems   Constitutional:  Positive for appetite change. Negative for activity change, chills, fatigue and fever.   HENT: Negative.     Eyes: Negative.    Respiratory:  Negative for cough and shortness of breath.    Cardiovascular:  Negative for chest pain.   Gastrointestinal:  Positive for abdominal distention, abdominal pain, constipation and nausea. Negative for diarrhea and vomiting.   Genitourinary: Negative.    Musculoskeletal: Negative.    Skin: Negative.      Objective:     Vital Signs (Most Recent):  Temp: 98.7 °F (37.1 °C) (08/08/23 2337)  Pulse: 66 (08/09/23 0603)  Resp: 16 (08/09/23 0302)  BP: 119/63 (08/09/23 0603)  SpO2: 97 % (08/09/23 0603) Vital Signs (24h Range):  Temp:  [98.7 °F (37.1 °C)] 98.7 °F (37.1 °C)  Pulse:  [57-93] 66  Resp:  [16] 16  SpO2:  [95 %-99 %] 97 %  BP: (119-169)/(63-94) 119/63     Weight: 95.3 kg (210 lb)  Body mass index is 30.13 kg/m².     Physical Exam  Vitals and nursing note reviewed.   Constitutional:       General: He is not in acute distress.     Appearance: Normal appearance.   HENT:      Nose: Nose normal.      Mouth/Throat:      Mouth: Mucous membranes are moist.   Cardiovascular:      Rate and Rhythm: Normal rate and regular rhythm.   Pulmonary:      Effort: Pulmonary effort is normal. No respiratory distress.   Abdominal:      Comments: Abdomen soft, moderately distended  Tenderness to palpation at the mid-abdomen  Well healed midline surgical incision   Musculoskeletal:         General: Normal range of motion.   Skin:     General:  Skin is warm and dry.   Neurological:      General: No focal deficit present.      Mental Status: He is alert.          I have reviewed all pertinent lab results within the past 24 hours.  CBC:   Recent Labs   Lab 08/09/23 0623   WBC 14.86*   RBC 5.07   HGB 15.2   HCT 45.5      MCV 90   MCH 30.0   MCHC 33.4     CMP:   Recent Labs   Lab 08/09/23  0003 08/09/23 0623    104   CALCIUM 9.2 9.1   ALBUMIN 4.1  --    PROT 7.2  --     139   K 3.6 3.7   CO2 24 27    101   BUN 10 9   CREATININE 0.9 0.9   ALKPHOS 71  --    ALT 20  --    AST 22  --    BILITOT 1.2*  --        Significant Diagnostics:  I have reviewed all pertinent imaging results/findings within the past 24 hours.      Assessment/Plan:     * SBO (small bowel obstruction)  Jules Aviles is a 68yoM who represents to the hospital with a small bowel obstruction. This is his fourth presentation in the last three months.     - patient seen and examined  - admit to general surgery  - NPO, mIVFs  - insert NG tube for decompression  - plan to proceed to OR for exploratory laparotomy  - surgical consent to be obtained  - please call with questions      VTE Risk Mitigation (From admission, onward)         Ordered     enoxaparin injection 40 mg  Daily         08/09/23 0603     IP VTE HIGH RISK PATIENT  Once         08/09/23 0603     Place sequential compression device  Until discontinued         08/09/23 0603                Thank you for your consult. I will follow-up with patient. Please contact us if you have any additional questions.    Carlos Aguilera MD  General Surgery  [facility] - Emergency Dept

## 2023-08-09 NOTE — TRANSFER OF CARE
"Anesthesia Transfer of Care Note    Patient: Jules Aviles    Procedure(s) Performed: Procedure(s) (LRB):  LAPAROTOMY, EXPLORATORY (N/A)    Patient location: PACU    Anesthesia Type: general    Transport from OR: Transported from OR on room air with adequate spontaneous ventilation    Post pain: adequate analgesia    Post assessment: no apparent anesthetic complications and tolerated procedure well    Post vital signs: stable    Level of consciousness: awake, alert and oriented    Nausea/Vomiting: no nausea/vomiting    Complications: none          Last vitals:   Visit Vitals  /82 (BP Location: Left arm, Patient Position: Sitting)   Pulse 92   Temp (!) 38.1 °C (100.6 °F) (Oral)   Resp 16   Ht 5' 10" (1.778 m)   Wt 95.3 kg (210 lb)   SpO2 98%   BMI 30.13 kg/m²     "

## 2023-08-10 ENCOUNTER — PATIENT OUTREACH (OUTPATIENT)
Dept: ADMINISTRATIVE | Facility: CLINIC | Age: 68
End: 2023-08-10
Payer: MEDICARE

## 2023-08-10 LAB
ALBUMIN SERPL BCP-MCNC: 3.1 G/DL (ref 3.5–5.2)
ALP SERPL-CCNC: 70 U/L (ref 55–135)
ALT SERPL W/O P-5'-P-CCNC: 55 U/L (ref 10–44)
ANION GAP SERPL CALC-SCNC: 10 MMOL/L (ref 8–16)
AST SERPL-CCNC: 39 U/L (ref 10–40)
BILIRUB SERPL-MCNC: 1.6 MG/DL (ref 0.1–1)
BUN SERPL-MCNC: 12 MG/DL (ref 8–23)
CALCIUM SERPL-MCNC: 8.7 MG/DL (ref 8.7–10.5)
CHLORIDE SERPL-SCNC: 101 MMOL/L (ref 95–110)
CO2 SERPL-SCNC: 28 MMOL/L (ref 23–29)
CREAT SERPL-MCNC: 0.9 MG/DL (ref 0.5–1.4)
ERYTHROCYTE [DISTWIDTH] IN BLOOD BY AUTOMATED COUNT: 13 % (ref 11.5–14.5)
EST. GFR  (NO RACE VARIABLE): >60 ML/MIN/1.73 M^2
GLUCOSE SERPL-MCNC: 92 MG/DL (ref 70–110)
HCT VFR BLD AUTO: 47.8 % (ref 40–54)
HGB BLD-MCNC: 15.8 G/DL (ref 14–18)
MAGNESIUM SERPL-MCNC: 1.6 MG/DL (ref 1.6–2.6)
MCH RBC QN AUTO: 29.6 PG (ref 27–31)
MCHC RBC AUTO-ENTMCNC: 33.1 G/DL (ref 32–36)
MCV RBC AUTO: 90 FL (ref 82–98)
PLATELET # BLD AUTO: 197 K/UL (ref 150–450)
PMV BLD AUTO: 11.9 FL (ref 9.2–12.9)
POTASSIUM SERPL-SCNC: 3.9 MMOL/L (ref 3.5–5.1)
PROT SERPL-MCNC: 6 G/DL (ref 6–8.4)
RBC # BLD AUTO: 5.33 M/UL (ref 4.6–6.2)
SODIUM SERPL-SCNC: 139 MMOL/L (ref 136–145)
WBC # BLD AUTO: 17.94 K/UL (ref 3.9–12.7)

## 2023-08-10 PROCEDURE — 36415 COLL VENOUS BLD VENIPUNCTURE: CPT | Performed by: STUDENT IN AN ORGANIZED HEALTH CARE EDUCATION/TRAINING PROGRAM

## 2023-08-10 PROCEDURE — 83735 ASSAY OF MAGNESIUM: CPT | Performed by: STUDENT IN AN ORGANIZED HEALTH CARE EDUCATION/TRAINING PROGRAM

## 2023-08-10 PROCEDURE — 63600175 PHARM REV CODE 636 W HCPCS: Performed by: STUDENT IN AN ORGANIZED HEALTH CARE EDUCATION/TRAINING PROGRAM

## 2023-08-10 PROCEDURE — 85027 COMPLETE CBC AUTOMATED: CPT | Performed by: STUDENT IN AN ORGANIZED HEALTH CARE EDUCATION/TRAINING PROGRAM

## 2023-08-10 PROCEDURE — 80053 COMPREHEN METABOLIC PANEL: CPT | Performed by: STUDENT IN AN ORGANIZED HEALTH CARE EDUCATION/TRAINING PROGRAM

## 2023-08-10 PROCEDURE — C9113 INJ PANTOPRAZOLE SODIUM, VIA: HCPCS | Performed by: STUDENT IN AN ORGANIZED HEALTH CARE EDUCATION/TRAINING PROGRAM

## 2023-08-10 PROCEDURE — 11000001 HC ACUTE MED/SURG PRIVATE ROOM

## 2023-08-10 RX ADMIN — MUPIROCIN: 20 OINTMENT TOPICAL at 08:08

## 2023-08-10 RX ADMIN — SODIUM CHLORIDE, POTASSIUM CHLORIDE, SODIUM LACTATE AND CALCIUM CHLORIDE: 600; 310; 30; 20 INJECTION, SOLUTION INTRAVENOUS at 06:08

## 2023-08-10 RX ADMIN — KETOROLAC TROMETHAMINE 15 MG: 30 INJECTION, SOLUTION INTRAMUSCULAR; INTRAVENOUS at 05:08

## 2023-08-10 RX ADMIN — PANTOPRAZOLE SODIUM 40 MG: 40 INJECTION, POWDER, FOR SOLUTION INTRAVENOUS at 08:08

## 2023-08-10 RX ADMIN — SODIUM CHLORIDE, POTASSIUM CHLORIDE, SODIUM LACTATE AND CALCIUM CHLORIDE 125 ML: 600; 310; 30; 20 INJECTION, SOLUTION INTRAVENOUS at 02:08

## 2023-08-10 RX ADMIN — SODIUM CHLORIDE, POTASSIUM CHLORIDE, SODIUM LACTATE AND CALCIUM CHLORIDE: 600; 310; 30; 20 INJECTION, SOLUTION INTRAVENOUS at 10:08

## 2023-08-10 RX ADMIN — KETOROLAC TROMETHAMINE 15 MG: 30 INJECTION, SOLUTION INTRAMUSCULAR; INTRAVENOUS at 11:08

## 2023-08-10 RX ADMIN — ENOXAPARIN SODIUM 40 MG: 40 INJECTION SUBCUTANEOUS at 04:08

## 2023-08-10 NOTE — NURSING
Pt is able to urinate and ambulated to bathroom. NG tube for decompression is clamped now while on chair.

## 2023-08-10 NOTE — NURSING
[facility] [facility] [place]      8/10/2023       Skin assessed on: Q Shift      [x] No Pressure Injuries Present    [x]Prevention Measures Documented    [] Yes LDA  for Pressure Injury Previously documented     [] Yes New Pressure Injury Discovered   [] LDA for New Pressure Injury Added      Attending RN:  Issa Dixon RN     Second RN:  Elo Whiting RN

## 2023-08-10 NOTE — ASSESSMENT & PLAN NOTE
Jules Aviles is a 68yoM who represents to the hospital with a small bowel obstruction. This is his fourth presentation in the last three months.     - patient seen and examined  - POD1 s/p ex lap, lysis of adhesions and small bowel resection  - vital signs, labs reviewed  - remain NPO with NG tube decompression  - awaiting return of bowel function   - given degree of bowel manipulation and bowel distension, would expect a degree of post-operative ileus  - multi-modal pain meds  - out of bed, to chair, ok for ambulation  - please call with questions

## 2023-08-10 NOTE — PROGRESS NOTES
[facility]  General Surgery  Progress Note    Subjective:     History of Present Illness:  Jules Aviles is a 68yoM who is well known to the general surgery service. He was recently admitted to the service with a small bowel obstruction, which was managed conservatively and resolved. He was discharged on Monday. He represents with similar abdominal distension, abdominal discomfort. CT redemonstrates a small bowel obstruction, with the obstruction in the same area. He denies any bowel function since discharge. General surgery consulted for evaluation.       Post-Op Info:  Procedure(s) (LRB):  LAPAROTOMY, EXPLORATORY (N/A)   1 Day Post-Op     Interval History: Patient seen and examined this morning. No acute events overnight. POD1 s/p lysis of adhesions, small bowel resection. 300cc of thin bilious output from NG tube. Appropriately tender.     Medications:  Continuous Infusions:   lactated ringers 125 mL (08/10/23 0240)     Scheduled Meds:   enoxparin  40 mg Subcutaneous Q24H (prophylaxis, 1700)    mupirocin   Nasal BID    pantoprazole  40 mg Intravenous Daily     PRN Meds:hydrALAZINE, HYDROmorphone, ketorolac, ondansetron     Review of patient's allergies indicates:  No Known Allergies  Objective:     Vital Signs (Most Recent):  Temp: 99.3 °F (37.4 °C) (08/10/23 0714)  Pulse: 87 (08/10/23 0714)  Resp: 17 (08/10/23 0714)  BP: 122/81 (08/10/23 0714)  SpO2: 96 % (08/10/23 0714) Vital Signs (24h Range):  Temp:  [98.1 °F (36.7 °C)-100.6 °F (38.1 °C)] 99.3 °F (37.4 °C)  Pulse:  [80-95] 87  Resp:  [15-19] 17  SpO2:  [90 %-100 %] 96 %  BP: (122-159)/(63-83) 122/81     Weight: 92.1 kg (203 lb 0.7 oz)  Body mass index is 29.13 kg/m².    Intake/Output - Last 3 Shifts         08/08 0700 08/09 0659 08/09 0700  08/10 0659 08/10 0700  08/11 0659    P.O.  0     I.V. (mL/kg)  266.7 (2.9)     IV Piggyback  1550     Total Intake(mL/kg)  1816.7 (19.7)     Urine (mL/kg/hr)  1080 (0.5)     Emesis/NG output  20     Drains   300     Stool  0     Blood  50     Total Output  1450     Net  +366.7            Stool Occurrence  0 x              Physical Exam  Vitals and nursing note reviewed.   Constitutional:       General: He is not in acute distress.     Appearance: Normal appearance.   HENT:      Nose:      Comments: NG tube in place with thin bilious output     Mouth/Throat:      Mouth: Mucous membranes are moist.   Cardiovascular:      Rate and Rhythm: Normal rate and regular rhythm.   Pulmonary:      Effort: Pulmonary effort is normal. No respiratory distress.   Abdominal:      Comments: Abdomen soft, mildly distended  Appropriately tender to palpation; mainly at right lower quadrant  Midline incision without concern; island dressing in place   Musculoskeletal:         General: Normal range of motion.   Skin:     General: Skin is warm and dry.   Neurological:      General: No focal deficit present.      Mental Status: He is alert.          Significant Labs:  I have reviewed all pertinent lab results within the past 24 hours.  CBC:   Recent Labs   Lab 08/10/23  0438   WBC 17.94*   RBC 5.33   HGB 15.8   HCT 47.8      MCV 90   MCH 29.6   MCHC 33.1     CMP:   Recent Labs   Lab 08/10/23  0438   GLU 92   CALCIUM 8.7   ALBUMIN 3.1*   PROT 6.0      K 3.9   CO2 28      BUN 12   CREATININE 0.9   ALKPHOS 70   ALT 55*   AST 39   BILITOT 1.6*       Significant Diagnostics:  I have reviewed all pertinent imaging results/findings within the past 24 hours.    Assessment/Plan:     * SBO (small bowel obstruction)  Jules Aviles is a 68yoM who represents to the hospital with a small bowel obstruction. This is his fourth presentation in the last three months.     - patient seen and examined  - POD1 s/p ex lap, lysis of adhesions and small bowel resection  - vital signs, labs reviewed  - remain NPO with NG tube decompression  - awaiting return of bowel function   - given degree of bowel manipulation and bowel distension, would expect a  degree of post-operative ileus  - multi-modal pain meds  - out of bed, to chair, ok for ambulation  - please call with questions        Carlos Aguilera MD  General Surgery  [facility] [place]

## 2023-08-10 NOTE — SUBJECTIVE & OBJECTIVE
Interval History: Patient seen and examined this morning. No acute events overnight. POD1 s/p lysis of adhesions, small bowel resection. 300cc of thin bilious output from NG tube. Appropriately tender.     Medications:  Continuous Infusions:   lactated ringers 125 mL (08/10/23 0240)     Scheduled Meds:   enoxparin  40 mg Subcutaneous Q24H (prophylaxis, 1700)    mupirocin   Nasal BID    pantoprazole  40 mg Intravenous Daily     PRN Meds:hydrALAZINE, HYDROmorphone, ketorolac, ondansetron     Review of patient's allergies indicates:  No Known Allergies  Objective:     Vital Signs (Most Recent):  Temp: 99.3 °F (37.4 °C) (08/10/23 0714)  Pulse: 87 (08/10/23 0714)  Resp: 17 (08/10/23 0714)  BP: 122/81 (08/10/23 0714)  SpO2: 96 % (08/10/23 0714) Vital Signs (24h Range):  Temp:  [98.1 °F (36.7 °C)-100.6 °F (38.1 °C)] 99.3 °F (37.4 °C)  Pulse:  [80-95] 87  Resp:  [15-19] 17  SpO2:  [90 %-100 %] 96 %  BP: (122-159)/(63-83) 122/81     Weight: 92.1 kg (203 lb 0.7 oz)  Body mass index is 29.13 kg/m².    Intake/Output - Last 3 Shifts         08/08 0700 08/09 0659 08/09 0700  08/10 0659 08/10 0700 08/11 0659    P.O.  0     I.V. (mL/kg)  266.7 (2.9)     IV Piggyback  1550     Total Intake(mL/kg)  1816.7 (19.7)     Urine (mL/kg/hr)  1080 (0.5)     Emesis/NG output  20     Drains  300     Stool  0     Blood  50     Total Output  1450     Net  +366.7            Stool Occurrence  0 x              Physical Exam  Vitals and nursing note reviewed.   Constitutional:       General: He is not in acute distress.     Appearance: Normal appearance.   HENT:      Nose:      Comments: NG tube in place with thin bilious output     Mouth/Throat:      Mouth: Mucous membranes are moist.   Cardiovascular:      Rate and Rhythm: Normal rate and regular rhythm.   Pulmonary:      Effort: Pulmonary effort is normal. No respiratory distress.   Abdominal:      Comments: Abdomen soft, mildly distended  Appropriately tender to palpation; mainly at right lower  quadrant  Midline incision without concern; island dressing in place   Musculoskeletal:         General: Normal range of motion.   Skin:     General: Skin is warm and dry.   Neurological:      General: No focal deficit present.      Mental Status: He is alert.          Significant Labs:  I have reviewed all pertinent lab results within the past 24 hours.  CBC:   Recent Labs   Lab 08/10/23  0438   WBC 17.94*   RBC 5.33   HGB 15.8   HCT 47.8      MCV 90   MCH 29.6   MCHC 33.1     CMP:   Recent Labs   Lab 08/10/23  0438   GLU 92   CALCIUM 8.7   ALBUMIN 3.1*   PROT 6.0      K 3.9   CO2 28      BUN 12   CREATININE 0.9   ALKPHOS 70   ALT 55*   AST 39   BILITOT 1.6*       Significant Diagnostics:  I have reviewed all pertinent imaging results/findings within the past 24 hours.

## 2023-08-10 NOTE — ANESTHESIA POSTPROCEDURE EVALUATION
Anesthesia Post Evaluation    Patient: Jules Aviles    Procedure(s) Performed: Procedure(s) (LRB):  LAPAROTOMY, EXPLORATORY (N/A)    Final Anesthesia Type: general      Patient location during evaluation: PACU  Patient participation: Yes- Able to Participate  Level of consciousness: awake and alert  Post-procedure vital signs: reviewed and stable  Pain management: adequate  Airway patency: patent    PONV status at discharge: No PONV  Anesthetic complications: no      Cardiovascular status: hemodynamically stable  Respiratory status: unassisted and spontaneous ventilation  Hydration status: euvolemic  Follow-up not needed.          Vitals Value Taken Time   /81 08/10/23 0714   Temp 37.4 °C (99.3 °F) 08/10/23 0714   Pulse 87 08/10/23 0714   Resp 17 08/10/23 0714   SpO2 96 % 08/10/23 0714         Event Time   Out of Recovery 08/09/2023 16:55:12         Pain/Andrey Score: Pain Rating Prior to Med Admin: 6 (8/10/2023  5:15 AM)  Pain Rating Post Med Admin: 4 (8/9/2023  4:30 PM)  Andrey Score: 9 (8/9/2023  5:40 PM)

## 2023-08-10 NOTE — NURSING
[facility], [facility]  Nurses Note -- 4 Eyes      8/9/2023       Skin assessed on: Q Shift      [x] No Pressure Injuries Present    []Prevention Measures Documented    [] Yes LDA  for Pressure Injury Previously documented     [] Yes New Pressure Injury Discovered   [] LDA for New Pressure Injury Added      Attending RN:  Gene Wright LPN     Second RN: Yeyo KAMARA

## 2023-08-11 LAB
ALBUMIN SERPL BCP-MCNC: 2.6 G/DL (ref 3.5–5.2)
ALP SERPL-CCNC: 86 U/L (ref 55–135)
ALT SERPL W/O P-5'-P-CCNC: 36 U/L (ref 10–44)
ANION GAP SERPL CALC-SCNC: 10 MMOL/L (ref 8–16)
AST SERPL-CCNC: 20 U/L (ref 10–40)
BASOPHILS # BLD AUTO: 0.04 K/UL (ref 0–0.2)
BASOPHILS NFR BLD: 0.2 % (ref 0–1.9)
BILIRUB SERPL-MCNC: 1.3 MG/DL (ref 0.1–1)
BUN SERPL-MCNC: 15 MG/DL (ref 8–23)
CALCIUM SERPL-MCNC: 9.3 MG/DL (ref 8.7–10.5)
CHLORIDE SERPL-SCNC: 102 MMOL/L (ref 95–110)
CO2 SERPL-SCNC: 28 MMOL/L (ref 23–29)
CREAT SERPL-MCNC: 0.8 MG/DL (ref 0.5–1.4)
DIFFERENTIAL METHOD: ABNORMAL
EOSINOPHIL # BLD AUTO: 0.1 K/UL (ref 0–0.5)
EOSINOPHIL NFR BLD: 0.3 % (ref 0–8)
ERYTHROCYTE [DISTWIDTH] IN BLOOD BY AUTOMATED COUNT: 13.2 % (ref 11.5–14.5)
ERYTHROCYTE [DISTWIDTH] IN BLOOD BY AUTOMATED COUNT: 13.2 % (ref 11.5–14.5)
EST. GFR  (NO RACE VARIABLE): >60 ML/MIN/1.73 M^2
FINAL PATHOLOGIC DIAGNOSIS: NORMAL
GLUCOSE SERPL-MCNC: 103 MG/DL (ref 70–110)
GROSS: NORMAL
HCT VFR BLD AUTO: 42.5 % (ref 40–54)
HCT VFR BLD AUTO: 43.2 % (ref 40–54)
HGB BLD-MCNC: 13.7 G/DL (ref 14–18)
HGB BLD-MCNC: 14.1 G/DL (ref 14–18)
IMM GRANULOCYTES # BLD AUTO: 0.13 K/UL (ref 0–0.04)
IMM GRANULOCYTES NFR BLD AUTO: 0.7 % (ref 0–0.5)
LYMPHOCYTES # BLD AUTO: 1.4 K/UL (ref 1–4.8)
LYMPHOCYTES NFR BLD: 7.9 % (ref 18–48)
Lab: NORMAL
MAGNESIUM SERPL-MCNC: 1.7 MG/DL (ref 1.6–2.6)
MCH RBC QN AUTO: 29.9 PG (ref 27–31)
MCH RBC QN AUTO: 30.4 PG (ref 27–31)
MCHC RBC AUTO-ENTMCNC: 31.7 G/DL (ref 32–36)
MCHC RBC AUTO-ENTMCNC: 33.2 G/DL (ref 32–36)
MCV RBC AUTO: 92 FL (ref 82–98)
MCV RBC AUTO: 94 FL (ref 82–98)
MONOCYTES # BLD AUTO: 1 K/UL (ref 0.3–1)
MONOCYTES NFR BLD: 5.6 % (ref 4–15)
NEUTROPHILS # BLD AUTO: 15.2 K/UL (ref 1.8–7.7)
NEUTROPHILS NFR BLD: 85.3 % (ref 38–73)
NRBC BLD-RTO: 0 /100 WBC
PLATELET # BLD AUTO: 184 K/UL (ref 150–450)
PLATELET # BLD AUTO: 202 K/UL (ref 150–450)
PMV BLD AUTO: 11.7 FL (ref 9.2–12.9)
PMV BLD AUTO: 12 FL (ref 9.2–12.9)
POTASSIUM SERPL-SCNC: 3.9 MMOL/L (ref 3.5–5.1)
PROT SERPL-MCNC: 6 G/DL (ref 6–8.4)
RBC # BLD AUTO: 4.58 M/UL (ref 4.6–6.2)
RBC # BLD AUTO: 4.64 M/UL (ref 4.6–6.2)
SODIUM SERPL-SCNC: 140 MMOL/L (ref 136–145)
WBC # BLD AUTO: 16.97 K/UL (ref 3.9–12.7)
WBC # BLD AUTO: 17.77 K/UL (ref 3.9–12.7)

## 2023-08-11 PROCEDURE — C9113 INJ PANTOPRAZOLE SODIUM, VIA: HCPCS | Performed by: STUDENT IN AN ORGANIZED HEALTH CARE EDUCATION/TRAINING PROGRAM

## 2023-08-11 PROCEDURE — 63600175 PHARM REV CODE 636 W HCPCS: Performed by: STUDENT IN AN ORGANIZED HEALTH CARE EDUCATION/TRAINING PROGRAM

## 2023-08-11 PROCEDURE — 25000003 PHARM REV CODE 250: Performed by: STUDENT IN AN ORGANIZED HEALTH CARE EDUCATION/TRAINING PROGRAM

## 2023-08-11 PROCEDURE — 36415 COLL VENOUS BLD VENIPUNCTURE: CPT | Performed by: STUDENT IN AN ORGANIZED HEALTH CARE EDUCATION/TRAINING PROGRAM

## 2023-08-11 PROCEDURE — 85027 COMPLETE CBC AUTOMATED: CPT | Performed by: STUDENT IN AN ORGANIZED HEALTH CARE EDUCATION/TRAINING PROGRAM

## 2023-08-11 PROCEDURE — 36415 COLL VENOUS BLD VENIPUNCTURE: CPT

## 2023-08-11 PROCEDURE — 80053 COMPREHEN METABOLIC PANEL: CPT | Performed by: STUDENT IN AN ORGANIZED HEALTH CARE EDUCATION/TRAINING PROGRAM

## 2023-08-11 PROCEDURE — 85025 COMPLETE CBC W/AUTO DIFF WBC: CPT

## 2023-08-11 PROCEDURE — 83735 ASSAY OF MAGNESIUM: CPT | Performed by: STUDENT IN AN ORGANIZED HEALTH CARE EDUCATION/TRAINING PROGRAM

## 2023-08-11 PROCEDURE — 11000001 HC ACUTE MED/SURG PRIVATE ROOM

## 2023-08-11 RX ADMIN — ENOXAPARIN SODIUM 40 MG: 40 INJECTION SUBCUTANEOUS at 05:08

## 2023-08-11 RX ADMIN — KETOROLAC TROMETHAMINE 15 MG: 30 INJECTION, SOLUTION INTRAMUSCULAR; INTRAVENOUS at 02:08

## 2023-08-11 RX ADMIN — MUPIROCIN: 20 OINTMENT TOPICAL at 08:08

## 2023-08-11 RX ADMIN — SODIUM CHLORIDE, POTASSIUM CHLORIDE, SODIUM LACTATE AND CALCIUM CHLORIDE: 600; 310; 30; 20 INJECTION, SOLUTION INTRAVENOUS at 12:08

## 2023-08-11 RX ADMIN — PIPERACILLIN AND TAZOBACTAM 4.5 G: 4; .5 INJECTION, POWDER, LYOPHILIZED, FOR SOLUTION INTRAVENOUS; PARENTERAL at 11:08

## 2023-08-11 RX ADMIN — ONDANSETRON 4 MG: 2 INJECTION INTRAMUSCULAR; INTRAVENOUS at 08:08

## 2023-08-11 RX ADMIN — KETOROLAC TROMETHAMINE 15 MG: 30 INJECTION, SOLUTION INTRAMUSCULAR; INTRAVENOUS at 09:08

## 2023-08-11 RX ADMIN — SODIUM CHLORIDE, POTASSIUM CHLORIDE, SODIUM LACTATE AND CALCIUM CHLORIDE: 600; 310; 30; 20 INJECTION, SOLUTION INTRAVENOUS at 02:08

## 2023-08-11 RX ADMIN — PIPERACILLIN AND TAZOBACTAM 4.5 G: 4; .5 INJECTION, POWDER, LYOPHILIZED, FOR SOLUTION INTRAVENOUS; PARENTERAL at 04:08

## 2023-08-11 RX ADMIN — PANTOPRAZOLE SODIUM 40 MG: 40 INJECTION, POWDER, FOR SOLUTION INTRAVENOUS at 08:08

## 2023-08-11 RX ADMIN — SODIUM CHLORIDE, POTASSIUM CHLORIDE, SODIUM LACTATE AND CALCIUM CHLORIDE: 600; 310; 30; 20 INJECTION, SOLUTION INTRAVENOUS at 08:08

## 2023-08-11 NOTE — SUBJECTIVE & OBJECTIVE
Interval History: Patient seen and examined this morning. No acute events overnight. Low grade tachycardia today. H/H dropped. Passing flatus. Plan for NG tube removal.     Medications:  Continuous Infusions:   lactated ringers 125 mL/hr at 08/11/23 0239     Scheduled Meds:   enoxparin  40 mg Subcutaneous Q24H (prophylaxis, 1700)    mupirocin   Nasal BID    pantoprazole  40 mg Intravenous Daily     PRN Meds:hydrALAZINE, HYDROmorphone, ketorolac, ondansetron     Review of patient's allergies indicates:  No Known Allergies  Objective:     Vital Signs (Most Recent):  Temp: 99.2 °F (37.3 °C) (08/11/23 0324)  Pulse: 90 (08/11/23 0324)  Resp: 14 (08/11/23 0324)  BP: 129/77 (08/11/23 0324)  SpO2: (!) 93 % (08/11/23 0012) Vital Signs (24h Range):  Temp:  [98.1 °F (36.7 °C)-100 °F (37.8 °C)] 99.2 °F (37.3 °C)  Pulse:  [] 90  Resp:  [14-18] 14  SpO2:  [92 %-96 %] 93 %  BP: (112-145)/(66-81) 129/77     Weight: 92.1 kg (203 lb 0.7 oz)  Body mass index is 29.13 kg/m².    Intake/Output - Last 3 Shifts         08/09 0700  08/10 0659 08/10 0700 08/11 0659    P.O. 0 0    I.V. (mL/kg) 266.7 (2.9) 2988 (32.4)    IV Piggyback 1550     Total Intake(mL/kg) 1816.7 (19.7) 2988 (32.4)    Urine (mL/kg/hr) 1080 (0.5) 650 (0.3)    Emesis/NG output 20     Drains 300 300    Stool 0 0    Blood 50     Total Output 1450 950    Net +366.7 +2038          Urine Occurrence  1 x    Stool Occurrence 0 x 0 x             Physical Exam  Vitals and nursing note reviewed.   Constitutional:       General: He is not in acute distress.     Appearance: Normal appearance.   HENT:      Nose:      Comments: NG tube in place with thin output     Mouth/Throat:      Mouth: Mucous membranes are moist.   Cardiovascular:      Rate and Rhythm: Normal rate and regular rhythm.   Pulmonary:      Effort: Pulmonary effort is normal. No respiratory distress.   Abdominal:      Comments: Abdomen soft, mildly distended  Appropriately tender to palpation; mainly at right  lower quadrant  Midline incision without concern   Musculoskeletal:         General: Normal range of motion.   Skin:     General: Skin is warm and dry.   Neurological:      General: No focal deficit present.      Mental Status: He is alert.          Significant Labs:  I have reviewed all pertinent lab results within the past 24 hours.  CBC:   Recent Labs   Lab 08/11/23  0340   WBC 16.97*   RBC 4.58*   HGB 13.7*   HCT 43.2      MCV 94   MCH 29.9   MCHC 31.7*     CMP:   Recent Labs   Lab 08/11/23  0340      CALCIUM 9.3   ALBUMIN 2.6*   PROT 6.0      K 3.9   CO2 28      BUN 15   CREATININE 0.8   ALKPHOS 86   ALT 36   AST 20   BILITOT 1.3*       Significant Diagnostics:  I have reviewed all pertinent imaging results/findings within the past 24 hours.

## 2023-08-11 NOTE — NURSING
[facility]  Nurses Note -- 4 Eyes      8/11/2023       Skin assessed on: Q Shift      [x] No Pressure Injuries Present    [x]Prevention Measures Documented    [] Yes LDA  for Pressure Injury Previously documented     [] Yes New Pressure Injury Discovered   [] LDA for New Pressure Injury Added      Attending RN:  Vito Bowers LPN     Second RN:  GIAN Bartlett

## 2023-08-11 NOTE — NURSING
Pt complaint of pain during shift; prn pain medication given. NGT LIWS. NPO. Vitals assessed. Cont IVF. Pt remains free from fall/injury. Alert and oriented; no distress noted. Safety measures maintained. Will cont to monitor

## 2023-08-11 NOTE — ASSESSMENT & PLAN NOTE
Jules Aviles is a 68yoM who represents to the hospital with a small bowel obstruction. This is his fourth presentation in the last three months.     - patient seen and examined  - POD2 s/p ex lap, lysis of adhesions and small bowel resection  - vital signs, labs reviewed   - low grade tachycardia with downtrending hemoglobin   - repeat CBC at noon  - patient with return of bowel function  - discontinue NG tube  - ok for clear liquids   - given degree of bowel manipulation and bowel distension, would expect a degree of post-operative ileus  - multi-modal pain meds  - out of bed, to chair, ok for ambulation  - please call with questions

## 2023-08-11 NOTE — PROGRESS NOTES
[facility]  General Surgery  Progress Note    Subjective:     History of Present Illness:  Jules Aviles is a 68yoM who is well known to the general surgery service. He was recently admitted to the service with a small bowel obstruction, which was managed conservatively and resolved. He was discharged on Monday. He represents with similar abdominal distension, abdominal discomfort. CT redemonstrates a small bowel obstruction, with the obstruction in the same area. He denies any bowel function since discharge. General surgery consulted for evaluation.       Post-Op Info:  Procedure(s) (LRB):  LAPAROTOMY, EXPLORATORY (N/A)  EXCISION, SMALL INTESTINE   2 Days Post-Op     Interval History: Patient seen and examined this morning. No acute events overnight. Low grade tachycardia today. H/H dropped. Passing flatus. Plan for NG tube removal.     Medications:  Continuous Infusions:   lactated ringers 125 mL/hr at 08/11/23 0239     Scheduled Meds:   enoxparin  40 mg Subcutaneous Q24H (prophylaxis, 1700)    mupirocin   Nasal BID    pantoprazole  40 mg Intravenous Daily     PRN Meds:hydrALAZINE, HYDROmorphone, ketorolac, ondansetron     Review of patient's allergies indicates:  No Known Allergies  Objective:     Vital Signs (Most Recent):  Temp: 99.2 °F (37.3 °C) (08/11/23 0324)  Pulse: 90 (08/11/23 0324)  Resp: 14 (08/11/23 0324)  BP: 129/77 (08/11/23 0324)  SpO2: (!) 93 % (08/11/23 0012) Vital Signs (24h Range):  Temp:  [98.1 °F (36.7 °C)-100 °F (37.8 °C)] 99.2 °F (37.3 °C)  Pulse:  [] 90  Resp:  [14-18] 14  SpO2:  [92 %-96 %] 93 %  BP: (112-145)/(66-81) 129/77     Weight: 92.1 kg (203 lb 0.7 oz)  Body mass index is 29.13 kg/m².    Intake/Output - Last 3 Shifts         08/09 0700  08/10 0659 08/10 0700 08/11 0659    P.O. 0 0    I.V. (mL/kg) 266.7 (2.9) 2988 (32.4)    IV Piggyback 1550     Total Intake(mL/kg) 1816.7 (19.7) 2988 (32.4)    Urine (mL/kg/hr) 1080 (0.5) 650 (0.3)    Emesis/NG output 20     Drains  300 300    Stool 0 0    Blood 50     Total Output 1450 950    Net +366.7 +2038          Urine Occurrence  1 x    Stool Occurrence 0 x 0 x             Physical Exam  Vitals and nursing note reviewed.   Constitutional:       General: He is not in acute distress.     Appearance: Normal appearance.   HENT:      Nose:      Comments: NG tube in place with thin output     Mouth/Throat:      Mouth: Mucous membranes are moist.   Cardiovascular:      Rate and Rhythm: Normal rate and regular rhythm.   Pulmonary:      Effort: Pulmonary effort is normal. No respiratory distress.   Abdominal:      Comments: Abdomen soft, mildly distended  Appropriately tender to palpation; mainly at right lower quadrant  Midline incision without concern   Musculoskeletal:         General: Normal range of motion.   Skin:     General: Skin is warm and dry.   Neurological:      General: No focal deficit present.      Mental Status: He is alert.          Significant Labs:  I have reviewed all pertinent lab results within the past 24 hours.  CBC:   Recent Labs   Lab 08/11/23  0340   WBC 16.97*   RBC 4.58*   HGB 13.7*   HCT 43.2      MCV 94   MCH 29.9   MCHC 31.7*     CMP:   Recent Labs   Lab 08/11/23  0340      CALCIUM 9.3   ALBUMIN 2.6*   PROT 6.0      K 3.9   CO2 28      BUN 15   CREATININE 0.8   ALKPHOS 86   ALT 36   AST 20   BILITOT 1.3*       Significant Diagnostics:  I have reviewed all pertinent imaging results/findings within the past 24 hours.    Assessment/Plan:     * SBO (small bowel obstruction)  Jules Aviles is a 68yoM who represents to the hospital with a small bowel obstruction. This is his fourth presentation in the last three months.     - patient seen and examined  - POD2 s/p ex lap, lysis of adhesions and small bowel resection  - vital signs, labs reviewed   - low grade tachycardia with downtrending hemoglobin   - repeat CBC at noon  - patient with return of bowel function  - discontinue NG tube  - ok for  clear liquids   - given degree of bowel manipulation and bowel distension, would expect a degree of post-operative ileus  - multi-modal pain meds  - out of bed, to chair, ok for ambulation  - please call with questions        Carlos Aguilera MD  General Surgery  [facility] [place] [facility]

## 2023-08-12 LAB
ALBUMIN SERPL BCP-MCNC: 2.5 G/DL (ref 3.5–5.2)
ALP SERPL-CCNC: 76 U/L (ref 55–135)
ALT SERPL W/O P-5'-P-CCNC: 28 U/L (ref 10–44)
ANION GAP SERPL CALC-SCNC: 9 MMOL/L (ref 8–16)
AST SERPL-CCNC: 19 U/L (ref 10–40)
BILIRUB SERPL-MCNC: 1.4 MG/DL (ref 0.1–1)
BUN SERPL-MCNC: 14 MG/DL (ref 8–23)
CALCIUM SERPL-MCNC: 8.9 MG/DL (ref 8.7–10.5)
CHLORIDE SERPL-SCNC: 102 MMOL/L (ref 95–110)
CO2 SERPL-SCNC: 28 MMOL/L (ref 23–29)
CREAT SERPL-MCNC: 0.8 MG/DL (ref 0.5–1.4)
ERYTHROCYTE [DISTWIDTH] IN BLOOD BY AUTOMATED COUNT: 13.2 % (ref 11.5–14.5)
EST. GFR  (NO RACE VARIABLE): >60 ML/MIN/1.73 M^2
GLUCOSE SERPL-MCNC: 103 MG/DL (ref 70–110)
HCT VFR BLD AUTO: 41.1 % (ref 40–54)
HGB BLD-MCNC: 13.2 G/DL (ref 14–18)
MAGNESIUM SERPL-MCNC: 1.7 MG/DL (ref 1.6–2.6)
MCH RBC QN AUTO: 29.9 PG (ref 27–31)
MCHC RBC AUTO-ENTMCNC: 32.1 G/DL (ref 32–36)
MCV RBC AUTO: 93 FL (ref 82–98)
PLATELET # BLD AUTO: 191 K/UL (ref 150–450)
PMV BLD AUTO: 11.8 FL (ref 9.2–12.9)
POTASSIUM SERPL-SCNC: 3.8 MMOL/L (ref 3.5–5.1)
PROT SERPL-MCNC: 5.7 G/DL (ref 6–8.4)
RBC # BLD AUTO: 4.41 M/UL (ref 4.6–6.2)
SODIUM SERPL-SCNC: 139 MMOL/L (ref 136–145)
WBC # BLD AUTO: 14.06 K/UL (ref 3.9–12.7)

## 2023-08-12 PROCEDURE — 94799 UNLISTED PULMONARY SVC/PX: CPT

## 2023-08-12 PROCEDURE — 36415 COLL VENOUS BLD VENIPUNCTURE: CPT | Performed by: STUDENT IN AN ORGANIZED HEALTH CARE EDUCATION/TRAINING PROGRAM

## 2023-08-12 PROCEDURE — 63600175 PHARM REV CODE 636 W HCPCS: Performed by: STUDENT IN AN ORGANIZED HEALTH CARE EDUCATION/TRAINING PROGRAM

## 2023-08-12 PROCEDURE — 94761 N-INVAS EAR/PLS OXIMETRY MLT: CPT

## 2023-08-12 PROCEDURE — 11000001 HC ACUTE MED/SURG PRIVATE ROOM

## 2023-08-12 PROCEDURE — C9113 INJ PANTOPRAZOLE SODIUM, VIA: HCPCS | Performed by: STUDENT IN AN ORGANIZED HEALTH CARE EDUCATION/TRAINING PROGRAM

## 2023-08-12 PROCEDURE — 83735 ASSAY OF MAGNESIUM: CPT | Performed by: STUDENT IN AN ORGANIZED HEALTH CARE EDUCATION/TRAINING PROGRAM

## 2023-08-12 PROCEDURE — 99900035 HC TECH TIME PER 15 MIN (STAT)

## 2023-08-12 PROCEDURE — 80053 COMPREHEN METABOLIC PANEL: CPT | Performed by: STUDENT IN AN ORGANIZED HEALTH CARE EDUCATION/TRAINING PROGRAM

## 2023-08-12 PROCEDURE — 94760 N-INVAS EAR/PLS OXIMETRY 1: CPT

## 2023-08-12 PROCEDURE — 25000003 PHARM REV CODE 250: Performed by: STUDENT IN AN ORGANIZED HEALTH CARE EDUCATION/TRAINING PROGRAM

## 2023-08-12 PROCEDURE — 25000003 PHARM REV CODE 250: Performed by: SURGERY

## 2023-08-12 PROCEDURE — 85027 COMPLETE CBC AUTOMATED: CPT | Performed by: STUDENT IN AN ORGANIZED HEALTH CARE EDUCATION/TRAINING PROGRAM

## 2023-08-12 RX ORDER — ACETAMINOPHEN 325 MG/1
650 TABLET ORAL EVERY 6 HOURS PRN
Status: DISCONTINUED | OUTPATIENT
Start: 2023-08-12 | End: 2023-08-16 | Stop reason: HOSPADM

## 2023-08-12 RX ADMIN — SODIUM CHLORIDE, POTASSIUM CHLORIDE, SODIUM LACTATE AND CALCIUM CHLORIDE: 600; 310; 30; 20 INJECTION, SOLUTION INTRAVENOUS at 04:08

## 2023-08-12 RX ADMIN — PIPERACILLIN AND TAZOBACTAM 4.5 G: 4; .5 INJECTION, POWDER, LYOPHILIZED, FOR SOLUTION INTRAVENOUS; PARENTERAL at 04:08

## 2023-08-12 RX ADMIN — MUPIROCIN: 20 OINTMENT TOPICAL at 08:08

## 2023-08-12 RX ADMIN — SODIUM CHLORIDE, POTASSIUM CHLORIDE, SODIUM LACTATE AND CALCIUM CHLORIDE: 600; 310; 30; 20 INJECTION, SOLUTION INTRAVENOUS at 12:08

## 2023-08-12 RX ADMIN — ACETAMINOPHEN 650 MG: 325 TABLET ORAL at 11:08

## 2023-08-12 RX ADMIN — ACETAMINOPHEN 650 MG: 325 TABLET ORAL at 12:08

## 2023-08-12 RX ADMIN — SODIUM CHLORIDE, POTASSIUM CHLORIDE, SODIUM LACTATE AND CALCIUM CHLORIDE: 600; 310; 30; 20 INJECTION, SOLUTION INTRAVENOUS at 11:08

## 2023-08-12 RX ADMIN — PIPERACILLIN AND TAZOBACTAM 4.5 G: 4; .5 INJECTION, POWDER, LYOPHILIZED, FOR SOLUTION INTRAVENOUS; PARENTERAL at 08:08

## 2023-08-12 RX ADMIN — PANTOPRAZOLE SODIUM 40 MG: 40 INJECTION, POWDER, FOR SOLUTION INTRAVENOUS at 08:08

## 2023-08-12 RX ADMIN — PIPERACILLIN AND TAZOBACTAM 4.5 G: 4; .5 INJECTION, POWDER, LYOPHILIZED, FOR SOLUTION INTRAVENOUS; PARENTERAL at 11:08

## 2023-08-12 RX ADMIN — ENOXAPARIN SODIUM 40 MG: 40 INJECTION SUBCUTANEOUS at 04:08

## 2023-08-12 NOTE — NURSING
[facility]  Nurses Note -- 4 Eyes      8/12/2023       Skin assessed on: Q Shift      [x] No Pressure Injuries Present    []Prevention Measures Documented    [] Yes LDA  for Pressure Injury Previously documented     [] Yes New Pressure Injury Discovered   [] LDA for New Pressure Injury Added      Attending RN:  Virginie Rice RN     Second RN:  NAHUN Padron

## 2023-08-12 NOTE — PROGRESS NOTES
[facility]  General Surgery  Progress Note    Subjective:     History of Present Illness:  Jules Aviles is a 68yoM who is well known to the general surgery service. He was recently admitted to the service with a small bowel obstruction, which was managed conservatively and resolved. He was discharged on Monday. He represents with similar abdominal distension, abdominal discomfort. CT redemonstrates a small bowel obstruction, with the obstruction in the same area. He denies any bowel function since discharge. General surgery consulted for evaluation.       Post-Op Info:  Procedure(s) (LRB):  LAPAROTOMY, EXPLORATORY (N/A)  EXCISION, SMALL INTESTINE   3 Days Post-Op     Interval History: Patient seen and examined. No acute events overnight. Episodes of tachycardia when ambulating. Hgb largely stable between 13-14 over the last 24hrs. Tolerating clears. Having bowel function. WBC downtrending.    Medications:  Continuous Infusions:   lactated ringers 125 mL/hr at 08/12/23 0404     Scheduled Meds:   enoxparin  40 mg Subcutaneous Q24H (prophylaxis, 1700)    mupirocin   Nasal BID    pantoprazole  40 mg Intravenous Daily    piperacillin-tazobactam (Zosyn) IV (PEDS and ADULTS) (extended infusion is not appropriate)  4.5 g Intravenous Q8H     PRN Meds:hydrALAZINE, HYDROmorphone, ketorolac, ondansetron     Review of patient's allergies indicates:  No Known Allergies  Objective:     Vital Signs (Most Recent):  Temp: 98.8 °F (37.1 °C) (08/12/23 0409)  Pulse: 103 (08/12/23 0500)  Resp: 20 (08/12/23 0409)  BP: (!) 141/72 (08/12/23 0409)  SpO2: (!) 93 % (08/12/23 0500) Vital Signs (24h Range):  Temp:  [97.6 °F (36.4 °C)-100.4 °F (38 °C)] 98.8 °F (37.1 °C)  Pulse:  [] 103  Resp:  [17-20] 20  SpO2:  [90 %-94 %] 93 %  BP: (141-158)/(72-97) 141/72     Weight: 92.1 kg (203 lb 0.7 oz)  Body mass index is 29.13 kg/m².    Intake/Output - Last 3 Shifts         08/10 0700  08/11 0659 08/11 0700 08/12 0659 08/12  0700  08/13 0659    P.O. 0 600     I.V. (mL/kg) 2988 (32.4)      IV Piggyback       Total Intake(mL/kg) 2988 (32.4) 600 (6.5)     Urine (mL/kg/hr) 650 (0.3) 300 (0.1)     Emesis/NG output       Drains 300 25     Stool 0 0     Blood       Total Output 950 325     Net +2038 +275            Urine Occurrence 1 x 3 x     Stool Occurrence 0 x 4 x              Physical Exam  Vitals and nursing note reviewed.   Constitutional:       General: He is not in acute distress.     Appearance: Normal appearance.   HENT:      Mouth/Throat:      Mouth: Mucous membranes are moist.   Cardiovascular:      Rate and Rhythm: Normal rate and regular rhythm.   Pulmonary:      Effort: Pulmonary effort is normal. No respiratory distress.   Abdominal:      Comments: Abdomen soft, mildly distended  Appropriately tender to palpation; mainly at right lower quadrant  Midline incision without concern   Musculoskeletal:         General: Normal range of motion.   Skin:     General: Skin is warm and dry.   Neurological:      General: No focal deficit present.      Mental Status: He is alert.          Significant Labs:  I have reviewed all pertinent lab results within the past 24 hours.  CBC:   Recent Labs   Lab 08/12/23 0447   WBC 14.06*   RBC 4.41*   HGB 13.2*   HCT 41.1      MCV 93   MCH 29.9   MCHC 32.1     CMP:   Recent Labs   Lab 08/12/23 0447      CALCIUM 8.9   ALBUMIN 2.5*   PROT 5.7*      K 3.8   CO2 28      BUN 14   CREATININE 0.8   ALKPHOS 76   ALT 28   AST 19   BILITOT 1.4*       Significant Diagnostics:  I have reviewed all pertinent imaging results/findings within the past 24 hours.    Assessment/Plan:     * SBO (small bowel obstruction)  Jules Aviles is a 68yoM who represents to the hospital with a small bowel obstruction. This is his fourth presentation in the last three months.     - patient seen and examined  - POD3 s/p ex lap, lysis of adhesions and small bowel resection  - vital signs, labs reviewed   -  episodes of tachycardia when ambulating   - Hgb stable between 13 and 14 over last 24hrs  - continues with bowel function  - IV antibiotics initiated yesterday-- WBC downtrending  - continue clear liquids  - given degree of bowel manipulation and bowel distension, would expect a degree of post-operative ileus  - multi-modal pain meds  - out of bed, to chair, ok for ambulation  - please call with questions        Carlos Aguilera MD  General Surgery  [facility] - Med Surg

## 2023-08-12 NOTE — NURSING
Telemonitor reported HR elevated in the 120s, this RN assessed pt in room. Pt reported just ambulated back from bathroom and he is cold. Pt denied any SOB, only pain to abdominal on activity. Pt is resting, bed alarm set, call light in reach, instructed pt to call for assistance. Notified primary RN.

## 2023-08-12 NOTE — ASSESSMENT & PLAN NOTE
Jules Aviles is a 68yoM who represents to the hospital with a small bowel obstruction. This is his fourth presentation in the last three months.     - patient seen and examined  - POD3 s/p ex lap, lysis of adhesions and small bowel resection  - vital signs, labs reviewed   - episodes of tachycardia when ambulating   - Hgb stable between 13 and 14 over last 24hrs  - continues with bowel function  - IV antibiotics initiated yesterday-- WBC downtrending  - continue clear liquids  - given degree of bowel manipulation and bowel distension, would expect a degree of post-operative ileus  - multi-modal pain meds  - out of bed, to chair, ok for ambulation  - please call with questions

## 2023-08-12 NOTE — CLINICAL REVIEW
IP Sepsis Screen (most recent)       Sepsis Screen (IP) - 08/12/23 1016       Is the patient's history or complaint suggestive of a possible infection? Yes  -JM    Are there at least two of the following signs and symptoms present? Yes  -JM    Sepsis signs/symptoms - Tachycardia Tachycardia     >90  -    Sepsis signs/symptoms - WBC WBC < 4,000 or WBC > 12,000  -JM    Are any of the following organ dysfunction criteria present and not considered to be due to a chronic condition? Yes  -    Organ Dysfunction Criteria - O2 O2 Saturation < 95% on room air  -JM    Initiate Sepsis Protocol No  -JM    Reason sepsis not considered Other - Required comments   Secure chat with MD RUELAS              User Key  (r) = Recorded By, (t) = Taken By, (c) = Cosigned By      Initials Name    Angela Glasgow RN

## 2023-08-12 NOTE — SUBJECTIVE & OBJECTIVE
Interval History: Patient seen and examined. No acute events overnight. Episodes of tachycardia when ambulating. Hgb largely stable between 13-14 over the last 24hrs. Tolerating clears. Having bowel function. WBC downtrending.    Medications:  Continuous Infusions:   lactated ringers 125 mL/hr at 08/12/23 0404     Scheduled Meds:   enoxparin  40 mg Subcutaneous Q24H (prophylaxis, 1700)    mupirocin   Nasal BID    pantoprazole  40 mg Intravenous Daily    piperacillin-tazobactam (Zosyn) IV (PEDS and ADULTS) (extended infusion is not appropriate)  4.5 g Intravenous Q8H     PRN Meds:hydrALAZINE, HYDROmorphone, ketorolac, ondansetron     Review of patient's allergies indicates:  No Known Allergies  Objective:     Vital Signs (Most Recent):  Temp: 98.8 °F (37.1 °C) (08/12/23 0409)  Pulse: 103 (08/12/23 0500)  Resp: 20 (08/12/23 0409)  BP: (!) 141/72 (08/12/23 0409)  SpO2: (!) 93 % (08/12/23 0500) Vital Signs (24h Range):  Temp:  [97.6 °F (36.4 °C)-100.4 °F (38 °C)] 98.8 °F (37.1 °C)  Pulse:  [] 103  Resp:  [17-20] 20  SpO2:  [90 %-94 %] 93 %  BP: (141-158)/(72-97) 141/72     Weight: 92.1 kg (203 lb 0.7 oz)  Body mass index is 29.13 kg/m².    Intake/Output - Last 3 Shifts         08/10 0700  08/11 0659 08/11 0700  08/12 0659 08/12 0700  08/13 0659    P.O. 0 600     I.V. (mL/kg) 2988 (32.4)      IV Piggyback       Total Intake(mL/kg) 2988 (32.4) 600 (6.5)     Urine (mL/kg/hr) 650 (0.3) 300 (0.1)     Emesis/NG output       Drains 300 25     Stool 0 0     Blood       Total Output 950 325     Net +2038 +275            Urine Occurrence 1 x 3 x     Stool Occurrence 0 x 4 x              Physical Exam  Vitals and nursing note reviewed.   Constitutional:       General: He is not in acute distress.     Appearance: Normal appearance.   HENT:      Mouth/Throat:      Mouth: Mucous membranes are moist.   Cardiovascular:      Rate and Rhythm: Normal rate and regular rhythm.   Pulmonary:      Effort: Pulmonary effort is normal. No  respiratory distress.   Abdominal:      Comments: Abdomen soft, mildly distended  Appropriately tender to palpation; mainly at right lower quadrant  Midline incision without concern   Musculoskeletal:         General: Normal range of motion.   Skin:     General: Skin is warm and dry.   Neurological:      General: No focal deficit present.      Mental Status: He is alert.          Significant Labs:  I have reviewed all pertinent lab results within the past 24 hours.  CBC:   Recent Labs   Lab 08/12/23 0447   WBC 14.06*   RBC 4.41*   HGB 13.2*   HCT 41.1      MCV 93   MCH 29.9   MCHC 32.1     CMP:   Recent Labs   Lab 08/12/23 0447      CALCIUM 8.9   ALBUMIN 2.5*   PROT 5.7*      K 3.8   CO2 28      BUN 14   CREATININE 0.8   ALKPHOS 76   ALT 28   AST 19   BILITOT 1.4*       Significant Diagnostics:  I have reviewed all pertinent imaging results/findings within the past 24 hours.

## 2023-08-12 NOTE — NURSING
[facility]  Nurses Note -- 4 Eyes      8/11/2023       Skin assessed on: Q Shift      [x] No Pressure Injuries Present    [x]Prevention Measures Documented    [] Yes LDA  for Pressure Injury Previously documented     [] Yes New Pressure Injury Discovered   [] LDA for New Pressure Injury Added      Attending RN:  Issa Dixon RN     Second RN:  Mckayla Schroeder RN

## 2023-08-13 LAB
ALBUMIN SERPL BCP-MCNC: 2.3 G/DL (ref 3.5–5.2)
ALP SERPL-CCNC: 63 U/L (ref 55–135)
ALT SERPL W/O P-5'-P-CCNC: 32 U/L (ref 10–44)
ANION GAP SERPL CALC-SCNC: 10 MMOL/L (ref 8–16)
AST SERPL-CCNC: 23 U/L (ref 10–40)
BASOPHILS # BLD AUTO: 0.03 K/UL (ref 0–0.2)
BASOPHILS NFR BLD: 0.3 % (ref 0–1.9)
BILIRUB SERPL-MCNC: 1.1 MG/DL (ref 0.1–1)
BUN SERPL-MCNC: 11 MG/DL (ref 8–23)
CALCIUM SERPL-MCNC: 8.2 MG/DL (ref 8.7–10.5)
CHLORIDE SERPL-SCNC: 100 MMOL/L (ref 95–110)
CO2 SERPL-SCNC: 27 MMOL/L (ref 23–29)
CREAT SERPL-MCNC: 0.8 MG/DL (ref 0.5–1.4)
DIFFERENTIAL METHOD: ABNORMAL
EOSINOPHIL # BLD AUTO: 0.2 K/UL (ref 0–0.5)
EOSINOPHIL NFR BLD: 2 % (ref 0–8)
ERYTHROCYTE [DISTWIDTH] IN BLOOD BY AUTOMATED COUNT: 13.2 % (ref 11.5–14.5)
ERYTHROCYTE [DISTWIDTH] IN BLOOD BY AUTOMATED COUNT: 13.3 % (ref 11.5–14.5)
EST. GFR  (NO RACE VARIABLE): >60 ML/MIN/1.73 M^2
GLUCOSE SERPL-MCNC: 186 MG/DL (ref 70–110)
HCT VFR BLD AUTO: 32.1 % (ref 40–54)
HCT VFR BLD AUTO: 34 % (ref 40–54)
HGB BLD-MCNC: 10.8 G/DL (ref 14–18)
HGB BLD-MCNC: 11.5 G/DL (ref 14–18)
IMM GRANULOCYTES # BLD AUTO: 0.04 K/UL (ref 0–0.04)
IMM GRANULOCYTES NFR BLD AUTO: 0.4 % (ref 0–0.5)
LYMPHOCYTES # BLD AUTO: 1.2 K/UL (ref 1–4.8)
LYMPHOCYTES NFR BLD: 10.6 % (ref 18–48)
MAGNESIUM SERPL-MCNC: 1.6 MG/DL (ref 1.6–2.6)
MCH RBC QN AUTO: 30.3 PG (ref 27–31)
MCH RBC QN AUTO: 30.7 PG (ref 27–31)
MCHC RBC AUTO-ENTMCNC: 33.6 G/DL (ref 32–36)
MCHC RBC AUTO-ENTMCNC: 33.8 G/DL (ref 32–36)
MCV RBC AUTO: 90 FL (ref 82–98)
MCV RBC AUTO: 91 FL (ref 82–98)
MONOCYTES # BLD AUTO: 0.7 K/UL (ref 0.3–1)
MONOCYTES NFR BLD: 6.5 % (ref 4–15)
NEUTROPHILS # BLD AUTO: 9 K/UL (ref 1.8–7.7)
NEUTROPHILS NFR BLD: 80.2 % (ref 38–73)
NRBC BLD-RTO: 0 /100 WBC
PLATELET # BLD AUTO: 189 K/UL (ref 150–450)
PLATELET # BLD AUTO: 196 K/UL (ref 150–450)
PMV BLD AUTO: 10.7 FL (ref 9.2–12.9)
PMV BLD AUTO: 11.2 FL (ref 9.2–12.9)
POTASSIUM SERPL-SCNC: 3 MMOL/L (ref 3.5–5.1)
PROT SERPL-MCNC: 5.2 G/DL (ref 6–8.4)
RBC # BLD AUTO: 3.57 M/UL (ref 4.6–6.2)
RBC # BLD AUTO: 3.75 M/UL (ref 4.6–6.2)
SODIUM SERPL-SCNC: 137 MMOL/L (ref 136–145)
WBC # BLD AUTO: 11.03 K/UL (ref 3.9–12.7)
WBC # BLD AUTO: 11.22 K/UL (ref 3.9–12.7)

## 2023-08-13 PROCEDURE — 85025 COMPLETE CBC W/AUTO DIFF WBC: CPT | Performed by: STUDENT IN AN ORGANIZED HEALTH CARE EDUCATION/TRAINING PROGRAM

## 2023-08-13 PROCEDURE — 83735 ASSAY OF MAGNESIUM: CPT | Performed by: STUDENT IN AN ORGANIZED HEALTH CARE EDUCATION/TRAINING PROGRAM

## 2023-08-13 PROCEDURE — C9113 INJ PANTOPRAZOLE SODIUM, VIA: HCPCS | Performed by: STUDENT IN AN ORGANIZED HEALTH CARE EDUCATION/TRAINING PROGRAM

## 2023-08-13 PROCEDURE — 63600175 PHARM REV CODE 636 W HCPCS: Performed by: STUDENT IN AN ORGANIZED HEALTH CARE EDUCATION/TRAINING PROGRAM

## 2023-08-13 PROCEDURE — 80053 COMPREHEN METABOLIC PANEL: CPT | Performed by: STUDENT IN AN ORGANIZED HEALTH CARE EDUCATION/TRAINING PROGRAM

## 2023-08-13 PROCEDURE — 11000001 HC ACUTE MED/SURG PRIVATE ROOM

## 2023-08-13 PROCEDURE — 25000003 PHARM REV CODE 250: Performed by: STUDENT IN AN ORGANIZED HEALTH CARE EDUCATION/TRAINING PROGRAM

## 2023-08-13 PROCEDURE — 36415 COLL VENOUS BLD VENIPUNCTURE: CPT | Performed by: STUDENT IN AN ORGANIZED HEALTH CARE EDUCATION/TRAINING PROGRAM

## 2023-08-13 PROCEDURE — 85027 COMPLETE CBC AUTOMATED: CPT | Performed by: STUDENT IN AN ORGANIZED HEALTH CARE EDUCATION/TRAINING PROGRAM

## 2023-08-13 PROCEDURE — 94761 N-INVAS EAR/PLS OXIMETRY MLT: CPT

## 2023-08-13 RX ORDER — OXYCODONE HYDROCHLORIDE 5 MG/1
5 TABLET ORAL EVERY 4 HOURS PRN
Status: DISCONTINUED | OUTPATIENT
Start: 2023-08-13 | End: 2023-08-16 | Stop reason: HOSPADM

## 2023-08-13 RX ADMIN — PIPERACILLIN AND TAZOBACTAM 4.5 G: 4; .5 INJECTION, POWDER, LYOPHILIZED, FOR SOLUTION INTRAVENOUS; PARENTERAL at 08:08

## 2023-08-13 RX ADMIN — ENOXAPARIN SODIUM 40 MG: 40 INJECTION SUBCUTANEOUS at 04:08

## 2023-08-13 RX ADMIN — MUPIROCIN: 20 OINTMENT TOPICAL at 08:08

## 2023-08-13 RX ADMIN — PIPERACILLIN AND TAZOBACTAM 4.5 G: 4; .5 INJECTION, POWDER, LYOPHILIZED, FOR SOLUTION INTRAVENOUS; PARENTERAL at 03:08

## 2023-08-13 RX ADMIN — PANTOPRAZOLE SODIUM 40 MG: 40 INJECTION, POWDER, FOR SOLUTION INTRAVENOUS at 08:08

## 2023-08-13 NOTE — ASSESSMENT & PLAN NOTE
Jules Aviles is a 68yoM who represents to the hospital with a small bowel obstruction. This is his fourth presentation in the last three months.     - patient seen and examined  - POD4 s/p ex lap, lysis of adhesions and small bowel resection  - vital signs, labs reviewed   - WBC normalized  - midline incision with purulent discharge; few staples removed and wet-to-dry packing placed   - spanning erythema along right lower abdomen  - low threshold to repeat CT abd/pelvis, but will hold off at this time as his WBC normalized, is tolerating diet and having bowel function  - diet advanced  - multi-modal pain meds  - out of bed, to chair, ok for ambulation  - please call with questions

## 2023-08-13 NOTE — PROGRESS NOTES
[facility]  General Surgery  Progress Note    Subjective:     History of Present Illness:  Jules Aviles is a 68yoM who is well known to the general surgery service. He was recently admitted to the service with a small bowel obstruction, which was managed conservatively and resolved. He was discharged on Monday. He represents with similar abdominal distension, abdominal discomfort. CT redemonstrates a small bowel obstruction, with the obstruction in the same area. He denies any bowel function since discharge. General surgery consulted for evaluation.       Post-Op Info:  Procedure(s) (LRB):  LAPAROTOMY, EXPLORATORY (N/A)  EXCISION, SMALL INTESTINE   4 Days Post-Op     Interval History: Patient seen and examined this morning. Drainage from the midline incisions. Probed this morning with expression of purosanguinous discharge. Staples removed and wet to dry dressing packed. Continues with bowel function. Hgb dropped to 10.8; plan for noon repeat.     Medications:  Continuous Infusions:   lactated ringers 125 mL/hr at 08/13/23 0440     Scheduled Meds:   enoxparin  40 mg Subcutaneous Q24H (prophylaxis, 1700)    mupirocin   Nasal BID    pantoprazole  40 mg Intravenous Daily    piperacillin-tazobactam (Zosyn) IV (PEDS and ADULTS) (extended infusion is not appropriate)  4.5 g Intravenous Q8H     PRN Meds:acetaminophen, hydrALAZINE, ondansetron, oxyCODONE, oxyCODONE     Review of patient's allergies indicates:  No Known Allergies  Objective:     Vital Signs (Most Recent):  Temp: 98.8 °F (37.1 °C) (08/13/23 0726)  Pulse: 75 (08/13/23 0726)  Resp: 18 (08/13/23 0726)  BP: 132/82 (08/13/23 0726)  SpO2: (!) 93 % (08/13/23 0726) Vital Signs (24h Range):  Temp:  [98.2 °F (36.8 °C)-102.5 °F (39.2 °C)] 98.8 °F (37.1 °C)  Pulse:  [64-89] 75  Resp:  [18-20] 18  SpO2:  [92 %-95 %] 93 %  BP: (113-140)/(61-82) 132/82     Weight: 92.1 kg (203 lb 0.7 oz)  Body mass index is 29.13 kg/m².    Intake/Output - Last 3 Shifts          08/11 0700  08/12 0659 08/12 0700  08/13 0659 08/13 0700  08/14 0659    P.O. 600      I.V. (mL/kg)       Total Intake(mL/kg) 600 (6.5)      Urine (mL/kg/hr) 300 (0.1) 300 (0.1)     Drains 25      Stool 0      Total Output 325 300     Net +275 -300            Urine Occurrence 3 x      Stool Occurrence 4 x               Physical Exam  Vitals and nursing note reviewed.   Constitutional:       General: He is not in acute distress.     Appearance: Normal appearance.   HENT:      Mouth/Throat:      Mouth: Mucous membranes are moist.   Cardiovascular:      Rate and Rhythm: Normal rate and regular rhythm.   Pulmonary:      Effort: Pulmonary effort is normal. No respiratory distress.   Abdominal:      Comments: Abdomen soft, mildly distended  Midline incision with purulent output this morning; three staples removed and wet to dry packing gauze packed in place  Fascia probed and remains intact  Spanning erythema from midline incision to right lower abdomen   Musculoskeletal:         General: Normal range of motion.   Skin:     General: Skin is warm and dry.   Neurological:      General: No focal deficit present.      Mental Status: He is alert.          Significant Labs:  I have reviewed all pertinent lab results within the past 24 hours.  CBC:   Recent Labs   Lab 08/13/23  0353   WBC 11.03   RBC 3.57*   HGB 10.8*   HCT 32.1*      MCV 90   MCH 30.3   MCHC 33.6     CMP:   Recent Labs   Lab 08/13/23  0353   *   CALCIUM 8.2*   ALBUMIN 2.3*   PROT 5.2*      K 3.0*   CO2 27      BUN 11   CREATININE 0.8   ALKPHOS 63   ALT 32   AST 23   BILITOT 1.1*       Significant Diagnostics:  I have reviewed all pertinent imaging results/findings within the past 24 hours.    Assessment/Plan:     * SBO (small bowel obstruction)  Jules Aviles is a 68yoM who represents to the hospital with a small bowel obstruction. This is his fourth presentation in the last three months.     - patient seen and examined  - POD4 s/p ex  lap, lysis of adhesions and small bowel resection  - vital signs, labs reviewed   - WBC normalized  - midline incision with purulent discharge; few staples removed and wet-to-dry packing placed   - spanning erythema along right lower abdomen  - low threshold to repeat CT abd/pelvis, but will hold off at this time as his WBC normalized, is tolerating diet and having bowel function  - diet advanced  - multi-modal pain meds  - out of bed, to chair, ok for ambulation  - please call with questions        Carlos Aguilera MD  General Surgery  [facility] - Med Surg

## 2023-08-13 NOTE — SUBJECTIVE & OBJECTIVE
Interval History: Patient seen and examined this morning. Drainage from the midline incisions. Probed this morning with expression of purosanguinous discharge. Staples removed and wet to dry dressing packed. Continues with bowel function. Hgb dropped to 10.8; plan for noon repeat.     Medications:  Continuous Infusions:   lactated ringers 125 mL/hr at 08/13/23 0440     Scheduled Meds:   enoxparin  40 mg Subcutaneous Q24H (prophylaxis, 1700)    mupirocin   Nasal BID    pantoprazole  40 mg Intravenous Daily    piperacillin-tazobactam (Zosyn) IV (PEDS and ADULTS) (extended infusion is not appropriate)  4.5 g Intravenous Q8H     PRN Meds:acetaminophen, hydrALAZINE, ondansetron, oxyCODONE, oxyCODONE     Review of patient's allergies indicates:  No Known Allergies  Objective:     Vital Signs (Most Recent):  Temp: 98.8 °F (37.1 °C) (08/13/23 0726)  Pulse: 75 (08/13/23 0726)  Resp: 18 (08/13/23 0726)  BP: 132/82 (08/13/23 0726)  SpO2: (!) 93 % (08/13/23 0726) Vital Signs (24h Range):  Temp:  [98.2 °F (36.8 °C)-102.5 °F (39.2 °C)] 98.8 °F (37.1 °C)  Pulse:  [64-89] 75  Resp:  [18-20] 18  SpO2:  [92 %-95 %] 93 %  BP: (113-140)/(61-82) 132/82     Weight: 92.1 kg (203 lb 0.7 oz)  Body mass index is 29.13 kg/m².    Intake/Output - Last 3 Shifts         08/11 0700 08/12 0659 08/12 0700 08/13 0659 08/13 0700 08/14 0659    P.O. 600      I.V. (mL/kg)       Total Intake(mL/kg) 600 (6.5)      Urine (mL/kg/hr) 300 (0.1) 300 (0.1)     Drains 25      Stool 0      Total Output 325 300     Net +275 -300            Urine Occurrence 3 x      Stool Occurrence 4 x               Physical Exam  Vitals and nursing note reviewed.   Constitutional:       General: He is not in acute distress.     Appearance: Normal appearance.   HENT:      Mouth/Throat:      Mouth: Mucous membranes are moist.   Cardiovascular:      Rate and Rhythm: Normal rate and regular rhythm.   Pulmonary:      Effort: Pulmonary effort is normal. No respiratory distress.    Abdominal:      Comments: Abdomen soft, mildly distended  Midline incision with purulent output this morning; three staples removed and wet to dry packing gauze packed in place  Fascia probed and remains intact  Spanning erythema from midline incision to right lower abdomen   Musculoskeletal:         General: Normal range of motion.   Skin:     General: Skin is warm and dry.   Neurological:      General: No focal deficit present.      Mental Status: He is alert.          Significant Labs:  I have reviewed all pertinent lab results within the past 24 hours.  CBC:   Recent Labs   Lab 08/13/23  0353   WBC 11.03   RBC 3.57*   HGB 10.8*   HCT 32.1*      MCV 90   MCH 30.3   MCHC 33.6     CMP:   Recent Labs   Lab 08/13/23  0353   *   CALCIUM 8.2*   ALBUMIN 2.3*   PROT 5.2*      K 3.0*   CO2 27      BUN 11   CREATININE 0.8   ALKPHOS 63   ALT 32   AST 23   BILITOT 1.1*       Significant Diagnostics:  I have reviewed all pertinent imaging results/findings within the past 24 hours.

## 2023-08-13 NOTE — NURSING
Offered patient a second iv so he could obtain LR at 50 ml/hour, patient refused.  He verbalizes understanding of all of the above

## 2023-08-13 NOTE — NURSING
[facility], [facility]  Nurses Note -- 4 Eyes      8/13/2023       Skin assessed on: Q Shift      [x] No Pressure Injuries Present    []Prevention Measures Documented    [] Yes LDA  for Pressure Injury Previously documented     [] Yes New Pressure Injury Discovered   [] LDA for New Pressure Injury Added      Attending RN:  Virginie Rice RN     Second RN:  Aylin FERNANDEZ RN      Patient abdomen was marked due to redness

## 2023-08-13 NOTE — NURSING
Moderate amount of drainage noted from abdominal incision; patient he was trying to get out of bed and the tele monitor cord accidentally caught onto the staples. Redness noted to right side of the abdomen. MD aware. Pt is up in chair. No complaint of pain. Remained free from fall/injury. Will cont to monitor

## 2023-08-13 NOTE — PLAN OF CARE
Problem: Adult Inpatient Plan of Care  Goal: Plan of Care Review  Outcome: Ongoing, Progressing  Goal: Patient-Specific Goal (Individualized)  Outcome: Ongoing, Progressing  Goal: Absence of Hospital-Acquired Illness or Injury  Outcome: Ongoing, Progressing  Goal: Optimal Comfort and Wellbeing  Outcome: Ongoing, Progressing  Goal: Readiness for Transition of Care  Outcome: Ongoing, Progressing   Progressing with slight decrease in wbc's.  Still has low grade temps, dressing to abdomen intact, no complaints of pain

## 2023-08-14 LAB
ALBUMIN SERPL BCP-MCNC: 2.3 G/DL (ref 3.5–5.2)
ALP SERPL-CCNC: 66 U/L (ref 55–135)
ALT SERPL W/O P-5'-P-CCNC: 34 U/L (ref 10–44)
ANION GAP SERPL CALC-SCNC: 12 MMOL/L (ref 8–16)
AST SERPL-CCNC: 30 U/L (ref 10–40)
BILIRUB SERPL-MCNC: 0.7 MG/DL (ref 0.1–1)
BUN SERPL-MCNC: 8 MG/DL (ref 8–23)
CALCIUM SERPL-MCNC: 8.2 MG/DL (ref 8.7–10.5)
CHLORIDE SERPL-SCNC: 99 MMOL/L (ref 95–110)
CO2 SERPL-SCNC: 26 MMOL/L (ref 23–29)
CREAT SERPL-MCNC: 0.8 MG/DL (ref 0.5–1.4)
ERYTHROCYTE [DISTWIDTH] IN BLOOD BY AUTOMATED COUNT: 13.1 % (ref 11.5–14.5)
EST. GFR  (NO RACE VARIABLE): >60 ML/MIN/1.73 M^2
GLUCOSE SERPL-MCNC: 219 MG/DL (ref 70–110)
HCT VFR BLD AUTO: 32.7 % (ref 40–54)
HGB BLD-MCNC: 11 G/DL (ref 14–18)
MAGNESIUM SERPL-MCNC: 1.5 MG/DL (ref 1.6–2.6)
MCH RBC QN AUTO: 30.2 PG (ref 27–31)
MCHC RBC AUTO-ENTMCNC: 33.6 G/DL (ref 32–36)
MCV RBC AUTO: 90 FL (ref 82–98)
PLATELET # BLD AUTO: 218 K/UL (ref 150–450)
PMV BLD AUTO: 11.3 FL (ref 9.2–12.9)
POCT GLUCOSE: 112 MG/DL (ref 70–110)
POCT GLUCOSE: 119 MG/DL (ref 70–110)
POCT GLUCOSE: 120 MG/DL (ref 70–110)
POCT GLUCOSE: 144 MG/DL (ref 70–110)
POTASSIUM SERPL-SCNC: 2.8 MMOL/L (ref 3.5–5.1)
PROT SERPL-MCNC: 5.3 G/DL (ref 6–8.4)
RBC # BLD AUTO: 3.64 M/UL (ref 4.6–6.2)
SODIUM SERPL-SCNC: 137 MMOL/L (ref 136–145)
WBC # BLD AUTO: 11.33 K/UL (ref 3.9–12.7)

## 2023-08-14 PROCEDURE — 36415 COLL VENOUS BLD VENIPUNCTURE: CPT | Performed by: STUDENT IN AN ORGANIZED HEALTH CARE EDUCATION/TRAINING PROGRAM

## 2023-08-14 PROCEDURE — 25000003 PHARM REV CODE 250: Performed by: STUDENT IN AN ORGANIZED HEALTH CARE EDUCATION/TRAINING PROGRAM

## 2023-08-14 PROCEDURE — 63600175 PHARM REV CODE 636 W HCPCS: Performed by: STUDENT IN AN ORGANIZED HEALTH CARE EDUCATION/TRAINING PROGRAM

## 2023-08-14 PROCEDURE — 80053 COMPREHEN METABOLIC PANEL: CPT | Performed by: STUDENT IN AN ORGANIZED HEALTH CARE EDUCATION/TRAINING PROGRAM

## 2023-08-14 PROCEDURE — 85027 COMPLETE CBC AUTOMATED: CPT | Performed by: STUDENT IN AN ORGANIZED HEALTH CARE EDUCATION/TRAINING PROGRAM

## 2023-08-14 PROCEDURE — 11000001 HC ACUTE MED/SURG PRIVATE ROOM

## 2023-08-14 PROCEDURE — 83735 ASSAY OF MAGNESIUM: CPT | Performed by: STUDENT IN AN ORGANIZED HEALTH CARE EDUCATION/TRAINING PROGRAM

## 2023-08-14 PROCEDURE — 63600175 PHARM REV CODE 636 W HCPCS

## 2023-08-14 PROCEDURE — C9113 INJ PANTOPRAZOLE SODIUM, VIA: HCPCS | Performed by: STUDENT IN AN ORGANIZED HEALTH CARE EDUCATION/TRAINING PROGRAM

## 2023-08-14 RX ORDER — IBUPROFEN 200 MG
24 TABLET ORAL
Status: DISCONTINUED | OUTPATIENT
Start: 2023-08-14 | End: 2023-08-15

## 2023-08-14 RX ORDER — GLUCAGON 1 MG
1 KIT INJECTION
Status: DISCONTINUED | OUTPATIENT
Start: 2023-08-14 | End: 2023-08-15

## 2023-08-14 RX ORDER — POTASSIUM CHLORIDE 7.45 MG/ML
10 INJECTION INTRAVENOUS
Status: COMPLETED | OUTPATIENT
Start: 2023-08-14 | End: 2023-08-14

## 2023-08-14 RX ORDER — INSULIN ASPART 100 [IU]/ML
1-10 INJECTION, SOLUTION INTRAVENOUS; SUBCUTANEOUS
Status: DISCONTINUED | OUTPATIENT
Start: 2023-08-14 | End: 2023-08-15

## 2023-08-14 RX ORDER — IBUPROFEN 200 MG
16 TABLET ORAL
Status: DISCONTINUED | OUTPATIENT
Start: 2023-08-14 | End: 2023-08-15

## 2023-08-14 RX ORDER — MAGNESIUM SULFATE HEPTAHYDRATE 40 MG/ML
4 INJECTION, SOLUTION INTRAVENOUS ONCE
Status: COMPLETED | OUTPATIENT
Start: 2023-08-14 | End: 2023-08-14

## 2023-08-14 RX ADMIN — PANTOPRAZOLE SODIUM 40 MG: 40 INJECTION, POWDER, FOR SOLUTION INTRAVENOUS at 08:08

## 2023-08-14 RX ADMIN — SODIUM CHLORIDE, POTASSIUM CHLORIDE, SODIUM LACTATE AND CALCIUM CHLORIDE: 600; 310; 30; 20 INJECTION, SOLUTION INTRAVENOUS at 03:08

## 2023-08-14 RX ADMIN — POTASSIUM CHLORIDE 10 MEQ: 7.46 INJECTION, SOLUTION INTRAVENOUS at 11:08

## 2023-08-14 RX ADMIN — PIPERACILLIN AND TAZOBACTAM 4.5 G: 4; .5 INJECTION, POWDER, LYOPHILIZED, FOR SOLUTION INTRAVENOUS; PARENTERAL at 12:08

## 2023-08-14 RX ADMIN — MAGNESIUM SULFATE HEPTAHYDRATE 4 G: 40 INJECTION, SOLUTION INTRAVENOUS at 07:08

## 2023-08-14 RX ADMIN — POTASSIUM CHLORIDE 10 MEQ: 7.46 INJECTION, SOLUTION INTRAVENOUS at 10:08

## 2023-08-14 RX ADMIN — ENOXAPARIN SODIUM 40 MG: 40 INJECTION SUBCUTANEOUS at 04:08

## 2023-08-14 RX ADMIN — POTASSIUM CHLORIDE 10 MEQ: 7.46 INJECTION, SOLUTION INTRAVENOUS at 09:08

## 2023-08-14 RX ADMIN — PIPERACILLIN AND TAZOBACTAM 4.5 G: 4; .5 INJECTION, POWDER, LYOPHILIZED, FOR SOLUTION INTRAVENOUS; PARENTERAL at 11:08

## 2023-08-14 RX ADMIN — POTASSIUM CHLORIDE 10 MEQ: 7.46 INJECTION, SOLUTION INTRAVENOUS at 08:08

## 2023-08-14 RX ADMIN — POTASSIUM CHLORIDE 10 MEQ: 7.46 INJECTION, SOLUTION INTRAVENOUS at 07:08

## 2023-08-14 RX ADMIN — PIPERACILLIN AND TAZOBACTAM 4.5 G: 4; .5 INJECTION, POWDER, LYOPHILIZED, FOR SOLUTION INTRAVENOUS; PARENTERAL at 08:08

## 2023-08-14 RX ADMIN — POTASSIUM CHLORIDE 10 MEQ: 7.46 INJECTION, SOLUTION INTRAVENOUS at 01:08

## 2023-08-14 RX ADMIN — PIPERACILLIN AND TAZOBACTAM 4.5 G: 4; .5 INJECTION, POWDER, LYOPHILIZED, FOR SOLUTION INTRAVENOUS; PARENTERAL at 03:08

## 2023-08-14 RX ADMIN — POTASSIUM CHLORIDE 10 MEQ: 7.46 INJECTION, SOLUTION INTRAVENOUS at 02:08

## 2023-08-14 NOTE — SUBJECTIVE & OBJECTIVE
Interval History: Patient seen and examined. No acute events. WBC normalized. HR normal. Erythema remains to right lower abdomen.     Medications:  Continuous Infusions:      Scheduled Meds:   enoxparin  40 mg Subcutaneous Q24H (prophylaxis, 1700)    magnesium sulfate IVPB  1 g Intravenous Once    pantoprazole  40 mg Intravenous Daily    piperacillin-tazobactam (Zosyn) IV (PEDS and ADULTS) (extended infusion is not appropriate)  4.5 g Intravenous Q8H    potassium chloride  40 mEq Oral BID     PRN Meds:acetaminophen, hydrALAZINE, ondansetron, oxyCODONE, oxyCODONE     Review of patient's allergies indicates:  No Known Allergies  Objective:     Vital Signs (Most Recent):  Temp: 99.1 °F (37.3 °C) (08/15/23 0400)  Pulse: 84 (08/15/23 0400)  Resp: 17 (08/15/23 0400)  BP: (!) 156/84 (08/15/23 0400)  SpO2: 96 % (08/15/23 0400) Vital Signs (24h Range):  Temp:  [99.1 °F (37.3 °C)-100.4 °F (38 °C)] 99.1 °F (37.3 °C)  Pulse:  [80-88] 84  Resp:  [17-19] 17  SpO2:  [93 %-96 %] 96 %  BP: (138-165)/(82-94) 156/84     Weight: 92.1 kg (203 lb 0.7 oz)  Body mass index is 29.13 kg/m².    Intake/Output - Last 3 Shifts         08/13 0700  08/14 0659 08/14 0700  08/15 0659 08/15 0700  08/16 0659    P.O. 500 360     Total Intake(mL/kg) 500 (5.4) 360 (3.9)     Urine (mL/kg/hr) 1425 (0.6) 2700 (1.2)     Stool  0     Total Output 1425 2700     Net -925 -2340            Urine Occurrence 1 x 1 x     Stool Occurrence 1 x 1 x              Physical Exam  Vitals and nursing note reviewed.   Constitutional:       General: He is not in acute distress.     Appearance: Normal appearance.   HENT:      Mouth/Throat:      Mouth: Mucous membranes are moist.   Cardiovascular:      Rate and Rhythm: Normal rate and regular rhythm.   Pulmonary:      Effort: Pulmonary effort is normal. No respiratory distress.   Abdominal:      Comments: Abdomen soft, mildly distended  Midline incision with purulent output this morning; three staples removed and wet to dry  packing gauze replaced  Fascia probed and remains intact  Spanning, blanching erythema from midline incision to right lower abdomen; improving   Musculoskeletal:         General: Normal range of motion.   Skin:     General: Skin is warm and dry.   Neurological:      General: No focal deficit present.      Mental Status: He is alert.          Significant Labs:  I have reviewed all pertinent lab results within the past 24 hours.  CBC:   Recent Labs   Lab 08/15/23  0420   WBC 10.22   RBC 3.73*   HGB 11.1*   HCT 34.0*      MCV 91   MCH 29.8   MCHC 32.6     CMP:   Recent Labs   Lab 08/15/23  0420   *   CALCIUM 8.1*   ALBUMIN 2.3*   PROT 5.5*      K 3.2*   CO2 27      BUN 8   CREATININE 0.8   ALKPHOS 75   ALT 60*   AST 67*   BILITOT 0.6       Significant Diagnostics:  I have reviewed all pertinent imaging results/findings within the past 24 hours.

## 2023-08-14 NOTE — PLAN OF CARE
Problem: Adult Inpatient Plan of Care  Goal: Plan of Care Review  Outcome: Ongoing, Progressing  Flowsheets (Taken 8/14/2023 1222)  Plan of Care Reviewed With: patient  Goal: Patient-Specific Goal (Individualized)  Outcome: Ongoing, Progressing  Goal: Absence of Hospital-Acquired Illness or Injury  Outcome: Ongoing, Progressing  Intervention: Identify and Manage Fall Risk  Flowsheets (Taken 8/14/2023 1222)  Safety Promotion/Fall Prevention:   assistive device/personal item within reach   Fall Risk reviewed with patient/family   high risk medications identified   nonskid shoes/socks when out of bed   side rails raised x 2   room near unit station   medications reviewed   instructed to call staff for mobility  Intervention: Prevent Skin Injury  Flowsheets (Taken 8/14/2023 1222)  Body Position: position changed independently  Skin Protection:   adhesive use limited   tubing/devices free from skin contact  Intervention: Prevent and Manage VTE (Venous Thromboembolism) Risk  Flowsheets (Taken 8/14/2023 1222)  Activity Management:   Ankle pumps - L1   Arm raise - L1   Rolling - L1   Straight leg raise - L1  VTE Prevention/Management:   ambulation promoted   bleeding precations maintained   bleeding risk assessed  Range of Motion: active ROM (range of motion) encouraged  Intervention: Prevent Infection  Flowsheets (Taken 8/14/2023 1222)  Infection Prevention: hand hygiene promoted  Goal: Optimal Comfort and Wellbeing  Outcome: Ongoing, Progressing  Goal: Readiness for Transition of Care  Outcome: Ongoing, Progressing     Problem: Pain Acute  Goal: Acceptable Pain Control and Functional Ability  Outcome: Ongoing, Progressing  Intervention: Develop Pain Management Plan  Flowsheets (Taken 8/14/2023 1230)  Pain Management Interventions:   medication offered   relaxation techniques promoted  Intervention: Optimize Psychosocial Wellbeing  Flowsheets (Taken 8/14/2023 1230)  Supportive Measures:   active listening utilized    relaxation techniques promoted   self-reflection promoted   self-care encouraged   problem-solving facilitated   positive reinforcement provided   verbalization of feelings encouraged   self-responsibility promoted

## 2023-08-14 NOTE — NURSING
[facility] [facility] [place] 4 [place]      8/14/2023       Skin assessed on: Q Shift      [x] No Pressure Injuries Present    []Prevention Measures Documented    [] Yes LDA  for Pressure Injury Previously documented     [] Yes New Pressure Injury Discovered   [] LDA for New Pressure Injury Added      Attending RN:  Patricia Brantley LPN     Second RN:  NAHUN Padron

## 2023-08-14 NOTE — NURSING
Pt back in bed watching tv, remains free from fall/injury. No complaints of pain/discomfort. IV antibiotics infusing.Tele #[id number]. Vitals assessed. Pt on room air; no distress noted. Safety measures maintained. Will cont to monitor

## 2023-08-14 NOTE — ASSESSMENT & PLAN NOTE
Jules Aviles is a 68yoM who represents to the hospital with a small bowel obstruction. This is his fourth presentation in the last three months.     - patient seen and examined  - POD5 s/p ex lap, lysis of adhesions and small bowel resection  - vital signs, labs reviewed   - WBC normalized  - midline incision wet-to-dry dressing replaced at bedside; less output this morning   - spanning erythema along right lower abdomen  - low threshold to repeat CT abd/pelvis, but will continue to hold off at this time as his WBC normalized, is tolerating diet and having bowel function  - diet advanced  - multi-modal pain meds  - out of bed, to chair, ok for ambulation  - please call with questions

## 2023-08-14 NOTE — NURSING
Pt up in chair watching tv. No complaints of pain/discomfort. Abdominal dressing c/d/i. Safety measures maintained. Will cont to monitor

## 2023-08-15 LAB
ALBUMIN SERPL BCP-MCNC: 2.3 G/DL (ref 3.5–5.2)
ALP SERPL-CCNC: 75 U/L (ref 55–135)
ALT SERPL W/O P-5'-P-CCNC: 60 U/L (ref 10–44)
ANION GAP SERPL CALC-SCNC: 8 MMOL/L (ref 8–16)
AST SERPL-CCNC: 67 U/L (ref 10–40)
BILIRUB SERPL-MCNC: 0.6 MG/DL (ref 0.1–1)
BUN SERPL-MCNC: 8 MG/DL (ref 8–23)
CALCIUM SERPL-MCNC: 8.1 MG/DL (ref 8.7–10.5)
CHLORIDE SERPL-SCNC: 103 MMOL/L (ref 95–110)
CO2 SERPL-SCNC: 27 MMOL/L (ref 23–29)
CREAT SERPL-MCNC: 0.8 MG/DL (ref 0.5–1.4)
ERYTHROCYTE [DISTWIDTH] IN BLOOD BY AUTOMATED COUNT: 13.2 % (ref 11.5–14.5)
EST. GFR  (NO RACE VARIABLE): >60 ML/MIN/1.73 M^2
GLUCOSE SERPL-MCNC: 118 MG/DL (ref 70–110)
HCT VFR BLD AUTO: 34 % (ref 40–54)
HGB BLD-MCNC: 11.1 G/DL (ref 14–18)
MAGNESIUM SERPL-MCNC: 1.9 MG/DL (ref 1.6–2.6)
MCH RBC QN AUTO: 29.8 PG (ref 27–31)
MCHC RBC AUTO-ENTMCNC: 32.6 G/DL (ref 32–36)
MCV RBC AUTO: 91 FL (ref 82–98)
PLATELET # BLD AUTO: 259 K/UL (ref 150–450)
PMV BLD AUTO: 10.7 FL (ref 9.2–12.9)
POTASSIUM SERPL-SCNC: 3.2 MMOL/L (ref 3.5–5.1)
PROT SERPL-MCNC: 5.5 G/DL (ref 6–8.4)
RBC # BLD AUTO: 3.73 M/UL (ref 4.6–6.2)
SODIUM SERPL-SCNC: 138 MMOL/L (ref 136–145)
WBC # BLD AUTO: 10.22 K/UL (ref 3.9–12.7)

## 2023-08-15 PROCEDURE — 85027 COMPLETE CBC AUTOMATED: CPT | Performed by: STUDENT IN AN ORGANIZED HEALTH CARE EDUCATION/TRAINING PROGRAM

## 2023-08-15 PROCEDURE — 94760 N-INVAS EAR/PLS OXIMETRY 1: CPT

## 2023-08-15 PROCEDURE — 25000003 PHARM REV CODE 250: Performed by: STUDENT IN AN ORGANIZED HEALTH CARE EDUCATION/TRAINING PROGRAM

## 2023-08-15 PROCEDURE — 36415 COLL VENOUS BLD VENIPUNCTURE: CPT | Performed by: STUDENT IN AN ORGANIZED HEALTH CARE EDUCATION/TRAINING PROGRAM

## 2023-08-15 PROCEDURE — 11000001 HC ACUTE MED/SURG PRIVATE ROOM

## 2023-08-15 PROCEDURE — 63600175 PHARM REV CODE 636 W HCPCS: Performed by: STUDENT IN AN ORGANIZED HEALTH CARE EDUCATION/TRAINING PROGRAM

## 2023-08-15 PROCEDURE — 83735 ASSAY OF MAGNESIUM: CPT | Performed by: STUDENT IN AN ORGANIZED HEALTH CARE EDUCATION/TRAINING PROGRAM

## 2023-08-15 PROCEDURE — 80053 COMPREHEN METABOLIC PANEL: CPT | Performed by: STUDENT IN AN ORGANIZED HEALTH CARE EDUCATION/TRAINING PROGRAM

## 2023-08-15 RX ORDER — MAGNESIUM SULFATE 1 G/100ML
1 INJECTION INTRAVENOUS ONCE
Status: COMPLETED | OUTPATIENT
Start: 2023-08-15 | End: 2023-08-15

## 2023-08-15 RX ORDER — METRONIDAZOLE 500 MG/1
500 TABLET ORAL EVERY 8 HOURS
Status: DISCONTINUED | OUTPATIENT
Start: 2023-08-15 | End: 2023-08-16 | Stop reason: HOSPADM

## 2023-08-15 RX ORDER — CIPROFLOXACIN 500 MG/1
500 TABLET ORAL EVERY 12 HOURS
Status: DISCONTINUED | OUTPATIENT
Start: 2023-08-15 | End: 2023-08-16 | Stop reason: HOSPADM

## 2023-08-15 RX ORDER — PANTOPRAZOLE SODIUM 40 MG/1
40 TABLET, DELAYED RELEASE ORAL DAILY
Status: DISCONTINUED | OUTPATIENT
Start: 2023-08-15 | End: 2023-08-16 | Stop reason: HOSPADM

## 2023-08-15 RX ORDER — POTASSIUM CHLORIDE 20 MEQ/1
40 TABLET, EXTENDED RELEASE ORAL 2 TIMES DAILY
Status: DISCONTINUED | OUTPATIENT
Start: 2023-08-15 | End: 2023-08-16 | Stop reason: HOSPADM

## 2023-08-15 RX ORDER — TAMSULOSIN HYDROCHLORIDE 0.4 MG/1
1 CAPSULE ORAL NIGHTLY
Status: DISCONTINUED | OUTPATIENT
Start: 2023-08-15 | End: 2023-08-16 | Stop reason: HOSPADM

## 2023-08-15 RX ADMIN — CIPROFLOXACIN 500 MG: 500 TABLET, FILM COATED ORAL at 08:08

## 2023-08-15 RX ADMIN — TAMSULOSIN HYDROCHLORIDE 0.4 MG: 0.4 CAPSULE ORAL at 08:08

## 2023-08-15 RX ADMIN — ENOXAPARIN SODIUM 40 MG: 40 INJECTION SUBCUTANEOUS at 04:08

## 2023-08-15 RX ADMIN — PANTOPRAZOLE SODIUM 40 MG: 40 TABLET, DELAYED RELEASE ORAL at 08:08

## 2023-08-15 RX ADMIN — METRONIDAZOLE 500 MG: 500 TABLET ORAL at 09:08

## 2023-08-15 RX ADMIN — METRONIDAZOLE 500 MG: 500 TABLET ORAL at 01:08

## 2023-08-15 RX ADMIN — MAGNESIUM SULFATE 1 G: 1 INJECTION INTRAVENOUS at 07:08

## 2023-08-15 RX ADMIN — POTASSIUM CHLORIDE 40 MEQ: 1500 TABLET, EXTENDED RELEASE ORAL at 08:08

## 2023-08-15 RX ADMIN — POTASSIUM CHLORIDE 20 MEQ: 1500 TABLET, EXTENDED RELEASE ORAL at 08:08

## 2023-08-15 NOTE — PROGRESS NOTES
[facility]  General Surgery  Progress Note    Subjective:     History of Present Illness:  Jules Aviles is a 68yoM who is well known to the general surgery service. He was recently admitted to the service with a small bowel obstruction, which was managed conservatively and resolved. He was discharged on Monday. He represents with similar abdominal distension, abdominal discomfort. CT redemonstrates a small bowel obstruction, with the obstruction in the same area. He denies any bowel function since discharge. General surgery consulted for evaluation.       Post-Op Info:  Procedure(s) (LRB):  LAPAROTOMY, EXPLORATORY (N/A)  EXCISION, SMALL INTESTINE   6 Days Post-Op     Interval History: Patient seen and examined this morning. No acute events overnight. Subjectively feeling better, healthier appetite. Midline dressing changed. Right lower abdominal erythema improving.     Medications:  Continuous Infusions:  Scheduled Meds:   enoxparin  40 mg Subcutaneous Q24H (prophylaxis, 1700)    magnesium sulfate IVPB  1 g Intravenous Once    pantoprazole  40 mg Intravenous Daily    piperacillin-tazobactam (Zosyn) IV (PEDS and ADULTS) (extended infusion is not appropriate)  4.5 g Intravenous Q8H    potassium chloride  40 mEq Oral BID     PRN Meds:acetaminophen, hydrALAZINE, ondansetron, oxyCODONE, oxyCODONE     Review of patient's allergies indicates:  No Known Allergies  Objective:     Vital Signs (Most Recent):  Temp: 99.1 °F (37.3 °C) (08/15/23 0400)  Pulse: 84 (08/15/23 0400)  Resp: 17 (08/15/23 0400)  BP: (!) 156/84 (08/15/23 0400)  SpO2: 96 % (08/15/23 0400) Vital Signs (24h Range):  Temp:  [99.1 °F (37.3 °C)-100.4 °F (38 °C)] 99.1 °F (37.3 °C)  Pulse:  [80-88] 84  Resp:  [17-19] 17  SpO2:  [93 %-96 %] 96 %  BP: (138-165)/(82-94) 156/84     Weight: 92.1 kg (203 lb 0.7 oz)  Body mass index is 29.13 kg/m².    Intake/Output - Last 3 Shifts         08/13 0700 08/14 0659 08/14 0700  08/15 0659 08/15 0700  08/16 0659     P.O. 500 360     Total Intake(mL/kg) 500 (5.4) 360 (3.9)     Urine (mL/kg/hr) 1425 (0.6) 2700 (1.2)     Stool  0     Total Output 1425 2700     Net -925 -2340            Urine Occurrence 1 x 1 x     Stool Occurrence 1 x 1 x              Physical Exam  Vitals and nursing note reviewed.   Constitutional:       General: He is not in acute distress.     Appearance: Normal appearance.   HENT:      Mouth/Throat:      Mouth: Mucous membranes are moist.   Cardiovascular:      Rate and Rhythm: Normal rate and regular rhythm.   Pulmonary:      Effort: Pulmonary effort is normal. No respiratory distress.   Abdominal:      Comments: Abdomen soft, mildly distended  Midline incision with purulent output this morning; three staples removed and wet to dry packing gauze replaced  Fascia probed and remains intact  Spanning, blanching erythema from midline incision to right lower abdomen; improving   Musculoskeletal:         General: Normal range of motion.   Skin:     General: Skin is warm and dry.   Neurological:      General: No focal deficit present.      Mental Status: He is alert.          Significant Labs:  I have reviewed all pertinent lab results within the past 24 hours.  CBC:   Recent Labs   Lab 08/15/23  0420   WBC 10.22   RBC 3.73*   HGB 11.1*   HCT 34.0*      MCV 91   MCH 29.8   MCHC 32.6     CMP:   Recent Labs   Lab 08/15/23  0420   *   CALCIUM 8.1*   ALBUMIN 2.3*   PROT 5.5*      K 3.2*   CO2 27      BUN 8   CREATININE 0.8   ALKPHOS 75   ALT 60*   AST 67*   BILITOT 0.6       Significant Diagnostics:  I have reviewed all pertinent imaging results/findings within the past 24 hours.    Assessment/Plan:     * SBO (small bowel obstruction)  Jules Aviles is a 68yoM who represents to the hospital with a small bowel obstruction. This is his fourth presentation in the last three months.     - patient seen and examined  - POD5 s/p ex lap, lysis of adhesions and small bowel resection  - vital signs,  labs reviewed   - HR and WBC remain normal  - midline incision wet-to-dry dressing replaced at bedside; no further output   - spanning erythema along right lower abdomen is resolving although remains present  - low threshold to repeat CT abd/pelvis, but will continue to hold off at this time as his WBC normalized, is tolerating diet and having bowel function  - diet advanced  - switch to PO antibiotics  - multi-modal pain meds  - out of bed, to chair, ok for ambulation  - please call with questions        Carlos Aguilera MD  General Surgery  [facility] - [place] - Med Surg

## 2023-08-15 NOTE — PLAN OF CARE
Patient is Post op day 5, exploratory lap after SBO.  Now diet is being advanced.  TN will continue to assess patient for discharge planning.  Plan A:  Home with family.  Plan B:  Home with family and home health         08/15/23 1256   Discharge Reassessment   Assessment Type Discharge Planning Reassessment   Discharge Plan discussed with: Patient   Name(s) and Number(s) MilkaAleisha pearce S/O [phone]   Discharge Plan A Home with family;Home Health   Discharge Plan B Home Health;Home with family   DME Needed Upon Discharge  none   Why the patient remains in the hospital Requires continued medical care   Post-Acute Status   Discharge Delays None known at this time

## 2023-08-15 NOTE — ASSESSMENT & PLAN NOTE
Jules Aviles is a 68yoM who represents to the hospital with a small bowel obstruction. This is his fourth presentation in the last three months.     - patient seen and examined  - POD5 s/p ex lap, lysis of adhesions and small bowel resection  - vital signs, labs reviewed   - HR and WBC remain normal  - midline incision wet-to-dry dressing replaced at bedside; no further output   - spanning erythema along right lower abdomen is resolving although remains present  - low threshold to repeat CT abd/pelvis, but will continue to hold off at this time as his WBC normalized, is tolerating diet and having bowel function  - diet advanced  - switch to PO antibiotics  - multi-modal pain meds  - out of bed, to chair, ok for ambulation  - please call with questions

## 2023-08-15 NOTE — SUBJECTIVE & OBJECTIVE
Interval History: Patient seen and examined this morning. No acute events overnight. Subjectively feeling better, healthier appetite. Midline dressing changed. Right lower abdominal erythema improving.     Medications:  Continuous Infusions:  Scheduled Meds:   enoxparin  40 mg Subcutaneous Q24H (prophylaxis, 1700)    magnesium sulfate IVPB  1 g Intravenous Once    pantoprazole  40 mg Intravenous Daily    piperacillin-tazobactam (Zosyn) IV (PEDS and ADULTS) (extended infusion is not appropriate)  4.5 g Intravenous Q8H    potassium chloride  40 mEq Oral BID     PRN Meds:acetaminophen, hydrALAZINE, ondansetron, oxyCODONE, oxyCODONE     Review of patient's allergies indicates:  No Known Allergies  Objective:     Vital Signs (Most Recent):  Temp: 99.1 °F (37.3 °C) (08/15/23 0400)  Pulse: 84 (08/15/23 0400)  Resp: 17 (08/15/23 0400)  BP: (!) 156/84 (08/15/23 0400)  SpO2: 96 % (08/15/23 0400) Vital Signs (24h Range):  Temp:  [99.1 °F (37.3 °C)-100.4 °F (38 °C)] 99.1 °F (37.3 °C)  Pulse:  [80-88] 84  Resp:  [17-19] 17  SpO2:  [93 %-96 %] 96 %  BP: (138-165)/(82-94) 156/84     Weight: 92.1 kg (203 lb 0.7 oz)  Body mass index is 29.13 kg/m².    Intake/Output - Last 3 Shifts         08/13 0700 08/14 0659 08/14 0700  08/15 0659 08/15 0700 08/16 0659    P.O. 500 360     Total Intake(mL/kg) 500 (5.4) 360 (3.9)     Urine (mL/kg/hr) 1425 (0.6) 2700 (1.2)     Stool  0     Total Output 1425 2700     Net -925 -2340            Urine Occurrence 1 x 1 x     Stool Occurrence 1 x 1 x              Physical Exam  Vitals and nursing note reviewed.   Constitutional:       General: He is not in acute distress.     Appearance: Normal appearance.   HENT:      Mouth/Throat:      Mouth: Mucous membranes are moist.   Cardiovascular:      Rate and Rhythm: Normal rate and regular rhythm.   Pulmonary:      Effort: Pulmonary effort is normal. No respiratory distress.   Abdominal:      Comments: Abdomen soft, mildly distended  Midline incision with  purulent output this morning; three staples removed and wet to dry packing gauze replaced  Fascia probed and remains intact  Spanning, blanching erythema from midline incision to right lower abdomen; improving   Musculoskeletal:         General: Normal range of motion.   Skin:     General: Skin is warm and dry.   Neurological:      General: No focal deficit present.      Mental Status: He is alert.          Significant Labs:  I have reviewed all pertinent lab results within the past 24 hours.  CBC:   Recent Labs   Lab 08/15/23  0420   WBC 10.22   RBC 3.73*   HGB 11.1*   HCT 34.0*      MCV 91   MCH 29.8   MCHC 32.6     CMP:   Recent Labs   Lab 08/15/23  0420   *   CALCIUM 8.1*   ALBUMIN 2.3*   PROT 5.5*      K 3.2*   CO2 27      BUN 8   CREATININE 0.8   ALKPHOS 75   ALT 60*   AST 67*   BILITOT 0.6       Significant Diagnostics:  I have reviewed all pertinent imaging results/findings within the past 24 hours.

## 2023-08-15 NOTE — PROGRESS NOTES
[facility]  General Surgery  Progress Note    Subjective:     History of Present Illness:  Jules Aviles is a 68yoM who is well known to the general surgery service. He was recently admitted to the service with a small bowel obstruction, which was managed conservatively and resolved. He was discharged on Monday. He represents with similar abdominal distension, abdominal discomfort. CT redemonstrates a small bowel obstruction, with the obstruction in the same area. He denies any bowel function since discharge. General surgery consulted for evaluation.       Post-Op Info:  Procedure(s) (LRB):  LAPAROTOMY, EXPLORATORY (N/A)  EXCISION, SMALL INTESTINE   6 Days Post-Op     Interval History: Patient seen and examined. No acute events. WBC normalized. HR normal. Erythema remains to right lower abdomen.     Medications:  Continuous Infusions:      Scheduled Meds:   enoxparin  40 mg Subcutaneous Q24H (prophylaxis, 1700)    magnesium sulfate IVPB  1 g Intravenous Once    pantoprazole  40 mg Intravenous Daily    piperacillin-tazobactam (Zosyn) IV (PEDS and ADULTS) (extended infusion is not appropriate)  4.5 g Intravenous Q8H    potassium chloride  40 mEq Oral BID     PRN Meds:acetaminophen, hydrALAZINE, ondansetron, oxyCODONE, oxyCODONE     Review of patient's allergies indicates:  No Known Allergies  Objective:     Vital Signs (Most Recent):  Temp: 99.1 °F (37.3 °C) (08/15/23 0400)  Pulse: 84 (08/15/23 0400)  Resp: 17 (08/15/23 0400)  BP: (!) 156/84 (08/15/23 0400)  SpO2: 96 % (08/15/23 0400) Vital Signs (24h Range):  Temp:  [99.1 °F (37.3 °C)-100.4 °F (38 °C)] 99.1 °F (37.3 °C)  Pulse:  [80-88] 84  Resp:  [17-19] 17  SpO2:  [93 %-96 %] 96 %  BP: (138-165)/(82-94) 156/84     Weight: 92.1 kg (203 lb 0.7 oz)  Body mass index is 29.13 kg/m².    Intake/Output - Last 3 Shifts         08/13 0700  08/14 0659 08/14 0700  08/15 0659 08/15 0700  08/16 0659    P.O. 500 360     Total Intake(mL/kg) 500 (5.4) 360 (3.9)      Urine (mL/kg/hr) 1425 (0.6) 2700 (1.2)     Stool  0     Total Output 1425 2700     Net -925 -2340            Urine Occurrence 1 x 1 x     Stool Occurrence 1 x 1 x              Physical Exam  Vitals and nursing note reviewed.   Constitutional:       General: He is not in acute distress.     Appearance: Normal appearance.   HENT:      Mouth/Throat:      Mouth: Mucous membranes are moist.   Cardiovascular:      Rate and Rhythm: Normal rate and regular rhythm.   Pulmonary:      Effort: Pulmonary effort is normal. No respiratory distress.   Abdominal:      Comments: Abdomen soft, mildly distended  Midline incision with purulent output this morning; three staples removed and wet to dry packing gauze replaced  Fascia probed and remains intact  Spanning, blanching erythema from midline incision to right lower abdomen; improving   Musculoskeletal:         General: Normal range of motion.   Skin:     General: Skin is warm and dry.   Neurological:      General: No focal deficit present.      Mental Status: He is alert.          Significant Labs:  I have reviewed all pertinent lab results within the past 24 hours.  CBC:   Recent Labs   Lab 08/15/23  0420   WBC 10.22   RBC 3.73*   HGB 11.1*   HCT 34.0*      MCV 91   MCH 29.8   MCHC 32.6     CMP:   Recent Labs   Lab 08/15/23  0420   *   CALCIUM 8.1*   ALBUMIN 2.3*   PROT 5.5*      K 3.2*   CO2 27      BUN 8   CREATININE 0.8   ALKPHOS 75   ALT 60*   AST 67*   BILITOT 0.6       Significant Diagnostics:  I have reviewed all pertinent imaging results/findings within the past 24 hours.    Assessment/Plan:     * SBO (small bowel obstruction)  Jules Aviles is a 68yoM who represents to the hospital with a small bowel obstruction. This is his fourth presentation in the last three months.     - patient seen and examined  - POD5 s/p ex lap, lysis of adhesions and small bowel resection  - vital signs, labs reviewed   - WBC normalized  - midline incision wet-to-dry  dressing replaced at bedside; less output this morning   - spanning erythema along right lower abdomen  - low threshold to repeat CT abd/pelvis, but will continue to hold off at this time as his WBC normalized, is tolerating diet and having bowel function  - diet advanced  - multi-modal pain meds  - out of bed, to chair, ok for ambulation  - please call with questions        Carlos Aguliera MD  General Surgery  [facility] - [place] - Med Surg

## 2023-08-15 NOTE — NURSING
[facility], [facility]  Nurses Note -- 4 Eyes      8/15/2023       Skin assessed on: Q Shift      [x] No Pressure Injuries Present    [x]Prevention Measures Documented    [] Yes LDA  for Pressure Injury Previously documented     [] Yes New Pressure Injury Discovered   [] LDA for New Pressure Injury Added      Attending RN:  Shirley Winn RN     Second RN:  NAHUN Padron

## 2023-08-16 VITALS
HEART RATE: 76 BPM | HEIGHT: 70 IN | DIASTOLIC BLOOD PRESSURE: 84 MMHG | SYSTOLIC BLOOD PRESSURE: 156 MMHG | BODY MASS INDEX: 29.07 KG/M2 | RESPIRATION RATE: 18 BRPM | WEIGHT: 203.06 LBS | TEMPERATURE: 98 F | OXYGEN SATURATION: 96 %

## 2023-08-16 LAB
ALBUMIN SERPL BCP-MCNC: 2.4 G/DL (ref 3.5–5.2)
ALP SERPL-CCNC: 76 U/L (ref 55–135)
ALT SERPL W/O P-5'-P-CCNC: 83 U/L (ref 10–44)
ANION GAP SERPL CALC-SCNC: 9 MMOL/L (ref 8–16)
AST SERPL-CCNC: 69 U/L (ref 10–40)
BILIRUB SERPL-MCNC: 0.4 MG/DL (ref 0.1–1)
BUN SERPL-MCNC: 7 MG/DL (ref 8–23)
CALCIUM SERPL-MCNC: 8.5 MG/DL (ref 8.7–10.5)
CHLORIDE SERPL-SCNC: 105 MMOL/L (ref 95–110)
CO2 SERPL-SCNC: 28 MMOL/L (ref 23–29)
CREAT SERPL-MCNC: 0.8 MG/DL (ref 0.5–1.4)
ERYTHROCYTE [DISTWIDTH] IN BLOOD BY AUTOMATED COUNT: 13.4 % (ref 11.5–14.5)
EST. GFR  (NO RACE VARIABLE): >60 ML/MIN/1.73 M^2
GLUCOSE SERPL-MCNC: 118 MG/DL (ref 70–110)
HCT VFR BLD AUTO: 33.7 % (ref 40–54)
HGB BLD-MCNC: 11.2 G/DL (ref 14–18)
MAGNESIUM SERPL-MCNC: 1.8 MG/DL (ref 1.6–2.6)
MCH RBC QN AUTO: 29.7 PG (ref 27–31)
MCHC RBC AUTO-ENTMCNC: 33.2 G/DL (ref 32–36)
MCV RBC AUTO: 89 FL (ref 82–98)
PLATELET # BLD AUTO: 307 K/UL (ref 150–450)
PMV BLD AUTO: 10.7 FL (ref 9.2–12.9)
POTASSIUM SERPL-SCNC: 3.5 MMOL/L (ref 3.5–5.1)
PROT SERPL-MCNC: 5.8 G/DL (ref 6–8.4)
RBC # BLD AUTO: 3.77 M/UL (ref 4.6–6.2)
SODIUM SERPL-SCNC: 142 MMOL/L (ref 136–145)
WBC # BLD AUTO: 8.04 K/UL (ref 3.9–12.7)

## 2023-08-16 PROCEDURE — 85027 COMPLETE CBC AUTOMATED: CPT | Performed by: STUDENT IN AN ORGANIZED HEALTH CARE EDUCATION/TRAINING PROGRAM

## 2023-08-16 PROCEDURE — 83735 ASSAY OF MAGNESIUM: CPT | Performed by: STUDENT IN AN ORGANIZED HEALTH CARE EDUCATION/TRAINING PROGRAM

## 2023-08-16 PROCEDURE — 80053 COMPREHEN METABOLIC PANEL: CPT | Performed by: STUDENT IN AN ORGANIZED HEALTH CARE EDUCATION/TRAINING PROGRAM

## 2023-08-16 PROCEDURE — 25500020 PHARM REV CODE 255: Performed by: SURGERY

## 2023-08-16 PROCEDURE — 25000003 PHARM REV CODE 250: Performed by: STUDENT IN AN ORGANIZED HEALTH CARE EDUCATION/TRAINING PROGRAM

## 2023-08-16 PROCEDURE — 36415 COLL VENOUS BLD VENIPUNCTURE: CPT | Performed by: STUDENT IN AN ORGANIZED HEALTH CARE EDUCATION/TRAINING PROGRAM

## 2023-08-16 RX ORDER — OXYCODONE HYDROCHLORIDE 5 MG/1
5 TABLET ORAL EVERY 6 HOURS PRN
Qty: 16 TABLET | Refills: 0 | Status: SHIPPED | OUTPATIENT
Start: 2023-08-16

## 2023-08-16 RX ORDER — METRONIDAZOLE 500 MG/1
500 TABLET ORAL EVERY 8 HOURS
Qty: 15 TABLET | Refills: 0 | Status: SHIPPED | OUTPATIENT
Start: 2023-08-16 | End: 2023-08-21

## 2023-08-16 RX ORDER — CIPROFLOXACIN 500 MG/1
500 TABLET ORAL EVERY 12 HOURS
Qty: 10 TABLET | Refills: 0 | Status: SHIPPED | OUTPATIENT
Start: 2023-08-16 | End: 2023-08-21

## 2023-08-16 RX ADMIN — CIPROFLOXACIN 500 MG: 500 TABLET, FILM COATED ORAL at 08:08

## 2023-08-16 RX ADMIN — METRONIDAZOLE 500 MG: 500 TABLET ORAL at 05:08

## 2023-08-16 RX ADMIN — PANTOPRAZOLE SODIUM 40 MG: 40 TABLET, DELAYED RELEASE ORAL at 08:08

## 2023-08-16 RX ADMIN — METRONIDAZOLE 500 MG: 500 TABLET ORAL at 01:08

## 2023-08-16 RX ADMIN — IOHEXOL 100 ML: 350 INJECTION, SOLUTION INTRAVENOUS at 10:08

## 2023-08-16 NOTE — PLAN OF CARE
08/16/23 0930   Medicare Message   Important Message from Medicare regarding Discharge Appeal Rights Given to patient/caregiver;Explained to patient/caregiver;Signed/date by patient/caregiver;Other (comments)   Date IMM was signed 08/16/23   Time IMM was signed 0930        1. Diabetic diet with a bedtime snack nightly.   2. Don't skip meals.  3. Activity: as tolerated.   4. Check your blood sugars before each meal and at bedtime. Also as needed if you feel funny.  5. Bring all of your blood sugar readings in to your appointment with Dr. Key and also the diabetic nurse educator for review.  6. Feel free to call me-Dr. Grayson-over the weekend if any questions: [phone].

## 2023-08-16 NOTE — PROGRESS NOTES
[facility]  General Surgery  Progress Note    Subjective:     History of Present Illness:  Jules Aviles is a 68yoM who is well known to the general surgery service. He was recently admitted to the service with a small bowel obstruction, which was managed conservatively and resolved. He was discharged on Monday. He represents with similar abdominal distension, abdominal discomfort. CT redemonstrates a small bowel obstruction, with the obstruction in the same area. He denies any bowel function since discharge. General surgery consulted for evaluation.       Post-Op Info:  Procedure(s) (LRB):  LAPAROTOMY, EXPLORATORY (N/A)  EXCISION, SMALL INTESTINE   7 Days Post-Op     Interval History: Patient seen and examined. No acute events overnight. Remains with right sided abdominal pain. WBC normal, tolerating diet, having bowel function. Plan for CT scan today. Nearing discharge.     Medications:  Continuous Infusions:  Scheduled Meds:   ciprofloxacin HCl  500 mg Oral Q12H    enoxparin  40 mg Subcutaneous Q24H (prophylaxis, 1700)    metroNIDAZOLE  500 mg Oral Q8H    pantoprazole  40 mg Oral Daily    potassium chloride  40 mEq Oral BID    tamsulosin  1 capsule Oral QHS     PRN Meds:acetaminophen, hydrALAZINE, ondansetron, oxyCODONE, oxyCODONE     Review of patient's allergies indicates:  No Known Allergies  Objective:     Vital Signs (Most Recent):  Temp: 98.6 °F (37 °C) (08/16/23 0702)  Pulse: 73 (08/16/23 0702)  Resp: 18 (08/16/23 0702)  BP: (!) 152/83 (08/16/23 0702)  SpO2: 96 % (08/16/23 0702) Vital Signs (24h Range):  Temp:  [98.5 °F (36.9 °C)-99.8 °F (37.7 °C)] 98.6 °F (37 °C)  Pulse:  [62-82] 73  Resp:  [17-18] 18  SpO2:  [93 %-97 %] 96 %  BP: (137-154)/(74-83) 152/83     Weight: 92.1 kg (203 lb 0.7 oz)  Body mass index is 29.13 kg/m².    Intake/Output - Last 3 Shifts         08/14 0700  08/15 0659 08/15 0700  08/16 0659 08/16 0700  08/17 0659    P.O. 360 960 240    Total Intake(mL/kg) 360 (3.9) 960  (10.4) 240 (2.6)    Urine (mL/kg/hr) 2700 (1.2) 1500 (0.7) 200 (0.8)    Stool 0 0     Total Output 2700 1500 200    Net -2340 -540 +40           Urine Occurrence 1 x      Stool Occurrence 1 x 2 x              Physical Exam  Vitals and nursing note reviewed.   Constitutional:       General: He is not in acute distress.     Appearance: Normal appearance.   HENT:      Mouth/Throat:      Mouth: Mucous membranes are moist.   Cardiovascular:      Rate and Rhythm: Normal rate and regular rhythm.   Pulmonary:      Effort: Pulmonary effort is normal. No respiratory distress.   Abdominal:      Comments: Abdomen soft, mildly distended  Midline incision with wet to dry dressing in place  Fascia probed and remains intact  Right lower quadrant erythema improving   Musculoskeletal:         General: Normal range of motion.   Skin:     General: Skin is warm and dry.   Neurological:      General: No focal deficit present.      Mental Status: He is alert.          Significant Labs:  I have reviewed all pertinent lab results within the past 24 hours.  CBC:   Recent Labs   Lab 08/16/23  0347   WBC 8.04   RBC 3.77*   HGB 11.2*   HCT 33.7*      MCV 89   MCH 29.7   MCHC 33.2     CMP:   Recent Labs   Lab 08/16/23  0347   *   CALCIUM 8.5*   ALBUMIN 2.4*   PROT 5.8*      K 3.5   CO2 28      BUN 7*   CREATININE 0.8   ALKPHOS 76   ALT 83*   AST 69*   BILITOT 0.4       Significant Diagnostics:  I have reviewed all pertinent imaging results/findings within the past 24 hours.    Assessment/Plan:     * SBO (small bowel obstruction)  Jules Aviles is a 68yoM who represents to the hospital with a small bowel obstruction. This is his fourth presentation in the last three months.     - patient seen and examined  - POD7 s/p ex lap, lysis of adhesions and small bowel resection  - vital signs, labs reviewed   - HR and WBC remain normal  - midline incision wet-to-dry dressing replaced at bedside; no further output   - spanning  erythema along right lower abdomen is resolving   - plan for CT scan today in anticipation for discharge  - tolerating regular diet  - PO antibiotics; vitals and WBC remain normal through transition from IV to PO abx  - multi-modal pain meds  - out of bed, to chair, ok for ambulation  - please call with questions        Carlos Aguilera MD  General Surgery  [facility] - Med Surg

## 2023-08-16 NOTE — NURSING
[facility]  Nurses Note -- 4 Eyes      8/15/2023       Skin assessed on: Q Shift      [x] No Pressure Injuries Present    []Prevention Measures Documented    [] Yes LDA  for Pressure Injury Previously documented     [] Yes New Pressure Injury Discovered   [] LDA for New Pressure Injury Added      Attending RN:  Jazmin Rendon RN     Second RN:  Shirley Winn RN

## 2023-08-16 NOTE — ASSESSMENT & PLAN NOTE
Jules Aviles is a 68yoM who represents to the hospital with a small bowel obstruction. This is his fourth presentation in the last three months.     - patient seen and examined  - POD7 s/p ex lap, lysis of adhesions and small bowel resection  - vital signs, labs reviewed   - HR and WBC remain normal  - midline incision wet-to-dry dressing replaced at bedside; no further output   - spanning erythema along right lower abdomen is resolving   - plan for CT scan today in anticipation for discharge  - tolerating regular diet  - PO antibiotics; vitals and WBC remain normal through transition from IV to PO abx  - multi-modal pain meds  - out of bed, to chair, ok for ambulation  - please call with questions

## 2023-08-16 NOTE — PLAN OF CARE
Problem: Adult Inpatient Plan of Care  Goal: Plan of Care Review  Outcome: Ongoing, Progressing  Goal: Patient-Specific Goal (Individualized)  Outcome: Ongoing, Progressing  Goal: Optimal Comfort and Wellbeing  Outcome: Ongoing, Progressing  Goal: Readiness for Transition of Care  Outcome: Ongoing, Progressing     Problem: Pain Acute  Goal: Acceptable Pain Control and Functional Ability  Outcome: Ongoing, Progressing     Problem: Infection  Goal: Absence of Infection Signs and Symptoms  Outcome: Ongoing, Progressing

## 2023-08-16 NOTE — PLAN OF CARE
[facility] Surg      HOME HEALTH ORDERS  FACE TO FACE ENCOUNTER    Patient Name: Jules Aviles  YOB: 1955    PCP: Basil Grover MD   PCP Address: [address] [place], [place] [address] / [id number]  PCP Phone Number: [phone]  PCP Fax: [phone]    Encounter Date: 8/8/23    Admit to Home Health    Diagnoses:  Active Hospital Problems    Diagnosis  POA    *SBO (small bowel obstruction) [K56.609]  Yes      Resolved Hospital Problems   No resolved problems to display.       Follow Up Appointments:  Future Appointments   Date Time Provider Department Center   8/22/2023  1:00 PM Jn Yusuf MD [facility] GENR [facility]   12/5/2023 11:00 AM Bharat Mathis MD [facility] CARDIO [facility]       Allergies:Review of patient's allergies indicates:  No Known Allergies    Medications: Review discharge medications with patient and family and provide education.    Current Facility-Administered Medications   Medication Dose Route Frequency Provider Last Rate Last Admin    acetaminophen tablet 650 mg  650 mg Oral Q6H PRN Jn Yusuf MD   650 mg at 08/12/23 2324    ciprofloxacin HCl tablet 500 mg  500 mg Oral Q12H Carlos Aguilera MD   500 mg at 08/16/23 0834    enoxaparin injection 40 mg  40 mg Subcutaneous Q24H (prophylaxis, 1700) Carlos Aguilera MD   40 mg at 08/15/23 1600    hydrALAZINE injection 10 mg  10 mg Intravenous Q6H PRN Carlos Aguilera MD        metroNIDAZOLE tablet 500 mg  500 mg Oral Q8H Carlos Aguilera MD   500 mg at 08/16/23 0515    ondansetron injection 4 mg  4 mg Intravenous Q12H PRN Carlos Aguilera MD   4 mg at 08/11/23 2059    oxyCODONE immediate release tablet 5 mg  5 mg Oral Q4H PRN Carlos Aguilera MD        oxyCODONE immediate release tablet 5 mg  5 mg Oral Q4H PRN Carlos Aguilera MD        pantoprazole EC tablet 40 mg  40 mg Oral Daily Carlos Aguilera MD   40 mg at 08/16/23 0834    potassium chloride SA CR tablet 40 mEq  40 mEq Oral BID  Carlos Aguilera MD   40 mEq at 08/15/23 2031    tamsulosin 24 hr capsule 0.4 mg  1 capsule Oral QHS Carlos Aguilera MD   0.4 mg at 08/15/23 2031     Facility-Administered Medications Ordered in Other Encounters   Medication Dose Route Frequency Provider Last Rate Last Admin    0.9%  NaCl infusion   Intravenous Continuous Lien Crocker ALESidra, NP   Stopped at 02/18/19 1553     Current Discharge Medication List        START taking these medications    Details   ciprofloxacin HCl (CIPRO) 500 MG tablet Take 1 tablet (500 mg total) by mouth every 12 (twelve) hours. for 5 days  Qty: 10 tablet, Refills: 0      metroNIDAZOLE (FLAGYL) 500 MG tablet Take 1 tablet (500 mg total) by mouth every 8 (eight) hours. for 5 days  Qty: 15 tablet, Refills: 0      oxyCODONE (ROXICODONE) 5 MG immediate release tablet Take 1 tablet (5 mg total) by mouth every 6 (six) hours as needed for Pain.  Qty: 16 tablet, Refills: 0    Comments: Quantity prescribed more than 7 day supply? No           CONTINUE these medications which have NOT CHANGED    Details   cyanocobalamin (VITAMIN B-12) 100 MCG tablet Take 100 mcg by mouth once daily.      hydroCHLOROthiazide (MICROZIDE) 12.5 mg capsule TAKE 1 CAPSULE BY MOUTH EVERY DAY  Qty: 90 capsule, Refills: 3    Associated Diagnoses: Essential hypertension      Lactobacillus acidophilus (PROBIOTIC ORAL) Take 1 capsule by mouth once daily.      losartan (COZAAR) 50 MG tablet TAKE 1 TABLET BY MOUTH EVERY DAY  Qty: 90 tablet, Refills: 3      pantoprazole (PROTONIX) 40 MG tablet Take 40 mg by mouth once daily.      tamsulosin (FLOMAX) 0.4 mg Cap Take 1 capsule by mouth every evening.      vitamin D (VITAMIN D3) 1000 units Tab Take 8,000 Units by mouth once daily.               I have seen and examined this patient within the last 30 days. My clinical findings that support the need for the home health skilled services and home bound status are the following:no   Medical restrictions requiring  assistance of another human to leave home due to  Wound care needs.     Diet:   regular diet    Labs:  No outpatient labs necessary    Referrals/ Consults  Aide to provide assistance with personal care, ADLs, and vital signs.    Activities:   activity as tolerated and no heavy lifting for 6 weeks    Nursing:   Agency to admit patient within 24 hours of hospital discharge unless specified on physician order or at patient request    SN to complete comprehensive assessment including routine vital signs. Instruct on disease process and s/s of complications to report to MD. Review/verify medication list sent home with the patient at time of discharge  and instruct patient/caregiver as needed. Frequency may be adjusted depending on start of care date.     Skilled nurse to perform up to 3 visits PRN for symptoms related to diagnosis    Notify MD if SBP > 160 or < 90; DBP > 90 or < 50; HR > 120 or < 50; Temp > 101; O2 < 88%; Other:   N/A    Ok to schedule additional visits based on staff availability and patient request on consecutive days within the home health episode.    When multiple disciplines ordered:    Start of Care occurs on Sunday - Wednesday schedule remaining discipline evaluations as ordered on separate consecutive days following the start of care.    Thursday SOC -schedule subsequent evaluations Friday and Monday the following week.     Friday - Saturday SOC - schedule subsequent discipline evaluations on consecutive days starting Monday of the following week.    For all post-discharge communication and subsequent orders please contact patient's primary care physician. If unable to reach primary care physician or do not receive response within 30 minutes, please contact Dr. Yusuf's office  for clinical staff order clarification    Miscellaneous   N/A    Home Health Aide:  Nursing Twice weekly and Home Health Aide Twice weekly    Wound Care Orders  yes:  Surgical Wound:  Location: Midline abdominal  incision    Consult ET nurse        Apply the following to wound:   Wet to Damp dressing: please back the opening at the umbilicus with a wet-to-dry kerlix dressing; this dressing needs to be changed daily. Please reinforce instruction to patient and significant other about dressing changes. (frequency)    I certify that this patient is confined to his home and needs intermittent skilled nursing care.

## 2023-08-16 NOTE — DISCHARGE SUMMARY
[facility]  General Surgery  Discharge Summary      Patient Name: Jules Aviles  MRN: [id number]  Admission Date: 8/8/2023  Hospital Length of Stay: 7 days  Discharge Date and Time:  08/16/2023 11:23 AM  Attending Physician: Jn Yusuf MD   Discharging Provider: Carlos Aguilera MD  Primary Care Provider: Basil Grover MD    HPI:   Jules Aviles is a 68yoM who is well known to the general surgery service. He was recently admitted to the service with a small bowel obstruction, which was managed conservatively and resolved. He was discharged on Monday. He represents with similar abdominal distension, abdominal discomfort. CT redemonstrates a small bowel obstruction, with the obstruction in the same area. He denies any bowel function since discharge. General surgery consulted for evaluation.       Procedure(s) (LRB):  LAPAROTOMY, EXPLORATORY (N/A)  EXCISION, SMALL INTESTINE      Indwelling Lines/Drains at time of discharge:   Lines/Drains/Airways     None               Hospital Course: Jules Aviles is a 68yoM who returned to the hospital with a recurrent small bowel obstruction. He was recently discharged from our service with a similar problem. The plan was always for short interval follow up and elective surgery. The patient returned to the hospital prior to his follow up with concerns of re-developing small bowel obstruction. He was taken to the operating room for an exploratory laparotomy with lysis of adhesions and small bowel resection. He progressed appropriately on the floor. He did remain with an elevated leukocytosis and was found to have a superficial wound infection. This was managed at bedside with drainage and daily wet-to-dry dressing placements. The patient was instructed on dressing changes, and referred to home health in the outpatient setting for help with wound management. He is being discharged with a completion course of PO antibiotics. He otherwise has met all discharge  criteria. We will plan for a short interval follow up in one week in the outpatient clinic.       Goals of Care Treatment Preferences:  Code Status: Full Code      Consults:   Consults (From admission, onward)        Status Ordering Provider     Inpatient consult to Social Work  Once        Provider:  (Not yet assigned)    Acknowledged WILLY RICH          Significant Diagnostic Studies: Labs:   CMP   Recent Labs   Lab 08/15/23  0420 08/16/23  0347    142   K 3.2* 3.5    105   CO2 27 28   * 118*   BUN 8 7*   CREATININE 0.8 0.8   CALCIUM 8.1* 8.5*   PROT 5.5* 5.8*   ALBUMIN 2.3* 2.4*   BILITOT 0.6 0.4   ALKPHOS 75 76   AST 67* 69*   ALT 60* 83*   ANIONGAP 8 9    and CBC   Recent Labs   Lab 08/15/23  0420 08/16/23  0347   WBC 10.22 8.04   HGB 11.1* 11.2*   HCT 34.0* 33.7*    307       Pending Diagnostic Studies:     None        Final Active Diagnoses:    Diagnosis Date Noted POA    PRINCIPAL PROBLEM:  SBO (small bowel obstruction) [K56.609] 06/02/2023 Yes      Problems Resolved During this Admission:      Discharged Condition: good    Disposition: Home-Health Care [facility]    Follow Up:   Follow-up Information     Jn Yusuf MD Follow up in 1 week(s).    Specialties: General Surgery, Surgery  Contact information:  [address]  [address] [place] [address] [id number] [phone]                       Patient Instructions:      Diet Adult Regular     Lifting restrictions   Order Comments: No lifting greater than 10 pounds for 6 weeks from day of surgery.  No pushing/pulling such as vacuuming or raking.  No straining, avoid constipation and take stool softeners as described and laxatives as needed.  No driving while on narcotics and until you can react quickly without pain.     Other restrictions (specify):   Order Comments: Post-op Instructions  Please take pain medication as needed, if taking narcotics please avoid driving.    We recommend a high fiber diet and use of stool softeners  while on narcotics as they might cause constipation.    You may leave the dressing in place for 48 hours.   Okay to shower in 24 hours   Avoid swimming pools, baths or hot tubs for at least 2 weeks.   Do not lift anything heavier than 10 lb for at least 2 weeks.   Please call if you have fevers, chills, shortness of breath, increased drainage from your wound or bleeding.  Please call to make an appointment in 2 weeks for wound recheck.     No driving until:   Order Comments: Off of narcotic pain medication, physically able to perform motions necessary for operating motor vehicle     Notify your health care provider if you experience any of the following:  temperature >100.4     Notify your health care provider if you experience any of the following:  persistent nausea and vomiting or diarrhea     Notify your health care provider if you experience any of the following:  severe uncontrolled pain     Notify your health care provider if you experience any of the following:  redness, tenderness, or signs of infection (pain, swelling, redness, odor or green/yellow discharge around incision site)     Notify your health care provider if you experience any of the following:  difficulty breathing or increased cough     Notify your health care provider if you experience any of the following:  severe persistent headache     Change dressing (specify)   Order Comments: Dressing change: change dressing at midline with wet-to-dry dressing changes at least one time daily     Activity as tolerated     Medications:  Reconciled Home Medications:      Medication List      START taking these medications    ciprofloxacin HCl 500 MG tablet  Commonly known as: CIPRO  Take 1 tablet (500 mg total) by mouth every 12 (twelve) hours. for 5 days     metroNIDAZOLE 500 MG tablet  Commonly known as: FLAGYL  Take 1 tablet (500 mg total) by mouth every 8 (eight) hours. for 5 days     oxyCODONE 5 MG immediate release tablet  Commonly known as:  ROXICODONE  Take 1 tablet (5 mg total) by mouth every 6 (six) hours as needed for Pain.        CONTINUE taking these medications    cyanocobalamin 100 MCG tablet  Commonly known as: VITAMIN B-12  Take 100 mcg by mouth once daily.     hydroCHLOROthiazide 12.5 mg capsule  Commonly known as: MICROZIDE  TAKE 1 CAPSULE BY MOUTH EVERY DAY     losartan 50 MG tablet  Commonly known as: COZAAR  TAKE 1 TABLET BY MOUTH EVERY DAY     pantoprazole 40 MG tablet  Commonly known as: PROTONIX  Take 40 mg by mouth once daily.     PROBIOTIC ORAL  Take 1 capsule by mouth once daily.     tamsulosin 0.4 mg Cap  Commonly known as: FLOMAX  Take 1 capsule by mouth every evening.     vitamin D 1000 units Tab  Commonly known as: VITAMIN D3  Take 8,000 Units by mouth once daily.          Time spent on the discharge of patient: 20 minutes    Carlos Aguilera MD  General Surgery  [facility] S[facility]

## 2023-08-16 NOTE — SUBJECTIVE & OBJECTIVE
Interval History: Patient seen and examined. No acute events overnight. Remains with right sided abdominal pain. WBC normal, tolerating diet, having bowel function. Plan for CT scan today. Nearing discharge.     Medications:  Continuous Infusions:  Scheduled Meds:   ciprofloxacin HCl  500 mg Oral Q12H    enoxparin  40 mg Subcutaneous Q24H (prophylaxis, 1700)    metroNIDAZOLE  500 mg Oral Q8H    pantoprazole  40 mg Oral Daily    potassium chloride  40 mEq Oral BID    tamsulosin  1 capsule Oral QHS     PRN Meds:acetaminophen, hydrALAZINE, ondansetron, oxyCODONE, oxyCODONE     Review of patient's allergies indicates:  No Known Allergies  Objective:     Vital Signs (Most Recent):  Temp: 98.6 °F (37 °C) (08/16/23 0702)  Pulse: 73 (08/16/23 0702)  Resp: 18 (08/16/23 0702)  BP: (!) 152/83 (08/16/23 0702)  SpO2: 96 % (08/16/23 0702) Vital Signs (24h Range):  Temp:  [98.5 °F (36.9 °C)-99.8 °F (37.7 °C)] 98.6 °F (37 °C)  Pulse:  [62-82] 73  Resp:  [17-18] 18  SpO2:  [93 %-97 %] 96 %  BP: (137-154)/(74-83) 152/83     Weight: 92.1 kg (203 lb 0.7 oz)  Body mass index is 29.13 kg/m².    Intake/Output - Last 3 Shifts         08/14 0700  08/15 0659 08/15 0700  08/16 0659 08/16 0700 08/17 0659    P.O. 360 960 240    Total Intake(mL/kg) 360 (3.9) 960 (10.4) 240 (2.6)    Urine (mL/kg/hr) 2700 (1.2) 1500 (0.7) 200 (0.8)    Stool 0 0     Total Output 2700 1500 200    Net -2340 -540 +40           Urine Occurrence 1 x      Stool Occurrence 1 x 2 x              Physical Exam  Vitals and nursing note reviewed.   Constitutional:       General: He is not in acute distress.     Appearance: Normal appearance.   HENT:      Mouth/Throat:      Mouth: Mucous membranes are moist.   Cardiovascular:      Rate and Rhythm: Normal rate and regular rhythm.   Pulmonary:      Effort: Pulmonary effort is normal. No respiratory distress.   Abdominal:      Comments: Abdomen soft, mildly distended  Midline incision with wet to dry dressing in place  Fascia  probed and remains intact  Right lower quadrant erythema improving   Musculoskeletal:         General: Normal range of motion.   Skin:     General: Skin is warm and dry.   Neurological:      General: No focal deficit present.      Mental Status: He is alert.          Significant Labs:  I have reviewed all pertinent lab results within the past 24 hours.  CBC:   Recent Labs   Lab 08/16/23  0347   WBC 8.04   RBC 3.77*   HGB 11.2*   HCT 33.7*      MCV 89   MCH 29.7   MCHC 33.2     CMP:   Recent Labs   Lab 08/16/23 0347   *   CALCIUM 8.5*   ALBUMIN 2.4*   PROT 5.8*      K 3.5   CO2 28      BUN 7*   CREATININE 0.8   ALKPHOS 76   ALT 83*   AST 69*   BILITOT 0.4       Significant Diagnostics:  I have reviewed all pertinent imaging results/findings within the past 24 hours.

## 2023-08-16 NOTE — PLAN OF CARE
TN sent a secure chat to med surg nurse Shirley that this patient is clear for discharge from case management's viewpoint. Surgery appt, pcp appt, OPCM and NP Care At Home.       08/16/23 1229   Final Note   Assessment Type Final Discharge Note   Anticipated Discharge Disposition Home-Health   What phone number can be called within the next 1-3 days to see how you are doing after discharge?   (see chart)   Hospital Resources/Appts/Education Provided Provided patient/caregiver with written discharge plan information   Post-Acute Status   Post-Acute Authorization Home Health   Home Health Status Set-up Complete/Auth obtained   Coverage BCBS Medicare   Patient choice form signed by patient/caregiver List with quality metrics by geographic area provided   Discharge Delays None known at this time

## 2023-08-16 NOTE — NURSING
Discharge instructions given. Pt verbalized understanding of instructions. IV removed, intact. Wound care supplies given to patient for wound care at home. Patient waiting on RX delivery and ride home.

## 2023-08-17 PROCEDURE — G0180 PR HOME HEALTH MD CERTIFICATION: ICD-10-PCS | Mod: ,,, | Performed by: SURGERY

## 2023-08-17 PROCEDURE — G0180 MD CERTIFICATION HHA PATIENT: HCPCS | Mod: ,,, | Performed by: SURGERY

## 2023-08-22 ENCOUNTER — TELEPHONE (OUTPATIENT)
Dept: SURGERY | Facility: CLINIC | Age: 68
End: 2023-08-22

## 2023-08-22 ENCOUNTER — OFFICE VISIT (OUTPATIENT)
Dept: SURGERY | Facility: CLINIC | Age: 68
End: 2023-08-22
Payer: MEDICARE

## 2023-08-22 VITALS
WEIGHT: 201.94 LBS | BODY MASS INDEX: 28.91 KG/M2 | OXYGEN SATURATION: 98 % | DIASTOLIC BLOOD PRESSURE: 79 MMHG | HEIGHT: 70 IN | SYSTOLIC BLOOD PRESSURE: 121 MMHG | HEART RATE: 74 BPM

## 2023-08-22 DIAGNOSIS — K56.50 SMALL BOWEL OBSTRUCTION DUE TO ADHESIONS: Primary | ICD-10-CM

## 2023-08-22 PROCEDURE — 3074F PR MOST RECENT SYSTOLIC BLOOD PRESSURE < 130 MM HG: ICD-10-PCS | Mod: CPTII,S$GLB,, | Performed by: SURGERY

## 2023-08-22 PROCEDURE — 3078F DIAST BP <80 MM HG: CPT | Mod: CPTII,S$GLB,, | Performed by: SURGERY

## 2023-08-22 PROCEDURE — 99024 PR POST-OP FOLLOW-UP VISIT: ICD-10-PCS | Mod: S$GLB,,, | Performed by: SURGERY

## 2023-08-22 PROCEDURE — 1126F AMNT PAIN NOTED NONE PRSNT: CPT | Mod: CPTII,S$GLB,, | Performed by: SURGERY

## 2023-08-22 PROCEDURE — 3074F SYST BP LT 130 MM HG: CPT | Mod: CPTII,S$GLB,, | Performed by: SURGERY

## 2023-08-22 PROCEDURE — 4010F ACE/ARB THERAPY RXD/TAKEN: CPT | Mod: CPTII,S$GLB,, | Performed by: SURGERY

## 2023-08-22 PROCEDURE — 99024 POSTOP FOLLOW-UP VISIT: CPT | Mod: S$GLB,,, | Performed by: SURGERY

## 2023-08-22 PROCEDURE — 4010F PR ACE/ARB THEARPY RXD/TAKEN: ICD-10-PCS | Mod: CPTII,S$GLB,, | Performed by: SURGERY

## 2023-08-22 PROCEDURE — 99999 PR PBB SHADOW E&M-EST. PATIENT-LVL III: CPT | Mod: PBBFAC,,, | Performed by: SURGERY

## 2023-08-22 PROCEDURE — 1126F PR PAIN SEVERITY QUANTIFIED, NO PAIN PRESENT: ICD-10-PCS | Mod: CPTII,S$GLB,, | Performed by: SURGERY

## 2023-08-22 PROCEDURE — 3008F BODY MASS INDEX DOCD: CPT | Mod: CPTII,S$GLB,, | Performed by: SURGERY

## 2023-08-22 PROCEDURE — 1159F MED LIST DOCD IN RCRD: CPT | Mod: CPTII,S$GLB,, | Performed by: SURGERY

## 2023-08-22 PROCEDURE — 3008F PR BODY MASS INDEX (BMI) DOCUMENTED: ICD-10-PCS | Mod: CPTII,S$GLB,, | Performed by: SURGERY

## 2023-08-22 PROCEDURE — 1159F PR MEDICATION LIST DOCUMENTED IN MEDICAL RECORD: ICD-10-PCS | Mod: CPTII,S$GLB,, | Performed by: SURGERY

## 2023-08-22 PROCEDURE — 3078F PR MOST RECENT DIASTOLIC BLOOD PRESSURE < 80 MM HG: ICD-10-PCS | Mod: CPTII,S$GLB,, | Performed by: SURGERY

## 2023-08-22 PROCEDURE — 99999 PR PBB SHADOW E&M-EST. PATIENT-LVL III: ICD-10-PCS | Mod: PBBFAC,,, | Performed by: SURGERY

## 2023-08-22 NOTE — PROGRESS NOTES
69 y/o man with history of exlap SB resection, now about 2 weeks out.  No complaints this am, reports feeling well.    PE: incision with 5-6 cm area which is healing by secondary intention. No evidence of infection or hernia    Impression: post op, doing well    Plan: gradually resume normal activity, normal diet, and follow up 2 weeks

## 2023-08-22 NOTE — TELEPHONE ENCOUNTER
----- Message from Leia Pizarro sent at 8/22/2023 10:00 AM CDT -----  Regarding: [id number]  Type: Patient Call Back    Who called:Arabella Trevizo [facility]     What is the request in detail: she stated that she need a order for an extra nursing visit, to get some pictures of the wound     Can the clinic reply by [facility]?no     Would the patient rather a call back or a response via [facility]? Call back     Best call back number:[phone]    Additional Information:fax orders to [phone]

## 2023-08-22 NOTE — TELEPHONE ENCOUNTER
I spoke with Arabella at [facility]. I informed her that I spoke with Dr. Yusuf regarding her message for an order for an extra nursing visit. I gave a verbal order for nurse visit. Also advised Arabella to fax order to our office to be signed by Dr. Yusuf today.

## 2023-08-26 ENCOUNTER — DOCUMENT SCAN (OUTPATIENT)
Dept: HOME HEALTH SERVICES | Facility: HOSPITAL | Age: 68
End: 2023-08-26
Payer: MEDICARE

## 2023-09-08 ENCOUNTER — OFFICE VISIT (OUTPATIENT)
Dept: SURGERY | Facility: CLINIC | Age: 68
End: 2023-09-08
Payer: MEDICARE

## 2023-09-08 VITALS
HEART RATE: 60 BPM | OXYGEN SATURATION: 98 % | BODY MASS INDEX: 29.95 KG/M2 | DIASTOLIC BLOOD PRESSURE: 82 MMHG | WEIGHT: 209.19 LBS | SYSTOLIC BLOOD PRESSURE: 131 MMHG | HEIGHT: 70 IN

## 2023-09-08 DIAGNOSIS — K56.50 SMALL BOWEL OBSTRUCTION DUE TO ADHESIONS: Primary | ICD-10-CM

## 2023-09-08 PROCEDURE — 3079F PR MOST RECENT DIASTOLIC BLOOD PRESSURE 80-89 MM HG: ICD-10-PCS | Mod: CPTII,S$GLB,, | Performed by: SURGERY

## 2023-09-08 PROCEDURE — 3008F PR BODY MASS INDEX (BMI) DOCUMENTED: ICD-10-PCS | Mod: CPTII,S$GLB,, | Performed by: SURGERY

## 2023-09-08 PROCEDURE — 3288F FALL RISK ASSESSMENT DOCD: CPT | Mod: CPTII,S$GLB,, | Performed by: SURGERY

## 2023-09-08 PROCEDURE — 99024 POSTOP FOLLOW-UP VISIT: CPT | Mod: S$GLB,,, | Performed by: SURGERY

## 2023-09-08 PROCEDURE — 4010F ACE/ARB THERAPY RXD/TAKEN: CPT | Mod: CPTII,S$GLB,, | Performed by: SURGERY

## 2023-09-08 PROCEDURE — 1159F PR MEDICATION LIST DOCUMENTED IN MEDICAL RECORD: ICD-10-PCS | Mod: CPTII,S$GLB,, | Performed by: SURGERY

## 2023-09-08 PROCEDURE — 1126F PR PAIN SEVERITY QUANTIFIED, NO PAIN PRESENT: ICD-10-PCS | Mod: CPTII,S$GLB,, | Performed by: SURGERY

## 2023-09-08 PROCEDURE — 99024 PR POST-OP FOLLOW-UP VISIT: ICD-10-PCS | Mod: S$GLB,,, | Performed by: SURGERY

## 2023-09-08 PROCEDURE — 99999 PR PBB SHADOW E&M-EST. PATIENT-LVL III: CPT | Mod: PBBFAC,,, | Performed by: SURGERY

## 2023-09-08 PROCEDURE — 1101F PR PT FALLS ASSESS DOC 0-1 FALLS W/OUT INJ PAST YR: ICD-10-PCS | Mod: CPTII,S$GLB,, | Performed by: SURGERY

## 2023-09-08 PROCEDURE — 99999 PR PBB SHADOW E&M-EST. PATIENT-LVL III: ICD-10-PCS | Mod: PBBFAC,,, | Performed by: SURGERY

## 2023-09-08 PROCEDURE — 4010F PR ACE/ARB THEARPY RXD/TAKEN: ICD-10-PCS | Mod: CPTII,S$GLB,, | Performed by: SURGERY

## 2023-09-08 PROCEDURE — 3288F PR FALLS RISK ASSESSMENT DOCUMENTED: ICD-10-PCS | Mod: CPTII,S$GLB,, | Performed by: SURGERY

## 2023-09-08 PROCEDURE — 1101F PT FALLS ASSESS-DOCD LE1/YR: CPT | Mod: CPTII,S$GLB,, | Performed by: SURGERY

## 2023-09-08 PROCEDURE — 1159F MED LIST DOCD IN RCRD: CPT | Mod: CPTII,S$GLB,, | Performed by: SURGERY

## 2023-09-08 PROCEDURE — 3079F DIAST BP 80-89 MM HG: CPT | Mod: CPTII,S$GLB,, | Performed by: SURGERY

## 2023-09-08 PROCEDURE — 1126F AMNT PAIN NOTED NONE PRSNT: CPT | Mod: CPTII,S$GLB,, | Performed by: SURGERY

## 2023-09-08 PROCEDURE — 3075F SYST BP GE 130 - 139MM HG: CPT | Mod: CPTII,S$GLB,, | Performed by: SURGERY

## 2023-09-08 PROCEDURE — 3075F PR MOST RECENT SYSTOLIC BLOOD PRESS GE 130-139MM HG: ICD-10-PCS | Mod: CPTII,S$GLB,, | Performed by: SURGERY

## 2023-09-08 PROCEDURE — 3008F BODY MASS INDEX DOCD: CPT | Mod: CPTII,S$GLB,, | Performed by: SURGERY

## 2023-09-08 NOTE — PROGRESS NOTES
67 y/o man with history of exlap SB resection, now about 2 weeks out.  No complaints this am, reports feeling well.    PE: incision with 5-6 cm area which is healing by secondary intention. No evidence of infection or hernia    Impression: post op, doing well    Plan: gradually resume normal activity, normal diet, and follow up 2 weeks

## 2023-09-12 ENCOUNTER — EXTERNAL HOME HEALTH (OUTPATIENT)
Dept: HOME HEALTH SERVICES | Facility: HOSPITAL | Age: 68
End: 2023-09-12
Payer: MEDICARE

## 2023-09-21 ENCOUNTER — TELEPHONE (OUTPATIENT)
Dept: SURGERY | Facility: CLINIC | Age: 68
End: 2023-09-21
Payer: MEDICARE

## 2023-09-21 NOTE — TELEPHONE ENCOUNTER
----- Message from Juan Francisco Green sent at 9/21/2023  2:29 PM CDT -----  Regarding: Self [phone]  Type: Patient Call Back     What is the request in detail: Pt had a post op appt scheduled for today and pt missed appt, please call pt back to rs     Can the clinic reply by [facility]? No     Would the patient rather a call back or a response via [facility]? Call back    Best call back number: .[phone]      Additional Information:    Thank you.

## 2023-09-26 ENCOUNTER — OFFICE VISIT (OUTPATIENT)
Dept: SURGERY | Facility: CLINIC | Age: 68
End: 2023-09-26
Payer: MEDICARE

## 2023-09-26 VITALS
HEIGHT: 70 IN | SYSTOLIC BLOOD PRESSURE: 131 MMHG | BODY MASS INDEX: 29.95 KG/M2 | WEIGHT: 209.19 LBS | HEART RATE: 60 BPM | DIASTOLIC BLOOD PRESSURE: 82 MMHG

## 2023-09-26 DIAGNOSIS — K56.50 SMALL BOWEL OBSTRUCTION DUE TO ADHESIONS: Primary | ICD-10-CM

## 2023-09-26 PROCEDURE — 3079F PR MOST RECENT DIASTOLIC BLOOD PRESSURE 80-89 MM HG: ICD-10-PCS | Mod: CPTII,S$GLB,, | Performed by: SURGERY

## 2023-09-26 PROCEDURE — 99999 PR PBB SHADOW E&M-EST. PATIENT-LVL III: CPT | Mod: PBBFAC,,, | Performed by: SURGERY

## 2023-09-26 PROCEDURE — 4010F ACE/ARB THERAPY RXD/TAKEN: CPT | Mod: CPTII,S$GLB,, | Performed by: SURGERY

## 2023-09-26 PROCEDURE — 3075F SYST BP GE 130 - 139MM HG: CPT | Mod: CPTII,S$GLB,, | Performed by: SURGERY

## 2023-09-26 PROCEDURE — 1159F MED LIST DOCD IN RCRD: CPT | Mod: CPTII,S$GLB,, | Performed by: SURGERY

## 2023-09-26 PROCEDURE — 4010F PR ACE/ARB THEARPY RXD/TAKEN: ICD-10-PCS | Mod: CPTII,S$GLB,, | Performed by: SURGERY

## 2023-09-26 PROCEDURE — 99999 PR PBB SHADOW E&M-EST. PATIENT-LVL III: ICD-10-PCS | Mod: PBBFAC,,, | Performed by: SURGERY

## 2023-09-26 PROCEDURE — 3075F PR MOST RECENT SYSTOLIC BLOOD PRESS GE 130-139MM HG: ICD-10-PCS | Mod: CPTII,S$GLB,, | Performed by: SURGERY

## 2023-09-26 PROCEDURE — 99024 POSTOP FOLLOW-UP VISIT: CPT | Mod: S$GLB,,, | Performed by: SURGERY

## 2023-09-26 PROCEDURE — 99024 PR POST-OP FOLLOW-UP VISIT: ICD-10-PCS | Mod: S$GLB,,, | Performed by: SURGERY

## 2023-09-26 PROCEDURE — 1126F AMNT PAIN NOTED NONE PRSNT: CPT | Mod: CPTII,S$GLB,, | Performed by: SURGERY

## 2023-09-26 PROCEDURE — 1126F PR PAIN SEVERITY QUANTIFIED, NO PAIN PRESENT: ICD-10-PCS | Mod: CPTII,S$GLB,, | Performed by: SURGERY

## 2023-09-26 PROCEDURE — 1159F PR MEDICATION LIST DOCUMENTED IN MEDICAL RECORD: ICD-10-PCS | Mod: CPTII,S$GLB,, | Performed by: SURGERY

## 2023-09-26 PROCEDURE — 3079F DIAST BP 80-89 MM HG: CPT | Mod: CPTII,S$GLB,, | Performed by: SURGERY

## 2023-09-26 PROCEDURE — 3008F PR BODY MASS INDEX (BMI) DOCUMENTED: ICD-10-PCS | Mod: CPTII,S$GLB,, | Performed by: SURGERY

## 2023-09-26 PROCEDURE — 3008F BODY MASS INDEX DOCD: CPT | Mod: CPTII,S$GLB,, | Performed by: SURGERY

## 2023-12-23 ENCOUNTER — HOSPITAL ENCOUNTER (EMERGENCY)
Facility: HOSPITAL | Age: 68
Discharge: HOME | End: 2023-12-23
Attending: EMERGENCY MEDICINE
Payer: MEDICARE

## 2023-12-23 VITALS
DIASTOLIC BLOOD PRESSURE: 79 MMHG | HEIGHT: 70 IN | TEMPERATURE: 100 F | OXYGEN SATURATION: 96 % | HEART RATE: 75 BPM | WEIGHT: 209 LBS | SYSTOLIC BLOOD PRESSURE: 158 MMHG | RESPIRATION RATE: 17 BRPM | BODY MASS INDEX: 29.92 KG/M2

## 2023-12-23 DIAGNOSIS — J10.1 INFLUENZA A: ICD-10-CM

## 2023-12-23 DIAGNOSIS — R10.31 RLQ ABDOMINAL PAIN: Primary | ICD-10-CM

## 2023-12-23 DIAGNOSIS — R50.9 ACUTE FEBRILE ILLNESS: ICD-10-CM

## 2023-12-23 DIAGNOSIS — Z87.19 HISTORY OF SMALL BOWEL OBSTRUCTION: ICD-10-CM

## 2023-12-23 LAB
ALBUMIN SERPL BCP-MCNC: 4 G/DL (ref 3.5–5.2)
ALLENS TEST: NORMAL
ALP SERPL-CCNC: 80 U/L (ref 55–135)
ALT SERPL W/O P-5'-P-CCNC: 25 U/L (ref 10–44)
ANION GAP SERPL CALC-SCNC: 14 MMOL/L (ref 8–16)
AST SERPL-CCNC: 23 U/L (ref 10–40)
BACTERIA #/AREA URNS HPF: NORMAL /HPF
BASOPHILS # BLD AUTO: 0.02 K/UL (ref 0–0.2)
BASOPHILS NFR BLD: 0.2 % (ref 0–1.9)
BILIRUB SERPL-MCNC: 0.5 MG/DL (ref 0.1–1)
BILIRUB UR QL STRIP: NEGATIVE
BUN SERPL-MCNC: 12 MG/DL (ref 8–23)
CALCIUM SERPL-MCNC: 9.7 MG/DL (ref 8.7–10.5)
CHLORIDE SERPL-SCNC: 103 MMOL/L (ref 95–110)
CLARITY UR: CLEAR
CO2 SERPL-SCNC: 22 MMOL/L (ref 23–29)
COLOR UR: YELLOW
CREAT SERPL-MCNC: 0.9 MG/DL (ref 0.5–1.4)
CRP SERPL-MCNC: 65.4 MG/L (ref 0–8.2)
CTP QC/QA: YES
DIFFERENTIAL METHOD: ABNORMAL
EOSINOPHIL # BLD AUTO: 0 K/UL (ref 0–0.5)
EOSINOPHIL NFR BLD: 0 % (ref 0–8)
ERYTHROCYTE [DISTWIDTH] IN BLOOD BY AUTOMATED COUNT: 13 % (ref 11.5–14.5)
EST. GFR  (NO RACE VARIABLE): >60 ML/MIN/1.73 M^2
GLUCOSE SERPL-MCNC: 176 MG/DL (ref 70–110)
GLUCOSE UR QL STRIP: NEGATIVE
HCT VFR BLD AUTO: 48.6 % (ref 40–54)
HGB BLD-MCNC: 16.5 G/DL (ref 14–18)
HGB UR QL STRIP: ABNORMAL
HYALINE CASTS #/AREA URNS LPF: 0 /LPF
IMM GRANULOCYTES # BLD AUTO: 0.03 K/UL (ref 0–0.04)
IMM GRANULOCYTES NFR BLD AUTO: 0.3 % (ref 0–0.5)
INR PPP: 1.1 (ref 0.8–1.2)
KETONES UR QL STRIP: ABNORMAL
LDH SERPL L TO P-CCNC: 1.58 MMOL/L (ref 0.5–2.2)
LEUKOCYTE ESTERASE UR QL STRIP: NEGATIVE
LIPASE SERPL-CCNC: 16 U/L (ref 4–60)
LYMPHOCYTES # BLD AUTO: 0.5 K/UL (ref 1–4.8)
LYMPHOCYTES NFR BLD: 5.5 % (ref 18–48)
MCH RBC QN AUTO: 29.6 PG (ref 27–31)
MCHC RBC AUTO-ENTMCNC: 34 G/DL (ref 32–36)
MCV RBC AUTO: 87 FL (ref 82–98)
MICROSCOPIC COMMENT: NORMAL
MOLECULAR STREP A: NEGATIVE
MONOCYTES # BLD AUTO: 0.5 K/UL (ref 0.3–1)
MONOCYTES NFR BLD: 5.1 % (ref 4–15)
NEUTROPHILS # BLD AUTO: 7.9 K/UL (ref 1.8–7.7)
NEUTROPHILS NFR BLD: 88.9 % (ref 38–73)
NITRITE UR QL STRIP: NEGATIVE
NRBC BLD-RTO: 0 /100 WBC
PH UR STRIP: 6 [PH] (ref 5–8)
PLATELET # BLD AUTO: 162 K/UL (ref 150–450)
PMV BLD AUTO: 10.6 FL (ref 9.2–12.9)
POC MOLECULAR INFLUENZA A AGN: POSITIVE
POC MOLECULAR INFLUENZA B AGN: NEGATIVE
POTASSIUM SERPL-SCNC: 3.6 MMOL/L (ref 3.5–5.1)
PROCALCITONIN SERPL IA-MCNC: 0.25 NG/ML
PROT SERPL-MCNC: 7.6 G/DL (ref 6–8.4)
PROT UR QL STRIP: ABNORMAL
PROTHROMBIN TIME: 11.2 SEC (ref 9–12.5)
RBC # BLD AUTO: 5.57 M/UL (ref 4.6–6.2)
RBC #/AREA URNS HPF: 2 /HPF (ref 0–4)
SAMPLE: NORMAL
SARS-COV-2 RDRP RESP QL NAA+PROBE: NEGATIVE
SITE: NORMAL
SODIUM SERPL-SCNC: 139 MMOL/L (ref 136–145)
SP GR UR STRIP: >1.03 (ref 1–1.03)
URN SPEC COLLECT METH UR: ABNORMAL
UROBILINOGEN UR STRIP-ACNC: NEGATIVE EU/DL
WBC # BLD AUTO: 8.84 K/UL (ref 3.9–12.7)
WBC #/AREA URNS HPF: 2 /HPF (ref 0–5)

## 2023-12-23 PROCEDURE — 87635 SARS-COV-2 COVID-19 AMP PRB: CPT | Performed by: PHYSICIAN ASSISTANT

## 2023-12-23 PROCEDURE — 25000003 PHARM REV CODE 250: Performed by: PHYSICIAN ASSISTANT

## 2023-12-23 PROCEDURE — 84145 PROCALCITONIN (PCT): CPT | Performed by: PHYSICIAN ASSISTANT

## 2023-12-23 PROCEDURE — 85610 PROTHROMBIN TIME: CPT | Performed by: PHYSICIAN ASSISTANT

## 2023-12-23 PROCEDURE — 80053 COMPREHEN METABOLIC PANEL: CPT | Performed by: PHYSICIAN ASSISTANT

## 2023-12-23 PROCEDURE — 83690 ASSAY OF LIPASE: CPT | Performed by: PHYSICIAN ASSISTANT

## 2023-12-23 PROCEDURE — 96375 TX/PRO/DX INJ NEW DRUG ADDON: CPT

## 2023-12-23 PROCEDURE — 63600175 PHARM REV CODE 636 W HCPCS: Performed by: PHYSICIAN ASSISTANT

## 2023-12-23 PROCEDURE — 85025 COMPLETE CBC W/AUTO DIFF WBC: CPT | Performed by: PHYSICIAN ASSISTANT

## 2023-12-23 PROCEDURE — 99285 EMERGENCY DEPT VISIT HI MDM: CPT | Mod: 25

## 2023-12-23 PROCEDURE — 87040 BLOOD CULTURE FOR BACTERIA: CPT | Mod: 91 | Performed by: PHYSICIAN ASSISTANT

## 2023-12-23 PROCEDURE — 96374 THER/PROPH/DIAG INJ IV PUSH: CPT | Mod: 59

## 2023-12-23 PROCEDURE — 87651 STREP A DNA AMP PROBE: CPT

## 2023-12-23 PROCEDURE — 99900035 HC TECH TIME PER 15 MIN (STAT)

## 2023-12-23 PROCEDURE — 93010 ELECTROCARDIOGRAM REPORT: CPT | Mod: ,,, | Performed by: INTERNAL MEDICINE

## 2023-12-23 PROCEDURE — 87502 INFLUENZA DNA AMP PROBE: CPT

## 2023-12-23 PROCEDURE — 81000 URINALYSIS NONAUTO W/SCOPE: CPT | Performed by: PHYSICIAN ASSISTANT

## 2023-12-23 PROCEDURE — 25500020 PHARM REV CODE 255: Performed by: EMERGENCY MEDICINE

## 2023-12-23 PROCEDURE — 86140 C-REACTIVE PROTEIN: CPT | Performed by: PHYSICIAN ASSISTANT

## 2023-12-23 PROCEDURE — 83605 ASSAY OF LACTIC ACID: CPT

## 2023-12-23 PROCEDURE — 93010 EKG 12-LEAD: ICD-10-PCS | Mod: ,,, | Performed by: INTERNAL MEDICINE

## 2023-12-23 PROCEDURE — 93005 ELECTROCARDIOGRAM TRACING: CPT

## 2023-12-23 PROCEDURE — 96361 HYDRATE IV INFUSION ADD-ON: CPT

## 2023-12-23 RX ORDER — OSELTAMIVIR PHOSPHATE 75 MG/1
75 CAPSULE ORAL 2 TIMES DAILY
Qty: 10 CAPSULE | Refills: 0 | Status: SHIPPED | OUTPATIENT
Start: 2023-12-23 | End: 2023-12-28

## 2023-12-23 RX ORDER — HYDROMORPHONE HYDROCHLORIDE 1 MG/ML
2 INJECTION, SOLUTION INTRAMUSCULAR; INTRAVENOUS; SUBCUTANEOUS
Status: DISCONTINUED | OUTPATIENT
Start: 2023-12-23 | End: 2023-12-23

## 2023-12-23 RX ORDER — ACETAMINOPHEN 500 MG
1000 TABLET ORAL
Status: COMPLETED | OUTPATIENT
Start: 2023-12-23 | End: 2023-12-23

## 2023-12-23 RX ORDER — MORPHINE SULFATE 4 MG/ML
5 INJECTION, SOLUTION INTRAMUSCULAR; INTRAVENOUS
Status: COMPLETED | OUTPATIENT
Start: 2023-12-23 | End: 2023-12-23

## 2023-12-23 RX ORDER — ONDANSETRON 2 MG/ML
4 INJECTION INTRAMUSCULAR; INTRAVENOUS
Status: COMPLETED | OUTPATIENT
Start: 2023-12-23 | End: 2023-12-23

## 2023-12-23 RX ORDER — DIPHENHYDRAMINE HYDROCHLORIDE 50 MG/ML
25 INJECTION INTRAMUSCULAR; INTRAVENOUS
Status: DISCONTINUED | OUTPATIENT
Start: 2023-12-23 | End: 2023-12-23

## 2023-12-23 RX ADMIN — SODIUM CHLORIDE, POTASSIUM CHLORIDE, SODIUM LACTATE AND CALCIUM CHLORIDE 2844 ML: 600; 310; 30; 20 INJECTION, SOLUTION INTRAVENOUS at 08:12

## 2023-12-23 RX ADMIN — ONDANSETRON 4 MG: 2 INJECTION INTRAMUSCULAR; INTRAVENOUS at 08:12

## 2023-12-23 RX ADMIN — MORPHINE SULFATE 5 MG: 4 INJECTION, SOLUTION INTRAMUSCULAR; INTRAVENOUS at 08:12

## 2023-12-23 RX ADMIN — ACETAMINOPHEN 1000 MG: 500 TABLET ORAL at 09:12

## 2023-12-23 RX ADMIN — IOHEXOL 80 ML: 350 INJECTION, SOLUTION INTRAVENOUS at 09:12

## 2023-12-23 NOTE — ED TRIAGE NOTES
"Pt reports with abdominal pain s/p colon resection performed 2 months ago. States a total of 8 inches of intestines were removed. Now reports diarrhea as well, and not " passing gas." Denies any chest pain, SOB, n/v.  "

## 2023-12-23 NOTE — ED PROVIDER NOTES
Encounter Date: 12/23/2023    SCRIBE #1 NOTE: I, Jeanne Urbano, am scribing for, and in the presence of,  Andres Byrne PA-C. I have scribed the following portions of the note - Other sections scribed: HPI,ROS.       History     Chief Complaint   Patient presents with    Abdominal Pain     67 yo male to triage for abd pain, N/D. Pt had a colon resection performed 2 months ago, now having same symptoms as before. Denies Vomiting, CP, SOB. VCSS, NAD, AAOx4    Diarrhea    Post-op Problem     This is a 68 year old male, with a PMHx of Essential HTN and GERD, who presents to the ED complaining of Abdominal Pain, symptoms onset last night. Patient reports nausea, cough, sore throat, diarrhea (since last week), and fever (since Tuesday). Patient states he had a colon resection performed 2 months ago and is now having similar symptoms as before; states a total of 8 inches of intestines were removed. Patient also states he has not been passing gas and his last bowel movement was at lunch yesterday. Patient further states he unsure of a Hx of Chron's or Ulcerative Colitis. Patient denies rhinorrhea, hematochezia, mucous in stool, or other associated symptoms. No other alleviating or exacerbating factors. This is the extent of the patient's complaints in the ED. NKDA.                             The history is provided by the patient. No  was used.     Review of patient's allergies indicates:  No Known Allergies  Past Medical History:   Diagnosis Date    Essential hypertension 12/07/2016    GERD (gastroesophageal reflux disease)      Past Surgical History:   Procedure Laterality Date    APPENDECTOMY      as a child    EXCISION, SMALL INTESTINE  8/9/2023    Procedure: EXCISION, SMALL INTESTINE;  Surgeon: Jn Yusuf MD;  Location: [facility] OR;  Service: General;;    GALLBLADDER SURGERY  2004    HERNIA REPAIR  2004 & 2005    LAPAROTOMY, EXPLORATORY N/A 8/9/2023    Procedure: LAPAROTOMY, EXPLORATORY;   Surgeon: Jn Yusuf MD;  Location: [facility];  Service: General;  Laterality: N/A;    TONSILLECTOMY, ADENOIDECTOMY      as a child     Family History   Problem Relation Age of Onset    Aneurysm Mother     Heart disease Father      Social History     Tobacco Use    Smoking status: Never    Smokeless tobacco: Never   Substance Use Topics    Alcohol use: No    Drug use: No     Review of Systems   Constitutional:  Positive for fever. Negative for diaphoresis, fatigue and unexpected weight change.   HENT:  Positive for sore throat. Negative for rhinorrhea and sinus pain.    Eyes:  Negative for pain, redness and visual disturbance.   Respiratory:  Positive for cough. Negative for chest tightness, shortness of breath and wheezing.    Cardiovascular:  Negative for chest pain and palpitations.   Gastrointestinal:  Positive for abdominal pain, diarrhea and nausea. Negative for blood in stool and vomiting.        (-) Mucous in stool   Endocrine: Negative for polydipsia, polyphagia and polyuria.   Genitourinary:  Negative for dysuria, frequency and urgency.   Musculoskeletal:  Negative for arthralgias, back pain and myalgias.   Skin:  Negative for rash.   Allergic/Immunologic: Negative for environmental allergies.   Neurological:  Negative for dizziness, syncope and headaches.   Psychiatric/Behavioral:  Negative for suicidal ideas.        Physical Exam     Initial Vitals [12/23/23 0810]   BP Pulse Resp Temp SpO2   (!) 154/81 96 17 (!) 100.7 °F (38.2 °C) (!) 94 %      MAP       --         Physical Exam    Nursing note and vitals reviewed.  Constitutional: He appears well-developed and well-nourished. He is not diaphoretic. No distress.   HENT:   Head: Atraumatic.   Right Ear: External ear normal.   Left Ear: External ear normal.   Mouth/Throat: Oropharynx is clear and moist. No oropharyngeal exudate.   Eyes: Conjunctivae are normal.   Neck: No tracheal deviation present.   Normal range of motion.  Cardiovascular:  Normal  rate and regular rhythm.           Pulmonary/Chest: Effort normal. No accessory muscle usage or stridor. No tachypnea. No respiratory distress.   Abdominal: Abdomen is soft. There is no abdominal tenderness.   Subtle distention noted.  Healing exploratory laparotomy scar is noted.   No right CVA tenderness.  No left CVA tenderness. There is no rebound, no guarding, no tenderness at McBurney's point and negative Arteaga's sign. negative Rovsing's sign  Musculoskeletal:      Cervical back: Normal range of motion.     Neurological: He displays no tremor. He displays no seizure activity. Coordination and gait normal.   Skin: Skin is warm and intact. No cyanosis.         ED Course   Procedures  Labs Reviewed   CBC W/ AUTO DIFFERENTIAL - Abnormal; Notable for the following components:       Result Value    Gran # (ANC) 7.9 (*)     Lymph # 0.5 (*)     Gran % 88.9 (*)     Lymph % 5.5 (*)     All other components within normal limits   COMPREHENSIVE METABOLIC PANEL - Abnormal; Notable for the following components:    CO2 22 (*)     Glucose 176 (*)     All other components within normal limits   URINALYSIS, REFLEX TO URINE CULTURE - Abnormal; Notable for the following components:    Specific Gravity, UA >1.030 (*)     Protein, UA 2+ (*)     Ketones, UA 3+ (*)     Occult Blood UA Trace (*)     All other components within normal limits    Narrative:     Specimen Source->Urine   PROCALCITONIN - Abnormal; Notable for the following components:    Procalcitonin 0.25 (*)     All other components within normal limits   C-REACTIVE PROTEIN - Abnormal; Notable for the following components:    CRP 65.4 (*)     All other components within normal limits   POCT INFLUENZA A/B MOLECULAR - Abnormal; Notable for the following components:    POC Molecular Influenza A Ag Positive (*)     All other components within normal limits   CULTURE, BLOOD   CULTURE, BLOOD   PROTIME-INR   LIPASE   URINALYSIS MICROSCOPIC    Narrative:     Specimen  Source->Urine   LACTIC ACID, PLASMA   SARS-COV-2 RDRP GENE   POCT STREP A MOLECULAR   ISTAT LACTATE     EKG Readings: (Independently Interpreted)   This patient is in a sinus rhythm with a heart rate of 80.  There are low voltage QRS complexes.  There are no significant ST segment changes.  There are some nonspecific T-wave changes.  There is no definite evidence of acute myocardial infarction or malignant arrhythmia.  This EKG compares favorably to a study done from June.  Dictating an I independently interpreted this EKG.       Imaging Results               CT Abdomen Pelvis With IV Contrast NO Oral Contrast (Final result)  Result time 12/23/23 11:11:09      Final result by Trung Martinez MD (12/23/23 11:11:09)                   Impression:      Similar postoperative changes in bowel.  Mild inflammatory changes involving small bowel in the right hemiabdomen with fluid distension of small bowel to the level of the anastomosis in the right hemiabdomen, potentially related to anastomotic narrowing versus partial small bowel obstruction, ileus, or enteritis.  Trace air adjacent to the anastomosis in the right hemiabdomen is presumably within a segment of bowel, though extraluminal air in the setting of trace anastomotic leak is difficult to exclude.  Suggest correlation with clinical and physical exam findings.    Liquid stool in the colon.    Innumerable colonic diverticula.    Multiple additional findings discussed in the body of the report.    This report was flagged in Epic as abnormal.      Electronically signed by: Trung Martinez MD  Date:    12/23/2023  Time:    11:11               Narrative:    EXAMINATION:  CT ABDOMEN PELVIS WITH IV CONTRAST    CLINICAL HISTORY:  Bowel obstruction suspected;    TECHNIQUE:  Low dose axial images, sagittal and coronal reformations were obtained from the lung bases to the pubic symphysis following the IV administration of 80 mL of Omnipaque 350 .  Oral contrast was not  given.    COMPARISON:  CT abdomen pelvis with contrast, 08/09/2023 and 08/16/2023.    FINDINGS:  Lower Chest:    Bibasilar subsegmental atelectasis.  No pleural effusion.  Heart size is normal.  Coronary artery calcifications.  No pericardial effusion.    Abdomen:    Liver is stable in size and contour.  No focal liver mass identified on this single phase study.  Gallbladder is surgically absent.  Minimal intra and extrahepatic biliary duct dilatation, similar to prior study, likely related to cholecystectomy status.    Spleen is stable.  Adrenal glands and pancreas are stable and negative for acute finding.    The kidneys are symmetric.  No hydronephrosis. No asymmetric perinephric inflammatory fat stranding.  Stable nonobstructing stones in the right kidney measuring up to 9 mm in size.    Postoperative changes again identified in bowel.  Mild fluid distension of small bowel proximal to the anastomosis in the right hemiabdomen, measuring up to 3.5 cm in diameter.  No high-grade small bowel obstruction identified, though there is fecalization of small bowel contents in the right hemiabdomen (axial image 95).  There is trace air adjacent to the anastomosis in the right hemiabdomen (axial image 102), presumably within a segment of bowel though trace extraluminal air is difficult to exclude.  No pneumoperitoneum identified elsewhere in the abdomen.  There is mild-to-moderate mesenteric edema and trace free fluid in the right hemiabdomen adjacent to operative changes, though improved when compared with prior study.  Mild small bowel wall thickening and hyperenhancement in the right hemiabdomen adjacent to anastomosis.  Liquid stool in the right hemicolon.  Distal colon is relatively decompressed and contains mixed liquid and solid stool.  Several colonic diverticula without adjacent inflammatory changes.    No organized fluid collection.    No bulky retroperitoneal lymphadenopathy.    Abdominal aorta is normal in  "caliber with mild-to-moderate calcific atherosclerosis.    Portal vein is patent.  No portal venous gas.    Pelvis:    Urinary bladder and rectum are stable and negative for acute finding.  Prostate is mildly enlarged.  No significant pelvic free fluid.    Bones and soft tissues:    No aggressive osseous lesions.  Similar degenerative changes in the spine.  Fat containing inguinal hernias.  Postoperative changes in the anterior abdominal wall.  Multiple fat containing ventral abdominal hernias including a hernia containing a short segment of uncomplicated bowel and fat in the upper abdominal wall.                                       X-Ray Chest AP Portable (Final result)  Result time 12/23/23 09:51:33      Final result by Trung Martinez MD (12/23/23 09:51:33)                   Impression:      No acute cardiopulmonary finding identified on this single view.      Electronically signed by: Trung Martinez MD  Date:    12/23/2023  Time:    09:51               Narrative:    EXAMINATION:  XR CHEST AP PORTABLE    CLINICAL HISTORY:  Provided history is "Sepsis;  ".    TECHNIQUE:  One view of the chest.    COMPARISON:  08/04/2023.    FINDINGS:  Cardiac wires overlie the chest.  Right hemidiaphragm is elevated.  Cardiomediastinal silhouette is not enlarged.  New bibasilar subsegmental atelectasis without confluent area of consolidation.  No sizable pleural effusion.  No pneumothorax.  Vague increased lucency in the right upper quadrant of the abdomen likely related to elevated right hemidiaphragm and overlapping right inferior lung base.                                       Medications   lactated ringers bolus 2,844 mL (0 mLs Intravenous Stopped 12/23/23 0954)   acetaminophen tablet 1,000 mg (1,000 mg Oral Given 12/23/23 0901)   morphine injection 5 mg (5 mg Intravenous Given 12/23/23 0857)   ondansetron injection 4 mg (4 mg Intravenous Given 12/23/23 0857)   iohexoL (OMNIPAQUE 350) injection 80 mL (80 mLs " Intravenous Given 12/23/23 0958)     Medical Decision Making  Patient notes that he is overall feeling better prior to administration of morphine in the ED. However, endorses significant improvement of pain after morphine.  Slightly distended with otherwise unremarkable abd exam. Clinically, I am not convinced patient has an obstruction today but given complexity of surgical history this remains worth consideration.  CT is essentially equivocal for partial or developing obstruction today vs. Ileus. Appears to have enteritis, presumably secondary to influenza A.  No hepatorenal dysfunction or significant electrolyte or metabolic disturbance.  No leukocytosis.  Lactic acid normal.    Case reviewed with General surgery resident, Dr. Nitin Faye; low probability of obstruction today.  Given surgical complexity, I do offer patient observation overnight for monitoring of new/developing symptoms and potential need for surgical intervention.  Patient declines and states he would prefer to go home and return if symptoms worsen.  Clear liquid diet and Tamiflu.    Amount and/or Complexity of Data Reviewed  Labs: ordered.  Radiology: ordered.    Risk  OTC drugs.  Prescription drug management.            Scribe Attestation:   Scribe #1: I performed the above scribed service and the documentation accurately describes the services I performed. I attest to the accuracy of the note.           I, Andres Byrne PA-C, personally performed the services described in this documentation. All medical record entries made by the scribe were at my direction and in my presence.  I have reviewed the chart and agree that the record reflects my personal performance and is accurate and complete.                   Clinical Impression:  Final diagnoses:  [R50.9] Acute febrile illness  [J10.1] Influenza A  [R10.31] RLQ abdominal pain (Primary)  [Z87.19] History of small bowel obstruction          ED Disposition Condition    Discharge Stable           ED Prescriptions       Medication Sig Dispense Start Date End Date Auth. Provider    oseltamivir (TAMIFLU) 75 MG capsule Take 1 capsule (75 mg total) by mouth 2 (two) times daily. for 5 days 10 capsule 12/23/2023 12/28/2023 Andres Byrne PA-C          Follow-up Information       Follow up With Specialties Details Why Contact Info    Basil Grover MD Family Medicine Schedule an appointment as soon as possible for a visit in 1 day For re-evaluation [address] [facility] [address]  [phone]      [facility] Emergency Medicine Go to  If symptoms worsen or new symptoms develop [address] [facility] [place] [address] [phone]             Andres Byrne PA-C  12/23/23 9934

## 2023-12-23 NOTE — DISCHARGE INSTRUCTIONS
Thank you for coming to our Emergency Department today. It is important to remember that some problems or medical conditions are difficult to diagnose and may not be found or addressed during your Emergency Department visit.  These conditions often start with non-specific symptoms and can only be diagnosed on follow up visits with your primary care physician or specialist when the symptoms continue or change. Please remember that all medical conditions can change, and we cannot predict how you will be feeling tomorrow or the next day. Return to the ER with any questions/concerns, new/concerning symptoms, worsening or failure to improve.       Be sure to follow up with your primary care doctor and review all labs/imaging/tests that were performed during your ER visit with them. It is very common for us to identify non-emergent incidental findings which must be followed up with your primary care physician.  Some labs/imaging/tests may be outside of the normal range, and require non-emergent follow-up and/or further investigation/treatment/procedures/testing to help diagnose/exclude/prevent complications or other potentially serious medical conditions. Some abnormalities may not have been discussed or addressed during your ER visit.     An ER visit does not replace a primary care visit, and many screening tests or follow-up tests cannot be ordered by an ER doctor or performed by the ER. Some tests may even require pre-approval.    If you do not have a primary care doctor, you may contact the one listed on your discharge paperwork or you may also call the [facility] Appointment Desk at [phone] , or [address] [place] [address] at  [phone] to schedule an appointment, or establish care with a primary care doctor or even a specialist and to obtain information about local resources. It is important to your health that you have a primary care doctor.    Please take all medications as directed. We have done our best to select  a medication for you that will treat your condition however, all medications may potentially have side-effects and it is impossible to predict which medications may give you side-effects or what those side-effects (if any) those medications may give you.  If you feel that you are having a negative effect or side-effect of any medication you should stop taking those medications immediately and seek medical attention. If you feel that you are having a life-threatening reaction call 911.        Do not drive, swim, climb to height, take a bath, operate heavy machinery, drink alcohol or take potentially sedating medications, sign any legal documents or make any important decisions for 24 hours if you have received any pain medications, sedatives or mood altering drugs during your ER visit or within 24 hours of taking them if they have been prescribed to you.     You can find additional resources for Dentists, hearing aids, durable medical equipment, low cost pharmacies and other resources at [email or web address]

## 2023-12-27 LAB
BACTERIA BLD CULT: NORMAL
BACTERIA BLD CULT: NORMAL

## 2024-01-11 DIAGNOSIS — I10 ESSENTIAL HYPERTENSION: ICD-10-CM

## 2024-01-11 RX ORDER — HYDROCHLOROTHIAZIDE 12.5 MG/1
CAPSULE ORAL
Qty: 90 CAPSULE | Refills: 3 | Status: SHIPPED | OUTPATIENT
Start: 2024-01-11

## 2024-04-19 NOTE — HOSPITAL COURSE
Jules Aviles is a 68yoM who returned to the hospital with a recurrent small bowel obstruction. He was recently discharged from our service with a similar problem. The plan was always for short interval follow up and elective surgery. The patient returned to the hospital prior to his follow up with concerns of re-developing small bowel obstruction. He was taken to the operating room for an exploratory laparotomy with lysis of adhesions and small bowel resection. He progressed appropriately on the floor. He did remain with an elevated leukocytosis and was found to have a superficial wound infection. This was managed at bedside with drainage and daily wet-to-dry dressing placements. The patient was instructed on dressing changes, and referred to home health in the outpatient setting for help with wound management. He is being discharged with a completion course of PO antibiotics. He otherwise has met all discharge criteria. We will plan for a short interval follow up in one week in the outpatient clinic.    yesterday

## 2024-05-22 ENCOUNTER — OFFICE VISIT (OUTPATIENT)
Dept: CARDIOLOGY | Facility: CLINIC | Age: 69
End: 2024-05-22
Payer: MEDICARE

## 2024-05-22 VITALS
HEART RATE: 64 BPM | WEIGHT: 224.44 LBS | SYSTOLIC BLOOD PRESSURE: 134 MMHG | DIASTOLIC BLOOD PRESSURE: 81 MMHG | HEIGHT: 70 IN | OXYGEN SATURATION: 94 % | BODY MASS INDEX: 32.13 KG/M2

## 2024-05-22 DIAGNOSIS — K21.9 GASTROESOPHAGEAL REFLUX DISEASE, UNSPECIFIED WHETHER ESOPHAGITIS PRESENT: ICD-10-CM

## 2024-05-22 DIAGNOSIS — I48.92 ATRIAL FLUTTER, UNSPECIFIED TYPE: ICD-10-CM

## 2024-05-22 DIAGNOSIS — I49.1 PREMATURE ATRIAL CONTRACTIONS: ICD-10-CM

## 2024-05-22 DIAGNOSIS — I70.0 ABDOMINAL AORTIC ATHEROSCLEROSIS: ICD-10-CM

## 2024-05-22 DIAGNOSIS — Z98.890 HISTORY OF CARDIAC RADIOFREQUENCY ABLATION (RFA): ICD-10-CM

## 2024-05-22 DIAGNOSIS — I49.3 PVC'S (PREMATURE VENTRICULAR CONTRACTIONS): ICD-10-CM

## 2024-05-22 DIAGNOSIS — I10 ESSENTIAL HYPERTENSION: Primary | ICD-10-CM

## 2024-05-22 PROCEDURE — 99999 PR PBB SHADOW E&M-EST. PATIENT-LVL III: CPT | Mod: PBBFAC,,, | Performed by: INTERNAL MEDICINE

## 2024-05-22 PROCEDURE — 1101F PT FALLS ASSESS-DOCD LE1/YR: CPT | Mod: CPTII,S$GLB,, | Performed by: INTERNAL MEDICINE

## 2024-05-22 PROCEDURE — 93000 ELECTROCARDIOGRAM COMPLETE: CPT | Mod: S$GLB,,, | Performed by: INTERNAL MEDICINE

## 2024-05-22 PROCEDURE — 3288F FALL RISK ASSESSMENT DOCD: CPT | Mod: CPTII,S$GLB,, | Performed by: INTERNAL MEDICINE

## 2024-05-22 PROCEDURE — 3008F BODY MASS INDEX DOCD: CPT | Mod: CPTII,S$GLB,, | Performed by: INTERNAL MEDICINE

## 2024-05-22 PROCEDURE — 1159F MED LIST DOCD IN RCRD: CPT | Mod: CPTII,S$GLB,, | Performed by: INTERNAL MEDICINE

## 2024-05-22 PROCEDURE — 3079F DIAST BP 80-89 MM HG: CPT | Mod: CPTII,S$GLB,, | Performed by: INTERNAL MEDICINE

## 2024-05-22 PROCEDURE — 1126F AMNT PAIN NOTED NONE PRSNT: CPT | Mod: CPTII,S$GLB,, | Performed by: INTERNAL MEDICINE

## 2024-05-22 PROCEDURE — 3075F SYST BP GE 130 - 139MM HG: CPT | Mod: CPTII,S$GLB,, | Performed by: INTERNAL MEDICINE

## 2024-05-22 PROCEDURE — 99214 OFFICE O/P EST MOD 30 MIN: CPT | Mod: 25,S$GLB,, | Performed by: INTERNAL MEDICINE

## 2024-05-22 RX ORDER — ROSUVASTATIN CALCIUM 20 MG/1
20 TABLET, COATED ORAL DAILY
Qty: 90 TABLET | Refills: 3 | Status: SHIPPED | OUTPATIENT
Start: 2024-05-22 | End: 2025-05-22

## 2024-05-22 RX ORDER — LOSARTAN POTASSIUM 50 MG/1
50 TABLET ORAL DAILY
Qty: 90 TABLET | Refills: 3 | Status: SHIPPED | OUTPATIENT
Start: 2024-05-22

## 2024-05-22 RX ORDER — HYDROCHLOROTHIAZIDE 12.5 MG/1
12.5 CAPSULE ORAL DAILY
Qty: 90 CAPSULE | Refills: 3 | Status: SHIPPED | OUTPATIENT
Start: 2024-05-22

## 2024-05-22 NOTE — PROGRESS NOTES
Subjective:      Patient ID: Jules Aviles is a 68 y.o. male.    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    Stays very active.    Feels good    Review of Systems   Cardiovascular:  Negative for chest pain, claudication, dyspnea on exertion, irregular heartbeat, leg swelling, near-syncope, orthopnea, palpitations and syncope.      Pt had small bowel resection 9/23 due to adhesions with obstruction.    Pt no longer takes cholestyramine for bile gastritis following cholecystectomy      Past Medical History:   Diagnosis Date    Essential hypertension 12/07/2016    GERD (gastroesophageal reflux disease)         Past Surgical History:   Procedure Laterality Date    APPENDECTOMY      as a child    EXCISION, SMALL INTESTINE  8/9/2023    Procedure: EXCISION, SMALL INTESTINE;  Surgeon: Jn Yusuf MD;  Location: [facility] OR;  Service: General;;    GALLBLADDER SURGERY  2004    HERNIA REPAIR  2004 & 2005    LAPAROTOMY, EXPLORATORY N/A 8/9/2023    Procedure: LAPAROTOMY, EXPLORATORY;  Surgeon: Jn Yusuf MD;  Location: [facility] OR;  Service: General;  Laterality: N/A;    TONSILLECTOMY, ADENOIDECTOMY      as a child       Family History   Problem Relation Name Age of Onset    Aneurysm Mother      Heart disease Father         Social History     Socioeconomic History    Marital status: Single   Tobacco Use    Smoking status: Never    Smokeless tobacco: Never   Substance and Sexual Activity    Alcohol use: No    Drug use: No    Sexual activity: Yes     Social Determinants of Health     Financial Resource Strain: Low Risk  (6/28/2023)    Overall Financial Resource Strain (CARDIA)     Difficulty of Paying Living Expenses: Not hard at all   Food Insecurity: No Food Insecurity (6/28/2023)    Hunger Vital Sign     Worried About Running Out of Food in the Last Year: Never true     Ran Out of Food in the Last Year: Never true   Transportation Needs: No Transportation Needs (6/28/2023)    PRAPARE -  Transportation     Lack of Transportation (Medical): No     Lack of Transportation (Non-Medical): No   Physical Activity: Sufficiently Active (6/28/2023)    Exercise Vital Sign     Days of Exercise per Week: 5 days     Minutes of Exercise per Session: 30 min   Stress: No Stress Concern Present (6/28/2023)    [employer] - Occupational Stress Questionnaire     Feeling of Stress : Not at all   Housing Stability: Low Risk  (6/28/2023)    Housing Stability Vital Sign     Unable to Pay for Housing in the Last Year: No     Number of Places Lived in the Last Year: 1     Unstable Housing in the Last Year: No       Current Outpatient Medications on File Prior to Visit   Medication Sig Dispense Refill    cyanocobalamin (VITAMIN B-12) 100 MCG tablet Take 100 mcg by mouth once daily.      Lactobacillus acidophilus (PROBIOTIC ORAL) Take 1 capsule by mouth once daily.      pantoprazole (PROTONIX) 40 MG tablet Take 40 mg by mouth once daily.      tamsulosin (FLOMAX) 0.4 mg Cap Take 1 capsule by mouth every evening.      vitamin D (VITAMIN D3) 1000 units Tab Take 8,000 Units by mouth once daily.      [DISCONTINUED] hydroCHLOROthiazide (MICROZIDE) 12.5 mg capsule TAKE 1 CAPSULE BY MOUTH EVERY DAY 90 capsule 3    [DISCONTINUED] losartan (COZAAR) 50 MG tablet TAKE 1 TABLET BY MOUTH EVERY DAY 90 tablet 3    [DISCONTINUED] oxyCODONE (ROXICODONE) 5 MG immediate release tablet Take 1 tablet (5 mg total) by mouth every 6 (six) hours as needed for Pain. (Patient not taking: Reported on 5/22/2024) 16 tablet 0     Current Facility-Administered Medications on File Prior to Visit   Medication Dose Route Frequency Provider Last Rate Last Admin    0.9%  NaCl infusion   Intravenous Continuous Lien Crocker NP   Stopped at 02/18/19 3663       Review of patient's allergies indicates:  No Known Allergies  Objective:     Vitals:    05/22/24 1100   BP: 134/81   BP Location: Left arm   Patient Position: Sitting   BP  "Method: Large (Automatic)   Pulse: 64   SpO2: (!) 94%   Weight: 101.8 kg (224 lb 6.9 oz)   Height: 5' 10" (1.778 m)        Physical Exam  Constitutional:       General: He is not in acute distress.     Appearance: He is well-developed. He is not diaphoretic.   Eyes:      General: No scleral icterus.  Neck:      Vascular: No carotid bruit or JVD.   Cardiovascular:      Rate and Rhythm: Regular rhythm.      Heart sounds: Normal heart sounds. No murmur heard.     No friction rub. No gallop.   Pulmonary:      Effort: Pulmonary effort is normal. No respiratory distress.      Breath sounds: Normal breath sounds.   Musculoskeletal:      Right lower leg: No edema.      Left lower leg: No edema.   Skin:     General: Skin is warm and dry.   Neurological:      Mental Status: He is alert and oriented to person, place, and time.   Psychiatric:         Behavior: Behavior normal.         Thought Content: Thought content normal.         Judgment: Judgment normal.     WT up 23 lbs since abdominal surgery       ECG today: NSR with first degree AV block, left axis deviation      [facility]  Outside Information  Result Image Hyperlink     Show images for COMPREHENSIVE METABOLIC PANEL  Results   COMPREHENSIVE METABOLIC PANEL (Order [id number])   [facility] Results Release    [facility] Status: Pending  Results Release             suggestion  Result Information displayed in this report will not trend and may not trigger automated decision support.      Contains abnormal data COMPREHENSIVE METABOLIC PANEL  Order: [id number]   Ref Range & Units 3 mo ago   Glucose 65 - 99 mg/dL 112 High     Comment:              Fasting reference interval     For someone without known diabetes, a glucose value   between 100 and 125 mg/dL is consistent with   prediabetes and should be confirmed with a   follow-up test.   Blood Urea Nitrogen 7 - 25 mg/dL 17    Creatinine 0.70 - 1.35 mg/dL 0.92    EGFR - [facility] > OR = 60 mL/min/1.73m2 92    BUN/Creatinine Ratio - " [facility] 6 - 22 (calc) SEE NOTE:    Comment:    Not Reported: BUN and Creatinine are within      reference range.          Sodium 135 - 146 mmol/L 139    Potassium 3.5 - 5.3 mmol/L 4.3    Chloride 98 - 110 mmol/L 103    Carbon Dioxide 20 - 32 mmol/L 25    Calcium 8.6 - 10.3 mg/dL 9.6    Protein Total 6.1 - 8.1 g/dL 6.9    Albumin Level 3.6 - 5.1 g/dL 4.1    Globulin 1.9 - 3.7 g/dL (calc) 2.8    Albumin/Globulin Ratio 1.0 - 2.5 (calc) 1.5    Total Bilirubin 0.2 - 1.2 mg/dL 0.9    Alkaline Phosphatase 35 - 144 U/L 68    AST 10 - 35 U/L 16    ALT 9 - 46 U/L 19    Resulting Agency  [facility]     Specimen Collected: 01/26/24 09:27    Performed by: [email or web address] Last Resulted: 01/27/24 09:42   Received From: [facility] [employer]  Result Received: 05/22/24 10:52     Chart Status: Pending  Results Release             suggestion  Result Information displayed in this report will not trend and may not trigger automated decision support.      Contains abnormal data LIPID PANEL  Order: [id number]   Ref Range & Units 3 mo ago   Cholesterol <200 mg/dL 232 High     HDL > OR = 40 mg/dL 57    Triglycerides <150 mg/dL 148    LDL Cholesterol mg/dL (calc) 148 High     Comment: Reference range: <100     Desirable range <100 mg/dL for primary prevention;    <70 mg/dL for patients with CHD or diabetic patients   with > or = 2 CHD risk factors.     LDL-C is now calculated using the Josy   calculation, which is a validated novel method providing   better accuracy than the Friedewald equation in the   estimation of LDL-C.   Sabino ZIEGLER et al. NORBERTO. 2013;310(19): 9341-2839   (http://education.[employer]/faq/MSH452)   CHOL/HDLC Ratio - [facility] <5.0 (calc) 4.1    Non HDL Cholesterol - [facility] <130 mg/dL (calc) 175 High     Comment: For patients with diabetes plus 1 major ASCVD risk   factor, treating to a non-HDL-C goal of <100 mg/dL   (LDL-C of <70 mg/dL) is considered a therapeutic     and may not trigger automated decision  support.     LIPID PANEL  Order: [id number]   Ref Range & Units 3 mo ago   Cholesterol See Comment mg/dL 187    HDL See Comment mg/dL 49    Triglycerides See Comment mg/dL 142    LDL Cholesterol Calculated See Comment mg/dL 110    Resulting Agency  [facility]   Narrative  Performed by [facility]  Triglycerides     Classification      mg/dL   and may not trigger automated decision support.     LIPID PANEL  Order: [id number]   Ref Range & Units 3 mo ago   Cholesterol See Comment mg/dL 187    HDL See Comment mg/dL 49    Triglycerides See Comment mg/dL 142    LDL Cholesterol Calculated See Comment mg/dL 110    Resulting Agency  [facility]   Narrative  Performed by [facility]  Triglycerides     Classification      mg/dL   Admission on 12/23/2023, Discharged on 12/23/2023   Component Date Value Ref Range Status    Blood Culture, Routine 12/23/2023 No Growth after 4 days.   Final    Blood Culture, Routine 12/23/2023 No Growth after 4 days.   Final    WBC 12/23/2023 8.84  3.90 - 12.70 K/uL Final    RBC 12/23/2023 5.57  4.60 - 6.20 M/uL Final    Hemoglobin 12/23/2023 16.5  14.0 - 18.0 g/dL Final    Hematocrit 12/23/2023 48.6  40.0 - 54.0 % Final    MCV 12/23/2023 87  82 - 98 fL Final    MCH 12/23/2023 29.6  27.0 - 31.0 pg Final    MCHC 12/23/2023 34.0  32.0 - 36.0 g/dL Final    RDW 12/23/2023 13.0  11.5 - 14.5 % Final    Platelets 12/23/2023 162  150 - 450 K/uL Final    MPV 12/23/2023 10.6  9.2 - 12.9 fL Final    Immature Granulocytes 12/23/2023 0.3  0.0 - 0.5 % Final    Gran # (ANC) 12/23/2023 7.9 (H)  1.8 - 7.7 K/uL Final    Immature Grans (Abs) 12/23/2023 0.03  0.00 - 0.04 K/uL Final    Lymph # 12/23/2023 0.5 (L)  1.0 - 4.8 K/uL Final    Mono # 12/23/2023 0.5  0.3 - 1.0 K/uL Final    Eos # 12/23/2023 0.0  0.0 - 0.5 K/uL Final    Baso # 12/23/2023 0.02  0.00 - 0.20 K/uL Final    nRBC 12/23/2023 0  0 /100 WBC Final    Gran % 12/23/2023 88.9 (H)  38.0 - 73.0 % Final    Lymph % 12/23/2023 5.5 (L)   18.0 - 48.0 % Final    Mono % 12/23/2023 5.1  4.0 - 15.0 % Final    Eosinophil % 12/23/2023 0.0  0.0 - 8.0 % Final    Basophil % 12/23/2023 0.2  0.0 - 1.9 % Final    Differential Method 12/23/2023 Automated   Final    Sodium 12/23/2023 139  136 - 145 mmol/L Final    Potassium 12/23/2023 3.6  3.5 - 5.1 mmol/L Final    Chloride 12/23/2023 103  95 - 110 mmol/L Final    CO2 12/23/2023 22 (L)  23 - 29 mmol/L Final    Glucose 12/23/2023 176 (H)  70 - 110 mg/dL Final    BUN 12/23/2023 12  8 - 23 mg/dL Final    Creatinine 12/23/2023 0.9  0.5 - 1.4 mg/dL Final    Calcium 12/23/2023 9.7  8.7 - 10.5 mg/dL Final    Total Protein 12/23/2023 7.6  6.0 - 8.4 g/dL Final    Albumin 12/23/2023 4.0  3.5 - 5.2 g/dL Final    Total Bilirubin 12/23/2023 0.5  0.1 - 1.0 mg/dL Final    Alkaline Phosphatase 12/23/2023 80  55 - 135 U/L Final    AST 12/23/2023 23  10 - 40 U/L Final    ALT 12/23/2023 25  10 - 44 U/L Final    eGFR 12/23/2023 >60  >60 mL/min/1.73 m^2 Final    Anion Gap 12/23/2023 14  8 - 16 mmol/L Final    Specimen UA 12/23/2023 Urine, Clean Catch   Final    Color, UA 12/23/2023 Yellow  Yellow, Straw, Paula Final    Appearance, UA 12/23/2023 Clear  Clear Final    pH, UA 12/23/2023 6.0  5.0 - 8.0 Final    Specific Gravity, UA 12/23/2023 >1.030 (A)  1.005 - 1.030 Final    Protein, UA 12/23/2023 2+ (A)  Negative Final    Glucose, UA 12/23/2023 Negative  Negative Final    Ketones, UA 12/23/2023 3+ (A)  Negative Final    Bilirubin (UA) 12/23/2023 Negative  Negative Final    Occult Blood UA 12/23/2023 Trace (A)  Negative Final    Nitrite, UA 12/23/2023 Negative  Negative Final    Urobilinogen, UA 12/23/2023 Negative  <2.0 EU/dL Final    Leukocytes, UA 12/23/2023 Negative  Negative Final    POC Molecular Influenza A Ag 12/23/2023 Positive (A)  Negative, Not Reported Final    POC Molecular Influenza B Ag 12/23/2023 Negative  Negative, Not Reported Final     Acceptable 12/23/2023 Yes   Final    Prothrombin Time 12/23/2023  11.2  9.0 - 12.5 sec Final    INR 12/23/2023 1.1  0.8 - 1.2 Final    Lipase 12/23/2023 16  4 - 60 U/L Final    Procalcitonin 12/23/2023 0.25 (H)  <0.25 ng/mL Final    CRP 12/23/2023 65.4 (H)  0.0 - 8.2 mg/L Final    POC Rapid COVID 12/23/2023 Negative  Negative Final     Acceptable 12/23/2023 Yes   Final    Molecular Strep A, POC 12/23/2023 Negative  Negative Final     Acceptable 12/23/2023 Yes   Final    POC Lactate 12/23/2023 1.58  0.5 - 2.2 mmol/L Final    Sample 12/23/2023 VENOUS   Final    Site 12/23/2023 Other   Final    Allens Test 12/23/2023 N/A   Final    RBC, UA 12/23/2023 2  0 - 4 /hpf Final    WBC, UA 12/23/2023 2  0 - 5 /hpf Final    Bacteria 12/23/2023 None  None-Occ /hpf Final    Hyaline Casts, UA 12/23/2023 0  0-1/lpf /lpf Final    Microscopic Comment 12/23/2023 SEE COMMENT   Final   (      Assessment:     1. Essential hypertension    2. Atrial flutter, unspecified type    3. History of cardiac radiofrequency ablation (RFA)    4. Premature atrial contractions    5. PVC's (premature ventricular contractions)    6. Gastroesophageal reflux disease, unspecified whether esophagitis present    7. Abdominal aortic atherosclerosis      Plan:   Vicente was seen today for follow-up.    Diagnoses and all orders for this visit:    Essential hypertension  -     IN OFFICE EKG 12-LEAD (to Muse)  -     hydroCHLOROthiazide (MICROZIDE) 12.5 mg capsule; Take 1 capsule (12.5 mg total) by mouth once daily.  -     Comprehensive Metabolic Panel; Future  -     CK; Future  -     Lipid Panel; Future    Atrial flutter, unspecified type  -     IN OFFICE EKG 12-LEAD (to Muse)  -     Comprehensive Metabolic Panel; Future  -     CK; Future  -     Lipid Panel; Future    History of cardiac radiofrequency ablation (RFA)  -     IN OFFICE EKG 12-LEAD (to Muse)  -     Comprehensive Metabolic Panel; Future  -     CK; Future  -     Lipid Panel; Future    Premature atrial contractions  -     IN OFFICE EKG 12-LEAD  (to Muse)  -     Comprehensive Metabolic Panel; Future  -     CK; Future  -     Lipid Panel; Future    PVC's (premature ventricular contractions)  -     IN OFFICE EKG 12-LEAD (to Muse)  -     Comprehensive Metabolic Panel; Future  -     CK; Future  -     Lipid Panel; Future    Gastroesophageal reflux disease, unspecified whether esophagitis present  -     IN OFFICE EKG 12-LEAD (to Muse)  -     Comprehensive Metabolic Panel; Future  -     CK; Future  -     Lipid Panel; Future    Abdominal aortic atherosclerosis  -     Comprehensive Metabolic Panel; Future  -     CK; Future  -     Lipid Panel; Future    Other orders  -     losartan (COZAAR) 50 MG tablet; Take 1 tablet (50 mg total) by mouth once daily.  -     rosuvastatin (CRESTOR) 20 MG tablet; Take 1 tablet (20 mg total) by mouth once daily.     Will add rosuvastatin 20 mg daily for HLD (diane in light of abd aortic atherosclerosis seen on CT)    Rest of meds the same    Low carb diet counseled.  Mediterranean diet counseled    RTC 3 months with repeat labs    F/u with PCP Dr Basil Grover      Follow up in about 3 months (around 8/22/2024).

## 2024-05-23 LAB
OHS QRS DURATION: 84 MS
OHS QTC CALCULATION: 400 MS

## 2024-09-26 ENCOUNTER — OFFICE VISIT (OUTPATIENT)
Dept: CARDIOLOGY | Facility: CLINIC | Age: 69
End: 2024-09-26
Payer: MEDICARE

## 2024-09-26 VITALS
SYSTOLIC BLOOD PRESSURE: 120 MMHG | DIASTOLIC BLOOD PRESSURE: 80 MMHG | BODY MASS INDEX: 32.26 KG/M2 | RESPIRATION RATE: 18 BRPM | HEART RATE: 65 BPM | WEIGHT: 225.31 LBS | OXYGEN SATURATION: 93 % | HEIGHT: 70 IN

## 2024-09-26 DIAGNOSIS — I10 ESSENTIAL HYPERTENSION: ICD-10-CM

## 2024-09-26 DIAGNOSIS — E78.2 MIXED HYPERLIPIDEMIA: ICD-10-CM

## 2024-09-26 DIAGNOSIS — Z98.890 HISTORY OF CARDIAC RADIOFREQUENCY ABLATION (RFA): ICD-10-CM

## 2024-09-26 DIAGNOSIS — I70.0 ABDOMINAL AORTIC ATHEROSCLEROSIS: ICD-10-CM

## 2024-09-26 DIAGNOSIS — I48.3 TYPICAL ATRIAL FLUTTER: Primary | ICD-10-CM

## 2024-09-26 DIAGNOSIS — E66.9 NON MORBID OBESITY, UNSPECIFIED OBESITY TYPE: ICD-10-CM

## 2024-09-26 LAB
OHS QRS DURATION: 78 MS
OHS QTC CALCULATION: 394 MS

## 2024-09-26 PROCEDURE — 99999 PR PBB SHADOW E&M-EST. PATIENT-LVL IV: CPT | Mod: PBBFAC,,, | Performed by: INTERNAL MEDICINE

## 2024-09-26 PROCEDURE — 93000 ELECTROCARDIOGRAM COMPLETE: CPT | Mod: S$GLB,,, | Performed by: INTERNAL MEDICINE

## 2024-09-26 RX ORDER — ASPIRIN 81 MG/1
81 TABLET ORAL DAILY
COMMUNITY
Start: 2024-09-26

## 2024-09-26 NOTE — LETTER
September 26, 2024        Basil Grover MD  [address] [facility]  [place] [address]             [facility] [place], [place] - Cardiology  [address]  [address]  [place] [address]  Phone: [phone]   Patient: Jules Aviles   MR Number: [id number]   YOB: 1955   Date of Visit: 9/26/2024       Dear Dr. Grover:    Thank you for referring Jules Aviles to me for evaluation. Attached you will find relevant portions of my assessment and plan of care.    If you have questions, please do not hesitate to call me. I look forward to following Jules Aviles along with you.    Sincerely,      Steven Lipscmob MD            CC  No Recipients    Enclosure

## 2024-09-26 NOTE — PROGRESS NOTES
CARDIOVASCULAR CONSULTATION    REASON FOR CONSULT:   Jules Aviles is a 69 y.o. male who presents for f/u AFL/HTN/HLP.    PCP: Reilly RAY: Ata  HISTORY OF PRESENT ILLNESS:   Previously followed by Dr. Mathis.    The patient comes in today for ongoing management of his history of atrial flutter, hypertension, and dyslipidemia.  He reports generally asymptomatic status without angina, dyspnea, palpitations, or syncope.  There has been no PND, orthopnea, melena, hematuria, or claudication symptoms.  The patient has a history of atrial flutter ablation and is no longer on anticoagulation.    Family history is notable for father with coronary bypass in his late 80s.  There does not appear to be a family history of premature CAD.      The patient is a nonsmoker.  Denies alcohol excess or illicit drug use.    CARDIOVASCULAR HISTORY:   AFL s/p ablation 2/18/19    PAST MEDICAL HISTORY:     Past Medical History:   Diagnosis Date    Essential hypertension 12/07/2016    GERD (gastroesophageal reflux disease)        PAST SURGICAL HISTORY:     Past Surgical History:   Procedure Laterality Date    APPENDECTOMY      as a child    EXCISION, SMALL INTESTINE  8/9/2023    Procedure: EXCISION, SMALL INTESTINE;  Surgeon: Jn Yusuf MD;  Location: [facility] OR;  Service: General;;    GALLBLADDER SURGERY  2004    HERNIA REPAIR  2004 & 2005    LAPAROTOMY, EXPLORATORY N/A 8/9/2023    Procedure: LAPAROTOMY, EXPLORATORY;  Surgeon: Jn Yusuf MD;  Location: [facility] OR;  Service: General;  Laterality: N/A;    TONSILLECTOMY, ADENOIDECTOMY      as a child       ALLERGIES AND MEDICATION:   Review of patient's allergies indicates:  No Known Allergies     Medication List            Accurate as of September 26, 2024  1:19 PM. If you have any questions, ask your nurse or doctor.                CONTINUE taking these medications      cyanocobalamin 100 MCG tablet  Commonly known as: VITAMIN B-12     hydroCHLOROthiazide 12.5 mg capsule  Commonly  known as: MICROZIDE  Take 1 capsule (12.5 mg total) by mouth once daily.     losartan 50 MG tablet  Commonly known as: COZAAR  Take 1 tablet (50 mg total) by mouth once daily.     pantoprazole 40 MG tablet  Commonly known as: PROTONIX     PROBIOTIC ORAL     rosuvastatin 20 MG tablet  Commonly known as: CRESTOR  Take 1 tablet (20 mg total) by mouth once daily.     tamsulosin 0.4 mg Cap  Commonly known as: FLOMAX     vitamin D 1000 units Tab  Commonly known as: VITAMIN D3              SOCIAL HISTORY:     Social History     Socioeconomic History    Marital status: Single   Tobacco Use    Smoking status: Never    Smokeless tobacco: Never   Substance and Sexual Activity    Alcohol use: No    Drug use: No    Sexual activity: Yes     Social Determinants of Health     Financial Resource Strain: Low Risk  (6/28/2023)    Overall Financial Resource Strain (CARDIA)     Difficulty of Paying Living Expenses: Not hard at all   Food Insecurity: No Food Insecurity (6/28/2023)    Hunger Vital Sign     Worried About Running Out of Food in the Last Year: Never true     Ran Out of Food in the Last Year: Never true   Transportation Needs: No Transportation Needs (6/28/2023)    PRAPARE - Transportation     Lack of Transportation (Medical): No     Lack of Transportation (Non-Medical): No   Physical Activity: Sufficiently Active (6/28/2023)    Exercise Vital Sign     Days of Exercise per Week: 5 days     Minutes of Exercise per Session: 30 min   Stress: No Stress Concern Present (6/28/2023)    [employer] [place] [employer] - Occupational Stress Questionnaire     Feeling of Stress : Not at all   Housing Stability: Low Risk  (6/28/2023)    Housing Stability Vital Sign     Unable to Pay for Housing in the Last Year: No     Number of Places Lived in the Last Year: 1     Unstable Housing in the Last Year: No       FAMILY HISTORY:     Family History   Problem Relation Name Age of Onset    Aneurysm Mother      Heart disease Father    "      REVIEW OF SYSTEMS:   Review of Systems   Constitutional:  Negative for chills, diaphoresis and fever.   HENT:  Negative for nosebleeds.    Eyes:  Negative for blurred vision, double vision and photophobia.   Respiratory:  Negative for hemoptysis, shortness of breath and wheezing.    Cardiovascular:  Negative for chest pain, palpitations, orthopnea, claudication, leg swelling and PND.   Gastrointestinal:  Negative for abdominal pain, blood in stool, heartburn, melena, nausea and vomiting.   Genitourinary:  Negative for flank pain and hematuria.   Musculoskeletal:  Negative for falls, myalgias and neck pain.   Skin:  Negative for rash.   Neurological:  Negative for dizziness, seizures, loss of consciousness, weakness and headaches.   Endo/Heme/Allergies:  Negative for polydipsia. Does not bruise/bleed easily.   Psychiatric/Behavioral:  Negative for depression and memory loss. The patient is not nervous/anxious.        PHYSICAL EXAM:     Vitals:    09/26/24 1312   BP: 120/80   Pulse: 65   Resp: 18    Body mass index is 32.33 kg/m².  Weight: 102.2 kg (225 lb 5 oz)   Height: 5' 10" (177.8 cm)     Physical Exam  Vitals reviewed.   Constitutional:       General: He is not in acute distress.     Appearance: He is well-developed. He is obese. He is not ill-appearing, toxic-appearing or diaphoretic.   HENT:      Head: Normocephalic and atraumatic.   Eyes:      General: No scleral icterus.     Extraocular Movements: Extraocular movements intact.      Conjunctiva/sclera: Conjunctivae normal.      Pupils: Pupils are equal, round, and reactive to light.   Neck:      Thyroid: No thyromegaly.      Vascular: Normal carotid pulses. No carotid bruit or JVD.      Trachea: Trachea normal.   Cardiovascular:      Rate and Rhythm: Normal rate and regular rhythm.      Pulses:           Carotid pulses are 2+ on the right side and 2+ on the left side.     Heart sounds: S1 normal and S2 normal. No murmur heard.     No friction rub. No " gallop.   Pulmonary:      Effort: Pulmonary effort is normal. No respiratory distress.      Breath sounds: Normal breath sounds. No stridor. No wheezing, rhonchi or rales.   Chest:      Chest wall: No tenderness.   Abdominal:      General: There is no distension.      Palpations: Abdomen is soft.   Musculoskeletal:         General: No swelling or tenderness. Normal range of motion.      Cervical back: Normal range of motion and neck supple. No edema or rigidity.      Right lower leg: No edema.      Left lower leg: No edema.   Feet:      Right foot:      Skin integrity: No ulcer.      Left foot:      Skin integrity: No ulcer.   Skin:     General: Skin is warm and dry.      Coloration: Skin is not jaundiced.   Neurological:      General: No focal deficit present.      Mental Status: He is alert and oriented to person, place, and time.      Cranial Nerves: No cranial nerve deficit.   Psychiatric:         Mood and Affect: Mood normal.         Speech: Speech normal.         Behavior: Behavior normal. Behavior is cooperative.         DATA:   EKG: (personally reviewed tracing)  9/26/24 SR 61    Laboratory:  CBC:  Recent Labs   Lab 08/15/23  0420 08/16/23  0347 12/23/23  0846   WBC 10.22 8.04 8.84   Hemoglobin 11.1 L 11.2 L 16.5   Hematocrit 34.0 L 33.7 L 48.6   Platelets 259 307 162       CHEMISTRIES:  Recent Labs   Lab 08/12/23  0447 08/13/23  0353 08/14/23  0332 08/15/23  0420 08/16/23  0347 12/23/23  0846 01/26/24  0927   Glucose 103 186 H 219 H 118 H 118 H 176 H 112 H   Sodium 139 137 137 138 142 139 139   Potassium 3.8 3.0 L 2.8 L 3.2 L 3.5 3.6 4.3   BUN 14 11 8 8 7 L 12  --    Blood Urea Nitrogen  --   --   --   --   --   --  17   Creatinine 0.8 0.8 0.8 0.8 0.8 0.9 0.92   eGFR >60 >60 >60 >60 >60 >60 92   Calcium 8.9 8.2 L 8.2 L 8.1 L 8.5 L 9.7 9.6   Magnesium 1.7 1.6 1.5 L 1.9 1.8  --   --        CARDIAC BIOMARKERS:        COAGS:  Recent Labs   Lab 08/23/22  1158 12/23/23  0846   INR 1.0 1.1        LIPIDS/LFTS:  Recent Labs   Lab 08/16/23  0347 12/23/23  0846 01/26/24  0927   AST 69 H 23 16   ALT 83 H 25 19     Lab Results   Component Value Date    TSH 0.991 01/15/2019         Cardiovascular Testing:  EVM 1/27/23  Sinus rhythm. PACs observed on 2 auto-activations.     Echo 1/15/19  Moderate left atrial enlargement.  Mild concentric left ventricular hypertrophy.  Normal left ventricular systolic function. The estimated ejection fraction is 60%  Grade I (mild) left ventricular diastolic dysfunction consistent with impaired relaxation.  Normal right ventricular systolic function.  Mild tricuspid regurgitation.  Mild right ventricular enlargement.  Normal left atrial pressure.  Normal central venous pressure (3 mm Hg).  The estimated PA systolic pressure is 28 mm Hg  Rhythm is sinus bradycardia at 54 beats per minute      ASSESSMENT:   # AFL in SR s/p ablation 2019, not on OAC  # HTN, controlled  # HLP on rosuva 20mg  # BMI 32  # aortic atherosclerosis (CT A/P 12/23/23)    PLAN:   Cont med rx  Start ASA 81mg qd  Diet/exercise/weight loss  RTC 6 months with lipids/LFT (Mar 2025)    The above documents medical care services that are part of ongoing care related to this patient's serious/complex condition (Code ) (PAFL/HTN/HLP).      Steven Lipscomb MD, FACC

## 2024-12-04 ENCOUNTER — OFFICE VISIT (OUTPATIENT)
Dept: SURGERY | Facility: CLINIC | Age: 69
End: 2024-12-04
Payer: MEDICARE

## 2024-12-04 VITALS
HEIGHT: 70 IN | BODY MASS INDEX: 32.26 KG/M2 | HEART RATE: 56 BPM | DIASTOLIC BLOOD PRESSURE: 80 MMHG | SYSTOLIC BLOOD PRESSURE: 153 MMHG | WEIGHT: 225.31 LBS

## 2024-12-04 DIAGNOSIS — K43.2 INCISIONAL HERNIA, WITHOUT OBSTRUCTION OR GANGRENE: Primary | ICD-10-CM

## 2024-12-04 PROCEDURE — 99213 OFFICE O/P EST LOW 20 MIN: CPT | Mod: S$GLB,,, | Performed by: SURGERY

## 2024-12-04 PROCEDURE — 3077F SYST BP >= 140 MM HG: CPT | Mod: CPTII,S$GLB,, | Performed by: SURGERY

## 2024-12-04 PROCEDURE — 99999 PR PBB SHADOW E&M-EST. PATIENT-LVL III: CPT | Mod: PBBFAC,,, | Performed by: SURGERY

## 2024-12-04 PROCEDURE — 4010F ACE/ARB THERAPY RXD/TAKEN: CPT | Mod: CPTII,S$GLB,, | Performed by: SURGERY

## 2024-12-04 PROCEDURE — 1126F AMNT PAIN NOTED NONE PRSNT: CPT | Mod: CPTII,S$GLB,, | Performed by: SURGERY

## 2024-12-04 PROCEDURE — 1101F PT FALLS ASSESS-DOCD LE1/YR: CPT | Mod: CPTII,S$GLB,, | Performed by: SURGERY

## 2024-12-04 PROCEDURE — 3008F BODY MASS INDEX DOCD: CPT | Mod: CPTII,S$GLB,, | Performed by: SURGERY

## 2024-12-04 PROCEDURE — 3079F DIAST BP 80-89 MM HG: CPT | Mod: CPTII,S$GLB,, | Performed by: SURGERY

## 2024-12-04 PROCEDURE — 3288F FALL RISK ASSESSMENT DOCD: CPT | Mod: CPTII,S$GLB,, | Performed by: SURGERY

## 2024-12-04 PROCEDURE — 1159F MED LIST DOCD IN RCRD: CPT | Mod: CPTII,S$GLB,, | Performed by: SURGERY

## 2024-12-04 NOTE — PROGRESS NOTES
68 y/o man with incisional hernia    Post op bowel obstruction and resection    Currently asymptomatic but growing.    Patient would like to wait until Jan for surgery and will return to clinic at that time.

## 2025-02-08 ENCOUNTER — HOSPITAL ENCOUNTER (INPATIENT)
Facility: HOSPITAL | Age: 70
LOS: 3 days | Discharge: HOME | DRG: 322 | End: 2025-02-12
Attending: EMERGENCY MEDICINE | Admitting: STUDENT IN AN ORGANIZED HEALTH CARE EDUCATION/TRAINING PROGRAM
Payer: MEDICARE

## 2025-02-08 DIAGNOSIS — I21.4 NSTEMI (NON-ST ELEVATED MYOCARDIAL INFARCTION): Primary | ICD-10-CM

## 2025-02-08 DIAGNOSIS — R10.13 EPIGASTRIC PAIN: ICD-10-CM

## 2025-02-08 DIAGNOSIS — R79.89 ELEVATED TROPONIN: ICD-10-CM

## 2025-02-08 DIAGNOSIS — I21.4 NSTEMI (NON-ST ELEVATION MYOCARDIAL INFARCTION): ICD-10-CM

## 2025-02-08 DIAGNOSIS — Z98.61 CAD S/P PERCUTANEOUS CORONARY ANGIOPLASTY: ICD-10-CM

## 2025-02-08 DIAGNOSIS — R14.0 ABDOMINAL DISTENTION: ICD-10-CM

## 2025-02-08 DIAGNOSIS — I25.10 CAD (CORONARY ARTERY DISEASE): ICD-10-CM

## 2025-02-08 DIAGNOSIS — I21.4 NON-ST ELEVATION MYOCARDIAL INFARCTION (NSTEMI): ICD-10-CM

## 2025-02-08 DIAGNOSIS — R07.9 CHEST PAIN: ICD-10-CM

## 2025-02-08 DIAGNOSIS — I48.0 PAF (PAROXYSMAL ATRIAL FIBRILLATION): ICD-10-CM

## 2025-02-08 DIAGNOSIS — I25.119 CHEST PAIN DUE TO CAD: ICD-10-CM

## 2025-02-08 DIAGNOSIS — I25.10 CAD S/P PERCUTANEOUS CORONARY ANGIOPLASTY: ICD-10-CM

## 2025-02-08 LAB
ALBUMIN SERPL BCP-MCNC: 4.3 G/DL (ref 3.5–5.2)
ALP SERPL-CCNC: 70 U/L (ref 40–150)
ALT SERPL W/O P-5'-P-CCNC: 35 U/L (ref 10–44)
ANION GAP SERPL CALC-SCNC: 9 MMOL/L (ref 8–16)
AST SERPL-CCNC: 29 U/L (ref 10–40)
BASOPHILS # BLD AUTO: 0.05 K/UL (ref 0–0.2)
BASOPHILS NFR BLD: 0.5 % (ref 0–1.9)
BILIRUB SERPL-MCNC: 0.5 MG/DL (ref 0.1–1)
BUN SERPL-MCNC: 21 MG/DL (ref 8–23)
CALCIUM SERPL-MCNC: 9.9 MG/DL (ref 8.7–10.5)
CHLORIDE SERPL-SCNC: 107 MMOL/L (ref 95–110)
CO2 SERPL-SCNC: 23 MMOL/L (ref 23–29)
CREAT SERPL-MCNC: 1 MG/DL (ref 0.5–1.4)
DIFFERENTIAL METHOD BLD: ABNORMAL
EOSINOPHIL # BLD AUTO: 0 K/UL (ref 0–0.5)
EOSINOPHIL NFR BLD: 0.4 % (ref 0–8)
ERYTHROCYTE [DISTWIDTH] IN BLOOD BY AUTOMATED COUNT: 13.1 % (ref 11.5–14.5)
EST. GFR  (NO RACE VARIABLE): >60 ML/MIN/1.73 M^2
GLUCOSE SERPL-MCNC: 139 MG/DL (ref 70–110)
HCT VFR BLD AUTO: 45.3 % (ref 40–54)
HGB BLD-MCNC: 15.3 G/DL (ref 14–18)
IMM GRANULOCYTES # BLD AUTO: 0.02 K/UL (ref 0–0.04)
IMM GRANULOCYTES NFR BLD AUTO: 0.2 % (ref 0–0.5)
LYMPHOCYTES # BLD AUTO: 1.4 K/UL (ref 1–4.8)
LYMPHOCYTES NFR BLD: 13.4 % (ref 18–48)
MCH RBC QN AUTO: 30.7 PG (ref 27–31)
MCHC RBC AUTO-ENTMCNC: 33.8 G/DL (ref 32–36)
MCV RBC AUTO: 91 FL (ref 82–98)
MONOCYTES # BLD AUTO: 1 K/UL (ref 0.3–1)
MONOCYTES NFR BLD: 9.2 % (ref 4–15)
NEUTROPHILS # BLD AUTO: 7.9 K/UL (ref 1.8–7.7)
NEUTROPHILS NFR BLD: 76.3 % (ref 38–73)
NRBC BLD-RTO: 0 /100 WBC
PLATELET # BLD AUTO: 229 K/UL (ref 150–450)
PMV BLD AUTO: 11.6 FL (ref 9.2–12.9)
POTASSIUM SERPL-SCNC: 4 MMOL/L (ref 3.5–5.1)
PROT SERPL-MCNC: 7.6 G/DL (ref 6–8.4)
RBC # BLD AUTO: 4.99 M/UL (ref 4.6–6.2)
SODIUM SERPL-SCNC: 139 MMOL/L (ref 136–145)
TROPONIN I SERPL DL<=0.01 NG/ML-MCNC: 0.08 NG/ML (ref 0–0.03)
TROPONIN I SERPL DL<=0.01 NG/ML-MCNC: 0.6 NG/ML (ref 0–0.03)
WBC # BLD AUTO: 10.38 K/UL (ref 3.9–12.7)

## 2025-02-08 PROCEDURE — 99285 EMERGENCY DEPT VISIT HI MDM: CPT | Mod: 25

## 2025-02-08 PROCEDURE — 85025 COMPLETE CBC W/AUTO DIFF WBC: CPT | Performed by: PHYSICIAN ASSISTANT

## 2025-02-08 PROCEDURE — 93005 ELECTROCARDIOGRAM TRACING: CPT

## 2025-02-08 PROCEDURE — 25000003 PHARM REV CODE 250: Performed by: PHYSICIAN ASSISTANT

## 2025-02-08 PROCEDURE — 80053 COMPREHEN METABOLIC PANEL: CPT | Performed by: EMERGENCY MEDICINE

## 2025-02-08 PROCEDURE — 84484 ASSAY OF TROPONIN QUANT: CPT | Mod: 91 | Performed by: PHYSICIAN ASSISTANT

## 2025-02-08 RX ORDER — LIDOCAINE HYDROCHLORIDE 20 MG/ML
15 SOLUTION OROPHARYNGEAL ONCE
Status: COMPLETED | OUTPATIENT
Start: 2025-02-08 | End: 2025-02-08

## 2025-02-08 RX ORDER — ASPIRIN 325 MG
325 TABLET ORAL
Status: COMPLETED | OUTPATIENT
Start: 2025-02-08 | End: 2025-02-08

## 2025-02-08 RX ORDER — ALUMINUM HYDROXIDE, MAGNESIUM HYDROXIDE, AND SIMETHICONE 1200; 120; 1200 MG/30ML; MG/30ML; MG/30ML
30 SUSPENSION ORAL ONCE
Status: COMPLETED | OUTPATIENT
Start: 2025-02-08 | End: 2025-02-08

## 2025-02-08 RX ADMIN — ALUMINUM HYDROXIDE, MAGNESIUM HYDROXIDE, AND SIMETHICONE 30 ML: 1200; 120; 1200 SUSPENSION ORAL at 09:02

## 2025-02-08 RX ADMIN — ASPIRIN 325 MG ORAL TABLET 325 MG: 325 PILL ORAL at 10:02

## 2025-02-08 RX ADMIN — LIDOCAINE HYDROCHLORIDE 15 ML: 20 SOLUTION ORAL at 09:02

## 2025-02-08 NOTE — Clinical Note
The catheter was inserted into the, was removed from the, was repositioned into the and was inserted over the wire into the ostium   left main  right coronary artery. An angiography was performed of the left coronary arteries and right coronary arteries. Multiple views were taken.

## 2025-02-08 NOTE — Clinical Note
A pulse oximeter was placed on the patient's right thumb. 

## 2025-02-08 NOTE — Clinical Note
The catheter was inserted into the, was removed from the and was inserted over the wire into the distal   circumflex. IVUS performed and catheter removed

## 2025-02-08 NOTE — Clinical Note
The radial band was applied to the right radial artery. 19 cc's of air were inserted into the closure device.

## 2025-02-08 NOTE — Clinical Note
The catheter was inserted into the, was removed from the and was inserted over the wire into the RPDA. IVUS performed and catheter removed

## 2025-02-08 NOTE — Clinical Note
Diagnosis: NSTEMI (non-ST elevated myocardial infarction) [890432]   Future Attending Provider: DANISHA KRAUSE [[id number]]   Reason for IP Medical Treatment  (Clinical interventions that can only be accomplished in the IP setting? ) :: NSTEMI

## 2025-02-09 PROBLEM — I21.4 NSTEMI (NON-ST ELEVATED MYOCARDIAL INFARCTION): Status: ACTIVE | Noted: 2025-02-09

## 2025-02-09 LAB
ABO + RH BLD: NORMAL
APTT PPP: 27.6 SEC (ref 21–32)
APTT PPP: 58.6 SEC (ref 21–32)
APTT PPP: 60.6 SEC (ref 21–32)
AV INDEX (PROSTH): 0.76
AV MEAN GRADIENT: 3 MMHG
AV PEAK GRADIENT: 8 MMHG
AV VALVE AREA BY VELOCITY RATIO: 2.7 CM²
AV VALVE AREA: 2.9 CM²
AV VELOCITY RATIO: 0.71
BASOPHILS # BLD AUTO: 0.04 K/UL (ref 0–0.2)
BASOPHILS NFR BLD: 0.4 % (ref 0–1.9)
BLD GP AB SCN CELLS X3 SERPL QL: NORMAL
BSA FOR ECHO PROCEDURE: 2.27 M2
CHOLEST SERPL-MCNC: 119 MG/DL (ref 120–199)
CHOLEST/HDLC SERPL: 2.8 {RATIO} (ref 2–5)
CV ECHO LV RWT: 0.33 CM
DIFFERENTIAL METHOD BLD: NORMAL
DOP CALC AO PEAK VEL: 1.4 M/S
DOP CALC AO VTI: 26.9 CM
DOP CALC LVOT AREA: 3.8 CM2
DOP CALC LVOT DIAMETER: 2.2 CM
DOP CALC LVOT PEAK VEL: 1 M/S
DOP CALC LVOT STROKE VOLUME: 77.9 CM3
DOP CALCLVOT PEAK VEL VTI: 20.5 CM
E WAVE DECELERATION TIME: 208 MSEC
E/A RATIO: 0.78
E/E' RATIO: 7 M/S
ECHO LV POSTERIOR WALL: 0.9 CM (ref 0.6–1.1)
EOSINOPHIL # BLD AUTO: 0 K/UL (ref 0–0.5)
EOSINOPHIL NFR BLD: 0.4 % (ref 0–8)
ERYTHROCYTE [DISTWIDTH] IN BLOOD BY AUTOMATED COUNT: 12.6 % (ref 11.5–14.5)
ESTIMATED AVG GLUCOSE: 111 MG/DL (ref 68–131)
FRACTIONAL SHORTENING: 33.3 % (ref 28–44)
HBA1C MFR BLD: 5.5 % (ref 4–5.6)
HCT VFR BLD AUTO: 44.2 % (ref 40–54)
HDLC SERPL-MCNC: 43 MG/DL (ref 40–75)
HDLC SERPL: 36.1 % (ref 20–50)
HGB BLD-MCNC: 15 G/DL (ref 14–18)
IMM GRANULOCYTES # BLD AUTO: 0.03 K/UL (ref 0–0.04)
IMM GRANULOCYTES NFR BLD AUTO: 0.3 % (ref 0–0.5)
INR PPP: 1 (ref 0.8–1.2)
INR PPP: 1 (ref 0.8–1.2)
INTERVENTRICULAR SEPTUM: 0.7 CM (ref 0.6–1.1)
IVC DIAMETER: 1.37 CM
LA MAJOR: 5 CM
LA MINOR: 4.9 CM
LA WIDTH: 4 CM
LDLC SERPL CALC-MCNC: 60.2 MG/DL (ref 63–159)
LEFT ATRIUM SIZE: 4.8 CM
LEFT ATRIUM VOLUME INDEX: 37 ML/M2
LEFT ATRIUM VOLUME: 81 CM3
LEFT INTERNAL DIMENSION IN SYSTOLE: 3.6 CM (ref 2.1–4)
LEFT VENTRICLE DIASTOLIC VOLUME INDEX: 62.69 ML/M2
LEFT VENTRICLE DIASTOLIC VOLUME: 138.55 ML
LEFT VENTRICLE MASS INDEX: 70.1 G/M2
LEFT VENTRICLE SYSTOLIC VOLUME INDEX: 25.2 ML/M2
LEFT VENTRICLE SYSTOLIC VOLUME: 55.59 ML
LEFT VENTRICULAR INTERNAL DIMENSION IN DIASTOLE: 5.4 CM (ref 3.5–6)
LEFT VENTRICULAR MASS: 155 G
LV LATERAL E/E' RATIO: 5.8 M/S
LV SEPTAL E/E' RATIO: 9.5 M/S
LVED V (TEICH): 138.55 ML
LVES V (TEICH): 55.59 ML
LVOT MG: 2.28 MMHG
LVOT MV: 0.7 CM/S
LYMPHOCYTES # BLD AUTO: 2.1 K/UL (ref 1–4.8)
LYMPHOCYTES NFR BLD: 22.1 % (ref 18–48)
MCH RBC QN AUTO: 30.9 PG (ref 27–31)
MCHC RBC AUTO-ENTMCNC: 33.9 G/DL (ref 32–36)
MCV RBC AUTO: 91 FL (ref 82–98)
MONOCYTES # BLD AUTO: 0.8 K/UL (ref 0.3–1)
MONOCYTES NFR BLD: 8.1 % (ref 4–15)
MV PEAK A VEL: 0.97 M/S
MV PEAK E VEL: 0.76 M/S
MV STENOSIS PRESSURE HALF TIME: 60.27 MS
MV VALVE AREA P 1/2 METHOD: 3.65 CM2
NEUTROPHILS # BLD AUTO: 6.5 K/UL (ref 1.8–7.7)
NEUTROPHILS NFR BLD: 68.7 % (ref 38–73)
NONHDLC SERPL-MCNC: 76 MG/DL
NRBC BLD-RTO: 0 /100 WBC
OHS CV RV/LV RATIO: 0.7 CM
OHS QRS DURATION: 84 MS
OHS QRS DURATION: 84 MS
OHS QTC CALCULATION: 389 MS
OHS QTC CALCULATION: 406 MS
PISA TR MAX VEL: 2 M/S
PLATELET # BLD AUTO: 208 K/UL (ref 150–450)
PMV BLD AUTO: 11.1 FL (ref 9.2–12.9)
PROTHROMBIN TIME: 10.9 SEC (ref 9–12.5)
PROTHROMBIN TIME: 10.9 SEC (ref 9–12.5)
PV PEAK GRADIENT: 5 MMHG
PV PEAK VELOCITY: 1.08 M/S
RA MAJOR: 4.75 CM
RA PRESSURE ESTIMATED: 3 MMHG
RA WIDTH: 3.5 CM
RBC # BLD AUTO: 4.86 M/UL (ref 4.6–6.2)
RIGHT VENTRICLE DIASTOLIC BASEL DIMENSION: 3.8 CM
RIGHT VENTRICULAR END-DIASTOLIC DIMENSION: 3.8 CM
RV TB RVSP: 5 MMHG
RV TISSUE DOPPLER FREE WALL SYSTOLIC VELOCITY 1 (APICAL 4 CHAMBER VIEW): 21.25 CM/S
SINUS: 3.58 CM
SPECIMEN OUTDATE: NORMAL
STJ: 3.17 CM
TDI LATERAL: 0.13 M/S
TDI SEPTAL: 0.08 M/S
TDI: 0.11 M/S
TR MAX PG: 16 MMHG
TRICUSPID ANNULAR PLANE SYSTOLIC EXCURSION: 2.6 CM
TRIGL SERPL-MCNC: 79 MG/DL (ref 30–150)
TROPONIN I SERPL DL<=0.01 NG/ML-MCNC: 1.31 NG/ML (ref 0–0.03)
TROPONIN I SERPL DL<=0.01 NG/ML-MCNC: 2.35 NG/ML (ref 0–0.03)
TV REST PULMONARY ARTERY PRESSURE: 19 MMHG
WBC # BLD AUTO: 9.44 K/UL (ref 3.9–12.7)
Z-SCORE OF LEFT VENTRICULAR DIMENSION IN END DIASTOLE: -3.46
Z-SCORE OF LEFT VENTRICULAR DIMENSION IN END SYSTOLE: -1.99

## 2025-02-09 PROCEDURE — 63600175 PHARM REV CODE 636 W HCPCS: Performed by: STUDENT IN AN ORGANIZED HEALTH CARE EDUCATION/TRAINING PROGRAM

## 2025-02-09 PROCEDURE — 85730 THROMBOPLASTIN TIME PARTIAL: CPT | Performed by: STUDENT IN AN ORGANIZED HEALTH CARE EDUCATION/TRAINING PROGRAM

## 2025-02-09 PROCEDURE — 25000003 PHARM REV CODE 250: Performed by: STUDENT IN AN ORGANIZED HEALTH CARE EDUCATION/TRAINING PROGRAM

## 2025-02-09 PROCEDURE — 93005 ELECTROCARDIOGRAM TRACING: CPT

## 2025-02-09 PROCEDURE — 86900 BLOOD TYPING SEROLOGIC ABO: CPT | Performed by: STUDENT IN AN ORGANIZED HEALTH CARE EDUCATION/TRAINING PROGRAM

## 2025-02-09 PROCEDURE — 85610 PROTHROMBIN TIME: CPT | Performed by: STUDENT IN AN ORGANIZED HEALTH CARE EDUCATION/TRAINING PROGRAM

## 2025-02-09 PROCEDURE — 83036 HEMOGLOBIN GLYCOSYLATED A1C: CPT | Performed by: STUDENT IN AN ORGANIZED HEALTH CARE EDUCATION/TRAINING PROGRAM

## 2025-02-09 PROCEDURE — 84484 ASSAY OF TROPONIN QUANT: CPT | Mod: 91 | Performed by: STUDENT IN AN ORGANIZED HEALTH CARE EDUCATION/TRAINING PROGRAM

## 2025-02-09 PROCEDURE — 80061 LIPID PANEL: CPT | Performed by: STUDENT IN AN ORGANIZED HEALTH CARE EDUCATION/TRAINING PROGRAM

## 2025-02-09 PROCEDURE — 85730 THROMBOPLASTIN TIME PARTIAL: CPT | Mod: 91 | Performed by: HOSPITALIST

## 2025-02-09 PROCEDURE — 11000001 HC ACUTE MED/SURG PRIVATE ROOM

## 2025-02-09 PROCEDURE — 84484 ASSAY OF TROPONIN QUANT: CPT | Performed by: EMERGENCY MEDICINE

## 2025-02-09 PROCEDURE — 85025 COMPLETE CBC W/AUTO DIFF WBC: CPT | Performed by: STUDENT IN AN ORGANIZED HEALTH CARE EDUCATION/TRAINING PROGRAM

## 2025-02-09 PROCEDURE — 25000242 PHARM REV CODE 250 ALT 637 W/ HCPCS: Performed by: EMERGENCY MEDICINE

## 2025-02-09 RX ORDER — METOPROLOL TARTRATE 25 MG/1
25 TABLET, FILM COATED ORAL 2 TIMES DAILY
Status: DISCONTINUED | OUTPATIENT
Start: 2025-02-09 | End: 2025-02-09

## 2025-02-09 RX ORDER — ATORVASTATIN CALCIUM 40 MG/1
80 TABLET, FILM COATED ORAL DAILY
Status: DISCONTINUED | OUTPATIENT
Start: 2025-02-09 | End: 2025-02-12 | Stop reason: HOSPADM

## 2025-02-09 RX ORDER — SODIUM CHLORIDE 9 MG/ML
INJECTION, SOLUTION INTRAVENOUS ONCE
Status: DISCONTINUED | OUTPATIENT
Start: 2025-02-10 | End: 2025-02-10

## 2025-02-09 RX ORDER — HYDROCHLOROTHIAZIDE 12.5 MG/1
12.5 TABLET ORAL DAILY
Status: DISCONTINUED | OUTPATIENT
Start: 2025-02-09 | End: 2025-02-12 | Stop reason: HOSPADM

## 2025-02-09 RX ORDER — NITROGLYCERIN 0.4 MG/1
0.4 TABLET SUBLINGUAL
Status: COMPLETED | OUTPATIENT
Start: 2025-02-09 | End: 2025-02-09

## 2025-02-09 RX ORDER — HEPARIN SODIUM,PORCINE/D5W 25000/250
0-40 INTRAVENOUS SOLUTION INTRAVENOUS CONTINUOUS
Status: DISCONTINUED | OUTPATIENT
Start: 2025-02-09 | End: 2025-02-10

## 2025-02-09 RX ORDER — HEPARIN SODIUM,PORCINE/D5W 25000/250
0-40 INTRAVENOUS SOLUTION INTRAVENOUS CONTINUOUS
Status: DISCONTINUED | OUTPATIENT
Start: 2025-02-09 | End: 2025-02-09

## 2025-02-09 RX ORDER — LOSARTAN POTASSIUM 25 MG/1
50 TABLET ORAL DAILY
Status: DISCONTINUED | OUTPATIENT
Start: 2025-02-09 | End: 2025-02-12 | Stop reason: HOSPADM

## 2025-02-09 RX ORDER — POLYETHYLENE GLYCOL 3350 17 G/17G
17 POWDER, FOR SOLUTION ORAL DAILY
Status: DISCONTINUED | OUTPATIENT
Start: 2025-02-09 | End: 2025-02-12 | Stop reason: HOSPADM

## 2025-02-09 RX ORDER — ASPIRIN 81 MG/1
81 TABLET ORAL DAILY
Status: DISCONTINUED | OUTPATIENT
Start: 2025-02-09 | End: 2025-02-12 | Stop reason: HOSPADM

## 2025-02-09 RX ORDER — ONDANSETRON 8 MG/1
8 TABLET, ORALLY DISINTEGRATING ORAL EVERY 8 HOURS PRN
Status: DISCONTINUED | OUTPATIENT
Start: 2025-02-09 | End: 2025-02-12 | Stop reason: HOSPADM

## 2025-02-09 RX ORDER — CLOPIDOGREL BISULFATE 300 MG/1
600 TABLET, FILM COATED ORAL ONCE
Status: COMPLETED | OUTPATIENT
Start: 2025-02-09 | End: 2025-02-09

## 2025-02-09 RX ORDER — SODIUM CHLORIDE, SODIUM LACTATE, POTASSIUM CHLORIDE, CALCIUM CHLORIDE 600; 310; 30; 20 MG/100ML; MG/100ML; MG/100ML; MG/100ML
INJECTION, SOLUTION INTRAVENOUS CONTINUOUS
Status: ACTIVE | OUTPATIENT
Start: 2025-02-09 | End: 2025-02-09

## 2025-02-09 RX ORDER — TALC
6 POWDER (GRAM) TOPICAL NIGHTLY PRN
Status: DISCONTINUED | OUTPATIENT
Start: 2025-02-09 | End: 2025-02-12 | Stop reason: HOSPADM

## 2025-02-09 RX ORDER — TAMSULOSIN HYDROCHLORIDE 0.4 MG/1
1 CAPSULE ORAL NIGHTLY
Status: DISCONTINUED | OUTPATIENT
Start: 2025-02-09 | End: 2025-02-12 | Stop reason: HOSPADM

## 2025-02-09 RX ORDER — NITROGLYCERIN 0.4 MG/1
0.4 TABLET SUBLINGUAL EVERY 5 MIN PRN
Status: DISCONTINUED | OUTPATIENT
Start: 2025-02-09 | End: 2025-02-11

## 2025-02-09 RX ORDER — PANTOPRAZOLE SODIUM 40 MG/1
40 TABLET, DELAYED RELEASE ORAL DAILY
Status: DISCONTINUED | OUTPATIENT
Start: 2025-02-09 | End: 2025-02-12 | Stop reason: HOSPADM

## 2025-02-09 RX ORDER — SODIUM CHLORIDE 0.9 % (FLUSH) 0.9 %
10 SYRINGE (ML) INJECTION
Status: DISCONTINUED | OUTPATIENT
Start: 2025-02-09 | End: 2025-02-12 | Stop reason: HOSPADM

## 2025-02-09 RX ORDER — ACETAMINOPHEN 325 MG/1
650 TABLET ORAL EVERY 8 HOURS PRN
Status: DISCONTINUED | OUTPATIENT
Start: 2025-02-09 | End: 2025-02-12 | Stop reason: HOSPADM

## 2025-02-09 RX ADMIN — HYDROCHLOROTHIAZIDE 12.5 MG: 12.5 TABLET ORAL at 09:02

## 2025-02-09 RX ADMIN — HEPARIN SODIUM 12 UNITS/KG/HR: 10000 INJECTION, SOLUTION INTRAVENOUS at 08:02

## 2025-02-09 RX ADMIN — HEPARIN SODIUM 12 UNITS/KG/HR: 10000 INJECTION, SOLUTION INTRAVENOUS at 03:02

## 2025-02-09 RX ADMIN — SODIUM CHLORIDE, POTASSIUM CHLORIDE, SODIUM LACTATE AND CALCIUM CHLORIDE: 600; 310; 30; 20 INJECTION, SOLUTION INTRAVENOUS at 03:02

## 2025-02-09 RX ADMIN — LOSARTAN POTASSIUM 50 MG: 25 TABLET, FILM COATED ORAL at 09:02

## 2025-02-09 RX ADMIN — TAMSULOSIN HYDROCHLORIDE 0.4 MG: 0.4 CAPSULE ORAL at 08:02

## 2025-02-09 RX ADMIN — PANTOPRAZOLE SODIUM 40 MG: 40 TABLET, DELAYED RELEASE ORAL at 09:02

## 2025-02-09 RX ADMIN — TAMSULOSIN HYDROCHLORIDE 0.4 MG: 0.4 CAPSULE ORAL at 03:02

## 2025-02-09 RX ADMIN — ASPIRIN 81 MG: 81 TABLET, COATED ORAL at 09:02

## 2025-02-09 RX ADMIN — NITROGLYCERIN 0.4 MG: 0.4 TABLET, ORALLY DISINTEGRATING SUBLINGUAL at 01:02

## 2025-02-09 RX ADMIN — CLOPIDOGREL BISULFATE 600 MG: 300 TABLET, FILM COATED ORAL at 03:02

## 2025-02-09 RX ADMIN — ATORVASTATIN CALCIUM 80 MG: 40 TABLET, FILM COATED ORAL at 09:02

## 2025-02-09 RX ADMIN — METOPROLOL TARTRATE 25 MG: 25 TABLET, FILM COATED ORAL at 09:02

## 2025-02-09 NOTE — ASSESSMENT & PLAN NOTE
Body mass index is 33 kg/m².   Continue lifestyle modification  Morbid obesity complicates all aspects of disease management from diagnostic modalities to treatment.   Weight loss encouraged and health benefits explained to patient.

## 2025-02-09 NOTE — ASSESSMENT & PLAN NOTE
History of atrial fibrillation status post radiofrequency ablation  Currently in regular, rate and rhythm.

## 2025-02-09 NOTE — HPI
Mr. Aviles is a 69-year-old male with past medical history of hypertension, hyperlipidemia, paroxysmal atrial flutter status post ablation GERD and prior history of small-bowel obstruction status post resection who was admitted to the hospital for substernal/epigastric chest pain which started yesterday evening.    Cardiology is consulted for further evaluation.    He follows up with Dr. Lipscomb as outpatient.  He said that occasionally, he gets belching/dyspepsia more often after small bowel surgery which get better with Tums.  Last evening, while sitting, he started to have substernal/epigastric discomfort.  It seemed different to his prior belching/dyspepsia symptoms.  It was nonradiating and there was no association with exertion.  Due to persistent symptoms, he came to ED for further evaluation.  In the ED, symptoms got slightly better with GI cocktail but still had substernal discomfort.    EKG did not show any acute ST-T wave change but troponin slowly trending up.  He was started on IV heparin drip.    He does not smoke cigarettes.  Does not drink alcohol.    No premature CAD in family.

## 2025-02-09 NOTE — ASSESSMENT & PLAN NOTE
History of gastroesophageal reflux disease  Thought symptoms were due to GERD  Continue home dose of pantoprazole for now  Will consider changing to H2 blockers as tolerated.

## 2025-02-09 NOTE — HPI
"69-year-old  male with medical history significant for hypertension, hyperlipidemia, atrial flutter status post ablation, atherosclerotic disease, previous small-bowel obstruction status post resection complicated by abdominal hernia, class 1 obesity and gastroesophageal reflux disease who presented to the emergency department on account of epigastric pain of 1 hour duration prior to presentation, pain is epigastric, described as discomfort, 8/10 in severity, nonradiating, with associated diaphoresis but denied nausea, no vomiting, pain was mildly improved with GI cocktail given in the emergency room, he denied preceding cough, no upper respiratory tract symptoms of malaise, postnasal drip or rhinorrhea.  He voiced similar pain in the past but relieved with Tums, this episode has not been relieved with usual antacid.  He denied lightheadedness, no dyspnea with exertion, headache, blurry vision or fall.  He denied change in bowel habits, no constipation or diarrhea.  He has not had urinary symptoms of dysuria, frequency, urgency, straining at micturition or hematuria.  He denies cigarette smoking, no other substance use, quit alcohol use over 20 years ago.    Initial troponin at presentation was 0.082 which increased to 0.602 and peaked at 1.315 during this documentation, comprehensive metabolic panel essentially within normal limits except for glucose of 139, CBC was also with no significant abnormalities, lipid panel with cholesterol of 126, HDL 49, LDL 55, TSH within normal limits at 1.41.  Abdominal x-ray showed "nonobstructive bowel gas pattern".  Chest x-ray PA/lateral with "chronic elevation of the right hemidiaphragm, otherwise no acute cardiopulmonary process identified".   "

## 2025-02-09 NOTE — CARE UPDATE
Patient seen and examined.  See H/P, treatment and plan per Dr. Zamudio.  68 y/o male admitted with NSTEMI.  Troponin now up to 2.3.  Currently chest pain free.  Cardiology consulted.  Started on heparin drip, ASA, Statin.  Plan for [facility] tomorrow.

## 2025-02-09 NOTE — PLAN OF CARE
[facility]  Initial Discharge Assessment       Primary Care Provider: Basil Grover MD  Case Management Assessment     PCP: See above  [facility]: [facility] [address] ([address])    Patient Arrived From: home with significant other  Existing Help at Home: significant other    Barriers to Discharge: none    Discharge Plan:    A. home   B. home with family      Will need f/u with PCP and Cardiology at discharge.  Son, Yovany, will transport home.            Admission Diagnosis: NSTEMI (non-ST elevated myocardial infarction) [I21.4]    Admission Date: 2/8/2025  Expected Discharge Date:     Transition of Care Barriers: None    Payor: BCBS [place] MEDICARE / Plan: [employer] [place] / Product Type: Medicare Advantage /     Extended Emergency Contact Information  Primary Emergency Contact: erynportiaaleisha  Mobile Phone: [phone]  Relation: Significant other  Secondary Emergency Contact: Yovany Webb  Mobile Phone: [phone]  Relation: Son  Preferred language: English   needed? No    Discharge Plan A: Home  Discharge Plan B: Home with family      [id number] [place] - [address]  [address]  [place] [address]  Phone: [phone] Fax: [phone]      Initial Assessment (most recent)       Adult Discharge Assessment - 02/09/25 1709          Discharge Assessment    Assessment Type Discharge Planning Assessment     Confirmed/corrected address, phone number and insurance Yes     Confirmed Demographics Correct on Facesheet     Source of Information patient     When was your last doctors appointment? --   1 week ago with Dr. Grover    Reason For Admission NSTEMI     People in Home significant other   Aleisha Aldridge  [phone]    Facility Arrived From: home     Do you expect to return to your current living situation? Yes     Do you have help at home or someone to help you manage your care at home? Yes     Who are your caregiver(s) and their phone  number(s)? Significant other: Aleisha Serna  [phone]     Prior to hospitilization cognitive status: Alert/Oriented     Current cognitive status: Alert/Oriented     Walking or Climbing Stairs Difficulty no     Dressing/Bathing Difficulty no     Equipment Currently Used at Home none     Readmission within 30 days? No     Patient currently being followed by outpatient case management? No     Do you currently have service(s) that help you manage your care at home? No     Do you take prescription medications? Yes     Do you have prescription coverage? Yes     Coverage Payor: BCBS [place] MEDICARE - [facility] -     Do you have any problems affording any of your prescribed medications? No     Is the patient taking medications as prescribed? yes     Who is going to help you get home at discharge? Son:  Yovany Webb;  [phone]     How do you get to doctors appointments? car, drives self     Are you on dialysis? No     Do you take coumadin? No     Discharge Plan A Home     Discharge Plan B Home with family     DME Needed Upon Discharge  none     Discharge Plan discussed with: Patient     Transition of Care Barriers None        OTHER    Name(s) of People in Home Aleisha Serna;

## 2025-02-09 NOTE — ED TRIAGE NOTES
Jules Aviles, a 69 y.o. male presents to the ED w/ complaint of pressure in his epigastric region. States this has happened frequently in the past and it has always been relieved with GI cocktail. Denies any chest pain, SOB, or n/v. Reports his symptoms started around 7pm shortly after eating fried eggplant around 5pm. Pt is AAOX4 and noted to be belching frequently.    Triage note:  Chief Complaint   Patient presents with    Abdominal Pain     Pt states repeated burping and epigastric pain for one hour. Pt took tums with no relief. Pain is 8/10. Pt with no HTN or cardiac issues     Review of patient's allergies indicates:  No Known Allergies  Past Medical History:   Diagnosis Date    Essential hypertension 12/07/2016    GERD (gastroesophageal reflux disease)

## 2025-02-09 NOTE — ASSESSMENT & PLAN NOTE
History of atrial flutter status post ablation, currently in sinus rhythm  He has not been on long-term anticoagulation.

## 2025-02-09 NOTE — CONSULTS
Patient is from home with significant other (Aleisha).  He was independent and didn't use any DME and had no OP services.  When medically sable for discharge, patient will need to f/u with his PCP and Cardiology.

## 2025-02-09 NOTE — ASSESSMENT & PLAN NOTE
Presented atypical chest pain  Pain said to be 8/10 in severity, non-radiating  History of atrial flutter status post ablation, hypertension and hyperlipidemia  Initial troponin was 0.082 at presentation increased to 0.602, most recently 1.315.  EKG did not show any acute ST-T wave elevation  Pain said to have resolved with nitroglycerin  Heart score 6  Cardiology consulted, for possible left heart cath/ risk stratification  Aspirin and Plavix loaded, continued atorvastatin  Will continue heparin drip for non ST elevation myocardial infarction

## 2025-02-09 NOTE — ASSESSMENT & PLAN NOTE
Patient's blood pressure range in the last 24 hours was: BP  Min: 131/74  Max: 186/86.The patient's inpatient anti-hypertensive regimen is listed below:  Current Antihypertensives  hydroCHLOROthiazide tablet 12.5 mg, Daily, Oral  losartan tablet 50 mg, Daily, Oral  metoprolol tartrate (LOPRESSOR) tablet 25 mg, 2 times daily, Oral  nitroGLYCERIN SL tablet 0.4 mg, Every 5 min PRN, Sublingual    Plan  - BP is uncontrolled, will adjust as follows:  Continue current home antihypertensive  - nitroglycerin we will likely improve blood pressure as well  - low-salt diet

## 2025-02-09 NOTE — NURSING
Report received from Adrianna Pritchett Rn. Care assumed at this time. Patient supine in bed watching tv. No distress noted. Patient has new orders for heparin infusion. Patient states that he is aware and that provider has educated him on medication.

## 2025-02-09 NOTE — ASSESSMENT & PLAN NOTE
Currently normal sinus rhythm  Heart rate stable.  Has not started on Lopressor 25 mg b.i.d.  Not on any rate-controlling agent/anticoagulation at home  Was on Eliquis briefly after a flutter ablation in 2019  [id number] Score: 2  Continue with heparin drip while inpatient

## 2025-02-09 NOTE — SUBJECTIVE & OBJECTIVE
Past Medical History:   Diagnosis Date    Essential hypertension 12/07/2016    GERD (gastroesophageal reflux disease)        Past Surgical History:   Procedure Laterality Date    APPENDECTOMY      as a child    EXCISION, SMALL INTESTINE  8/9/2023    Procedure: EXCISION, SMALL INTESTINE;  Surgeon: Jn Yusuf MD;  Location: [facility] OR;  Service: General;;    GALLBLADDER SURGERY  2004    HERNIA REPAIR  2004 & 2005    LAPAROTOMY, EXPLORATORY N/A 8/9/2023    Procedure: LAPAROTOMY, EXPLORATORY;  Surgeon: Jn Yusuf MD;  Location: [facility] OR;  Service: General;  Laterality: N/A;    TONSILLECTOMY, ADENOIDECTOMY      as a child       Review of patient's allergies indicates:  No Known Allergies    Current Facility-Administered Medications on File Prior to Encounter   Medication    0.9%  NaCl infusion     Current Outpatient Medications on File Prior to Encounter   Medication Sig    aspirin (ECOTRIN) 81 MG EC tablet Take 1 tablet (81 mg total) by mouth once daily.    cyanocobalamin (VITAMIN B-12) 100 MCG tablet Take 100 mcg by mouth once daily.    hydroCHLOROthiazide (MICROZIDE) 12.5 mg capsule Take 1 capsule (12.5 mg total) by mouth once daily.    Lactobacillus acidophilus (PROBIOTIC ORAL) Take 1 capsule by mouth once daily.    losartan (COZAAR) 50 MG tablet Take 1 tablet (50 mg total) by mouth once daily.    pantoprazole (PROTONIX) 40 MG tablet Take 40 mg by mouth once daily.    rosuvastatin (CRESTOR) 20 MG tablet Take 1 tablet (20 mg total) by mouth once daily.    tamsulosin (FLOMAX) 0.4 mg Cap Take 1 capsule by mouth every evening.    vitamin D (VITAMIN D3) 1000 units Tab Take 8,000 Units by mouth once daily.     Family History       Problem Relation (Age of Onset)    Aneurysm Mother    Heart disease Father          Tobacco Use    Smoking status: Never    Smokeless tobacco: Never   Substance and Sexual Activity    Alcohol use: No    Drug use: No    Sexual activity: Yes     Review of Systems   Constitutional:   Negative for activity change, appetite change, chills, diaphoresis and fatigue.   HENT:  Negative for congestion, sore throat and tinnitus.    Eyes:  Negative for pain, discharge and itching.   Respiratory:  Negative for cough, chest tightness, shortness of breath and wheezing.    Cardiovascular:  Positive for chest pain. Negative for palpitations and leg swelling.   Gastrointestinal:  Positive for abdominal pain. Negative for abdominal distention, blood in stool, nausea and vomiting.   Endocrine: Negative for cold intolerance, heat intolerance and polydipsia.   Genitourinary:  Negative for difficulty urinating, dysuria, flank pain, frequency and hematuria.   Musculoskeletal:  Negative for arthralgias and back pain.   Skin:  Negative for color change and pallor.   Neurological:  Negative for dizziness, seizures, facial asymmetry, light-headedness, numbness and headaches.   Psychiatric/Behavioral:  Negative for agitation, confusion and hallucinations.      Objective:     Vital Signs (Most Recent):  Temp: 98.1 °F (36.7 °C) (02/08/25 2319)  Pulse: 60 (02/09/25 0209)  Resp: 16 (02/09/25 0209)  BP: (!) 141/71 (02/09/25 0127)  SpO2: 98 % (02/09/25 0209) Vital Signs (24h Range):  Temp:  [97.8 °F (36.6 °C)-98.1 °F (36.7 °C)] 98.1 °F (36.7 °C)  Pulse:  [60-66] 60  Resp:  [16-19] 16  SpO2:  [95 %-100 %] 98 %  BP: (131-186)/(71-90) 141/71     Weight: 104.3 kg (230 lb)  Body mass index is 33 kg/m².     Physical Exam  Vitals reviewed.   Constitutional:       General: He is not in acute distress.     Appearance: Normal appearance. He is obese. He is not ill-appearing.   HENT:      Head: Normocephalic and atraumatic.      Nose: Nose normal. No congestion or rhinorrhea.      Mouth/Throat:      Mouth: Mucous membranes are moist.   Eyes:      General: No scleral icterus.     Extraocular Movements: Extraocular movements intact.      Conjunctiva/sclera: Conjunctivae normal.      Pupils: Pupils are equal, round, and reactive to light.    Cardiovascular:      Rate and Rhythm: Normal rate and regular rhythm.      Pulses: Normal pulses.      Heart sounds: Normal heart sounds. No murmur heard.  Pulmonary:      Effort: Pulmonary effort is normal. No respiratory distress.      Breath sounds: Normal breath sounds. No wheezing or rales.   Chest:      Chest wall: No tenderness.   Abdominal:      General: Abdomen is flat. Bowel sounds are normal. There is no distension.      Palpations: Abdomen is soft.      Tenderness: There is no abdominal tenderness. There is no right CVA tenderness, left CVA tenderness, guarding or rebound.   Musculoskeletal:         General: No swelling or tenderness.      Right lower leg: No edema.      Left lower leg: No edema.   Skin:     General: Skin is warm and dry.      Coloration: Skin is not jaundiced.      Findings: No lesion.   Neurological:      General: No focal deficit present.      Mental Status: He is alert and oriented to person, place, and time. Mental status is at baseline.      Cranial Nerves: No cranial nerve deficit.      Sensory: No sensory deficit.   Psychiatric:         Mood and Affect: Mood normal.         Behavior: Behavior normal.         Thought Content: Thought content normal.         Judgment: Judgment normal.              CRANIAL NERVES     CN III, IV, VI   Pupils are equal, round, and reactive to light.       Significant Labs: All pertinent labs within the past 24 hours have been reviewed.  Bilirubin:   Recent Labs   Lab 01/31/25  1006 02/08/25  2150   BILITOT 0.8 0.5     CBC:   Recent Labs   Lab 02/08/25 2107   WBC 10.38   HGB 15.3   HCT 45.3        CMP:   Recent Labs   Lab 02/08/25  2150      K 4.0      CO2 23   *   BUN 21   CREATININE 1.0   CALCIUM 9.9   PROT 7.6   ALBUMIN 4.3   BILITOT 0.5   ALKPHOS 70   AST 29   ALT 35   ANIONGAP 9     Troponin:   Recent Labs   Lab 02/08/25  2107 02/08/25  2318 02/09/25  0117   TROPONINI 0.082* 0.602* 1.315*     TSH:   Recent Labs   Lab  01/31/25  1006   TSH 1.41       Significant Imaging: I have reviewed all pertinent imaging results/findings within the past 24 hours.  Imaging Results              X-Ray Abdomen Flat And Erect (Final result)  Result time 02/08/25 22:36:35      Final result by Marcos Felix MD (02/08/25 22:36:35)                   Impression:      Nonobstructive bowel gas pattern.      Electronically signed by: Marcos Felix MD  Date:    02/08/2025  Time:    22:36               Narrative:    EXAMINATION:  XR ABDOMEN FLAT AND ERECT    CLINICAL HISTORY:  Abdominal distension (gaseous)    TECHNIQUE:  Flat and erect AP views of the abdomen were performed.    COMPARISON:  August 2023.    FINDINGS:  Nonspecific bowel gas pattern.  No evidence to suggest obstruction.  No free air identified.  Moderate volume retained stool is seen in the colon.  Partially visualized lung bases are clear.                                       X-Ray Chest PA And Lateral (Final result)  Result time 02/08/25 22:35:43      Final result by Marcos Felix MD (02/08/25 22:35:43)                   Impression:      Chronic elevation of the right hemidiaphragm, otherwise no acute cardiopulmonary process identified.      Electronically signed by: Marcos Felix MD  Date:    02/08/2025  Time:    22:35               Narrative:    EXAMINATION:  XR CHEST PA AND LATERAL    CLINICAL HISTORY:  Other specified abnormal findings of blood chemistry    TECHNIQUE:  PA and lateral views of the chest were performed.    COMPARISON:  December 2023.    FINDINGS:  Cardiac silhouette is normal in size.  Left lung is well expanded.  There is chronic elevation of the right hemidiaphragm resulting in mild asymmetric volume loss on the right.  No evidence of focal consolidative process, pneumothorax, or significant pleural effusion.  No acute osseous abnormality identified.

## 2025-02-09 NOTE — SUBJECTIVE & OBJECTIVE
Past Medical History:   Diagnosis Date    Essential hypertension 12/07/2016    GERD (gastroesophageal reflux disease)        Past Surgical History:   Procedure Laterality Date    APPENDECTOMY      as a child    EXCISION, SMALL INTESTINE  8/9/2023    Procedure: EXCISION, SMALL INTESTINE;  Surgeon: Jn Yusuf MD;  Location: [facility] OR;  Service: General;;    GALLBLADDER SURGERY  2004    HERNIA REPAIR  2004 & 2005    LAPAROTOMY, EXPLORATORY N/A 8/9/2023    Procedure: LAPAROTOMY, EXPLORATORY;  Surgeon: Jn Yusuf MD;  Location: [facility] OR;  Service: General;  Laterality: N/A;    TONSILLECTOMY, ADENOIDECTOMY      as a child       Review of patient's allergies indicates:  No Known Allergies    Current Facility-Administered Medications on File Prior to Encounter   Medication    0.9%  NaCl infusion     Current Outpatient Medications on File Prior to Encounter   Medication Sig    aspirin (ECOTRIN) 81 MG EC tablet Take 1 tablet (81 mg total) by mouth once daily.    cyanocobalamin (VITAMIN B-12) 100 MCG tablet Take 100 mcg by mouth once daily.    hydroCHLOROthiazide (MICROZIDE) 12.5 mg capsule Take 1 capsule (12.5 mg total) by mouth once daily.    Lactobacillus acidophilus (PROBIOTIC ORAL) Take 1 capsule by mouth once daily.    losartan (COZAAR) 50 MG tablet Take 1 tablet (50 mg total) by mouth once daily.    pantoprazole (PROTONIX) 40 MG tablet Take 40 mg by mouth once daily.    rosuvastatin (CRESTOR) 20 MG tablet Take 1 tablet (20 mg total) by mouth once daily.    tamsulosin (FLOMAX) 0.4 mg Cap Take 1 capsule by mouth every evening.    vitamin D (VITAMIN D3) 1000 units Tab Take 8,000 Units by mouth once daily.     Family History       Problem Relation (Age of Onset)    Aneurysm Mother    Heart disease Father          Tobacco Use    Smoking status: Never    Smokeless tobacco: Never   Substance and Sexual Activity    Alcohol use: No    Drug use: No    Sexual activity: Yes     Review of Systems   Cardiovascular:   Positive for chest pain. Negative for claudication, dyspnea on exertion, irregular heartbeat, leg swelling, near-syncope, orthopnea, palpitations, paroxysmal nocturnal dyspnea and syncope.   Respiratory:  Negative for cough, shortness of breath, snoring and sputum production.    Gastrointestinal:  Positive for heartburn. Negative for abdominal pain, dysphagia, nausea and vomiting.   Neurological:  Negative for dizziness, headaches, loss of balance and weakness.     Objective:     Vital Signs (Most Recent):  Temp: 98.2 °F (36.8 °C) (02/09/25 0750)  Pulse: 76 (02/09/25 1000)  Resp: 19 (02/09/25 1000)  BP: (!) 148/73 (02/09/25 0930)  SpO2: (!) 94 % (02/09/25 1000) Vital Signs (24h Range):  Temp:  [97.8 °F (36.6 °C)-98.2 °F (36.8 °C)] 98.2 °F (36.8 °C)  Pulse:  [56-88] 76  Resp:  [12-26] 19  SpO2:  [94 %-100 %] 94 %  BP: (131-186)/(71-90) 148/73     Weight: 104.3 kg (229 lb 15 oz)  Body mass index is 32.99 kg/m².    SpO2: (!) 94 %         Intake/Output Summary (Last 24 hours) at 2/9/2025 1015  Last data filed at 2/9/2025 0638  Gross per 24 hour   Intake --   Output 200 ml   Net -200 ml       Lines/Drains/Airways       Peripheral Intravenous Line  Duration                  Peripheral IV - Single Lumen 02/08/25 2130 20 G 1 in Left;Posterior Hand <1 day         Peripheral IV - Single Lumen 02/09/25 0201 20 G Right Antecubital <1 day                     Physical Exam  HENT:      Head: Normocephalic and atraumatic.      Mouth/Throat:      Mouth: Mucous membranes are moist.   Eyes:      Extraocular Movements: Extraocular movements intact.      Pupils: Pupils are equal, round, and reactive to light.   Cardiovascular:      Rate and Rhythm: Normal rate and regular rhythm.      Pulses: Normal pulses.      Heart sounds: Normal heart sounds.   Pulmonary:      Effort: Pulmonary effort is normal.      Breath sounds: Normal breath sounds.   Abdominal:      General: Bowel sounds are normal.      Palpations: Abdomen is soft.    Musculoskeletal:      Left lower leg: No edema.   Skin:     General: Skin is warm.   Neurological:      General: No focal deficit present.      Mental Status: He is alert.   Psychiatric:         Mood and Affect: Mood normal.         Behavior: Behavior normal.          Current Medications:   [START ON 2/10/2025] 0.9% NaCl   Intravenous Once    aspirin  81 mg Oral Daily    atorvastatin  80 mg Oral Daily    hydroCHLOROthiazide  12.5 mg Oral Daily    losartan  50 mg Oral Daily    metoprolol tartrate  25 mg Oral BID    pantoprazole  40 mg Oral Daily    polyethylene glycol  17 g Oral Daily    tamsulosin  1 capsule Oral QHS      heparin (porcine) in D5W  0-40 Units/kg/hr Intravenous Continuous 12.5 mL/hr at 02/09/25 0337 12 Units/kg/hr at 02/09/25 0337    lactated ringers   Intravenous Continuous 75 mL/hr at 02/09/25 0331 New Bag at 02/09/25 0331       Current Facility-Administered Medications:     acetaminophen, 650 mg, Oral, Q8H PRN    heparin (PORCINE), 38.4 Units/kg, Intravenous, PRN    heparin (PORCINE), 30 Units/kg, Intravenous, PRN    melatonin, 6 mg, Oral, Nightly PRN    nitroGLYCERIN, 0.4 mg, Sublingual, Q5 Min PRN    ondansetron, 8 mg, Oral, Q8H PRN    sodium chloride 0.9%, 10 mL, Intravenous, PRN    Laboratory (all labs reviewed):  CBC:  Recent Labs   Lab 08/15/23  0420 08/16/23  0347 12/23/23  0846 02/08/25  2107 02/09/25  0255   WBC 10.22 8.04 8.84 10.38 9.44   Hemoglobin 11.1 L 11.2 L 16.5 15.3 15.0   Hematocrit 34.0 L 33.7 L 48.6 45.3 44.2   Platelets 259 307 162 229 208       CHEMISTRIES:  Recent Labs   Lab 08/12/23  0447 08/13/23  0353 08/14/23  0332 08/15/23  0420 08/16/23  0347 12/23/23  0846 01/26/24  0927 05/06/24  0940 01/31/25  1006 02/08/25  2150   Glucose 103 186 H 219 H 118 H 118 H 176 H 112 H  --   --  139 H   Sodium 139 137 137 138 142 139 139 139 140 139   Potassium 3.8 3.0 L 2.8 L 3.2 L 3.5 3.6 4.3 3.8 4.1 4.0   BUN 14 11 8 8 7 L 12  --  23.0 17.2 21   Blood Urea Nitrogen  --   --   --   --    --   --  17  --   --   --    Creatinine 0.8 0.8 0.8 0.8 0.8 0.9 0.92 0.97 0.88 1.0   eGFR >60 >60 >60 >60 >60 >60 92 86 L 94 >60   Calcium 8.9 8.2 L 8.2 L 8.1 L 8.5 L 9.7 9.6 9.8 9.7 9.9   Magnesium 1.7 1.6 1.5 L 1.9 1.8  --   --   --   --   --        CARDIAC BIOMARKERS:  Recent Labs   Lab 02/08/25  2107 02/08/25  2318 02/09/25  0117 02/09/25  0254   Troponin I 0.082 H 0.602 H 1.315 H 2.349 H       COAGS:  Recent Labs   Lab 08/23/22  1158 12/23/23  0846 02/09/25  0255   INR 1.0 1.1 1.0  1.0       LIPIDS/LFTS:  Recent Labs   Lab 08/16/23  0347 12/23/23  0846 01/26/24  0927 01/31/25  1006 02/08/25  2150 02/09/25  0254   Cholesterol  --   --   --  126  --  119 L   Triglycerides  --   --   --  109  --  79   HDL  --   --   --  49  --  43   LDL Cholesterol  --   --   --   --   --  60.2 L   LDL Calculated  --   --   --  55  --   --    Non-HDL Cholesterol  --   --   --  77  --  76   AST 69 H 23 16 23 29  --    ALT 83 H 25 19 29 35  --        BNP:        TSH:  Recent Labs   Lab 01/31/25  1006   TSH 1.41       Free T4:        Diagnostic Results:  ECG (personally reviewed and interpreted tracing(s)):  EKG done on 8 February 2025 showed sinus rhythm, PAC with subtle ST  segment depression in V2,V3,V4 and V5    Chest X-Ray (personally reviewed and interpreted image(s)):  No acute cardiopulmonary process    Echo: 1/2019  Moderate left atrial enlargement.  Mild concentric left ventricular hypertrophy.  Normal left ventricular systolic function. The estimated ejection fraction is 60%  Grade I (mild) left ventricular diastolic dysfunction consistent with impaired relaxation.  Normal right ventricular systolic function.  Mild tricuspid regurgitation.  Mild right ventricular enlargement.  Normal left atrial pressure.  Normal central venous pressure (3 mm Hg).  The estimated PA systolic pressure is 28 mm Hg  Rhythm is sinus bradycardia at 54 beats per minute    Stress Test: NA    Cath: NA    Holter 1/2023  The baseline rhythm was  sinus with an average rate of 56-57 bpm.    There were no patient symptomatic activations.    There was 1 patient activation without an indication entered that corresponded to sinus rhythm with rate of 77 bpm.    There were 3 auto-activations showing sinus rhythm with rate of 41-55 bpm. PACs were observed on 2 of these activations.

## 2025-02-09 NOTE — CONSULTS
[facility] Emergency Dept  Cardiology  Consult Note    Patient Name: Jules Aviles  MRN: [id number]  Admission Date: 2/8/2025  Hospital Length of Stay: 0 days  Code Status: Full Code   Attending Provider: Sotero Deutsch MD   Consulting Provider: Yury Gamboa MD  Primary Care Physician: Basil Grover MD  Principal Problem:NSTEMI (non-ST elevated myocardial infarction)    Patient information was obtained from patient and ER records.     Inpatient consult to Cardiology  Consult performed by: Yury Gamboa MD  Consult ordered by: Jered Zamudio MD        Subjective:     Chief Complaint:  Chest pain    HPI:   Mr. Aviles is a 69-year-old male with past medical history of hypertension, hyperlipidemia, paroxysmal atrial flutter status post ablation GERD and prior history of small-bowel obstruction status post resection who was admitted to the hospital for substernal/epigastric chest pain which started yesterday evening.    Cardiology is consulted for further evaluation.    He follows up with Dr. Lipscomb as outpatient.  He said that occasionally, he gets belching/dyspepsia more often after small bowel surgery which get better with Tums.  Last evening, while sitting, he started to have substernal/epigastric discomfort.  It seemed different to his prior belching/dyspepsia symptoms.  It was nonradiating and there was no association with exertion.  Due to persistent symptoms, he came to ED for further evaluation.  In the ED, symptoms got slightly better with GI cocktail but still had substernal discomfort.    EKG did not show any acute ST-T wave change but troponin slowly trending up.  He was started on IV heparin drip.    He does not smoke cigarettes.  Does not drink alcohol.    No premature CAD in family.    Past Medical History:   Diagnosis Date    Essential hypertension 12/07/2016    GERD (gastroesophageal reflux disease)        Past Surgical History:   Procedure Laterality Date    APPENDECTOMY      as a  child    EXCISION, SMALL INTESTINE  8/9/2023    Procedure: EXCISION, SMALL INTESTINE;  Surgeon: Jn Yusuf MD;  Location: [facility] OR;  Service: General;;    GALLBLADDER SURGERY  2004    HERNIA REPAIR  2004 & 2005    LAPAROTOMY, EXPLORATORY N/A 8/9/2023    Procedure: LAPAROTOMY, EXPLORATORY;  Surgeon: Jn Yusuf MD;  Location: [facility] OR;  Service: General;  Laterality: N/A;    TONSILLECTOMY, ADENOIDECTOMY      as a child       Review of patient's allergies indicates:  No Known Allergies    Current Facility-Administered Medications on File Prior to Encounter   Medication    0.9%  NaCl infusion     Current Outpatient Medications on File Prior to Encounter   Medication Sig    aspirin (ECOTRIN) 81 MG EC tablet Take 1 tablet (81 mg total) by mouth once daily.    cyanocobalamin (VITAMIN B-12) 100 MCG tablet Take 100 mcg by mouth once daily.    hydroCHLOROthiazide (MICROZIDE) 12.5 mg capsule Take 1 capsule (12.5 mg total) by mouth once daily.    Lactobacillus acidophilus (PROBIOTIC ORAL) Take 1 capsule by mouth once daily.    losartan (COZAAR) 50 MG tablet Take 1 tablet (50 mg total) by mouth once daily.    pantoprazole (PROTONIX) 40 MG tablet Take 40 mg by mouth once daily.    rosuvastatin (CRESTOR) 20 MG tablet Take 1 tablet (20 mg total) by mouth once daily.    tamsulosin (FLOMAX) 0.4 mg Cap Take 1 capsule by mouth every evening.    vitamin D (VITAMIN D3) 1000 units Tab Take 8,000 Units by mouth once daily.     Family History       Problem Relation (Age of Onset)    Aneurysm Mother    Heart disease Father          Tobacco Use    Smoking status: Never    Smokeless tobacco: Never   Substance and Sexual Activity    Alcohol use: No    Drug use: No    Sexual activity: Yes     Review of Systems   Cardiovascular:  Positive for chest pain. Negative for claudication, dyspnea on exertion, irregular heartbeat, leg swelling, near-syncope, orthopnea, palpitations, paroxysmal nocturnal dyspnea and syncope.   Respiratory:   Negative for cough, shortness of breath, snoring and sputum production.    Gastrointestinal:  Positive for heartburn. Negative for abdominal pain, dysphagia, nausea and vomiting.   Neurological:  Negative for dizziness, headaches, loss of balance and weakness.     Objective:     Vital Signs (Most Recent):  Temp: 98.2 °F (36.8 °C) (02/09/25 0750)  Pulse: 76 (02/09/25 1000)  Resp: 19 (02/09/25 1000)  BP: (!) 148/73 (02/09/25 0930)  SpO2: (!) 94 % (02/09/25 1000) Vital Signs (24h Range):  Temp:  [97.8 °F (36.6 °C)-98.2 °F (36.8 °C)] 98.2 °F (36.8 °C)  Pulse:  [56-88] 76  Resp:  [12-26] 19  SpO2:  [94 %-100 %] 94 %  BP: (131-186)/(71-90) 148/73     Weight: 104.3 kg (229 lb 15 oz)  Body mass index is 32.99 kg/m².    SpO2: (!) 94 %         Intake/Output Summary (Last 24 hours) at 2/9/2025 1015  Last data filed at 2/9/2025 0638  Gross per 24 hour   Intake --   Output 200 ml   Net -200 ml       Lines/Drains/Airways       Peripheral Intravenous Line  Duration                  Peripheral IV - Single Lumen 02/08/25 2130 20 G 1 in Left;Posterior Hand <1 day         Peripheral IV - Single Lumen 02/09/25 0201 20 G Right Antecubital <1 day                     Physical Exam  HENT:      Head: Normocephalic and atraumatic.      Mouth/Throat:      Mouth: Mucous membranes are moist.   Eyes:      Extraocular Movements: Extraocular movements intact.      Pupils: Pupils are equal, round, and reactive to light.   Cardiovascular:      Rate and Rhythm: Normal rate and regular rhythm.      Pulses: Normal pulses.      Heart sounds: Normal heart sounds.   Pulmonary:      Effort: Pulmonary effort is normal.      Breath sounds: Normal breath sounds.   Abdominal:      General: Bowel sounds are normal.      Palpations: Abdomen is soft.   Musculoskeletal:      Left lower leg: No edema.   Skin:     General: Skin is warm.   Neurological:      General: No focal deficit present.      Mental Status: He is alert.   Psychiatric:         Mood and Affect:  Mood normal.         Behavior: Behavior normal.          Current Medications:   [START ON 2/10/2025] 0.9% NaCl   Intravenous Once    aspirin  81 mg Oral Daily    atorvastatin  80 mg Oral Daily    hydroCHLOROthiazide  12.5 mg Oral Daily    losartan  50 mg Oral Daily    metoprolol tartrate  25 mg Oral BID    pantoprazole  40 mg Oral Daily    polyethylene glycol  17 g Oral Daily    tamsulosin  1 capsule Oral QHS      heparin (porcine) in D5W  0-40 Units/kg/hr Intravenous Continuous 12.5 mL/hr at 02/09/25 0337 12 Units/kg/hr at 02/09/25 0337    lactated ringers   Intravenous Continuous 75 mL/hr at 02/09/25 0331 New Bag at 02/09/25 0331       Current Facility-Administered Medications:     acetaminophen, 650 mg, Oral, Q8H PRN    heparin (PORCINE), 38.4 Units/kg, Intravenous, PRN    heparin (PORCINE), 30 Units/kg, Intravenous, PRN    melatonin, 6 mg, Oral, Nightly PRN    nitroGLYCERIN, 0.4 mg, Sublingual, Q5 Min PRN    ondansetron, 8 mg, Oral, Q8H PRN    sodium chloride 0.9%, 10 mL, Intravenous, PRN    Laboratory (all labs reviewed):  CBC:  Recent Labs   Lab 08/15/23  0420 08/16/23  0347 12/23/23  0846 02/08/25  2107 02/09/25  0255   WBC 10.22 8.04 8.84 10.38 9.44   Hemoglobin 11.1 L 11.2 L 16.5 15.3 15.0   Hematocrit 34.0 L 33.7 L 48.6 45.3 44.2   Platelets 259 307 162 229 208       CHEMISTRIES:  Recent Labs   Lab 08/12/23  0447 08/13/23  0353 08/14/23  0332 08/15/23  0420 08/16/23  0347 12/23/23  0846 01/26/24  0927 05/06/24  0940 01/31/25  1006 02/08/25  2150   Glucose 103 186 H 219 H 118 H 118 H 176 H 112 H  --   --  139 H   Sodium 139 137 137 138 142 139 139 139 140 139   Potassium 3.8 3.0 L 2.8 L 3.2 L 3.5 3.6 4.3 3.8 4.1 4.0   BUN 14 11 8 8 7 L 12  --  23.0 17.2 21   Blood Urea Nitrogen  --   --   --   --   --   --  17  --   --   --    Creatinine 0.8 0.8 0.8 0.8 0.8 0.9 0.92 0.97 0.88 1.0   eGFR >60 >60 >60 >60 >60 >60 92 86 L 94 >60   Calcium 8.9 8.2 L 8.2 L 8.1 L 8.5 L 9.7 9.6 9.8 9.7 9.9   Magnesium 1.7 1.6 1.5 L  1.9 1.8  --   --   --   --   --        CARDIAC BIOMARKERS:  Recent Labs   Lab 02/08/25  2107 02/08/25  2318 02/09/25  0117 02/09/25  0254   Troponin I 0.082 H 0.602 H 1.315 H 2.349 H       COAGS:  Recent Labs   Lab 08/23/22  1158 12/23/23  0846 02/09/25  0255   INR 1.0 1.1 1.0  1.0       LIPIDS/LFTS:  Recent Labs   Lab 08/16/23  0347 12/23/23  0846 01/26/24  0927 01/31/25  1006 02/08/25  2150 02/09/25  0254   Cholesterol  --   --   --  126  --  119 L   Triglycerides  --   --   --  109  --  79   HDL  --   --   --  49  --  43   LDL Cholesterol  --   --   --   --   --  60.2 L   LDL Calculated  --   --   --  55  --   --    Non-HDL Cholesterol  --   --   --  77  --  76   AST 69 H 23 16 23 29  --    ALT 83 H 25 19 29 35  --        BNP:        TSH:  Recent Labs   Lab 01/31/25  1006   TSH 1.41       Free T4:        Diagnostic Results:  ECG (personally reviewed and interpreted tracing(s)):  EKG done on 8 February 2025 showed sinus rhythm, PAC with subtle ST  segment depression in V2,V3,V4 and V5    Chest X-Ray (personally reviewed and interpreted image(s)):  No acute cardiopulmonary process    Echo: 1/2019  Moderate left atrial enlargement.  Mild concentric left ventricular hypertrophy.  Normal left ventricular systolic function. The estimated ejection fraction is 60%  Grade I (mild) left ventricular diastolic dysfunction consistent with impaired relaxation.  Normal right ventricular systolic function.  Mild tricuspid regurgitation.  Mild right ventricular enlargement.  Normal left atrial pressure.  Normal central venous pressure (3 mm Hg).  The estimated PA systolic pressure is 28 mm Hg  Rhythm is sinus bradycardia at 54 beats per minute    Stress Test: NA    Cath: NA    Holter 1/2023  The baseline rhythm was sinus with an average rate of 56-57 bpm.    There were no patient symptomatic activations.    There was 1 patient activation without an indication entered that corresponded to sinus rhythm with rate of 77  bpm.    There were 3 auto-activations showing sinus rhythm with rate of 41-55 bpm. PACs were observed on 2 of these activations.    Assessment and Plan:     * NSTEMI (non-ST elevated myocardial infarction)  EKG with subtle ST segment depression in precordial leads  Troponin trending up  Agree with starting IV heparin according to NSTEMI protocol  Continue with baby aspirin and high-intensity statin  Coronary angiography is scheduled for tomorrow ([facility]  Make him NPO past midnight except for morning medications with sips of water    Risks, benefits and alternatives of the catheterization procedure were discussed with the patient which include but are not limited to: bleeding, infection, death, heart attack, arrhythmia, kidney failure, stroke, need for emergency surgery, etc.  Patient understands and and agrees to proceed.  Consent was placed on the chart.     Essential hypertension  Blood pressure stable  Continue home losartan 50 mg daily and hydrochlorothiazide 12.5 mg daily    Abdominal aortic atherosclerosis  Continue with baby aspirin/statin    Atrial flutter  Currently normal sinus rhythm  Heart rate stable.  Has not started on Lopressor 25 mg b.i.d.  Not on any rate-controlling agent/anticoagulation at home  Was on Eliquis briefly after a flutter ablation in 2019  [id number] Score: 2  Continue with heparin drip while inpatient         VTE Risk Mitigation (From admission, onward)           Ordered     heparin 25,000 units in dextrose 5% (100 units/ml) IV bolus from bag LOW INTENSITY nomogram - [facility]  As needed (PRN)        Question:  Heparin Infusion Adjustment (DO NOT MODIFY ANSWER)  Answer:  [email or web address]\epic\Images\Pharmacy\HeparinInfusions\heparin LOW INTENSITY nomogram for [facility] [id number]    02/09/25 0215     heparin 25,000 units in dextrose 5% (100 units/ml) IV bolus from bag LOW INTENSITY nomogram - [facility]  As needed (PRN)        Question:  Heparin Infusion Adjustment  (DO NOT MODIFY ANSWER)  Answer:  [email or web address]\epic\Images\Pharmacy\HeparinInfusions\heparin LOW INTENSITY nomogram for [facility] [id number]    02/09/25 0215     heparin 25,000 units in dextrose 5% 250 mL (100 units/mL) infusion LOW INTENSITY nomogram - [facility]  Continuous        Question:  Begin at (units/kg/hr)  Answer:  12    02/09/25 0215     Reason for no Mechanical VTE Prophylaxis  Once        Question:  Reasons:  Answer:  IV Heparin within 24 hrs. Pre or Post-Op    02/09/25 0209     IP VTE HIGH RISK PATIENT  Once         02/09/25 0209     Place sequential compression device  Until discontinued         02/09/25 0209                    Yury Gamboa MD  Cardiology   [facility] - Emergency Dept

## 2025-02-09 NOTE — PHARMACY MED REC
"Admission Medication History     The home medication history was taken by Mony Cline.    You may go to "Admission" then "Reconcile Home Medications" tabs to review and/or act upon these items.     The home medication list has been updated by the Pharmacy department.   Please read ALL comments highlighted in yellow.   Please address this information as you see fit.    Feel free to contact us if you have any questions or require assistance.      Medications listed below were obtained from: Patient/family and Analytic software- [employer]  (Not in a hospital admission)          Mony Cline  [phone]                 .          "

## 2025-02-09 NOTE — ED PROVIDER NOTES
Care update:     Patient brought to main side already admitted to Dr. Zamudio service. Given abnormal troponin significantly worsening I went to check on patient. He reports mild pain to the left of the lower sternum/upper abdomen. Nitroglycerin ordered. I reviewed his initial EKG which was concerned for depression in V2 and lateral leads. No clear elevation. ?posterior infarct? I repeated his EKG at 1248 with no ST elevation or depression. TW flattening in AVL. Patient pain now a zero after NGT. Jeaneth spoke with Dr. Gamboa again and he recommends repeat trop now and if elevated give patient AC. Repeat troponin elevated to 1.315. Updated Dr. Cintron and he has ordered plavix and heparin. ASA already given earlier. I rechecked on the patient and he still has zero pain. I informed him that we were going to start him on AC. All questions answered.      Nina Poon MD  02/09/25 6486

## 2025-02-09 NOTE — PLAN OF CARE
Patient alert and oriented x4. Respirations even and unlabored. No distress noted. Skin warm and dry. Iv fluids infusing with no complications noted. Iv heparin infusing at 12units/kg/hr. Next ptt to be drawn at 9:37am. Patient denies chest or epigastric pain at this time. Pain medication available as needed. Patient utilized urinal throughout shift. Patient educated on npo status. Patient verbalizes an understanding. Patient has no complaints at this time. Bed in lowest position. Call bell within reach. Instructed to call for any needs.     Problem: Adult Inpatient Plan of Care  Goal: Plan of Care Review  Outcome: Progressing  Flowsheets (Taken 2/9/2025 0419)  Plan of Care Reviewed With: patient  Goal: Patient-Specific Goal (Individualized)  Outcome: Progressing  Goal: Absence of Hospital-Acquired Illness or Injury  Outcome: Progressing  Intervention: Identify and Manage Fall Risk  Flowsheets (Taken 2/9/2025 0419)  Safety Promotion/Fall Prevention:   assistive device/personal item within reach   bed alarm refused   Fall Risk signage in place   high risk medications identified   nonskid shoes/socks when out of bed   Fall Risk reviewed with patient/family     Problem: Acute Coronary Syndrome  Goal: Optimal Adaptation to Illness  Outcome: Progressing

## 2025-02-09 NOTE — NURSING
Heparin infusion started at this time per md orders. Heparin infusing at 12 units/kg/hr. Ptt to be checked in 6 hours.

## 2025-02-09 NOTE — ASSESSMENT & PLAN NOTE
EKG with subtle ST segment depression in precordial leads  Troponin trending up  Agree with starting IV heparin according to NSTEMI protocol  Continue with baby aspirin and high-intensity statin  Coronary angiography is scheduled for tomorrow (to be performed by Dr. Lipscomb around noontime)  Make him NPO past midnight except for morning medications with sips of water    Risks, benefits and alternatives of the catheterization procedure were discussed with the patient which include but are not limited to: bleeding, infection, death, heart attack, arrhythmia, kidney failure, stroke, need for emergency surgery, etc.  Patient understands and and agrees to proceed.  Consent was placed on the chart.

## 2025-02-09 NOTE — ED PROVIDER NOTES
Encounter Date: 2/8/2025       History     Chief Complaint   Patient presents with    Abdominal Pain     Pt states repeated burping and epigastric pain for one hour. Pt took tums with no relief. Pain is 8/10. Pt with no HTN or cardiac issues     Patient is a 69-year-old male with history of hypertension and acid reflux who presents to the emergency department with pressure in his epigastric region.  Patient reports this has happened frequently in the past and it has always been relieved with GI cocktail.  Denies any chest pain.  Denies shortness of breath.  Reports 1 time before he did have a bowel obstruction.  Reports he had a normal bowel movement this morning.  Denies any nausea or vomiting.  Reports his symptoms started shortly after eating fried egg plant this afternoon.    The history is provided by the patient.     Review of patient's allergies indicates:  No Known Allergies  Past Medical History:   Diagnosis Date    Essential hypertension 12/07/2016    GERD (gastroesophageal reflux disease)      Past Surgical History:   Procedure Laterality Date    APPENDECTOMY      as a child    EXCISION, SMALL INTESTINE  8/9/2023    Procedure: EXCISION, SMALL INTESTINE;  Surgeon: Jn Yusuf MD;  Location: [facility] OR;  Service: General;;    GALLBLADDER SURGERY  2004    HERNIA REPAIR  2004 & 2005    LAPAROTOMY, EXPLORATORY N/A 8/9/2023    Procedure: LAPAROTOMY, EXPLORATORY;  Surgeon: Jn Yusuf MD;  Location: [facility] OR;  Service: General;  Laterality: N/A;    TONSILLECTOMY, ADENOIDECTOMY      as a child     Family History   Problem Relation Name Age of Onset    Aneurysm Mother      Heart disease Father       Social History     Tobacco Use    Smoking status: Never    Smokeless tobacco: Never   Substance Use Topics    Alcohol use: No    Drug use: No     Review of Systems   Constitutional:  Negative for activity change, appetite change, chills, fatigue and fever.   HENT:  Negative for congestion, ear discharge, ear  pain, postnasal drip, rhinorrhea, sore throat and trouble swallowing.    Respiratory:  Negative for cough and shortness of breath.    Cardiovascular:  Negative for chest pain.   Gastrointestinal:  Positive for abdominal pain. Negative for blood in stool, constipation, diarrhea, nausea and vomiting.   Genitourinary:  Negative for dysuria, flank pain and hematuria.   Musculoskeletal:  Negative for back pain, neck pain and neck stiffness.   Skin:  Negative for rash and wound.   Neurological:  Negative for dizziness, light-headedness and headaches.       Physical Exam     Initial Vitals [02/08/25 1937]   BP Pulse Resp Temp SpO2   (!) 186/86 64 18 97.8 °F (36.6 °C) 98 %      MAP       --         Physical Exam    Nursing note and vitals reviewed.  Constitutional: He appears well-developed and well-nourished. He is not diaphoretic.  Non-toxic appearance. No distress.   HENT:   Head: Normocephalic.   Right Ear: Hearing and external ear normal.   Left Ear: Hearing and external ear normal.   Nose: Nose normal. Mouth/Throat: Uvula is midline, oropharynx is clear and moist and mucous membranes are normal. No oropharyngeal exudate.   Eyes: Conjunctivae are normal. Pupils are equal, round, and reactive to light.   Neck:   Normal range of motion.  Cardiovascular:  Normal rate and regular rhythm.           No lower extremity edema   Pulmonary/Chest: Breath sounds normal. No respiratory distress. He has no wheezes. He has no rhonchi. He has no rales. He exhibits no tenderness.   Abdominal: Abdomen is soft. Bowel sounds are normal. There is no abdominal tenderness.   Musculoskeletal:         General: Normal range of motion.      Cervical back: Normal range of motion.     Neurological: He is alert and oriented to person, place, and time.   Skin: Skin is warm and dry. Capillary refill takes less than 2 seconds.   Psychiatric: He has a normal mood and affect.         ED Course   Procedures  Labs Reviewed   CBC W/ AUTO DIFFERENTIAL -  Abnormal       Result Value    WBC 10.38      RBC 4.99      Hemoglobin 15.3      Hematocrit 45.3      MCV 91      MCH 30.7      MCHC 33.8      RDW 13.1      Platelets 229      MPV 11.6      Immature Granulocytes 0.2      Gran # (ANC) 7.9 (*)     Immature Grans (Abs) 0.02      Lymph # 1.4      Mono # 1.0      Eos # 0.0      Baso # 0.05      nRBC 0      Gran % 76.3 (*)     Lymph % 13.4 (*)     Mono % 9.2      Eosinophil % 0.4      Basophil % 0.5      Differential Method Automated     TROPONIN I - Abnormal    Troponin I 0.082 (*)    COMPREHENSIVE METABOLIC PANEL - Abnormal    Sodium 139      Potassium 4.0      Chloride 107      CO2 23      Glucose 139 (*)     BUN 21      Creatinine 1.0      Calcium 9.9      Total Protein 7.6      Albumin 4.3      Total Bilirubin 0.5      Alkaline Phosphatase 70      AST 29      ALT 35      eGFR >60      Anion Gap 9     TROPONIN I - Abnormal    Troponin I 0.602 (*)      EKG Readings: (Independently Interpreted)   Initial Reading: No STEMI. Rhythm: Normal Sinus Rhythm.   PVCs with non specific ST and T wave inversion in v2       Imaging Results              X-Ray Abdomen Flat And Erect (Final result)  Result time 02/08/25 22:36:35      Final result by Marcos Felix MD (02/08/25 22:36:35)                   Impression:      Nonobstructive bowel gas pattern.      Electronically signed by: Marcos Felix MD  Date:    02/08/2025  Time:    22:36               Narrative:    EXAMINATION:  XR ABDOMEN FLAT AND ERECT    CLINICAL HISTORY:  Abdominal distension (gaseous)    TECHNIQUE:  Flat and erect AP views of the abdomen were performed.    COMPARISON:  August 2023.    FINDINGS:  Nonspecific bowel gas pattern.  No evidence to suggest obstruction.  No free air identified.  Moderate volume retained stool is seen in the colon.  Partially visualized lung bases are clear.                                       X-Ray Chest PA And Lateral (Final result)  Result time 02/08/25 22:35:43      Final result  by Marcos Felix MD (02/08/25 22:35:43)                   Impression:      Chronic elevation of the right hemidiaphragm, otherwise no acute cardiopulmonary process identified.      Electronically signed by: Marcos Felix MD  Date:    02/08/2025  Time:    22:35               Narrative:    EXAMINATION:  XR CHEST PA AND LATERAL    CLINICAL HISTORY:  Other specified abnormal findings of blood chemistry    TECHNIQUE:  PA and lateral views of the chest were performed.    COMPARISON:  December 2023.    FINDINGS:  Cardiac silhouette is normal in size.  Left lung is well expanded.  There is chronic elevation of the right hemidiaphragm resulting in mild asymmetric volume loss on the right.  No evidence of focal consolidative process, pneumothorax, or significant pleural effusion.  No acute osseous abnormality identified.                                       Medications   aluminum-magnesium hydroxide-simethicone 200-200-20 mg/5 mL suspension 30 mL (30 mLs Oral Given 2/8/25 2103)     And   LIDOcaine viscous HCl 2% oral solution 15 mL (15 mLs Oral Given 2/8/25 2103)   aspirin tablet 325 mg (325 mg Oral Given 2/8/25 2225)     Medical Decision Making  Urgent evaluation of a 69-year-old male who presents to the emergency department with epigastric tenderness.  Patient is afebrile, nontoxic-appearing, and hemodynamically stable.  Benign abdominal exam.  Normal bowel sounds.  Normal heart and lung sounds.  No lower extremity edema.  Not suspicious for ACS, but with the pressure sensation he is reporting I will obtain cardiac workup.  EKG shows no acute ischemia.  He is requesting a GI cocktail.  Will also get flat and erect as he has had history of bowel obstruction and reports this is how he felt last time he had bowel obstruction.    9:57 PM  Leukocytosis.  No anemia.  Troponin is bumped.  Will give aspirin.  Pending x-ray still.  Chemistry is hemolyzed and will be recollected at this time.    12:16 AM  Troponin more than  doubled.  Discussed with Dr. Gamboa with Cardiology.  He advised to continue to trend.  If continue to increase, will anticoagulate at that time.  He did suggest to start statin if not taking. He is already taking a statin.  Patient meets observation criteria.  Will discuss with Hospital Medicine at this time.    12:33 AM  Discussed with Dr. Zamudio via secure chat.    1:07 AM  Dr. Poon on main side saw patient and looked at original EKG.  She is concerned because there is ST depression in V2.  She wants patient heparinized.  Spoke with Dr. Gamboa once again who said to repeat troponin at this time and then decide.  Dr. Poon doesn't agree with this plan and would like to take over this patient.  She is not one of my supervising physicians, so will completely hand over case at this time for her to continue to manage.    Amount and/or Complexity of Data Reviewed  Labs: ordered.  Radiology: ordered.    Risk  OTC drugs.  Prescription drug management.               ED Course as of 02/09/25 0033   Sat Feb 08, 2025   2347 X-Ray Abdomen Flat And Erect [TM]      ED Course User Index  [TM] Brain Calderon PA-C                           Clinical Impression:  Final diagnoses:  [R07.9] Chest pain  [R14.0] Abdominal distention  [R10.13] Epigastric pain  [R79.89] Elevated troponin                 Jeaneth Azul PA-C  02/09/25 0033       Jeaneth Azul PA-C  02/09/25 0110       Jeaneth Azul PA-C  02/09/25 0145       Jeaneth Azul PA-C  02/09/25 0147

## 2025-02-09 NOTE — H&P
[facility] Medicine  History & Physical    Patient Name: Jules Aviles  MRN: [id number]  Patient Class: IP- Inpatient  Admission Date: 2/8/2025  Attending Physician: Sotero Deutsch MD   Primary Care Provider: Basil Grover MD         Patient information was obtained from patient, past medical records, and ER records.     Subjective:     Principal Problem:NSTEMI (non-ST elevated myocardial infarction)    Chief Complaint:   Chief Complaint   Patient presents with    Abdominal Pain     Pt states repeated burping and epigastric pain for one hour. Pt took tums with no relief. Pain is 8/10. Pt with no HTN or cardiac issues        HPI: 69-year-old  male with medical history significant for hypertension, hyperlipidemia, atrial flutter status post ablation, atherosclerotic disease, previous small-bowel obstruction status post resection complicated by abdominal hernia, class 1 obesity and gastroesophageal reflux disease who presented to the emergency department on account of epigastric pain of 1 hour duration prior to presentation, pain is epigastric, described as discomfort, 8/10 in severity, nonradiating, with associated diaphoresis but denied nausea, no vomiting, pain was mildly improved with GI cocktail given in the emergency room, he denied preceding cough, no upper respiratory tract symptoms of malaise, postnasal drip or rhinorrhea.  He voiced similar pain in the past but relieved with Tums, this episode has not been relieved with usual antacid.  He denied lightheadedness, no dyspnea with exertion, headache, blurry vision or fall.  He denied change in bowel habits, no constipation or diarrhea.  He has not had urinary symptoms of dysuria, frequency, urgency, straining at micturition or hematuria.  He denies cigarette smoking, no other substance use, quit alcohol use over 20 years ago.    Initial troponin at presentation was 0.082 which increased to 0.602 and peaked at 1.315 during  "this documentation, comprehensive metabolic panel essentially within normal limits except for glucose of 139, CBC was also with no significant abnormalities, lipid panel with cholesterol of 126, HDL 49, LDL 55, TSH within normal limits at 1.41.  Abdominal x-ray showed "nonobstructive bowel gas pattern".  Chest x-ray PA/lateral with "chronic elevation of the right hemidiaphragm, otherwise no acute cardiopulmonary process identified".     Past Medical History:   Diagnosis Date    Essential hypertension 12/07/2016    GERD (gastroesophageal reflux disease)        Past Surgical History:   Procedure Laterality Date    APPENDECTOMY      as a child    EXCISION, SMALL INTESTINE  8/9/2023    Procedure: EXCISION, SMALL INTESTINE;  Surgeon: Jn Yusuf MD;  Location: [facility] OR;  Service: General;;    GALLBLADDER SURGERY  2004    HERNIA REPAIR  2004 & 2005    LAPAROTOMY, EXPLORATORY N/A 8/9/2023    Procedure: LAPAROTOMY, EXPLORATORY;  Surgeon: Jn Yusuf MD;  Location: [facility] OR;  Service: General;  Laterality: N/A;    TONSILLECTOMY, ADENOIDECTOMY      as a child       Review of patient's allergies indicates:  No Known Allergies    Current Facility-Administered Medications on File Prior to Encounter   Medication    0.9%  NaCl infusion     Current Outpatient Medications on File Prior to Encounter   Medication Sig    aspirin (ECOTRIN) 81 MG EC tablet Take 1 tablet (81 mg total) by mouth once daily.    cyanocobalamin (VITAMIN B-12) 100 MCG tablet Take 100 mcg by mouth once daily.    hydroCHLOROthiazide (MICROZIDE) 12.5 mg capsule Take 1 capsule (12.5 mg total) by mouth once daily.    Lactobacillus acidophilus (PROBIOTIC ORAL) Take 1 capsule by mouth once daily.    losartan (COZAAR) 50 MG tablet Take 1 tablet (50 mg total) by mouth once daily.    pantoprazole (PROTONIX) 40 MG tablet Take 40 mg by mouth once daily.    rosuvastatin (CRESTOR) 20 MG tablet Take 1 tablet (20 mg total) by mouth once daily.    tamsulosin " (FLOMAX) 0.4 mg Cap Take 1 capsule by mouth every evening.    vitamin D (VITAMIN D3) 1000 units Tab Take 8,000 Units by mouth once daily.     Family History       Problem Relation (Age of Onset)    Aneurysm Mother    Heart disease Father          Tobacco Use    Smoking status: Never    Smokeless tobacco: Never   Substance and Sexual Activity    Alcohol use: No    Drug use: No    Sexual activity: Yes     Review of Systems   Constitutional:  Negative for activity change, appetite change, chills, diaphoresis and fatigue.   HENT:  Negative for congestion, sore throat and tinnitus.    Eyes:  Negative for pain, discharge and itching.   Respiratory:  Negative for cough, chest tightness, shortness of breath and wheezing.    Cardiovascular:  Positive for chest pain. Negative for palpitations and leg swelling.   Gastrointestinal:  Positive for abdominal pain. Negative for abdominal distention, blood in stool, nausea and vomiting.   Endocrine: Negative for cold intolerance, heat intolerance and polydipsia.   Genitourinary:  Negative for difficulty urinating, dysuria, flank pain, frequency and hematuria.   Musculoskeletal:  Negative for arthralgias and back pain.   Skin:  Negative for color change and pallor.   Neurological:  Negative for dizziness, seizures, facial asymmetry, light-headedness, numbness and headaches.   Psychiatric/Behavioral:  Negative for agitation, confusion and hallucinations.      Objective:     Vital Signs (Most Recent):  Temp: 98.1 °F (36.7 °C) (02/08/25 2319)  Pulse: 60 (02/09/25 0209)  Resp: 16 (02/09/25 0209)  BP: (!) 141/71 (02/09/25 0127)  SpO2: 98 % (02/09/25 0209) Vital Signs (24h Range):  Temp:  [97.8 °F (36.6 °C)-98.1 °F (36.7 °C)] 98.1 °F (36.7 °C)  Pulse:  [60-66] 60  Resp:  [16-19] 16  SpO2:  [95 %-100 %] 98 %  BP: (131-186)/(71-90) 141/71     Weight: 104.3 kg (230 lb)  Body mass index is 33 kg/m².     Physical Exam  Vitals reviewed.   Constitutional:       General: He is not in acute  distress.     Appearance: Normal appearance. He is obese. He is not ill-appearing.   HENT:      Head: Normocephalic and atraumatic.      Nose: Nose normal. No congestion or rhinorrhea.      Mouth/Throat:      Mouth: Mucous membranes are moist.   Eyes:      General: No scleral icterus.     Extraocular Movements: Extraocular movements intact.      Conjunctiva/sclera: Conjunctivae normal.      Pupils: Pupils are equal, round, and reactive to light.   Cardiovascular:      Rate and Rhythm: Normal rate and regular rhythm.      Pulses: Normal pulses.      Heart sounds: Normal heart sounds. No murmur heard.  Pulmonary:      Effort: Pulmonary effort is normal. No respiratory distress.      Breath sounds: Normal breath sounds. No wheezing or rales.   Chest:      Chest wall: No tenderness.   Abdominal:      General: Abdomen is flat. Bowel sounds are normal. There is no distension.      Palpations: Abdomen is soft.      Tenderness: There is no abdominal tenderness. There is no right CVA tenderness, left CVA tenderness, guarding or rebound.   Musculoskeletal:         General: No swelling or tenderness.      Right lower leg: No edema.      Left lower leg: No edema.   Skin:     General: Skin is warm and dry.      Coloration: Skin is not jaundiced.      Findings: No lesion.   Neurological:      General: No focal deficit present.      Mental Status: He is alert and oriented to person, place, and time. Mental status is at baseline.      Cranial Nerves: No cranial nerve deficit.      Sensory: No sensory deficit.   Psychiatric:         Mood and Affect: Mood normal.         Behavior: Behavior normal.         Thought Content: Thought content normal.         Judgment: Judgment normal.              CRANIAL NERVES     CN III, IV, VI   Pupils are equal, round, and reactive to light.       Significant Labs: All pertinent labs within the past 24 hours have been reviewed.  Bilirubin:   Recent Labs   Lab 01/31/25  1006 02/08/25  2150   BILITOT  0.8 0.5     CBC:   Recent Labs   Lab 02/08/25 2107   WBC 10.38   HGB 15.3   HCT 45.3        CMP:   Recent Labs   Lab 02/08/25  2150      K 4.0      CO2 23   *   BUN 21   CREATININE 1.0   CALCIUM 9.9   PROT 7.6   ALBUMIN 4.3   BILITOT 0.5   ALKPHOS 70   AST 29   ALT 35   ANIONGAP 9     Troponin:   Recent Labs   Lab 02/08/25  2107 02/08/25  2318 02/09/25  0117   TROPONINI 0.082* 0.602* 1.315*     TSH:   Recent Labs   Lab 01/31/25  1006   TSH 1.41       Significant Imaging: I have reviewed all pertinent imaging results/findings within the past 24 hours.  Imaging Results              X-Ray Abdomen Flat And Erect (Final result)  Result time 02/08/25 22:36:35      Final result by Marcos Fleix MD (02/08/25 22:36:35)                   Impression:      Nonobstructive bowel gas pattern.      Electronically signed by: Marcos Felix MD  Date:    02/08/2025  Time:    22:36               Narrative:    EXAMINATION:  XR ABDOMEN FLAT AND ERECT    CLINICAL HISTORY:  Abdominal distension (gaseous)    TECHNIQUE:  Flat and erect AP views of the abdomen were performed.    COMPARISON:  August 2023.    FINDINGS:  Nonspecific bowel gas pattern.  No evidence to suggest obstruction.  No free air identified.  Moderate volume retained stool is seen in the colon.  Partially visualized lung bases are clear.                                       X-Ray Chest PA And Lateral (Final result)  Result time 02/08/25 22:35:43      Final result by Marcos Felix MD (02/08/25 22:35:43)                   Impression:      Chronic elevation of the right hemidiaphragm, otherwise no acute cardiopulmonary process identified.      Electronically signed by: Marcos Felix MD  Date:    02/08/2025  Time:    22:35               Narrative:    EXAMINATION:  XR CHEST PA AND LATERAL    CLINICAL HISTORY:  Other specified abnormal findings of blood chemistry    TECHNIQUE:  PA and lateral views of the chest were  performed.    COMPARISON:  December 2023.    FINDINGS:  Cardiac silhouette is normal in size.  Left lung is well expanded.  There is chronic elevation of the right hemidiaphragm resulting in mild asymmetric volume loss on the right.  No evidence of focal consolidative process, pneumothorax, or significant pleural effusion.  No acute osseous abnormality identified.                                      Assessment/Plan:     * NSTEMI (non-ST elevated myocardial infarction)  Presented atypical chest pain  Pain said to be 8/10 in severity, non-radiating  History of atrial flutter status post ablation, hypertension and hyperlipidemia  Initial troponin was 0.082 at presentation increased to 0.602, most recently 1.315.  EKG did not show any acute ST-T wave elevation  Pain said to have resolved with nitroglycerin  Heart score 6  Cardiology consulted, for possible left heart cath/ risk stratification  Aspirin and Plavix loaded, continued atorvastatin  Will continue heparin drip for non ST elevation myocardial infarction      Essential hypertension  Patient's blood pressure range in the last 24 hours was: BP  Min: 131/74  Max: 186/86.The patient's inpatient anti-hypertensive regimen is listed below:  Current Antihypertensives  hydroCHLOROthiazide tablet 12.5 mg, Daily, Oral  losartan tablet 50 mg, Daily, Oral  metoprolol tartrate (LOPRESSOR) tablet 25 mg, 2 times daily, Oral  nitroGLYCERIN SL tablet 0.4 mg, Every 5 min PRN, Sublingual    Plan  - BP is uncontrolled, will adjust as follows:  Continue current home antihypertensive  - nitroglycerin we will likely improve blood pressure as well  - low-salt diet    Obesity  Body mass index is 33 kg/m².   Continue lifestyle modification  Morbid obesity complicates all aspects of disease management from diagnostic modalities to treatment.   Weight loss encouraged and health benefits explained to patient.         GERD (gastroesophageal reflux disease)  History of gastroesophageal reflux  disease  Thought symptoms were due to GERD  Continue home dose of pantoprazole for now  Will consider changing to H2 blockers as tolerated.      History of cardiac radiofrequency ablation (RFA)  History of atrial fibrillation status post radiofrequency ablation  Currently in regular, rate and rhythm.      Atrial flutter  History of atrial flutter status post ablation, currently in sinus rhythm  He has not been on long-term anticoagulation.        VTE Risk Mitigation (From admission, onward)           Ordered     heparin 25,000 units in dextrose 5% (100 units/ml) IV bolus from bag LOW INTENSITY nomogram - [facility]  As needed (PRN)        Question:  Heparin Infusion Adjustment (DO NOT MODIFY ANSWER)  Answer:  [email or web address]\Images\Pharmacy\HeparinInfusions\heparin LOW INTENSITY nomogram for [facility] [id number]    02/09/25 0215     heparin 25,000 units in dextrose 5% (100 units/ml) IV bolus from bag LOW INTENSITY nomogram - [facility]  As needed (PRN)        Question:  Heparin Infusion Adjustment (DO NOT MODIFY ANSWER)  Answer:  \[email or web address]\[email or web address]\Images\Pharmacy\HeparinInfusions\heparin LOW INTENSITY nomogram for [facility] [id number]    02/09/25 0215     heparin 25,000 units in dextrose 5% (100 units/ml) IV bolus from bag LOW INTENSITY nomogram - [facility]  Once        Question:  Heparin Infusion Adjustment (DO NOT MODIFY ANSWER)  Answer:  [email or web address]\Images\Pharmacy\HeparinInfusions\heparin LOW INTENSITY nomogram for [facility] [id number]    02/09/25 0215     heparin 25,000 units in dextrose 5% 250 mL (100 units/mL) infusion LOW INTENSITY nomogram - [facility]  Continuous        Question:  Begin at (units/kg/hr)  Answer:  12    02/09/25 0215     Reason for no Mechanical VTE Prophylaxis  Once        Question:  Reasons:  Answer:  IV Heparin within 24 hrs. Pre or Post-Op    02/09/25 0209     IP VTE HIGH RISK PATIENT  Once         02/09/25 0209     Place sequential compression device  Until discontinued         02/09/25 0209                        Patient admitted under my care to hospital medicine service 2/9/2025               Jered Zamudio MD  Department of Hospital Medicine  [facility] - Emergency Dept

## 2025-02-10 LAB
APTT PPP: 69.7 SEC (ref 21–32)
BASOPHILS # BLD AUTO: 0.03 K/UL (ref 0–0.2)
BASOPHILS NFR BLD: 0.4 % (ref 0–1.9)
DIFFERENTIAL METHOD BLD: NORMAL
EOSINOPHIL # BLD AUTO: 0.1 K/UL (ref 0–0.5)
EOSINOPHIL NFR BLD: 1.8 % (ref 0–8)
ERYTHROCYTE [DISTWIDTH] IN BLOOD BY AUTOMATED COUNT: 12.6 % (ref 11.5–14.5)
HCT VFR BLD AUTO: 45.3 % (ref 40–54)
HGB BLD-MCNC: 15.1 G/DL (ref 14–18)
IMM GRANULOCYTES # BLD AUTO: 0.01 K/UL (ref 0–0.04)
IMM GRANULOCYTES NFR BLD AUTO: 0.1 % (ref 0–0.5)
LYMPHOCYTES # BLD AUTO: 2.6 K/UL (ref 1–4.8)
LYMPHOCYTES NFR BLD: 32.6 % (ref 18–48)
MCH RBC QN AUTO: 30.3 PG (ref 27–31)
MCHC RBC AUTO-ENTMCNC: 33.3 G/DL (ref 32–36)
MCV RBC AUTO: 91 FL (ref 82–98)
MONOCYTES # BLD AUTO: 0.8 K/UL (ref 0.3–1)
MONOCYTES NFR BLD: 10.1 % (ref 4–15)
NEUTROPHILS # BLD AUTO: 4.3 K/UL (ref 1.8–7.7)
NEUTROPHILS NFR BLD: 55 % (ref 38–73)
NRBC BLD-RTO: 0 /100 WBC
PLATELET # BLD AUTO: 200 K/UL (ref 150–450)
PMV BLD AUTO: 11.3 FL (ref 9.2–12.9)
POC ACTIVATED CLOTTING TIME K: 204 SEC (ref 74–137)
POC ACTIVATED CLOTTING TIME K: 279 SEC (ref 74–137)
RBC # BLD AUTO: 4.99 M/UL (ref 4.6–6.2)
SAMPLE: ABNORMAL
SAMPLE: ABNORMAL
WBC # BLD AUTO: 7.82 K/UL (ref 3.9–12.7)

## 2025-02-10 PROCEDURE — C1894 INTRO/SHEATH, NON-LASER: HCPCS | Performed by: INTERNAL MEDICINE

## 2025-02-10 PROCEDURE — 25000003 PHARM REV CODE 250: Performed by: INTERNAL MEDICINE

## 2025-02-10 PROCEDURE — C1725 CATH, TRANSLUMIN NON-LASER: HCPCS | Performed by: INTERNAL MEDICINE

## 2025-02-10 PROCEDURE — 93005 ELECTROCARDIOGRAM TRACING: CPT

## 2025-02-10 PROCEDURE — B241ZZ3 ULTRASONOGRAPHY OF MULTIPLE CORONARY ARTERIES, INTRAVASCULAR: ICD-10-PCS | Performed by: INTERNAL MEDICINE

## 2025-02-10 PROCEDURE — B2111ZZ FLUOROSCOPY OF MULTIPLE CORONARY ARTERIES USING LOW OSMOLAR CONTRAST: ICD-10-PCS | Performed by: INTERNAL MEDICINE

## 2025-02-10 PROCEDURE — C1753 CATH, INTRAVAS ULTRASOUND: HCPCS | Performed by: INTERNAL MEDICINE

## 2025-02-10 PROCEDURE — C1769 GUIDE WIRE: HCPCS | Performed by: INTERNAL MEDICINE

## 2025-02-10 PROCEDURE — 25000003 PHARM REV CODE 250: Performed by: STUDENT IN AN ORGANIZED HEALTH CARE EDUCATION/TRAINING PROGRAM

## 2025-02-10 PROCEDURE — 20000000 HC ICU ROOM

## 2025-02-10 PROCEDURE — 27000221 HC OXYGEN, UP TO 24 HOURS

## 2025-02-10 PROCEDURE — 93458 L HRT ARTERY/VENTRICLE ANGIO: CPT | Mod: XU | Performed by: INTERNAL MEDICINE

## 2025-02-10 PROCEDURE — 25500020 PHARM REV CODE 255: Performed by: INTERNAL MEDICINE

## 2025-02-10 PROCEDURE — 63600175 PHARM REV CODE 636 W HCPCS: Performed by: INTERNAL MEDICINE

## 2025-02-10 PROCEDURE — C9600 PERC DRUG-EL COR STENT SING: HCPCS | Mod: LC,RC | Performed by: INTERNAL MEDICINE

## 2025-02-10 PROCEDURE — 85730 THROMBOPLASTIN TIME PARTIAL: CPT | Performed by: HOSPITALIST

## 2025-02-10 PROCEDURE — C1874 STENT, COATED/COV W/DEL SYS: HCPCS | Performed by: INTERNAL MEDICINE

## 2025-02-10 PROCEDURE — 027135Z DILATION OF CORONARY ARTERY, TWO ARTERIES WITH TWO DRUG-ELUTING INTRALUMINAL DEVICES, PERCUTANEOUS APPROACH: ICD-10-PCS | Performed by: INTERNAL MEDICINE

## 2025-02-10 PROCEDURE — 92978 ENDOLUMINL IVUS OCT C 1ST: CPT | Mod: LC | Performed by: INTERNAL MEDICINE

## 2025-02-10 PROCEDURE — 27201423 OPTIME MED/SURG SUP & DEVICES STERILE SUPPLY: Performed by: INTERNAL MEDICINE

## 2025-02-10 PROCEDURE — 4A023N7 MEASUREMENT OF CARDIAC SAMPLING AND PRESSURE, LEFT HEART, PERCUTANEOUS APPROACH: ICD-10-PCS | Performed by: INTERNAL MEDICINE

## 2025-02-10 PROCEDURE — 92979 ENDOLUMINL IVUS OCT C EA: CPT | Mod: RC | Performed by: INTERNAL MEDICINE

## 2025-02-10 PROCEDURE — 85025 COMPLETE CBC W/AUTO DIFF WBC: CPT | Performed by: STUDENT IN AN ORGANIZED HEALTH CARE EDUCATION/TRAINING PROGRAM

## 2025-02-10 PROCEDURE — C1887 CATHETER, GUIDING: HCPCS | Performed by: INTERNAL MEDICINE

## 2025-02-10 PROCEDURE — 36415 COLL VENOUS BLD VENIPUNCTURE: CPT | Performed by: HOSPITALIST

## 2025-02-10 DEVICE — EVEROLIMUS-ELUTING PLATINUM CHROMIUM CORONARY STENT SYSTEM
Type: IMPLANTABLE DEVICE | Site: CORONARY | Status: FUNCTIONAL
Brand: SYNERGY™ XD

## 2025-02-10 RX ORDER — ONDANSETRON 8 MG/1
8 TABLET, ORALLY DISINTEGRATING ORAL EVERY 8 HOURS PRN
Status: DISCONTINUED | OUTPATIENT
Start: 2025-02-10 | End: 2025-02-12 | Stop reason: HOSPADM

## 2025-02-10 RX ORDER — SODIUM CHLORIDE 9 MG/ML
INJECTION, SOLUTION INTRAVENOUS CONTINUOUS
Status: ACTIVE | OUTPATIENT
Start: 2025-02-10 | End: 2025-02-10

## 2025-02-10 RX ORDER — ATROPINE SULFATE 0.1 MG/ML
0.5 INJECTION INTRAVENOUS
Status: DISCONTINUED | OUTPATIENT
Start: 2025-02-10 | End: 2025-02-12 | Stop reason: HOSPADM

## 2025-02-10 RX ORDER — NITROGLYCERIN 0.4 MG/1
0.4 TABLET SUBLINGUAL EVERY 5 MIN PRN
Status: DISCONTINUED | OUTPATIENT
Start: 2025-02-10 | End: 2025-02-12 | Stop reason: HOSPADM

## 2025-02-10 RX ORDER — MORPHINE SULFATE 4 MG/ML
2 INJECTION, SOLUTION INTRAMUSCULAR; INTRAVENOUS EVERY 10 MIN PRN
Status: DISCONTINUED | OUTPATIENT
Start: 2025-02-10 | End: 2025-02-12 | Stop reason: HOSPADM

## 2025-02-10 RX ORDER — FENTANYL CITRATE 50 UG/ML
INJECTION, SOLUTION INTRAMUSCULAR; INTRAVENOUS
Status: DISCONTINUED | OUTPATIENT
Start: 2025-02-10 | End: 2025-02-10 | Stop reason: HOSPADM

## 2025-02-10 RX ORDER — DICYCLOMINE HYDROCHLORIDE 10 MG/ML
10 INJECTION INTRAMUSCULAR EVERY 6 HOURS PRN
Status: DISCONTINUED | OUTPATIENT
Start: 2025-02-10 | End: 2025-02-12 | Stop reason: HOSPADM

## 2025-02-10 RX ORDER — LIDOCAINE HYDROCHLORIDE 10 MG/ML
INJECTION, SOLUTION EPIDURAL; INFILTRATION; INTRACAUDAL; PERINEURAL
Status: DISCONTINUED | OUTPATIENT
Start: 2025-02-10 | End: 2025-02-10 | Stop reason: HOSPADM

## 2025-02-10 RX ORDER — MIDAZOLAM HYDROCHLORIDE 1 MG/ML
INJECTION INTRAMUSCULAR; INTRAVENOUS
Status: DISCONTINUED | OUTPATIENT
Start: 2025-02-10 | End: 2025-02-10 | Stop reason: HOSPADM

## 2025-02-10 RX ORDER — SIMETHICONE 80 MG
2 TABLET,CHEWABLE ORAL 3 TIMES DAILY PRN
Status: DISCONTINUED | OUTPATIENT
Start: 2025-02-10 | End: 2025-02-12 | Stop reason: HOSPADM

## 2025-02-10 RX ORDER — CLOPIDOGREL BISULFATE 75 MG/1
75 TABLET ORAL DAILY
Status: DISCONTINUED | OUTPATIENT
Start: 2025-02-11 | End: 2025-02-12 | Stop reason: HOSPADM

## 2025-02-10 RX ORDER — HYDROCODONE BITARTRATE AND ACETAMINOPHEN 5; 325 MG/1; MG/1
1 TABLET ORAL EVERY 4 HOURS PRN
Status: DISCONTINUED | OUTPATIENT
Start: 2025-02-10 | End: 2025-02-12 | Stop reason: HOSPADM

## 2025-02-10 RX ORDER — HEPARIN SODIUM 1000 [USP'U]/ML
INJECTION, SOLUTION INTRAVENOUS; SUBCUTANEOUS
Status: DISCONTINUED | OUTPATIENT
Start: 2025-02-10 | End: 2025-02-10 | Stop reason: HOSPADM

## 2025-02-10 RX ORDER — CLOPIDOGREL BISULFATE 300 MG/1
600 TABLET, FILM COATED ORAL ONCE
Status: COMPLETED | OUTPATIENT
Start: 2025-02-10 | End: 2025-02-10

## 2025-02-10 RX ORDER — VERAPAMIL HYDROCHLORIDE 2.5 MG/ML
INJECTION, SOLUTION INTRAVENOUS
Status: DISCONTINUED | OUTPATIENT
Start: 2025-02-10 | End: 2025-02-10 | Stop reason: HOSPADM

## 2025-02-10 RX ORDER — CALCIUM CARBONATE 200(500)MG
500 TABLET,CHEWABLE ORAL ONCE AS NEEDED
Status: COMPLETED | OUTPATIENT
Start: 2025-02-10 | End: 2025-02-10

## 2025-02-10 RX ORDER — ALUMINUM HYDROXIDE, MAGNESIUM HYDROXIDE, AND SIMETHICONE 1200; 120; 1200 MG/30ML; MG/30ML; MG/30ML
30 SUSPENSION ORAL ONCE
Status: COMPLETED | OUTPATIENT
Start: 2025-02-10 | End: 2025-02-10

## 2025-02-10 RX ORDER — LIDOCAINE HYDROCHLORIDE 20 MG/ML
15 SOLUTION OROPHARYNGEAL ONCE
Status: COMPLETED | OUTPATIENT
Start: 2025-02-10 | End: 2025-02-10

## 2025-02-10 RX ORDER — ACETAMINOPHEN 325 MG/1
650 TABLET ORAL EVERY 4 HOURS PRN
Status: DISCONTINUED | OUTPATIENT
Start: 2025-02-10 | End: 2025-02-12 | Stop reason: HOSPADM

## 2025-02-10 RX ORDER — HEPARIN SODIUM 200 [USP'U]/100ML
INJECTION, SOLUTION INTRAVENOUS
Status: DISCONTINUED | OUTPATIENT
Start: 2025-02-10 | End: 2025-02-11

## 2025-02-10 RX ORDER — CALCIUM CARBONATE 200(500)MG
TABLET,CHEWABLE ORAL
Status: DISPENSED
Start: 2025-02-10 | End: 2025-02-11

## 2025-02-10 RX ADMIN — MORPHINE SULFATE 2 MG: 4 INJECTION, SOLUTION INTRAMUSCULAR; INTRAVENOUS at 11:02

## 2025-02-10 RX ADMIN — AMIODARONE HYDROCHLORIDE 1 MG/MIN: 1.8 INJECTION, SOLUTION INTRAVENOUS at 01:02

## 2025-02-10 RX ADMIN — ATORVASTATIN CALCIUM 80 MG: 40 TABLET, FILM COATED ORAL at 08:02

## 2025-02-10 RX ADMIN — RIVAROXABAN 20 MG: 20 TABLET, FILM COATED ORAL at 04:02

## 2025-02-10 RX ADMIN — ASPIRIN 81 MG: 81 TABLET, COATED ORAL at 08:02

## 2025-02-10 RX ADMIN — HYDROCHLOROTHIAZIDE 12.5 MG: 12.5 TABLET ORAL at 08:02

## 2025-02-10 RX ADMIN — SIMETHICONE 160 MG: 80 TABLET, CHEWABLE ORAL at 10:02

## 2025-02-10 RX ADMIN — SODIUM CHLORIDE: 9 INJECTION, SOLUTION INTRAVENOUS at 08:02

## 2025-02-10 RX ADMIN — AMIODARONE HYDROCHLORIDE 0.5 MG/MIN: 1.8 INJECTION, SOLUTION INTRAVENOUS at 07:02

## 2025-02-10 RX ADMIN — CLOPIDOGREL BISULFATE 600 MG: 300 TABLET, FILM COATED ORAL at 09:02

## 2025-02-10 RX ADMIN — LIDOCAINE HYDROCHLORIDE 15 ML: 20 SOLUTION ORAL at 09:02

## 2025-02-10 RX ADMIN — LOSARTAN POTASSIUM 50 MG: 25 TABLET, FILM COATED ORAL at 08:02

## 2025-02-10 RX ADMIN — SODIUM CHLORIDE 1000 ML: 9 INJECTION, SOLUTION INTRAVENOUS at 01:02

## 2025-02-10 RX ADMIN — PANTOPRAZOLE SODIUM 40 MG: 40 TABLET, DELAYED RELEASE ORAL at 08:02

## 2025-02-10 RX ADMIN — CALCIUM CARBONATE (ANTACID) CHEW TAB 500 MG 500 MG: 500 CHEW TAB at 08:02

## 2025-02-10 RX ADMIN — ALUMINUM HYDROXIDE, MAGNESIUM HYDROXIDE, AND SIMETHICONE 30 ML: 1200; 120; 1200 SUSPENSION ORAL at 09:02

## 2025-02-10 NOTE — PLAN OF CARE
Problem: Adult Inpatient Plan of Care  Goal: Plan of Care Review  Outcome: Progressing  Goal: Patient-Specific Goal (Individualized)  Outcome: Progressing  Goal: Absence of Hospital-Acquired Illness or Injury  Outcome: Progressing  Goal: Optimal Comfort and Wellbeing  Outcome: Progressing  Goal: Readiness for Transition of Care  Outcome: Progressing     Problem: Acute Coronary Syndrome  Goal: Optimal Adaptation to Illness  Outcome: Progressing  Goal: Absence of Cardiac-Related Pain  Outcome: Progressing  Goal: Normalized Cardiac Rhythm  Outcome: Progressing  Goal: Effective Cardiac Pump Function  Outcome: Progressing  Goal: Adequate Tissue Perfusion  Outcome: Progressing     Problem: Cardiac Catheterization (Diagnostic/Interventional)  Goal: Absence of Bleeding  Outcome: Progressing  Goal: Absence of Contrast-Induced Injury  Outcome: Progressing  Goal: Stable Heart Rate and Rhythm  Outcome: Progressing  Goal: Absence of Embolism Signs and Symptoms  Outcome: Progressing  Goal: Anesthesia/Sedation Recovery  Outcome: Progressing  Goal: Optimal Pain Control and Function  Outcome: Progressing  Goal: Absence of Vascular Access Complication  Outcome: Progressing     Problem: Dysrhythmia  Goal: Normalized Cardiac Rhythm  Outcome: Progressing     Problem: Wound  Goal: Optimal Coping  Outcome: Progressing  Goal: Optimal Functional Ability  Outcome: Progressing  Goal: Absence of Infection Signs and Symptoms  Outcome: Progressing  Goal: Improved Oral Intake  Outcome: Progressing  Goal: Optimal Pain Control and Function  Outcome: Progressing  Goal: Skin Health and Integrity  Outcome: Progressing  Goal: Optimal Wound Healing  Outcome: Progressing

## 2025-02-10 NOTE — NURSING
APTT 69.7   Per nomogram, do not hold infusion, decrease by 2units/kg/hr  Dual signed off at bedside with charge nurse.   Current infusion dose is 10units/kg/hr    APTT to be checked in 6 hours.

## 2025-02-10 NOTE — NURSING
Patient arrived to floor via transporter tech from ED. Patient transferred to bed independently. AAOX4. Patient was oriented to room, information on communication board, and medication regimen. Bed low adequate lighting provided, side rails x2 up, call bell in reach. Admission assessment completed. IV heparin at 12 units/kg/hr. Next aPTT in AM because x2 therapeutic levels. Tele monitor on and monitor tech notified. Vitals per chart. Patient denied having any acute distress at this time. None observed.    [facility] [place]  Nurses Note -- 4 Eyes      2/9/2025       Skin assessed on: Admit      [x] No Pressure Injuries Present    []Prevention Measures Documented    [] Yes LDA  for Pressure Injury Previously documented     [] Yes New Pressure Injury Discovered   [] LDA for New Pressure Injury Added      Attending RN:  Claudia Osman, RN     Second RN:  Raegan Martinez

## 2025-02-10 NOTE — NURSING
[facility] [place]  Nurses Note -- 4 Eyes      2/10/2025       Skin assessed on: Transfer      [x] No Pressure Injuries Present    [x]Prevention Measures Documented    [] Yes LDA  for Pressure Injury Previously documented     [] Yes New Pressure Injury Discovered   [] LDA for New Pressure Injury Added      Attending RN:  Shirley Winn, RN     Second RN:  Shaylee

## 2025-02-10 NOTE — CONSULTS
Food & Nutrition  Education    Diet Education: Cardiac Diet Education  Time Spent: -  Learners: -      Nutrition Education provided with handouts: Heart Healthy Diet, Diet and Health      Comments: Consult received for heart healthy diet education. Unable to educate pt at this time, RD providing remote coverage. RD to follow up at a later date to educate pt. Educational handouts attached to d/c paperwork.      Follow-Up: 2/11    Please Re-consult as needed        Thanks!

## 2025-02-10 NOTE — NURSING
Pt lying in bed, denies any chest pain or SOB. Heparin infusing at 12units/kg/hr, dual sign off at bedside. Call light in reach, instructed pt to call for assistance.     [facility]  Nurses Note -- 4 Eyes      2/9/2025       Skin assessed on: Q Shift      [x] No Pressure Injuries Present    [x]Prevention Measures Documented    [] Yes LDA  for Pressure Injury Previously documented     [] Yes New Pressure Injury Discovered   [] LDA for New Pressure Injury Added      Attending RN:  Magi Barroso RN     Second RN:  ANH Taylor

## 2025-02-10 NOTE — CARE UPDATE
"Per nurse, HR is fluctuating between 50-37. not sustaining.   asymptomatic     -DC Lopressor for now  -Glucagon 5mg iv x 1 if hermes worsens    /89   Pulse 64   Temp 98.4 °F (36.9 °C) (Oral)   Resp 16   Ht 5' 10" (1.778 m)   Wt 102.4 kg (225 lb 12 oz)   SpO2 95%   BMI 32.39 kg/m²           Current Facility-Administered Medications:     [START ON 2/10/2025] 0.9% NaCl infusion, , Intravenous, Once, Yury Gamboa MD    acetaminophen tablet 650 mg, 650 mg, Oral, Q8H PRN, Jered Zamudio MD    aspirin EC tablet 81 mg, 81 mg, Oral, Daily, Jered Zamudio MD, 81 mg at 02/09/25 0931    atorvastatin tablet 80 mg, 80 mg, Oral, Daily, Jered Zamudio MD, 80 mg at 02/09/25 0931    heparin 25,000 units in dextrose 5% (100 units/ml) IV bolus from bag LOW INTENSITY nomogram - [facility], 38.4 Units/kg, Intravenous, PRN, Jered Zamudio MD    heparin 25,000 units in dextrose 5% (100 units/ml) IV bolus from bag LOW INTENSITY nomogram - [facility], 30 Units/kg, Intravenous, PRN, Jered Zamudio MD    heparin 25,000 units in dextrose 5% 250 mL (100 units/mL) infusion LOW INTENSITY nomogram - [facility], 0-40 Units/kg/hr, Intravenous, Continuous, Jered Zamudio MD, Last Rate: 12.5 mL/hr at 02/09/25 1052, 12 Units/kg/hr at 02/09/25 1052    hydroCHLOROthiazide tablet 12.5 mg, 12.5 mg, Oral, Daily, Jered Zamudio MD, 12.5 mg at 02/09/25 0930    losartan tablet 50 mg, 50 mg, Oral, Daily, Jered Zamudio MD, 50 mg at 02/09/25 0931    melatonin tablet 6 mg, 6 mg, Oral, Nightly PRN, Jered Zamudio MD    metoprolol tartrate (LOPRESSOR) tablet 25 mg, 25 mg, Oral, BID, Jered Zamudio MD, 25 mg at 02/09/25 0931    nitroGLYCERIN SL tablet 0.4 mg, 0.4 mg, Sublingual, Q5 Min PRN, Jered Zamudio MD    ondansetron disintegrating tablet 8 mg, 8 mg, Oral, Q8H PRN, Jered Zamudio MD    pantoprazole EC tablet 40 mg, 40 mg, Oral, Daily, Jered Zamudio MD, 40 mg at 02/09/25 0931    polyethylene glycol packet 17 g, " 17 g, Oral, Daily, Jered Zamudio MD    sodium chloride 0.9% flush 10 mL, 10 mL, Intravenous, PRN, Bee, Yury MILLAN MD    tamsulosin 24 hr capsule 0.4 mg, 1 capsule, Oral, QHS, Jered Zamudio MD, 0.4 mg at 02/09/25 0322    Facility-Administered Medications Ordered in Other Encounters:     0.9%  NaCl infusion, , Intravenous, Continuous, Lien Crocker NP, Stopped at 02/18/19 3633

## 2025-02-10 NOTE — ASSESSMENT & PLAN NOTE
Patient's blood pressure range in the last 24 hours was: BP  Min: 127/78  Max: 137/82.The patient's inpatient anti-hypertensive regimen is listed below:  Current Antihypertensives  hydroCHLOROthiazide tablet 12.5 mg, Daily, Oral  losartan tablet 50 mg, Daily, Oral  nitroGLYCERIN SL tablet 0.4 mg, Every 5 min PRN, Sublingual  nitroGLYCERIN in D5W 200 mcg/mL vial, As needed (PRN),   verapamiL injection, As needed (PRN),   nitroGLYCERIN SL tablet 0.4 mg, Every 5 min PRN, Sublingual    Plan  - Bp better controlled.  Continue current regimen.

## 2025-02-10 NOTE — HOSPITAL COURSE
Mr. Aviles is a 69-year-old male who was admitted with an NSTEMI.  Troponin elevated up to 2.3.  Started on heparin drip, aspirin, statin and Plavix.  Cardiology consulted.  Patient underwent left heart catheterization with PCI to mid left circumflex and proximal RPDA.  Patient remained chest pain-free.  Patient developed a flutter after procedure and transferred to ICU on amiodarone drip.  He converted to sinus rhythm spontaneously amiodarone stopped.  Discussed with Cardiology, he will complete 5 more days of aspirin along with Plavix and Xarelto.  He will then continue on Plavix and Xarelto until follow-up with Cardiology team.  Discussed all of the above with patient prior to discharge.  All questions answered and patient discharged home in stable condition.

## 2025-02-10 NOTE — ASSESSMENT & PLAN NOTE
Presented atypical chest pain  Pain said to be 8/10 in severity, non-radiating  History of atrial flutter status post ablation, hypertension and hyperlipidemia  EKG did not show any acute ST-T wave elevation  Pain resolved.  Aspirin and Plavix loaded, continued atorvastatin  Will continue heparin drip for non ST elevation myocardial infarction  Plan on [facility] today.

## 2025-02-10 NOTE — PROGRESS NOTES
"[facility]  Progress Note    Patient Name: Jules Aviles  MRN: [id number]  Patient Class: IP- Inpatient   Admission Date: 2/8/2025  Length of Stay: 1 days  Attending Physician: Sotero Deutsch MD  Primary Care Provider: Basil Grover MD        Subjective     Principal Problem:NSTEMI (non-ST elevated myocardial infarction)        HPI:  69-year-old  male with medical history significant for hypertension, hyperlipidemia, atrial flutter status post ablation, atherosclerotic disease, previous small-bowel obstruction status post resection complicated by abdominal hernia, class 1 obesity and gastroesophageal reflux disease who presented to the emergency department on account of epigastric pain of 1 hour duration prior to presentation, pain is epigastric, described as discomfort, 8/10 in severity, nonradiating, with associated diaphoresis but denied nausea, no vomiting, pain was mildly improved with GI cocktail given in the emergency room, he denied preceding cough, no upper respiratory tract symptoms of malaise, postnasal drip or rhinorrhea.  He voiced similar pain in the past but relieved with Tums, this episode has not been relieved with usual antacid.  He denied lightheadedness, no dyspnea with exertion, headache, blurry vision or fall.  He denied change in bowel habits, no constipation or diarrhea.  He has not had urinary symptoms of dysuria, frequency, urgency, straining at micturition or hematuria.  He denies cigarette smoking, no other substance use, quit alcohol use over 20 years ago.    Initial troponin at presentation was 0.082 which increased to 0.602 and peaked at 1.315 during this documentation, comprehensive metabolic panel essentially within normal limits except for glucose of 139, CBC was also with no significant abnormalities, lipid panel with cholesterol of 126, HDL 49, LDL 55, TSH within normal limits at 1.41.  Abdominal x-ray showed "nonobstructive bowel gas " "pattern".  Chest x-ray PA/lateral with "chronic elevation of the right hemidiaphragm, otherwise no acute cardiopulmonary process identified".     Overview/Hospital Course:  70 y/o male admitted with NSTEMI.  Troponin now up to 2.3.  Currently chest pain free.  Cardiology consulted.  Started on heparin drip, ASA, Statin.  Plan for C today.    Interval History: no further chest pain.    Review of Systems   HENT:  Negative for ear discharge and ear pain.    Eyes:  Negative for discharge and itching.   Endocrine: Negative for cold intolerance and heat intolerance.   Neurological:  Negative for seizures and syncope.     Objective:     Vital Signs (Most Recent):  Temp: 97.9 °F (36.6 °C) (02/10/25 0742)  Pulse: 61 (02/10/25 0742)  Resp: 18 (02/10/25 0538)  BP: 133/79 (02/10/25 0742)  SpO2: 96 % (02/10/25 0742) Vital Signs (24h Range):  Temp:  [97.9 °F (36.6 °C)-98.5 °F (36.9 °C)] 97.9 °F (36.6 °C)  Pulse:  [37-73] 61  Resp:  [15-20] 18  SpO2:  [93 %-97 %] 96 %  BP: (127-137)/(78-89) 133/79     Weight: 102.4 kg (225 lb 12 oz)  Body mass index is 32.39 kg/m².    Intake/Output Summary (Last 24 hours) at 2/10/2025 1319  Last data filed at 2/10/2025 1111  Gross per 24 hour   Intake 1952.15 ml   Output --   Net 1952.15 ml         Physical Exam  Vitals reviewed.   Constitutional:       General: He is not in acute distress.     Appearance: Normal appearance. He is obese. He is not ill-appearing.   HENT:      Head: Normocephalic and atraumatic.      Nose: Nose normal. No congestion or rhinorrhea.      Mouth/Throat:      Mouth: Mucous membranes are moist.   Eyes:      General: No scleral icterus.     Extraocular Movements: Extraocular movements intact.      Conjunctiva/sclera: Conjunctivae normal.      Pupils: Pupils are equal, round, and reactive to light.   Cardiovascular:      Rate and Rhythm: Normal rate and regular rhythm.      Pulses: Normal pulses.      Heart sounds: Normal heart sounds. No murmur heard.  Pulmonary:      " Effort: Pulmonary effort is normal. No respiratory distress.      Breath sounds: Normal breath sounds. No wheezing or rales.   Chest:      Chest wall: No tenderness.   Abdominal:      General: Abdomen is flat. Bowel sounds are normal. There is no distension.      Palpations: Abdomen is soft.      Tenderness: There is no abdominal tenderness. There is no right CVA tenderness, left CVA tenderness, guarding or rebound.   Musculoskeletal:         General: No swelling or tenderness.      Right lower leg: No edema.      Left lower leg: No edema.   Skin:     General: Skin is warm and dry.      Coloration: Skin is not jaundiced.      Findings: No lesion.   Neurological:      General: No focal deficit present.      Mental Status: He is alert and oriented to person, place, and time. Mental status is at baseline.      Cranial Nerves: No cranial nerve deficit.      Sensory: No sensory deficit.   Psychiatric:         Mood and Affect: Mood normal.         Behavior: Behavior normal.         Thought Content: Thought content normal.         Judgment: Judgment normal.             Significant Labs: All pertinent labs within the past 24 hours have been reviewed.  BMP:   Recent Labs   Lab 02/08/25  2150   *      K 4.0      CO2 23   BUN 21   CREATININE 1.0   CALCIUM 9.9     CBC:   Recent Labs   Lab 02/08/25  2107 02/09/25  0255 02/10/25  0452   WBC 10.38 9.44 7.82   HGB 15.3 15.0 15.1   HCT 45.3 44.2 45.3    208 200       Significant Imaging: I have reviewed all pertinent imaging results/findings within the past 24 hours.    Assessment and Plan     * NSTEMI (non-ST elevated myocardial infarction)  Presented atypical chest pain  Pain said to be 8/10 in severity, non-radiating  History of atrial flutter status post ablation, hypertension and hyperlipidemia  EKG did not show any acute ST-T wave elevation  Pain resolved.  Aspirin and Plavix loaded, continued atorvastatin  Will continue heparin drip for non ST  elevation myocardial infarction  Plan on [facility] today.      Obesity  Body mass index is 33 kg/m².   Continue lifestyle modification  Morbid obesity complicates all aspects of disease management from diagnostic modalities to treatment.   Weight loss encouraged and health benefits explained to patient.         GERD (gastroesophageal reflux disease)  History of gastroesophageal reflux disease  Thought symptoms were due to GERD  Continue home dose of pantoprazole for now  Will consider changing to H2 blockers as tolerated.      History of cardiac radiofrequency ablation (RFA)  History of atrial fibrillation status post radiofrequency ablation  Currently in regular, rate and rhythm.      Atrial flutter  History of atrial flutter status post ablation, currently in sinus rhythm  He has not been on long-term anticoagulation.      Essential hypertension  Patient's blood pressure range in the last 24 hours was: BP  Min: 127/78  Max: 137/82.The patient's inpatient anti-hypertensive regimen is listed below:  Current Antihypertensives  hydroCHLOROthiazide tablet 12.5 mg, Daily, Oral  losartan tablet 50 mg, Daily, Oral  nitroGLYCERIN SL tablet 0.4 mg, Every 5 min PRN, Sublingual  nitroGLYCERIN in D5W 200 mcg/mL vial, As needed (PRN),   verapamiL injection, As needed (PRN),   nitroGLYCERIN SL tablet 0.4 mg, Every 5 min PRN, Sublingual    Plan  - Bp better controlled.  Continue current regimen.      VTE Risk Mitigation (From admission, onward)           Ordered     rivaroxaban tablet 20 mg  With dinner         02/10/25 1255     heparin (porcine) injection  As needed (PRN)         02/10/25 1158     heparin infusion 1,000 units/500 ml in 0.9% NaCl (pressure line flush)  Intra-op continuous PRN         02/10/25 1135     Reason for no Mechanical VTE Prophylaxis  Once        Question:  Reasons:  Answer:  IV Heparin within 24 hrs. Pre or Post-Op    02/09/25 0209     IP VTE HIGH RISK PATIENT  Once         02/09/25 0209     Place sequential  compression device  Until discontinued         02/09/25 0209                    Discharge Planning   KAYLEIGH:      Code Status: Full Code   Medical Readiness for Discharge Date:   Discharge Plan A: Home                        Sotero Deutsch MD  Department of Hospital Medicine   [facility]

## 2025-02-10 NOTE — NURSING
[facility]  Nurses Note -- 4 Eyes      2/10/2025       Skin assessed on: Q Shift      [x] No Pressure Injuries Present    [x]Prevention Measures Documented    [] Yes LDA  for Pressure Injury Previously documented     [] Yes New Pressure Injury Discovered   [] LDA for New Pressure Injury Added      Attending RN:  Claudia Osman, RN     Second RN:  Magi Barroso

## 2025-02-10 NOTE — BRIEF OP NOTE
[facility] - Cath Lab  Brief Operative Note     SUMMARY     Surgery Date: 2/10/2025     Surgeons and Role:     * Steven Lipscomb MD - Primary    Assisting Surgeon: None    Pre-op Diagnosis:  Chest pain [R07.9]  NSTEMI (non-ST elevated myocardial infarction) [I21.4]    Post-op Diagnosis:  Post-Op Diagnosis Codes:     * Chest pain [R07.9]     * NSTEMI (non-ST elevated myocardial infarction) [I21.4]    Procedure(s) (LRB):  ANGIOGRAM, CORONARY ARTERY (N/A)  Percutaneous coronary intervention (N/A)  IVUS, Coronary  Stent, Drug Eluting, Multi Vessel, Coronary    Anesthesia: Local    Description of the findings of the procedure: uneventful C/cor angio/IVUS guided PCI mid LCx 2.5x16 Synergy DUONG/IVUS guided PCI prox RPDA 2.5x12 Synergy DUONG/R rad TR band for hemostasis.    Findings/Key Components:  LVEDP: 9mmHg  LVEF: 60% by echo    Dominance: Right  LM: MLI  LAD: prox 50%, mid 50%, dist 90% (T3 flow)  LCx: mid 99% (culprit) with T1 flow into OM3/dist LCx, george from RCA noted)   RCA: MLI   RPDA: prox 95%    PCI mid LCx:  Preop ASA/Plavix/heparin  Predil 2.0x12  Stent 2.5x16 Synergy DUONG  Post-IVUS with good stent expansion/apposition, no dissections.  Excellent angiographic result, T3 flow, 0% residual stenosis.    PCI prox RPDA:  Predil 2.0x12  Stent 2.5x12 Synergy DUONG  Post-IVUS with minimal mid underexpansion, otherwise good apposition and no dissections.  Postdil 2.75x12 NC 18 elysia  Excellent angiographic result, T3 flow, 0% residual stenosis.    Hemostasis:  R Radial band    Impression:  NSTEMI  3V CAD, normal LV fxn  Culprit in mid LCx, severe prox RPDA stenosis.  Mod prox LAD stenosis with diffuse distal disease.  Successful IVUS guided PCI:  Mid LCx 2.5x16 Synergy DUONG  Prox RPDA 2.5x12 Synergy DUONG  R rad TR band for hemostasis  Pt noted to go into AF with controlled VR during procedure.    Plan:  Cont med rx  Cont ASA 81mg for 1 week (thru 2/17/25)  Cont Plavix 75mg qd for 1 year (thru Feb 2026)  Statin  Initiate  amio infusion, keep NPO after MN for possible DCCV in am  Start Xarelto 20mg qd this evening (2/10/25)  Likely home in 24-48hrs  Follow up with Dr. Ruel Becerra card rehab referral    Estimated Blood Loss: <50cc         Specimens: None

## 2025-02-10 NOTE — PLAN OF CARE
Problem: Adult Inpatient Plan of Care  Goal: Plan of Care Review  Outcome: Progressing     Problem: Acute Coronary Syndrome  Goal: Normalized Cardiac Rhythm  Outcome: Progressing     Problem: Cardiac Catheterization (Diagnostic/Interventional)  Goal: Stable Heart Rate and Rhythm  Outcome: Progressing     Problem: Dysrhythmia  Goal: Normalized Cardiac Rhythm  Outcome: Progressing

## 2025-02-10 NOTE — SUBJECTIVE & OBJECTIVE
Interval History: no further chest pain.    Review of Systems   HENT:  Negative for ear discharge and ear pain.    Eyes:  Negative for discharge and itching.   Endocrine: Negative for cold intolerance and heat intolerance.   Neurological:  Negative for seizures and syncope.     Objective:     Vital Signs (Most Recent):  Temp: 97.9 °F (36.6 °C) (02/10/25 0742)  Pulse: 61 (02/10/25 0742)  Resp: 18 (02/10/25 0538)  BP: 133/79 (02/10/25 0742)  SpO2: 96 % (02/10/25 0742) Vital Signs (24h Range):  Temp:  [97.9 °F (36.6 °C)-98.5 °F (36.9 °C)] 97.9 °F (36.6 °C)  Pulse:  [37-73] 61  Resp:  [15-20] 18  SpO2:  [93 %-97 %] 96 %  BP: (127-137)/(78-89) 133/79     Weight: 102.4 kg (225 lb 12 oz)  Body mass index is 32.39 kg/m².    Intake/Output Summary (Last 24 hours) at 2/10/2025 1319  Last data filed at 2/10/2025 1111  Gross per 24 hour   Intake 1952.15 ml   Output --   Net 1952.15 ml         Physical Exam  Vitals reviewed.   Constitutional:       General: He is not in acute distress.     Appearance: Normal appearance. He is obese. He is not ill-appearing.   HENT:      Head: Normocephalic and atraumatic.      Nose: Nose normal. No congestion or rhinorrhea.      Mouth/Throat:      Mouth: Mucous membranes are moist.   Eyes:      General: No scleral icterus.     Extraocular Movements: Extraocular movements intact.      Conjunctiva/sclera: Conjunctivae normal.      Pupils: Pupils are equal, round, and reactive to light.   Cardiovascular:      Rate and Rhythm: Normal rate and regular rhythm.      Pulses: Normal pulses.      Heart sounds: Normal heart sounds. No murmur heard.  Pulmonary:      Effort: Pulmonary effort is normal. No respiratory distress.      Breath sounds: Normal breath sounds. No wheezing or rales.   Chest:      Chest wall: No tenderness.   Abdominal:      General: Abdomen is flat. Bowel sounds are normal. There is no distension.      Palpations: Abdomen is soft.      Tenderness: There is no abdominal tenderness.  There is no right CVA tenderness, left CVA tenderness, guarding or rebound.   Musculoskeletal:         General: No swelling or tenderness.      Right lower leg: No edema.      Left lower leg: No edema.   Skin:     General: Skin is warm and dry.      Coloration: Skin is not jaundiced.      Findings: No lesion.   Neurological:      General: No focal deficit present.      Mental Status: He is alert and oriented to person, place, and time. Mental status is at baseline.      Cranial Nerves: No cranial nerve deficit.      Sensory: No sensory deficit.   Psychiatric:         Mood and Affect: Mood normal.         Behavior: Behavior normal.         Thought Content: Thought content normal.         Judgment: Judgment normal.             Significant Labs: All pertinent labs within the past 24 hours have been reviewed.  BMP:   Recent Labs   Lab 02/08/25  2150   *      K 4.0      CO2 23   BUN 21   CREATININE 1.0   CALCIUM 9.9     CBC:   Recent Labs   Lab 02/08/25  2107 02/09/25  0255 02/10/25  0452   WBC 10.38 9.44 7.82   HGB 15.3 15.0 15.1   HCT 45.3 44.2 45.3    208 200       Significant Imaging: I have reviewed all pertinent imaging results/findings within the past 24 hours.

## 2025-02-11 LAB
ANION GAP SERPL CALC-SCNC: 14 MMOL/L (ref 8–16)
APTT PPP: 35.7 SEC (ref 21–32)
BASOPHILS # BLD AUTO: 0.03 K/UL (ref 0–0.2)
BASOPHILS NFR BLD: 0.3 % (ref 0–1.9)
BUN SERPL-MCNC: 13 MG/DL (ref 8–23)
CALCIUM SERPL-MCNC: 9.5 MG/DL (ref 8.7–10.5)
CHLORIDE SERPL-SCNC: 106 MMOL/L (ref 95–110)
CO2 SERPL-SCNC: 19 MMOL/L (ref 23–29)
CREAT SERPL-MCNC: 1 MG/DL (ref 0.5–1.4)
DIFFERENTIAL METHOD BLD: ABNORMAL
EOSINOPHIL # BLD AUTO: 0 K/UL (ref 0–0.5)
EOSINOPHIL NFR BLD: 0 % (ref 0–8)
ERYTHROCYTE [DISTWIDTH] IN BLOOD BY AUTOMATED COUNT: 12.8 % (ref 11.5–14.5)
EST. GFR  (NO RACE VARIABLE): >60 ML/MIN/1.73 M^2
GLUCOSE SERPL-MCNC: 110 MG/DL (ref 70–110)
HCT VFR BLD AUTO: 48 % (ref 40–54)
HGB BLD-MCNC: 15.9 G/DL (ref 14–18)
IMM GRANULOCYTES # BLD AUTO: 0.04 K/UL (ref 0–0.04)
IMM GRANULOCYTES NFR BLD AUTO: 0.3 % (ref 0–0.5)
LYMPHOCYTES # BLD AUTO: 0.9 K/UL (ref 1–4.8)
LYMPHOCYTES NFR BLD: 7.1 % (ref 18–48)
MCH RBC QN AUTO: 30.1 PG (ref 27–31)
MCHC RBC AUTO-ENTMCNC: 33.1 G/DL (ref 32–36)
MCV RBC AUTO: 91 FL (ref 82–98)
MONOCYTES # BLD AUTO: 0.6 K/UL (ref 0.3–1)
MONOCYTES NFR BLD: 5.4 % (ref 4–15)
NEUTROPHILS # BLD AUTO: 10.4 K/UL (ref 1.8–7.7)
NEUTROPHILS NFR BLD: 86.9 % (ref 38–73)
NRBC BLD-RTO: 0 /100 WBC
PLATELET # BLD AUTO: 209 K/UL (ref 150–450)
PMV BLD AUTO: 11.3 FL (ref 9.2–12.9)
POTASSIUM SERPL-SCNC: 4.1 MMOL/L (ref 3.5–5.1)
RBC # BLD AUTO: 5.29 M/UL (ref 4.6–6.2)
SODIUM SERPL-SCNC: 139 MMOL/L (ref 136–145)
WBC # BLD AUTO: 11.91 K/UL (ref 3.9–12.7)

## 2025-02-11 PROCEDURE — 25000003 PHARM REV CODE 250: Performed by: STUDENT IN AN ORGANIZED HEALTH CARE EDUCATION/TRAINING PROGRAM

## 2025-02-11 PROCEDURE — 85730 THROMBOPLASTIN TIME PARTIAL: CPT | Performed by: HOSPITALIST

## 2025-02-11 PROCEDURE — 11000001 HC ACUTE MED/SURG PRIVATE ROOM

## 2025-02-11 PROCEDURE — 25000003 PHARM REV CODE 250: Performed by: INTERNAL MEDICINE

## 2025-02-11 PROCEDURE — 94761 N-INVAS EAR/PLS OXIMETRY MLT: CPT

## 2025-02-11 PROCEDURE — 36415 COLL VENOUS BLD VENIPUNCTURE: CPT | Performed by: HOSPITALIST

## 2025-02-11 PROCEDURE — 80048 BASIC METABOLIC PNL TOTAL CA: CPT | Performed by: INTERNAL MEDICINE

## 2025-02-11 PROCEDURE — 85025 COMPLETE CBC W/AUTO DIFF WBC: CPT | Performed by: INTERNAL MEDICINE

## 2025-02-11 PROCEDURE — 63600175 PHARM REV CODE 636 W HCPCS: Performed by: INTERNAL MEDICINE

## 2025-02-11 RX ORDER — ALUMINUM HYDROXIDE, MAGNESIUM HYDROXIDE, AND SIMETHICONE 1200; 120; 1200 MG/30ML; MG/30ML; MG/30ML
30 SUSPENSION ORAL
Status: DISCONTINUED | OUTPATIENT
Start: 2025-02-11 | End: 2025-02-12 | Stop reason: HOSPADM

## 2025-02-11 RX ADMIN — HYDROCODONE BITARTRATE AND ACETAMINOPHEN 1 TABLET: 5; 325 TABLET ORAL at 01:02

## 2025-02-11 RX ADMIN — ASPIRIN 81 MG: 81 TABLET, COATED ORAL at 08:02

## 2025-02-11 RX ADMIN — ONDANSETRON 8 MG: 8 TABLET, ORALLY DISINTEGRATING ORAL at 09:02

## 2025-02-11 RX ADMIN — RIVAROXABAN 20 MG: 20 TABLET, FILM COATED ORAL at 04:02

## 2025-02-11 RX ADMIN — CLOPIDOGREL BISULFATE 75 MG: 75 TABLET ORAL at 08:02

## 2025-02-11 RX ADMIN — LOSARTAN POTASSIUM 50 MG: 25 TABLET, FILM COATED ORAL at 08:02

## 2025-02-11 RX ADMIN — METOPROLOL SUCCINATE 12.5 MG: 25 TABLET, EXTENDED RELEASE ORAL at 09:02

## 2025-02-11 RX ADMIN — PANTOPRAZOLE SODIUM 40 MG: 40 TABLET, DELAYED RELEASE ORAL at 08:02

## 2025-02-11 RX ADMIN — ALUMINUM HYDROXIDE, MAGNESIUM HYDROXIDE, AND SIMETHICONE 30 ML: 1200; 120; 1200 SUSPENSION ORAL at 09:02

## 2025-02-11 RX ADMIN — ONDANSETRON 8 MG: 8 TABLET, ORALLY DISINTEGRATING ORAL at 01:02

## 2025-02-11 RX ADMIN — HYDROCHLOROTHIAZIDE 12.5 MG: 12.5 TABLET ORAL at 08:02

## 2025-02-11 RX ADMIN — TAMSULOSIN HYDROCHLORIDE 0.4 MG: 0.4 CAPSULE ORAL at 09:02

## 2025-02-11 RX ADMIN — MORPHINE SULFATE 2 MG: 4 INJECTION, SOLUTION INTRAMUSCULAR; INTRAVENOUS at 02:02

## 2025-02-11 RX ADMIN — ALUMINUM HYDROXIDE, MAGNESIUM HYDROXIDE, AND SIMETHICONE 30 ML: 1200; 120; 1200 SUSPENSION ORAL at 04:02

## 2025-02-11 RX ADMIN — ATORVASTATIN CALCIUM 80 MG: 40 TABLET, FILM COATED ORAL at 08:02

## 2025-02-11 RX ADMIN — POLYETHYLENE GLYCOL 3350 17 G: 17 POWDER, FOR SOLUTION ORAL at 11:02

## 2025-02-11 NOTE — ASSESSMENT & PLAN NOTE
History of atrial flutter status post ablation, developed aflutter after C  He has not been on long-term anticoagulation.  - briefly on IV amiodarone drip, now in sinus rhythm.  - on xarelto - will discuss with Cardiology about need for triple therapy - (DAPT + anticoagulation)

## 2025-02-11 NOTE — PLAN OF CARE
Changes in medical condition or discharge plan:  Patient transferred to ICU following LHC    Does patient need new DME? no    Follow up appts needed: Patient will need followup appointments with PCP and cardiology and addtional appointments as ordered by the discharging provider.      Medically stable:  No     Estimated Discharge Date: tbd       02/11/25 1059   Rounds   Attendance Provider;Nurse ;Assigned nurse;Charge nurse;Occupational therapist;Pharmacist  (Critical care/pulm team, Palliative care team and Clergy)   Discharge Plan A Home   Why the patient remains in the hospital Requires continued medical care   Transition of Care Barriers None

## 2025-02-11 NOTE — NURSING
[facility] [place]  Nurses Note -- 4 Eyes      2/11/2025       Skin assessed on: Q Shift      [x] No Pressure Injuries Present    [x]Prevention Measures Documented    [] Yes LDA  for Pressure Injury Previously documented     [] Yes New Pressure Injury Discovered   [] LDA for New Pressure Injury Added      Attending RN:  Shirley Winn, RN     Second RN:  Kamille

## 2025-02-11 NOTE — EICU
eICU Intervention    Received request for heartburn medication  Already on pantoprazole    Seen resting not in distress    Ordered Tums prn for heartburn

## 2025-02-11 NOTE — ASSESSMENT & PLAN NOTE
Currently normal sinus rhythm  Continue with metoprolol succinate 12.5 mg daily  Continue with Xarelto 20 mg daily  [id number] Score: 2

## 2025-02-11 NOTE — ASSESSMENT & PLAN NOTE
Presented atypical chest pain  Pain said to be 8/10 in severity, non-radiating  History of atrial flutter status post ablation, hypertension and hyperlipidemia  EKG did not show any acute ST-T wave elevation  Pain resolved.  Aspirin and Plavix loaded, continued atorvastatin  Will continue heparin drip for non ST elevation myocardial infarction  LHC with multivessel, underwent PCI x2 -- mid Lcx and prox PDA

## 2025-02-11 NOTE — PROGRESS NOTES
[facility] [place] [facility]  Cardiology  Progress Note    Patient Name: Jules Aviles  MRN: [id number]  Admission Date: 2/8/2025  Hospital Length of Stay: 2 days  Code Status: Full Code   Attending Physician: Satish Mckenzie MD   Primary Care Physician: Basil Grover MD  Expected Discharge Date:   Principal Problem:NSTEMI (non-ST elevated myocardial infarction)    Subjective:     Hospital Course:   Patient was seen and examined this morning.  Under two-vessel PCI yesterday (mid circumflex and proximal RPDA)  No acute events in last 24 hours  Access site without hematoma or tenderness.  Remained in normal sinus rhythm.  No AFib recurrence.        Review of Systems   Cardiovascular:  Negative for chest pain, claudication, dyspnea on exertion, irregular heartbeat, leg swelling, near-syncope, orthopnea, palpitations, paroxysmal nocturnal dyspnea and syncope.   Respiratory:  Negative for cough, shortness of breath, snoring and sputum production.    Gastrointestinal:  Negative for abdominal pain, dysphagia, heartburn, nausea and vomiting.   Neurological:  Negative for dizziness, headaches, loss of balance and weakness.     Objective:     Vital Signs (Most Recent):  Temp: 98.8 °F (37.1 °C) (02/11/25 1615)  Pulse: 61 (02/11/25 1615)  Resp: 18 (02/11/25 1615)  BP: (!) 156/74 (02/11/25 1615)  SpO2: 95 % (02/11/25 1615) Vital Signs (24h Range):  Temp:  [97.6 °F (36.4 °C)-98.8 °F (37.1 °C)] 98.8 °F (37.1 °C)  Pulse:  [55-71] 61  Resp:  [0-24] 18  SpO2:  [92 %-98 %] 95 %  BP: (116-166)/(69-87) 156/74     Weight: 102.4 kg (225 lb 12 oz)  Body mass index is 32.39 kg/m².     SpO2: 95 %         Intake/Output Summary (Last 24 hours) at 2/11/2025 1713  Last data filed at 2/11/2025 0957  Gross per 24 hour   Intake 2790.03 ml   Output 300 ml   Net 2490.03 ml       Lines/Drains/Airways       Peripheral Intravenous Line  Duration                  Peripheral IV - Single Lumen 02/08/25 2130 20 G 1 in Left;Posterior Hand 2 days         Peripheral  IV - Single Lumen 02/09/25 0201 20 G Right Antecubital 2 days                       Physical Exam  HENT:      Head: Normocephalic and atraumatic.      Mouth/Throat:      Mouth: Mucous membranes are moist.   Eyes:      Extraocular Movements: Extraocular movements intact.      Pupils: Pupils are equal, round, and reactive to light.   Cardiovascular:      Rate and Rhythm: Normal rate and regular rhythm.      Pulses: Normal pulses.      Heart sounds: Normal heart sounds.   Pulmonary:      Effort: Pulmonary effort is normal.      Breath sounds: Normal breath sounds.   Abdominal:      General: Bowel sounds are normal.      Palpations: Abdomen is soft.   Musculoskeletal:      Left lower leg: No edema.   Skin:     General: Skin is warm.   Neurological:      General: No focal deficit present.      Mental Status: He is alert.   Psychiatric:         Mood and Affect: Mood normal.         Behavior: Behavior normal.            Current Medications:   aluminum-magnesium hydroxide-simethicone  30 mL Oral QID (AC & HS)    aspirin  81 mg Oral Daily    atorvastatin  80 mg Oral Daily    clopidogreL  75 mg Oral Daily    hydroCHLOROthiazide  12.5 mg Oral Daily    losartan  50 mg Oral Daily    metoprolol succinate  12.5 mg Oral Daily    pantoprazole  40 mg Oral Daily    polyethylene glycol  17 g Oral Daily    rivaroxaban  20 mg Oral Daily with dinner    tamsulosin  1 capsule Oral QHS         Current Facility-Administered Medications:     acetaminophen, 650 mg, Oral, Q8H PRN    acetaminophen, 650 mg, Oral, Q4H PRN    atropine, 0.5 mg, Intravenous, PRN    dicyclomine, 10 mg, Intramuscular, Q6H PRN    HYDROcodone-acetaminophen, 1 tablet, Oral, Q4H PRN    melatonin, 6 mg, Oral, Nightly PRN    morphine, 2 mg, Intravenous, Q10 Min PRN    nitroGLYCERIN, 0.4 mg, Sublingual, Q5 Min PRN    ondansetron, 8 mg, Oral, Q8H PRN    ondansetron, 8 mg, Oral, Q8H PRN    simethicone, 2 tablet, Oral, TID PRN    sodium chloride 0.9%, 250 mL, Intravenous, PRN     sodium chloride 0.9%, 10 mL, Intravenous, PRN    Laboratory (all labs reviewed):  CBC:  Recent Labs   Lab 12/23/23  0846 02/08/25  2107 02/09/25  0255 02/10/25  0452 02/11/25  0424   WBC 8.84 10.38 9.44 7.82 11.91   Hemoglobin 16.5 15.3 15.0 15.1 15.9   Hematocrit 48.6 45.3 44.2 45.3 48.0   Platelets 162 229 208 200 209       CHEMISTRIES:  Recent Labs   Lab 08/12/23  0447 08/13/23  0353 08/14/23  0332 08/15/23  0420 08/16/23  0347 12/23/23  0846 01/26/24  0927 05/06/24  0940 01/31/25  1006 02/08/25  2150 02/11/25  0424   Glucose 103 186 H 219 H 118 H 118 H 176 H 112 H  --   --  139 H 110   Sodium 139 137 137 138 142 139 139 139 140 139 139   Potassium 3.8 3.0 L 2.8 L 3.2 L 3.5 3.6 4.3 3.8 4.1 4.0 4.1   BUN 14 11 8 8 7 L 12  --  23.0 17.2 21 13   Blood Urea Nitrogen  --   --   --   --   --   --  17  --   --   --   --    Creatinine 0.8 0.8 0.8 0.8 0.8 0.9 0.92 0.97 0.88 1.0 1.0   eGFR >60 >60 >60 >60 >60 >60 92 86 L 94 >60 >60   Calcium 8.9 8.2 L 8.2 L 8.1 L 8.5 L 9.7 9.6 9.8 9.7 9.9 9.5   Magnesium 1.7 1.6 1.5 L 1.9 1.8  --   --   --   --   --   --        CARDIAC BIOMARKERS:  Recent Labs   Lab 02/08/25 2107 02/08/25  2318 02/09/25  0117 02/09/25  0254   Troponin I 0.082 H 0.602 H 1.315 H 2.349 H       COAGS:  Recent Labs   Lab 08/23/22  1158 12/23/23  0846 02/09/25  0255   INR 1.0 1.1 1.0  1.0       LIPIDS/LFTS:  Recent Labs   Lab 08/16/23  0347 12/23/23  0846 01/26/24  0927 01/31/25  1006 02/08/25  2150 02/09/25  0254   Cholesterol  --   --   --  126  --  119 L   Triglycerides  --   --   --  109  --  79   HDL  --   --   --  49  --  43   LDL Cholesterol  --   --   --   --   --  60.2 L   LDL Calculated  --   --   --  55  --   --    Non-HDL Cholesterol  --   --   --  77  --  76   AST 69 H 23 16 23 29  --    ALT 83 H 25 19 29 35  --        BNP:        TSH:  Recent Labs   Lab 01/31/25  1006   TSH 1.41       Free T4:          Assessment and Plan:       * NSTEMI (non-ST elevated myocardial infarction)  He is status post  mid circumflex and proximal RPDA PCI by Dr. Lipscomb on 2/10/2025  Right radial access site without issues  Continue aspirin for 6 more days and then stop it  Continue Plavix 75 mg daily and Xarelto 20 mg daily  Continue high-intensity statin  Outpatient cardiac rehab referral    Atrial flutter  Currently normal sinus rhythm  Continue with metoprolol succinate 12.5 mg daily  Continue with Xarelto 20 mg daily  [id number] Score: 2    Essential hypertension  Blood pressure stable  Continue home losartan 50 mg daily and hydrochlorothiazide 12.5 mg daily    Abdominal aortic atherosclerosis  Continue with statin      Cardiology will sign off      VTE Risk Mitigation (From admission, onward)           Ordered     rivaroxaban tablet 20 mg  With dinner         02/10/25 1255     Reason for no Mechanical VTE Prophylaxis  Once        Question:  Reasons:  Answer:  IV Heparin within 24 hrs. Pre or Post-Op    02/09/25 0209     IP VTE HIGH RISK PATIENT  Once         02/09/25 0209     Place sequential compression device  Until discontinued         02/09/25 0209                    Yury Gamboa MD  Cardiology  [facility] - [place] - Telemetry

## 2025-02-11 NOTE — SUBJECTIVE & OBJECTIVE
Review of Systems   Cardiovascular:  Negative for chest pain, claudication, dyspnea on exertion, irregular heartbeat, leg swelling, near-syncope, orthopnea, palpitations, paroxysmal nocturnal dyspnea and syncope.   Respiratory:  Negative for cough, shortness of breath, snoring and sputum production.    Gastrointestinal:  Negative for abdominal pain, dysphagia, heartburn, nausea and vomiting.   Neurological:  Negative for dizziness, headaches, loss of balance and weakness.     Objective:     Vital Signs (Most Recent):  Temp: 98.8 °F (37.1 °C) (02/11/25 1615)  Pulse: 61 (02/11/25 1615)  Resp: 18 (02/11/25 1615)  BP: (!) 156/74 (02/11/25 1615)  SpO2: 95 % (02/11/25 1615) Vital Signs (24h Range):  Temp:  [97.6 °F (36.4 °C)-98.8 °F (37.1 °C)] 98.8 °F (37.1 °C)  Pulse:  [55-71] 61  Resp:  [0-24] 18  SpO2:  [92 %-98 %] 95 %  BP: (116-166)/(69-87) 156/74     Weight: 102.4 kg (225 lb 12 oz)  Body mass index is 32.39 kg/m².     SpO2: 95 %         Intake/Output Summary (Last 24 hours) at 2/11/2025 1713  Last data filed at 2/11/2025 0957  Gross per 24 hour   Intake 2790.03 ml   Output 300 ml   Net 2490.03 ml       Lines/Drains/Airways       Peripheral Intravenous Line  Duration                  Peripheral IV - Single Lumen 02/08/25 2130 20 G 1 in Left;Posterior Hand 2 days         Peripheral IV - Single Lumen 02/09/25 0201 20 G Right Antecubital 2 days                       Physical Exam  HENT:      Head: Normocephalic and atraumatic.      Mouth/Throat:      Mouth: Mucous membranes are moist.   Eyes:      Extraocular Movements: Extraocular movements intact.      Pupils: Pupils are equal, round, and reactive to light.   Cardiovascular:      Rate and Rhythm: Normal rate and regular rhythm.      Pulses: Normal pulses.      Heart sounds: Normal heart sounds.   Pulmonary:      Effort: Pulmonary effort is normal.      Breath sounds: Normal breath sounds.   Abdominal:      General: Bowel sounds are normal.      Palpations: Abdomen  is soft.   Musculoskeletal:      Left lower leg: No edema.   Skin:     General: Skin is warm.   Neurological:      General: No focal deficit present.      Mental Status: He is alert.   Psychiatric:         Mood and Affect: Mood normal.         Behavior: Behavior normal.            Current Medications:   aluminum-magnesium hydroxide-simethicone  30 mL Oral QID (AC & HS)    aspirin  81 mg Oral Daily    atorvastatin  80 mg Oral Daily    clopidogreL  75 mg Oral Daily    hydroCHLOROthiazide  12.5 mg Oral Daily    losartan  50 mg Oral Daily    metoprolol succinate  12.5 mg Oral Daily    pantoprazole  40 mg Oral Daily    polyethylene glycol  17 g Oral Daily    rivaroxaban  20 mg Oral Daily with dinner    tamsulosin  1 capsule Oral QHS         Current Facility-Administered Medications:     acetaminophen, 650 mg, Oral, Q8H PRN    acetaminophen, 650 mg, Oral, Q4H PRN    atropine, 0.5 mg, Intravenous, PRN    dicyclomine, 10 mg, Intramuscular, Q6H PRN    HYDROcodone-acetaminophen, 1 tablet, Oral, Q4H PRN    melatonin, 6 mg, Oral, Nightly PRN    morphine, 2 mg, Intravenous, Q10 Min PRN    nitroGLYCERIN, 0.4 mg, Sublingual, Q5 Min PRN    ondansetron, 8 mg, Oral, Q8H PRN    ondansetron, 8 mg, Oral, Q8H PRN    simethicone, 2 tablet, Oral, TID PRN    sodium chloride 0.9%, 250 mL, Intravenous, PRN    sodium chloride 0.9%, 10 mL, Intravenous, PRN    Laboratory (all labs reviewed):  CBC:  Recent Labs   Lab 12/23/23  0846 02/08/25  2107 02/09/25  0255 02/10/25  0452 02/11/25  0424   WBC 8.84 10.38 9.44 7.82 11.91   Hemoglobin 16.5 15.3 15.0 15.1 15.9   Hematocrit 48.6 45.3 44.2 45.3 48.0   Platelets 162 229 208 200 209       CHEMISTRIES:  Recent Labs   Lab 08/12/23  0447 08/13/23  0353 08/14/23  0332 08/15/23  0420 08/16/23  0347 12/23/23  0846 01/26/24  0927 05/06/24  0940 01/31/25  1006 02/08/25  2150 02/11/25  0424   Glucose 103 186 H 219 H 118 H 118 H 176 H 112 H  --   --  139 H 110   Sodium 139 137 137 138 142 139 139 139 140 139  139   Potassium 3.8 3.0 L 2.8 L 3.2 L 3.5 3.6 4.3 3.8 4.1 4.0 4.1   BUN 14 11 8 8 7 L 12  --  23.0 17.2 21 13   Blood Urea Nitrogen  --   --   --   --   --   --  17  --   --   --   --    Creatinine 0.8 0.8 0.8 0.8 0.8 0.9 0.92 0.97 0.88 1.0 1.0   eGFR >60 >60 >60 >60 >60 >60 92 86 L 94 >60 >60   Calcium 8.9 8.2 L 8.2 L 8.1 L 8.5 L 9.7 9.6 9.8 9.7 9.9 9.5   Magnesium 1.7 1.6 1.5 L 1.9 1.8  --   --   --   --   --   --        CARDIAC BIOMARKERS:  Recent Labs   Lab 02/08/25  2107 02/08/25  2318 02/09/25  0117 02/09/25  0254   Troponin I 0.082 H 0.602 H 1.315 H 2.349 H       COAGS:  Recent Labs   Lab 08/23/22  1158 12/23/23  0846 02/09/25  0255   INR 1.0 1.1 1.0  1.0       LIPIDS/LFTS:  Recent Labs   Lab 08/16/23  0347 12/23/23  0846 01/26/24  0927 01/31/25  1006 02/08/25  2150 02/09/25  0254   Cholesterol  --   --   --  126  --  119 L   Triglycerides  --   --   --  109  --  79   HDL  --   --   --  49  --  43   LDL Cholesterol  --   --   --   --   --  60.2 L   LDL Calculated  --   --   --  55  --   --    Non-HDL Cholesterol  --   --   --  77  --  76   AST 69 H 23 16 23 29  --    ALT 83 H 25 19 29 35  --        BNP:        TSH:  Recent Labs   Lab 01/31/25  1006   TSH 1.41       Free T4:

## 2025-02-11 NOTE — SUBJECTIVE & OBJECTIVE
Interval History: no chest pain or shortness of breath but he is having indigestion, abdominal fullness and significant amount of belching. He reports this happens occasionally since his gallbladder surgery. Abdominal hernia is soft, reducible and bowel sounds present. Passing gas and had BM.    Review of Systems  Objective:     Vital Signs (Most Recent):  Temp: 98.2 °F (36.8 °C) (02/11/25 0712)  Pulse: 67 (02/11/25 0911)  Resp: 13 (02/11/25 0911)  BP: (!) 141/77 (02/11/25 0700)  SpO2: 97 % (02/11/25 0911) Vital Signs (24h Range):  Temp:  [98 °F (36.7 °C)-98.4 °F (36.9 °C)] 98.2 °F (36.8 °C)  Pulse:  [57-79] 67  Resp:  [10-24] 13  SpO2:  [92 %-98 %] 97 %  BP: (116-166)/(69-87) 141/77     Weight: 102.4 kg (225 lb 12 oz)  Body mass index is 32.39 kg/m².    Intake/Output Summary (Last 24 hours) at 2/11/2025 0919  Last data filed at 2/11/2025 0000  Gross per 24 hour   Intake 3405.46 ml   Output 1125 ml   Net 2280.46 ml         Physical Exam  Vitals reviewed.   Constitutional:       General: He is not in acute distress.     Appearance: Normal appearance. He is obese. He is not ill-appearing.   HENT:      Head: Normocephalic and atraumatic.   Cardiovascular:      Rate and Rhythm: Normal rate and regular rhythm.      Pulses: Normal pulses.   Pulmonary:      Effort: Pulmonary effort is normal.   Abdominal:      General: Abdomen is flat. Bowel sounds are normal. There is no distension.      Palpations: Abdomen is soft.      Hernia: A hernia is present.      Comments: Hernia is soft, reducible   Musculoskeletal:         General: No swelling.   Skin:     General: Skin is warm and dry.   Neurological:      General: No focal deficit present.      Mental Status: He is alert and oriented to person, place, and time.             Significant Labs: All pertinent labs within the past 24 hours have been reviewed.    Significant Imaging: I have reviewed all pertinent imaging results/findings within the past 24 hours.

## 2025-02-11 NOTE — ASSESSMENT & PLAN NOTE
He is status post mid circumflex and proximal RPDA PCI by Dr. Lipscomb on 2/10/2025  Right radial access site without issues  Continue aspirin for 6 more days and then stop it  Continue Plavix 75 mg daily and Xarelto 20 mg daily  Continue high-intensity statin

## 2025-02-11 NOTE — EICU
Intervention Initiated From:  Bedside    Tameka intervened regarding:  Medication    Doctor Notified:  Yes  Comments: Dr. Lyle notified that bedside RN is requesting order for medication for heartburn/indigestion.

## 2025-02-11 NOTE — NURSING
[facility], [facility]  Nurses Note -- 4 Eyes      2/10/2025       Skin assessed on: Q Shift      [x] No Pressure Injuries Present    [x]Prevention Measures Documented    [] Yes LDA  for Pressure Injury Previously documented     [] Yes New Pressure Injury Discovered   [] LDA for New Pressure Injury Added      Attending RN:  Kamille Mcmillan RN     Second RN:  ANH Watson

## 2025-02-11 NOTE — ASSESSMENT & PLAN NOTE
History of gastroesophageal reflux disease  Thought symptoms were due to GERD  Continue home dose of pantoprazole for now  Add maalox

## 2025-02-11 NOTE — HOSPITAL COURSE
Patient was seen and examined this morning.  Under two-vessel PCI yesterday (mid circumflex and proximal RPDA)  No acute events in last 24 hours  Access site without hematoma or tenderness.  Remained in normal sinus rhythm.  No AFib recurrence.

## 2025-02-11 NOTE — NURSING
Patient has arrived on the unit via wheelchair on room air, awake and alert, no distress noted. Situated in bed, call light given.

## 2025-02-11 NOTE — PROGRESS NOTES
"Food & Nutrition  Education    Diet Education: Cardiac Diet Education  Time Spent: 15 min  Learners: Pt      Nutrition Education provided with handouts: Heart Healthy Nutrition Therapy      Comments: Educated pt on heart healthy diet. Pt states that his girlfriend is an RD and he knows what he should be eating, and admits to not eating or liking salt. Encouraged pt to continue not cooking with/using salt and discussed different salt-free seasoning alternatives such as Mrs. Pierre and salt-free Alexander. Discussed choosing fresh/frozen veggies and rinsing canned good when using. Discussed looking for key words such as "low sodium, no added salt, reduced fat" when grocery shopping and using more olive/plant oils compared to butter. Encouraged pt to choose more lean proteins such as chicken/fish/plant proteins compared to fattier cuts of beef and pork. Discussed tips on going out to eat such as asking for no added salt and sauce on the side. Emphasized the importance of reading the nutrition label to identify sodium and fat content of food. Pt voiced understanding.       All questions and concerns answered. Dietitian's contact information provided.       Follow-Up: 2/18    Please Re-consult as needed        Thanks!    "

## 2025-02-11 NOTE — PROGRESS NOTES
"[facility] Medicine  Progress Note    Patient Name: Jules Aviles  MRN: [id number]  Patient Class: IP- Inpatient   Admission Date: 2/8/2025  Length of Stay: 2 days  Attending Physician: Satish Mckenzie MD  Primary Care Provider: Basil Grover MD        Subjective     Principal Problem:NSTEMI (non-ST elevated myocardial infarction)        HPI:  69-year-old  male with medical history significant for hypertension, hyperlipidemia, atrial flutter status post ablation, atherosclerotic disease, previous small-bowel obstruction status post resection complicated by abdominal hernia, class 1 obesity and gastroesophageal reflux disease who presented to the emergency department on account of epigastric pain of 1 hour duration prior to presentation, pain is epigastric, described as discomfort, 8/10 in severity, nonradiating, with associated diaphoresis but denied nausea, no vomiting, pain was mildly improved with GI cocktail given in the emergency room, he denied preceding cough, no upper respiratory tract symptoms of malaise, postnasal drip or rhinorrhea.  He voiced similar pain in the past but relieved with Tums, this episode has not been relieved with usual antacid.  He denied lightheadedness, no dyspnea with exertion, headache, blurry vision or fall.  He denied change in bowel habits, no constipation or diarrhea.  He has not had urinary symptoms of dysuria, frequency, urgency, straining at micturition or hematuria.  He denies cigarette smoking, no other substance use, quit alcohol use over 20 years ago.    Initial troponin at presentation was 0.082 which increased to 0.602 and peaked at 1.315 during this documentation, comprehensive metabolic panel essentially within normal limits except for glucose of 139, CBC was also with no significant abnormalities, lipid panel with cholesterol of 126, HDL 49, LDL 55, TSH within normal limits at 1.41.  Abdominal x-ray showed "nonobstructive bowel gas " "pattern".  Chest x-ray PA/lateral with "chronic elevation of the right hemidiaphragm, otherwise no acute cardiopulmonary process identified".     Overview/Hospital Course:  68 y/o male admitted with NSTEMI.  Troponin now up to 2.3.  Currently chest pain free.  Cardiology consulted.  Started on heparin drip, ASA, Statin.  Plan for C today.    Interval History: no chest pain or shortness of breath but he is having indigestion, abdominal fullness and significant amount of belching. He reports this happens occasionally since his gallbladder surgery. Abdominal hernia is soft, reducible and bowel sounds present. Passing gas and had BM.    Review of Systems  Objective:     Vital Signs (Most Recent):  Temp: 98.2 °F (36.8 °C) (02/11/25 0712)  Pulse: 67 (02/11/25 0911)  Resp: 13 (02/11/25 0911)  BP: (!) 141/77 (02/11/25 0700)  SpO2: 97 % (02/11/25 0911) Vital Signs (24h Range):  Temp:  [98 °F (36.7 °C)-98.4 °F (36.9 °C)] 98.2 °F (36.8 °C)  Pulse:  [57-79] 67  Resp:  [10-24] 13  SpO2:  [92 %-98 %] 97 %  BP: (116-166)/(69-87) 141/77     Weight: 102.4 kg (225 lb 12 oz)  Body mass index is 32.39 kg/m².    Intake/Output Summary (Last 24 hours) at 2/11/2025 0919  Last data filed at 2/11/2025 0000  Gross per 24 hour   Intake 3405.46 ml   Output 1125 ml   Net 2280.46 ml         Physical Exam  Vitals reviewed.   Constitutional:       General: He is not in acute distress.     Appearance: Normal appearance. He is obese. He is not ill-appearing.   HENT:      Head: Normocephalic and atraumatic.   Cardiovascular:      Rate and Rhythm: Normal rate and regular rhythm.      Pulses: Normal pulses.   Pulmonary:      Effort: Pulmonary effort is normal.   Abdominal:      General: Abdomen is flat. Bowel sounds are normal. There is no distension.      Palpations: Abdomen is soft.      Hernia: A hernia is present.      Comments: Hernia is soft, reducible   Musculoskeletal:         General: No swelling.   Skin:     General: Skin is warm and dry. "   Neurological:      General: No focal deficit present.      Mental Status: He is alert and oriented to person, place, and time.             Significant Labs: All pertinent labs within the past 24 hours have been reviewed.    Significant Imaging: I have reviewed all pertinent imaging results/findings within the past 24 hours.    Assessment and Plan     * NSTEMI (non-ST elevated myocardial infarction)  Presented atypical chest pain  Pain said to be 8/10 in severity, non-radiating  History of atrial flutter status post ablation, hypertension and hyperlipidemia  EKG did not show any acute ST-T wave elevation  Pain resolved.  Aspirin and Plavix loaded, continued atorvastatin  Will continue heparin drip for non ST elevation myocardial infarction  [facility] with multivessel, underwent PCI x2 -- mid Lcx and prox PDA      Obesity  Body mass index is 33 kg/m².   Continue lifestyle modification  Morbid obesity complicates all aspects of disease management from diagnostic modalities to treatment.   Weight loss encouraged and health benefits explained to patient.         GERD (gastroesophageal reflux disease)  History of gastroesophageal reflux disease  Thought symptoms were due to GERD  Continue home dose of pantoprazole for now  Add maalox      History of cardiac radiofrequency ablation (RFA)  History of atrial fibrillation status post radiofrequency ablation        Atrial flutter  History of atrial flutter status post ablation, developed aflutter after [facility]  He has not been on long-term anticoagulation.  - briefly on IV amiodarone drip, now in sinus rhythm.  - on xarelto - will discuss with Cardiology about need for triple therapy - (DAPT + anticoagulation)      Essential hypertension  Patient's blood pressure range in the last 24 hours was: BP  Min: 127/78  Max: 137/82.The patient's inpatient anti-hypertensive regimen is listed below:  Current Antihypertensives  hydroCHLOROthiazide tablet 12.5 mg, Daily, Oral  losartan tablet 50  mg, Daily, Oral  nitroGLYCERIN SL tablet 0.4 mg, Every 5 min PRN, Sublingual  nitroGLYCERIN in D5W 200 mcg/mL vial, As needed (PRN),   verapamiL injection, As needed (PRN),   nitroGLYCERIN SL tablet 0.4 mg, Every 5 min PRN, Sublingual    Plan  - Bp better controlled.  Continue current regimen.      VTE Risk Mitigation (From admission, onward)           Ordered     rivaroxaban tablet 20 mg  With dinner         02/10/25 1255     heparin infusion 1,000 units/500 ml in 0.9% NaCl (pressure line flush)  Intra-op continuous PRN         02/10/25 1135     Reason for no Mechanical VTE Prophylaxis  Once        Question:  Reasons:  Answer:  IV Heparin within 24 hrs. Pre or Post-Op    02/09/25 0209     IP VTE HIGH RISK PATIENT  Once         02/09/25 0209     Place sequential compression device  Until discontinued         02/09/25 0209                    Discharge Planning   KAYLEIGH:      Code Status: Full Code   Medical Readiness for Discharge Date:   Discharge Plan A: Home            Critical care time spent on the evaluation and treatment of severe organ dysfunction, review of pertinent labs and imaging studies, discussions with consulting providers and discussions with patient/family: 20 minutes.            Satish Mckenzie MD  Department of Hospital Medicine   [facility] - Intensive Care

## 2025-02-11 NOTE — NURSING TRANSFER
Nursing Transfer Note      2/11/2025   2:42 PM    Nurse giving handoff:Shirley  Nurse receiving handoff:Ashley    Reason patient is being transferred: orders    Transfer From: ICU    Transfer via wheelchair    Transfer with cardiac monitoring    Transported by Nurse    Transfer Vital Signs:  Blood Pressure:152/82  Heart Rate:70  O2:94  Temperature:97.8  Respirations:18    Telemetry: Box Number [id number]  Order for Tele Monitor? Yes    Additional Lines:     Medicines sent: na    Any special needs or follow-up needed: na    Patient belongings transferred with patient: Yes    Chart send with patient: Yes    Notified: Pt will call family    Patient reassessed at: 02/11/2025 (date, time)  1  Upon arrival to floor: cardiac monitor applied, patient oriented to room, call bell in reach, and bed in lowest position

## 2025-02-11 NOTE — PLAN OF CARE
Pt remains in ICU. R radial site clean, dry, and intact; no redness or swelling noted. Amio gtt stopped d/t pt being in sinus rhythm with HR in 50s. Pt complaining of indigestion/epigastric pain from hernia, multiple PRNs given with moderate relief. Pt with emesis x 1, relieved with zofran. O2 sats dropped to upper 80s while asleep, 2L NC applied with relief. UOP adequate. POC reviewed with pt, verbalized understanding.     Problem: Adult Inpatient Plan of Care  Goal: Plan of Care Review  Outcome: Progressing  Goal: Patient-Specific Goal (Individualized)  Outcome: Progressing  Goal: Absence of Hospital-Acquired Illness or Injury  Outcome: Progressing  Goal: Optimal Comfort and Wellbeing  Outcome: Progressing  Goal: Readiness for Transition of Care  Outcome: Progressing     Problem: Acute Coronary Syndrome  Goal: Optimal Adaptation to Illness  Outcome: Progressing  Goal: Absence of Cardiac-Related Pain  Outcome: Progressing  Goal: Normalized Cardiac Rhythm  Outcome: Progressing  Goal: Effective Cardiac Pump Function  Outcome: Progressing  Goal: Adequate Tissue Perfusion  Outcome: Progressing     Problem: Cardiac Catheterization (Diagnostic/Interventional)  Goal: Absence of Bleeding  Outcome: Progressing  Goal: Absence of Contrast-Induced Injury  Outcome: Progressing  Goal: Stable Heart Rate and Rhythm  Outcome: Progressing  Goal: Absence of Embolism Signs and Symptoms  Outcome: Progressing  Goal: Anesthesia/Sedation Recovery  Outcome: Progressing  Goal: Optimal Pain Control and Function  Outcome: Progressing  Goal: Absence of Vascular Access Complication  Outcome: Progressing     Problem: Dysrhythmia  Goal: Normalized Cardiac Rhythm  Outcome: Progressing     Problem: Wound  Goal: Optimal Coping  Outcome: Progressing  Goal: Optimal Functional Ability  Outcome: Progressing  Goal: Absence of Infection Signs and Symptoms  Outcome: Progressing  Goal: Improved Oral Intake  Outcome: Progressing  Goal: Optimal Pain Control  and Function  Outcome: Progressing  Goal: Skin Health and Integrity  Outcome: Progressing  Goal: Optimal Wound Healing  Outcome: Progressing     Problem: Fall Injury Risk  Goal: Absence of Fall and Fall-Related Injury  Outcome: Progressing

## 2025-02-12 VITALS
HEART RATE: 63 BPM | SYSTOLIC BLOOD PRESSURE: 109 MMHG | WEIGHT: 225.75 LBS | OXYGEN SATURATION: 93 % | RESPIRATION RATE: 18 BRPM | HEIGHT: 70 IN | DIASTOLIC BLOOD PRESSURE: 63 MMHG | BODY MASS INDEX: 32.32 KG/M2 | TEMPERATURE: 99 F

## 2025-02-12 LAB
APTT PPP: 36.9 SEC (ref 21–32)
OHS QRS DURATION: 80 MS
OHS QTC CALCULATION: 430 MS

## 2025-02-12 PROCEDURE — 25000003 PHARM REV CODE 250: Performed by: STUDENT IN AN ORGANIZED HEALTH CARE EDUCATION/TRAINING PROGRAM

## 2025-02-12 PROCEDURE — 85730 THROMBOPLASTIN TIME PARTIAL: CPT | Performed by: STUDENT IN AN ORGANIZED HEALTH CARE EDUCATION/TRAINING PROGRAM

## 2025-02-12 PROCEDURE — 36415 COLL VENOUS BLD VENIPUNCTURE: CPT | Performed by: STUDENT IN AN ORGANIZED HEALTH CARE EDUCATION/TRAINING PROGRAM

## 2025-02-12 PROCEDURE — 94761 N-INVAS EAR/PLS OXIMETRY MLT: CPT

## 2025-02-12 RX ORDER — METOPROLOL SUCCINATE 25 MG/1
12.5 TABLET, EXTENDED RELEASE ORAL DAILY
Qty: 45 TABLET | Refills: 3 | Status: SHIPPED | OUTPATIENT
Start: 2025-02-13 | End: 2026-02-13

## 2025-02-12 RX ORDER — NITROGLYCERIN 0.4 MG/1
0.4 TABLET SUBLINGUAL EVERY 5 MIN PRN
Qty: 25 TABLET | Refills: 4 | Status: SHIPPED | OUTPATIENT
Start: 2025-02-13 | End: 2026-02-13

## 2025-02-12 RX ORDER — CLOPIDOGREL BISULFATE 75 MG/1
75 TABLET ORAL DAILY
Qty: 30 TABLET | Refills: 11 | Status: SHIPPED | OUTPATIENT
Start: 2025-02-13 | End: 2026-02-13

## 2025-02-12 RX ORDER — ASPIRIN 81 MG/1
81 TABLET ORAL DAILY
COMMUNITY
Start: 2025-02-12 | End: 2025-02-16

## 2025-02-12 RX ADMIN — METOPROLOL SUCCINATE 12.5 MG: 25 TABLET, EXTENDED RELEASE ORAL at 09:02

## 2025-02-12 RX ADMIN — HYDROCHLOROTHIAZIDE 12.5 MG: 12.5 TABLET ORAL at 09:02

## 2025-02-12 RX ADMIN — CLOPIDOGREL BISULFATE 75 MG: 75 TABLET ORAL at 09:02

## 2025-02-12 RX ADMIN — ASPIRIN 81 MG: 81 TABLET, COATED ORAL at 09:02

## 2025-02-12 RX ADMIN — PANTOPRAZOLE SODIUM 40 MG: 40 TABLET, DELAYED RELEASE ORAL at 09:02

## 2025-02-12 RX ADMIN — LOSARTAN POTASSIUM 50 MG: 25 TABLET, FILM COATED ORAL at 09:02

## 2025-02-12 RX ADMIN — ALUMINUM HYDROXIDE, MAGNESIUM HYDROXIDE, AND SIMETHICONE 30 ML: 1200; 120; 1200 SUSPENSION ORAL at 06:02

## 2025-02-12 RX ADMIN — ATORVASTATIN CALCIUM 80 MG: 40 TABLET, FILM COATED ORAL at 09:02

## 2025-02-12 NOTE — DISCHARGE INSTRUCTIONS
Our goal at [facility] is to always give you outstanding care and exceptional service. You may receive a survey by mail, text or e-mail in the next 7-10 days from Geoff Silva and our leadership team asking about the care you received with us. The survey should only take 5-10 minutes to complete and is very important to us.     Your feedback provides us with a way to recognize our staff who work tirelessly to provide the best care! Also, your responses help us learn how to improve when your experience was below our aspiration of excellence. We WILL use your feedback to continue making improvements to help us provide the highest quality care. We keep your personal information and feedback confidential. We appreciate your time completing this survey and can't wait to hear from you!!!     We want you to leave us today feeling like you can DEFINITELY recommend us to others! We look forward to your continued care with us! Thanks so much for choosing [facility] for your healthcare needs!

## 2025-02-12 NOTE — PLAN OF CARE
Case Management Final Discharge Note      Discharge Disposition: Home    New DME ordered / company name: None    Relevant SDOH / Transition of Care Barriers:  None identified    Person available to provide assistance at home when needed and their contact information: Aleisha Serna [phone]    Scheduled followup appointment: PCP 2/14; cardiology 2/26    Referrals placed: None    Transportation: private vehicle        Patient educated on discharge services and updated on DC plan. Bedside RN notified, patient clear to discharge from Case Management Perspective.      02/12/25 1041   Final Note   Assessment Type Final Discharge Note   Anticipated Discharge Disposition Home   Hospital Resources/Appts/Education Provided Appointments scheduled and added to AVS   Post-Acute Status   Coverage BCBS   Discharge Delays None known at this time

## 2025-02-12 NOTE — PLAN OF CARE
Problem: Adult Inpatient Plan of Care  Goal: Plan of Care Review  Outcome: Progressing  Flowsheets (Taken 2/12/2025 1310)  Plan of Care Reviewed With: patient     Problem: Cardiac Catheterization (Diagnostic/Interventional)  Goal: Absence of Bleeding  Outcome: Progressing     Problem: Dysrhythmia  Goal: Normalized Cardiac Rhythm  Outcome: Progressing     Problem: Wound  Goal: Optimal Coping  Outcome: Progressing  Intervention: Support Patient and Family Response  Flowsheets (Taken 2/12/2025 1310)  Supportive Measures: active listening utilized     Problem: Fall Injury Risk  Goal: Absence of Fall and Fall-Related Injury  Outcome: Progressing

## 2025-02-12 NOTE — NURSING
Patient is discharged on room air, no distress noted. Instructions printed. To be reviewed by the virtual nurse. Bed in lowest position, wheels locked, call light in reach. Son at bedside. Prescriptions delivered.

## 2025-02-12 NOTE — PROGRESS NOTES
AVS virtually reviewed with patient in its entirety with emphasis on diet, medications, follow-up appointments and reasons to return to the ED or contact the [facility] On Call Nurse Care Line. Patient also encouraged to utilize their patient portal. Ease and convenience of use reiterated. Education complete and patient voiced understanding. All questions answered. Discharge teaching complete. Encouraged to complete patient survey. Discussed bleeding precautions.  Encouraged to activate patient portal.

## 2025-02-13 NOTE — DISCHARGE SUMMARY
[facility] Medicine  Discharge Summary      Patient Name: Jules Aviles  MRN: [id number]  DONELL: [id number]  Patient Class: IP- Inpatient  Admission Date: 2/8/2025  Hospital Length of Stay: 3 days  Discharge Date and Time: 2/12/2025  1:18 PM  Attending Physician: No att. providers found   Discharging Provider: Satish Mckenzie MD  Primary Care Provider: Basil Grover MD    Primary Care Team: Networked reference to record PCT     HPI:   69-year-old  male with medical history significant for hypertension, hyperlipidemia, atrial flutter status post ablation, atherosclerotic disease, previous small-bowel obstruction status post resection complicated by abdominal hernia, class 1 obesity and gastroesophageal reflux disease who presented to the emergency department on account of epigastric pain of 1 hour duration prior to presentation, pain is epigastric, described as discomfort, 8/10 in severity, nonradiating, with associated diaphoresis but denied nausea, no vomiting, pain was mildly improved with GI cocktail given in the emergency room, he denied preceding cough, no upper respiratory tract symptoms of malaise, postnasal drip or rhinorrhea.  He voiced similar pain in the past but relieved with Tums, this episode has not been relieved with usual antacid.  He denied lightheadedness, no dyspnea with exertion, headache, blurry vision or fall.  He denied change in bowel habits, no constipation or diarrhea.  He has not had urinary symptoms of dysuria, frequency, urgency, straining at micturition or hematuria.  He denies cigarette smoking, no other substance use, quit alcohol use over 20 years ago.    Initial troponin at presentation was 0.082 which increased to 0.602 and peaked at 1.315 during this documentation, comprehensive metabolic panel essentially within normal limits except for glucose of 139, CBC was also with no significant abnormalities, lipid panel with cholesterol of 126, HDL 49, LDL 55,  "TSH within normal limits at 1.41.  Abdominal x-ray showed "nonobstructive bowel gas pattern".  Chest x-ray PA/lateral with "chronic elevation of the right hemidiaphragm, otherwise no acute cardiopulmonary process identified".     Procedure(s) (LRB):  ANGIOGRAM, CORONARY ARTERY (N/A)  Percutaneous coronary intervention (N/A)  IVUS, Coronary  Stent, Drug Eluting, Multi Vessel, Coronary  Angiogram, Coronary, with Left Heart Cath      Hospital Course:   Mr. Aviles is a 69-year-old male who was admitted with an NSTEMI.  Troponin elevated up to 2.3.  Started on heparin drip, aspirin, statin and Plavix.  Cardiology consulted.  Patient underwent left heart catheterization with PCI to mid left circumflex and proximal RPDA.  Patient remained chest pain-free.  Patient developed a flutter after procedure and transferred to ICU on amiodarone drip.  He converted to sinus rhythm spontaneously amiodarone stopped.  Discussed with Cardiology, he will complete 5 more days of aspirin along with Plavix and Xarelto.  He will then continue on Plavix and Xarelto until follow-up with Cardiology team.  Discussed all of the above with patient prior to discharge.  All questions answered and patient discharged home in stable condition.         Goals of Care Treatment Preferences:  Code Status: Full Code      SDOH Screening:  The patient declined to be screened for utility difficulties, food insecurity, transport difficulties, housing insecurity, and interpersonal safety, so no concerns could be identified this admission.     Consults:   Consults (From admission, onward)          Status Ordering Provider     Inpatient consult to Registered Dietitian/Nutritionist  Once        Provider:  (Not yet assigned)    Completed MICHAEL MANZO     Inpatient consult to Social Work/Case Management  Once        Provider:  (Not yet assigned)    Completed MICHAEL MANZO     Inpatient consult to Cardiology  Once        Provider:  Yury Gamboa MD    " Completed MICHAEL MAZNO            * NSTEMI (non-ST elevated myocardial infarction)  Presented atypical chest pain  Pain said to be 8/10 in severity, non-radiating  History of atrial flutter status post ablation, hypertension and hyperlipidemia  EKG did not show any acute ST-T wave elevation  Pain resolved.  Aspirin and Plavix loaded, continued atorvastatin  Will continue heparin drip for non ST elevation myocardial infarction  [facility] with multivessel, underwent PCI x2 -- mid Lcx and prox PDA      Obesity  Body mass index is 33 kg/m².   Continue lifestyle modification  Morbid obesity complicates all aspects of disease management from diagnostic modalities to treatment.   Weight loss encouraged and health benefits explained to patient.         GERD (gastroesophageal reflux disease)  History of gastroesophageal reflux disease  Thought symptoms were due to GERD  Continue home dose of pantoprazole for now  Add maalox      History of cardiac radiofrequency ablation (RFA)  History of atrial fibrillation status post radiofrequency ablation        Atrial flutter  History of atrial flutter status post ablation, developed aflutter after [facility]  He has not been on long-term anticoagulation.  - briefly on IV amiodarone drip, now in sinus rhythm.  - on xarelto - will discuss with Cardiology about need for triple therapy - (DAPT + anticoagulation)      Essential hypertension  Patient's blood pressure range in the last 24 hours was: BP  Min: 127/78  Max: 137/82.The patient's inpatient anti-hypertensive regimen is listed below:  Current Antihypertensives  hydroCHLOROthiazide tablet 12.5 mg, Daily, Oral  losartan tablet 50 mg, Daily, Oral  nitroGLYCERIN SL tablet 0.4 mg, Every 5 min PRN, Sublingual  nitroGLYCERIN in D5W 200 mcg/mL vial, As needed (PRN),   verapamiL injection, As needed (PRN),   nitroGLYCERIN SL tablet 0.4 mg, Every 5 min PRN, Sublingual    Plan  - Bp better controlled.  Continue current regimen.      Final Active  Diagnoses:    Diagnosis Date Noted POA    PRINCIPAL PROBLEM:  NSTEMI (non-ST elevated myocardial infarction) [I21.4] 02/09/2025 Yes    Abdominal aortic atherosclerosis [I70.0] 05/22/2024 Yes    Obesity [E66.9] 06/02/2023 Yes    GERD (gastroesophageal reflux disease) [K21.9]  Yes    History of cardiac radiofrequency ablation (RFA) [Z98.890] 03/13/2019 Not Applicable    Atrial flutter [I48.92] 01/11/2019 Yes    Essential hypertension [I10] 12/07/2016 Yes      Problems Resolved During this Admission:       Discharged Condition: stable    Disposition: Home or Self Care    Follow Up:   Follow-up Information       Basil Grover MD. Go on 2/14/2025.    Specialty: Family Medicine  Why: Appointment schedule for 2:30pm  Contact information:  [address] [facility]  [place] [address]  [phone]                           Patient Instructions:      REASON FOR NOT PRESCRIBING ANTIPLATELET MEDICATION AT DISCHARGE     Order Specific Question Answer Comments   Reason for not Prescribing: Other (Comment)        Significant Diagnostic Studies: Labs: All labs within the past 24 hours have been reviewed    Pending Diagnostic Studies:       Procedure Component Value Units Date/Time    EKG 12-lead [[id number]]     Order Status: Sent Lab Status: No result            Medications:  Reconciled Home Medications:      Medication List        START taking these medications      clopidogreL 75 mg tablet  Commonly known as: PLAVIX  Take 1 tablet (75 mg total) by mouth once daily.     metoprolol succinate 25 MG 24 hr tablet  Commonly known as: TOPROL-XL  Take ½ tablet by mouth once daily.     nitroGLYCERIN 0.4 MG SL tablet  Commonly known as: NITROSTAT  Place 1 tablet (0.4 mg total) under the tongue every 5 (five) minutes as needed for Chest pain (Max of 3 tablets in 15 minutes).     XARELTO 20 mg Tab  Generic drug: rivaroxaban  Take 1 tablet (20 mg total) by mouth daily with dinner or evening meal.            CHANGE how you take these  medications      aspirin 81 MG EC tablet  Commonly known as: ECOTRIN  Take 1 tablet (81 mg total) by mouth once daily. Last dose 2/16/25 for 4 days  What changed: additional instructions            CONTINUE taking these medications      cyanocobalamin 100 MCG tablet  Commonly known as: VITAMIN B-12  Take 100 mcg by mouth once daily.     hydroCHLOROthiazide 12.5 mg capsule  Commonly known as: MICROZIDE  Take 1 capsule (12.5 mg total) by mouth once daily.     losartan 50 MG tablet  Commonly known as: COZAAR  Take 1 tablet (50 mg total) by mouth once daily.     pantoprazole 40 MG tablet  Commonly known as: PROTONIX  Take 40 mg by mouth once daily.     PROBIOTIC ORAL  Take 1 capsule by mouth once daily.     rosuvastatin 20 MG tablet  Commonly known as: CRESTOR  Take 1 tablet (20 mg total) by mouth once daily.     tamsulosin 0.4 mg Cap  Commonly known as: FLOMAX  Take 1 capsule by mouth every evening.     vitamin D 1000 units Tab  Commonly known as: VITAMIN D3  Take 8,000 Units by mouth once daily.              Indwelling Lines/Drains at time of discharge:   Lines/Drains/Airways       None                   Time spent on the discharge of patient: >35 minutes         Satish Mckenzie MD  Department of Hospital Medicine  [facility] - [place], [place] - [facility]

## 2025-02-26 ENCOUNTER — OFFICE VISIT (OUTPATIENT)
Dept: CARDIOLOGY | Facility: CLINIC | Age: 70
End: 2025-02-26
Payer: MEDICARE

## 2025-02-26 VITALS
SYSTOLIC BLOOD PRESSURE: 126 MMHG | WEIGHT: 225.31 LBS | HEIGHT: 70 IN | HEART RATE: 54 BPM | BODY MASS INDEX: 32.26 KG/M2 | DIASTOLIC BLOOD PRESSURE: 78 MMHG | RESPIRATION RATE: 18 BRPM | OXYGEN SATURATION: 97 %

## 2025-02-26 DIAGNOSIS — I25.10 CORONARY ARTERY DISEASE, UNSPECIFIED VESSEL OR LESION TYPE, UNSPECIFIED WHETHER ANGINA PRESENT, UNSPECIFIED WHETHER NATIVE OR TRANSPLANTED HEART: Primary | ICD-10-CM

## 2025-02-26 DIAGNOSIS — I21.4 NSTEMI (NON-ST ELEVATION MYOCARDIAL INFARCTION): ICD-10-CM

## 2025-02-26 PROCEDURE — 99999 PR PBB SHADOW E&M-EST. PATIENT-LVL IV: CPT | Mod: PBBFAC,,, | Performed by: INTERNAL MEDICINE

## 2025-02-26 NOTE — PROGRESS NOTES
CARDIOVASCULAR PROGRESS NOTE    REASON FOR CONSULT:   Jules Aviles is a 69 y.o. male who presents for follow up after recent hospitalization for NSTEMI.    He follows up with Dr. Lipscomb.    HISTORY OF PRESENT ILLNESS:     He has past medical history of hypertension, hyperlipidemia, paroxysmal atrial flutter status post ablation (2019) and GERD.    He was in the hospital in February, 2025 for evaluation of chest pain.  He underwent coronary angiography and was found to have two-vessel disease.  Successful IVUS guided PCI of mid LCx (2.5x16 Synergy DUONG) and IVUS guided PCI of prox RPDA 2.5x12 was performed by Dr. Lipscomb.  He was discharged home on triple therapy (baby aspirin (for 1 week after PCI), Plavix 75 mg and Xarelto 20 mg daily).    He is here today for follow up.  He has been compliant with his medications including Xarelto and Plavix.  Right radial arterial access site without any issues.  No recurrence of chest pain.    He is able to perform everyday life activities without issues.  No orthopnea or PND.  No lower extremity swelling.    PAST MEDICAL HISTORY:     Past Medical History:   Diagnosis Date    Essential hypertension 12/07/2016    GERD (gastroesophageal reflux disease)        PAST SURGICAL HISTORY:     Past Surgical History:   Procedure Laterality Date    ANGIOGRAM, CORONARY, WITH LEFT HEART CATHETERIZATION  2/10/2025    Procedure: Angiogram, Coronary, with Left Heart Cath;  Surgeon: Steven Lipscomb MD;  Location: [facility] CATH LAB;  Service: Cardiology;;    APPENDECTOMY      as a child    CORONARY ANGIOGRAPHY N/A 2/10/2025    Procedure: ANGIOGRAM, CORONARY ARTERY;  Surgeon: Steven Lipscomb MD;  Location: [facility] CATH LAB;  Service: Cardiology;  Laterality: N/A;  Start Time: 11:45 AM - Right Radial Artery    EXCISION, SMALL INTESTINE  8/9/2023    Procedure: EXCISION, SMALL INTESTINE;  Surgeon: Jn Yusuf MD;  Location: [facility] OR;  Service: General;;    GALLBLADDER SURGERY  2004    HERNIA  REPAIR  2004 & 2005    IVUS, CORONARY  2/10/2025    Procedure: IVUS, Coronary;  Surgeon: Steven Lipscomb MD;  Location: [facility] CATH LAB;  Service: Cardiology;;    LAPAROTOMY, EXPLORATORY N/A 8/9/2023    Procedure: LAPAROTOMY, EXPLORATORY;  Surgeon: Jn Yusuf MD;  Location: [facility] OR;  Service: General;  Laterality: N/A;    PERCUTANEOUS CORONARY INTERVENTION, ARTERY N/A 2/10/2025    Procedure: Percutaneous coronary intervention;  Surgeon: Steven Lipscomb MD;  Location: [facility] CATH LAB;  Service: Cardiology;  Laterality: N/A;    STENT, DRUG ELUTING, MULTI VESSEL, CORONARY  2/10/2025    Procedure: Stent, Drug Eluting, Multi Vessel, Coronary;  Surgeon: Steven Lipscomb MD;  Location: [facility] CATH LAB;  Service: Cardiology;;    TONSILLECTOMY, ADENOIDECTOMY      as a child       ALLERGIES AND MEDICATION:   Review of patient's allergies indicates:  No Known Allergies     Medication List            Accurate as of February 26, 2025 11:38 AM. If you have any questions, ask your nurse or doctor.                CONTINUE taking these medications      clopidogreL 75 mg tablet  Commonly known as: PLAVIX  Take 1 tablet (75 mg total) by mouth once daily.     cyanocobalamin 100 MCG tablet  Commonly known as: VITAMIN B-12     hydroCHLOROthiazide 12.5 mg capsule  Commonly known as: MICROZIDE  Take 1 capsule (12.5 mg total) by mouth once daily.     losartan 50 MG tablet  Commonly known as: COZAAR  Take 1 tablet (50 mg total) by mouth once daily.     metoprolol succinate 25 MG 24 hr tablet  Commonly known as: TOPROL-XL  Take ½ tablet by mouth once daily.     nitroGLYCERIN 0.4 MG SL tablet  Commonly known as: NITROSTAT  Place 1 tablet (0.4 mg total) under the tongue every 5 (five) minutes as needed for Chest pain (Max of 3 tablets in 15 minutes).     pantoprazole 40 MG tablet  Commonly known as: PROTONIX     PROBIOTIC ORAL     rosuvastatin 20 MG tablet  Commonly known as: CRESTOR  Take 1 tablet (20 mg total) by mouth once  "daily.     tamsulosin 0.4 mg Cap  Commonly known as: FLOMAX     vitamin D 1000 units Tab  Commonly known as: VITAMIN D3     XARELTO 20 mg Tab  Generic drug: rivaroxaban  Take 1 tablet (20 mg total) by mouth daily with dinner or evening meal.            STOP taking these medications      aspirin 81 MG EC tablet  Commonly known as: ECOTRIN  Stopped by: Yury Gamboa MD              SOCIAL HISTORY:   Social History[1]    FAMILY HISTORY:     Family History   Problem Relation Name Age of Onset    Aneurysm Mother      Heart disease Father         REVIEW OF SYSTEMS:   ROS    Constitution: Negative for chills, fever, weight gain and weight loss.   Eyes: Negative for blurry vision, visual changes    Cardiovascular: Negative for chest pain. Negative for claudication, dyspnea on exertion, leg swelling, orthopnea, palpitations, paroxysmal nocturnal dyspnea.   Respiratory: Negative for shortness of breath. Negative for cough.    Endocrine: Negative for heat or cold intolerance    Hematologic/Lymphatic: Negative for easy bruising or bleeding    Skin: Negative for color change and rash.   Musculoskeletal: Negative for neck pain, arthralgias, myalgias    Gastrointestinal: Negative for abdominal pain, nausea, vomiting, diarrhea  Neurological: Negative for dizziness, light-headedness and loss of balance.   Psychiatric/Behavioral: Negative for altered mental status.    PHYSICAL EXAM:     Vitals:    02/26/25 1019   BP: 126/78   Pulse: (!) 54   Resp: 18    Body mass index is 32.33 kg/m².  Weight: 102.2 kg (225 lb 5 oz)   Height: 5' 10" (177.8 cm)     Gen: NAD  Head/Eyes/Ears/Nose: MMM, good dentition   Neck: No carotid bruits, no JVD  Lung: Clear to auscultation bilaterally, no wheezes/rales/ronchi, symmetrical lung expansion with inspiration  Heart: Normal S1/S2, regular rate and rhythm, no murmurs/rubs/gallops  Abdomen: Soft, NT/ND, no masses  Extremities: No lower extremity edema.  No wounds or other skin lesions  Skin: " Normal color and turgor. No wounds rashes, no petechia, no ecchymoses.   Neuro: AAOx3    DATA:     Laboratory:  CBC:  Recent Labs   Lab 02/09/25  0255 02/10/25  0452 02/11/25  0424   WBC 9.44 7.82 11.91   Hemoglobin 15.0 15.1 15.9   Hematocrit 44.2 45.3 48.0   Platelets 208 200 209       CHEMISTRIES:  Recent Labs   Lab 08/12/23  0447 08/13/23  0353 08/14/23  0332 08/15/23  0420 08/16/23  0347 12/23/23  0846 01/26/24  0927 05/06/24  0940 01/31/25  1006 02/08/25  2150 02/11/25  0424   Glucose 103 186 H 219 H 118 H 118 H 176 H 112 H  --   --  139 H 110   Sodium 139 137 137 138 142 139 139 139 140 139 139   Potassium 3.8 3.0 L 2.8 L 3.2 L 3.5 3.6 4.3 3.8 4.1 4.0 4.1   BUN 14 11 8 8 7 L 12  --  23.0 17.2 21 13   Blood Urea Nitrogen  --   --   --   --   --   --  17  --   --   --   --    Creatinine 0.8 0.8 0.8 0.8 0.8 0.9 0.92 0.97 0.88 1.0 1.0   eGFR >60 >60 >60 >60 >60 >60 92 86 L 94 >60 >60   Calcium 8.9 8.2 L 8.2 L 8.1 L 8.5 L 9.7 9.6 9.8 9.7 9.9 9.5   Magnesium 1.7 1.6 1.5 L 1.9 1.8  --   --   --   --   --   --        CARDIAC BIOMARKERS:  Recent Labs   Lab 02/08/25  2318 02/09/25  0117 02/09/25  0254   Troponin I 0.602 H 1.315 H 2.349 H       HBA1C:  Hemoglobin A1C   Date Value Ref Range Status   02/09/2025 5.5 4.0 - 5.6 % Final     Comment:     ADA Screening Guidelines:  5.7-6.4%  Consistent with prediabetes  >or=6.5%  Consistent with diabetes    High levels of fetal hemoglobin interfere with the HbA1C  assay. Heterozygous hemoglobin variants (HbS, HgC, etc)do  not significantly interfere with this assay.   However, presence of multiple variants may affect accuracy.          COAGS:  Recent Labs   Lab 08/23/22  1158 12/23/23  0846 02/09/25  0255   INR 1.0 1.1 1.0  1.0       LIPIDS/LFTS:  Recent Labs   Lab 01/26/24  0927 01/31/25  1006 02/08/25  2150 02/09/25  0254   Cholesterol  --  126  --  119 L   Triglycerides  --  109  --  79   HDL  --  49  --  43   LDL Cholesterol  --   --   --  60.2 L   LDL Calculated  --  55   --   --    Non-HDL Cholesterol  --  77  --  76   AST 16 23 29  --    ALT 19 29 35  --          CARDIAC DIAGNOSTICS:  :     EKG:  EKG done on 9th February, 2025 at 00:48 showed sinus rhythm and no STT wave change    ECHO  2/2025    Left Ventricle: The left ventricle is normal in size. Normal wall thickness. There is normal systolic function with a visually estimated ejection fraction of 60 - 65%. There is normal diastolic function. Normal left ventricular filling pressure.    Right Ventricle: Normal right ventricular cavity size. Systolic function is normal.    Left Atrium: Left atrium is mildly dilated.    Pulmonary Artery: The estimated pulmonary artery systolic pressure is 19 mmHg.    IVC/SVC: Normal venous pressure at 3 mmHg.    3.  STRESS TEST  NA    4.  CARDIAC CATHETERIZATION  2/2025  Dominance: Right  LM: MLI  LAD: prox 50%, mid 50%, dist 90% (T3 flow)  LCx: mid 99% (culprit) with T1 flow into OM3/dist LCx, george from RCA noted)   RCA: MLI              RPDA: prox 95%     PCI mid LCx:  Preop ASA/Plavix/heparin  Predil 2.0x12  Stent 2.5x16 Synergy DUONG  Post-IVUS with good stent expansion/apposition, no dissections.  Excellent angiographic result, T3 flow, 0% residual stenosis.     PCI prox RPDA:  Predil 2.0x12  Stent 2.5x12 Synergy DUONG  Post-IVUS with minimal mid underexpansion, otherwise good apposition and no dissections.  Postdil 2.75x12 NC 18 elysia  Excellent angiographic result, T3 flow, 0% residual stenosis.    5.  IMAGING   CT abdomen pelvis done in December, 2023 showed mild aortic and coronary calcification    6. OTHERS  A1C 5.5 (2/2025)  LDL 60.2 (2/2025)    ASSESSMENT:   CAD status post two-vessel PCI (Mid LCx 2.5x16 Synergy and prox RPDA 2.5x12 synergy - 2/2025)  Hypertension  Hyperlipidemia   Paroxysmal atrial flutter status post ablation (2019) now on Xarelto  Mild aortic calcification  Obesity    PLAN:   Continue Plavix 75 mg daily  Continue rosuvastatin 20 mg daily.  LDL at goal  Heart rate under  control.  Continue metoprolol succinate 12.5 mg daily  Continue Xarelto 20 mg daily  Blood pressure stable.  Continue losartan 50 mg daily and metoprolol succinate 12.5 mg daily  Referral sent for cardiac rehab  Sleep study to be ordered by PCP to rule out YOANNA  Dietary restriction and daily exercise    Follow up in 3 months    Yury Gamboa MD  [facility]           [1]   Social History  Socioeconomic History    Marital status: Single   Tobacco Use    Smoking status: Never    Smokeless tobacco: Never   Substance and Sexual Activity    Alcohol use: No    Drug use: No    Sexual activity: Yes     Social Drivers of Health     Financial Resource Strain: Patient Declined (2/9/2025)    Overall Financial Resource Strain (CARDIA)     Difficulty of Paying Living Expenses: Patient declined   Food Insecurity: Patient Declined (2/9/2025)    Hunger Vital Sign     Worried About Running Out of Food in the Last Year: Patient declined     Ran Out of Food in the Last Year: Patient declined   Transportation Needs: Patient Declined (2/9/2025)    TRANSPORTATION NEEDS     Transportation : Patient declined   Physical Activity: Sufficiently Active (6/28/2023)    Exercise Vital Sign     Days of Exercise per Week: 5 days     Minutes of Exercise per Session: 30 min   Stress: Patient Declined (2/9/2025)    [employer] - Occupational Stress Questionnaire     Feeling of Stress : Patient declined   Housing Stability: Patient Declined (2/9/2025)    Housing Stability Vital Sign     Unable to Pay for Housing in the Last Year: Patient declined     Homeless in the Last Year: Patient declined

## 2025-03-27 ENCOUNTER — TELEPHONE (OUTPATIENT)
Dept: CARDIOLOGY | Facility: CLINIC | Age: 70
End: 2025-03-27
Payer: MEDICARE

## 2025-03-27 NOTE — TELEPHONE ENCOUNTER
----- Message from Santos Brand sent at 3/27/2025 10:28 AM CDT -----  Type:  Sooner Appointment RequestPatient is requesting a sooner appointment.  Patient declined first available appointment listed as well as another facility and provider .  Patient will not accept being placed on the waitlist and is requesting a message be sent to doctor.Name of Caller:Pt When is the first available appointment?5/15Symptoms:Discuss stroke Would the patient rather a call back or a response via [facility]?Callback Best Call Back Number:Telephone Information:Mobile          [phone] Additional Information: Prefers [facility] location

## 2025-04-03 ENCOUNTER — OFFICE VISIT (OUTPATIENT)
Dept: CARDIOLOGY | Facility: CLINIC | Age: 70
End: 2025-04-03
Payer: MEDICARE

## 2025-04-03 VITALS
SYSTOLIC BLOOD PRESSURE: 134 MMHG | BODY MASS INDEX: 32.07 KG/M2 | HEART RATE: 57 BPM | OXYGEN SATURATION: 96 % | HEIGHT: 70 IN | RESPIRATION RATE: 18 BRPM | DIASTOLIC BLOOD PRESSURE: 74 MMHG | WEIGHT: 224 LBS

## 2025-04-03 DIAGNOSIS — I48.0 PAF (PAROXYSMAL ATRIAL FIBRILLATION): ICD-10-CM

## 2025-04-03 DIAGNOSIS — I10 ESSENTIAL HYPERTENSION: ICD-10-CM

## 2025-04-03 DIAGNOSIS — I25.10 CORONARY ARTERY DISEASE INVOLVING NATIVE CORONARY ARTERY OF NATIVE HEART WITHOUT ANGINA PECTORIS: ICD-10-CM

## 2025-04-03 DIAGNOSIS — Z01.810 PREOP CARDIOVASCULAR EXAM: ICD-10-CM

## 2025-04-03 DIAGNOSIS — Z98.890 HISTORY OF CARDIAC RADIOFREQUENCY ABLATION (RFA): ICD-10-CM

## 2025-04-03 DIAGNOSIS — I21.4 NSTEMI (NON-ST ELEVATED MYOCARDIAL INFARCTION): Primary | ICD-10-CM

## 2025-04-03 DIAGNOSIS — E78.2 MIXED HYPERLIPIDEMIA: ICD-10-CM

## 2025-04-03 DIAGNOSIS — Z98.61 CAD S/P PERCUTANEOUS CORONARY ANGIOPLASTY: ICD-10-CM

## 2025-04-03 DIAGNOSIS — Z79.01 CHRONIC ANTICOAGULATION: ICD-10-CM

## 2025-04-03 DIAGNOSIS — I25.10 CAD S/P PERCUTANEOUS CORONARY ANGIOPLASTY: ICD-10-CM

## 2025-04-03 DIAGNOSIS — E66.9 NON MORBID OBESITY, UNSPECIFIED OBESITY TYPE: ICD-10-CM

## 2025-04-03 PROCEDURE — 99999 PR PBB SHADOW E&M-EST. PATIENT-LVL IV: CPT | Mod: PBBFAC,,, | Performed by: INTERNAL MEDICINE

## 2025-04-03 RX ORDER — CLOPIDOGREL BISULFATE 75 MG/1
75 TABLET ORAL DAILY
Qty: 90 TABLET | Refills: 3 | Status: SHIPPED | OUTPATIENT
Start: 2025-04-03

## 2025-04-03 NOTE — LETTER
April 3, 2025        Basil Grover MD  [address]  [place] [address]             [facility]  [address]  [address]  [place] [address]  Phone: [phone]   Patient: Jules Aviles   MR Number: [id number]   YOB: 1955   Date of Visit: 4/3/2025       Dear Dr. Grover:    Thank you for referring Jules Aviles to me for evaluation. Attached you will find relevant portions of my assessment and plan of care.    If you have questions, please do not hesitate to call me. I look forward to following Jules Aviles along with you.    Sincerely,      Steven Lipscomb MD            CC  No Recipients    Enclosure

## 2025-04-28 RX ORDER — ROSUVASTATIN CALCIUM 20 MG/1
20 TABLET, COATED ORAL DAILY
Qty: 90 TABLET | Refills: 3 | Status: SHIPPED | OUTPATIENT
Start: 2025-04-28 | End: 2026-04-28

## 2025-04-28 NOTE — TELEPHONE ENCOUNTER
Spoke with Nikhil from [facility] and she stated that patient wanted Rosuvastatin sent to [facility] pharmacy instead for a cheaper co-pay. Please advise

## 2025-04-28 NOTE — TELEPHONE ENCOUNTER
----- Message from Bryan sent at 4/28/2025  9:10 AM CDT -----  Contact: Andi  Type:  Patient CallWho Called: [employer] - Andi Does the patient know what this is regarding?: Requesting a call back to discuss having the Rx rosuvastatin (CRESTOR) 20 MG tablet being sent to the [facility] at a cheaper coset to the patient ; please advise Best Call Back Number:[phone] call if any question other wise just send to the pharmacy below Additional Information:  [facility]

## (undated) DEVICE — PACK CATH LAB

## (undated) DEVICE — OMNIPAQUE CONTRAST 350MG/100ML

## (undated) DEVICE — PRESTO INFLATION DEVICE

## (undated) DEVICE — SUT SILK 3-0 SH 18IN BLACK

## (undated) DEVICE — SHEATH HEMOSTASIS 8.5FR

## (undated) DEVICE — CATH DS 7F 8MM THERMOCOUPLE

## (undated) DEVICE — PATCH ENSITE PRECISION NAVX SE

## (undated) DEVICE — SUT VICRYL PLUS 3-0 SH 18IN

## (undated) DEVICE — SOL NACL IRR 1000ML BTL

## (undated) DEVICE — GUIDEWIRE PROWATER .014X180CM

## (undated) DEVICE — KIT ESSENTIALS W/ Y ADAPTER

## (undated) DEVICE — APPLICATOR CHLORAPREP ORN 26ML

## (undated) DEVICE — SUT 2-0 12-18IN SILK

## (undated) DEVICE — SUT VICRYL 3-0 27 SH

## (undated) DEVICE — INTRODUCER HEMOSTASIS 6.5FR

## (undated) DEVICE — COVER OVERHEAD SURG LT BLUE

## (undated) DEVICE — TRAY FOLEY 16FR INFECTION CONT

## (undated) DEVICE — PAD DEFIB CADENCE ADULT R2

## (undated) DEVICE — ELECTRODE POLYHESIVEPRE-ATTACH

## (undated) DEVICE — GUIDE LAUNCHER 6FR JR 4.0

## (undated) DEVICE — PACK ENDOSCOPY GENERAL

## (undated) DEVICE — WIRE GUIDE SAFE-T-J .035 260CM

## (undated) DEVICE — INTRODUCER HEMOSTASIS 7.5F

## (undated) DEVICE — SPONGE LAP 18X18 PREWASHED

## (undated) DEVICE — Device

## (undated) DEVICE — KIT SYR REUSABLE

## (undated) DEVICE — SEE MEDLINE ITEM 157110

## (undated) DEVICE — KIT MANIFOLD LOW PRESS TUBING

## (undated) DEVICE — PAD RADI FEMORAL

## (undated) DEVICE — ELECTRODE REM PLYHSV RETURN 9

## (undated) DEVICE — TIP YANKAUERS BULB NO VENT

## (undated) DEVICE — PACK EP DRAPE

## (undated) DEVICE — INTRO 8.5FR 63CM SRO

## (undated) DEVICE — TOWEL OR DISP STRL BLUE 4/PK

## (undated) DEVICE — BAND TR WITH INFLATOR

## (undated) DEVICE — SUT 1 36IN PDS II

## (undated) DEVICE — CATH TRICUSPID HALO XP 7FRX110

## (undated) DEVICE — SUT PDS PLUS 1 TP1 96IN

## (undated) DEVICE — KIT PROBE COVER WITH GEL

## (undated) DEVICE — CATH JACKY RADIAL 3.5 110CM

## (undated) DEVICE — SUT 3-0 12-18IN SILK

## (undated) DEVICE — CUTTER PROXIMATE BLUE 75MM

## (undated) DEVICE — CATH EAGLE EYE PLATINUM

## (undated) DEVICE — GLOVE BIOGEL 7.5

## (undated) DEVICE — CATH LNCHR EB35 6F 110CM

## (undated) DEVICE — CATH EMERGE MR 12 X 2.00

## (undated) DEVICE — CATH NC EMERGE MR 2.75X12MM

## (undated) DEVICE — BLANKET UPPER BODY 78.7X29.9IN

## (undated) DEVICE — SUT 3-0 VICRYL / SH (J416)

## (undated) DEVICE — CATH RESPONSE QPLR JSN 6F 120

## (undated) DEVICE — CANISTER SUCTION 2 LTR

## (undated) DEVICE — VALVE CONTROL COPILOT

## (undated) DEVICE — KIT GLIDESHEATH SLEND 6FR 10CM

## (undated) DEVICE — RELOAD PROXIMATE CUT BLUE 55MM

## (undated) DEVICE — SEALER LIGASURE IMPACT 18CM

## (undated) DEVICE — CATH BIDIRECTIONAL DF CRV 7FR

## (undated) DEVICE — ANGIOTOUCH KIT